# Patient Record
Sex: FEMALE | Race: WHITE | NOT HISPANIC OR LATINO | Employment: OTHER | URBAN - METROPOLITAN AREA
[De-identification: names, ages, dates, MRNs, and addresses within clinical notes are randomized per-mention and may not be internally consistent; named-entity substitution may affect disease eponyms.]

---

## 2017-01-09 ENCOUNTER — GENERIC CONVERSION - ENCOUNTER (OUTPATIENT)
Dept: OTHER | Facility: OTHER | Age: 72
End: 2017-01-09

## 2017-04-11 ENCOUNTER — GENERIC CONVERSION - ENCOUNTER (OUTPATIENT)
Dept: OTHER | Facility: OTHER | Age: 72
End: 2017-04-11

## 2017-04-12 ENCOUNTER — ALLSCRIPTS OFFICE VISIT (OUTPATIENT)
Dept: OTHER | Facility: OTHER | Age: 72
End: 2017-04-12

## 2017-04-12 LAB
BILIRUB UR QL STRIP: NORMAL
CLARITY UR: NORMAL
COLOR UR: YELLOW
GLUCOSE (HISTORICAL): NORMAL
HGB UR QL STRIP.AUTO: NORMAL
KETONES UR STRIP-MCNC: NORMAL MG/DL
LEUKOCYTE ESTERASE UR QL STRIP: NORMAL
NITRITE UR QL STRIP: NORMAL
PH UR STRIP.AUTO: 5.5 [PH]
PROT UR STRIP-MCNC: NORMAL MG/DL
SP GR UR STRIP.AUTO: 1.02
UROBILINOGEN UR QL STRIP.AUTO: 0.2

## 2017-06-20 ENCOUNTER — TRANSCRIBE ORDERS (OUTPATIENT)
Dept: LAB | Facility: HOSPITAL | Age: 72
End: 2017-06-20

## 2017-06-20 DIAGNOSIS — R03.0 ELEVATED BLOOD PRESSURE READING WITHOUT DIAGNOSIS OF HYPERTENSION: Primary | ICD-10-CM

## 2017-06-27 ENCOUNTER — APPOINTMENT (OUTPATIENT)
Dept: LAB | Facility: HOSPITAL | Age: 72
End: 2017-06-27
Attending: INTERNAL MEDICINE
Payer: COMMERCIAL

## 2017-06-27 DIAGNOSIS — R03.0 ELEVATED BLOOD PRESSURE READING WITHOUT DIAGNOSIS OF HYPERTENSION: ICD-10-CM

## 2017-06-27 LAB — VENIPUNCTURE: NORMAL

## 2017-06-27 PROCEDURE — 80061 LIPID PANEL: CPT | Performed by: INTERNAL MEDICINE

## 2017-06-27 PROCEDURE — 36415 COLL VENOUS BLD VENIPUNCTURE: CPT | Performed by: INTERNAL MEDICINE

## 2017-07-11 ENCOUNTER — ALLSCRIPTS OFFICE VISIT (OUTPATIENT)
Dept: OTHER | Facility: OTHER | Age: 72
End: 2017-07-11

## 2017-07-25 ENCOUNTER — APPOINTMENT (OUTPATIENT)
Dept: LAB | Facility: CLINIC | Age: 72
End: 2017-07-25
Payer: COMMERCIAL

## 2017-07-25 DIAGNOSIS — R53.83 OTHER FATIGUE: ICD-10-CM

## 2017-07-25 LAB — TSH SERPL DL<=0.05 MIU/L-ACNC: 1.97 UIU/ML (ref 0.36–3.74)

## 2017-07-25 PROCEDURE — 84443 ASSAY THYROID STIM HORMONE: CPT

## 2017-07-25 PROCEDURE — 36415 COLL VENOUS BLD VENIPUNCTURE: CPT

## 2017-08-08 ENCOUNTER — GENERIC CONVERSION - ENCOUNTER (OUTPATIENT)
Dept: OTHER | Facility: OTHER | Age: 72
End: 2017-08-08

## 2017-08-10 ENCOUNTER — ALLSCRIPTS OFFICE VISIT (OUTPATIENT)
Dept: OTHER | Facility: OTHER | Age: 72
End: 2017-08-10

## 2017-08-30 ENCOUNTER — TELEPHONE (OUTPATIENT)
Dept: PREADMISSION TESTING | Facility: HOSPITAL | Age: 72
End: 2017-08-30

## 2017-09-01 DIAGNOSIS — I60.8: ICD-10-CM

## 2017-09-01 DIAGNOSIS — R93.0 ABNORMAL FINDINGS ON DIAGNOSTIC IMAGING OF SKULL AND HEAD, NOT ELSEWHERE CLASSIFIED: ICD-10-CM

## 2017-09-01 DIAGNOSIS — I60.9 NONTRAUMATIC SUBARACHNOID HEMORRHAGE (HCC): ICD-10-CM

## 2017-09-05 ENCOUNTER — ANESTHESIA EVENT (OUTPATIENT)
Dept: RADIOLOGY | Facility: HOSPITAL | Age: 72
End: 2017-09-05

## 2017-09-06 ENCOUNTER — HOSPITAL ENCOUNTER (OUTPATIENT)
Dept: RADIOLOGY | Facility: HOSPITAL | Age: 72
Discharge: HOME | End: 2017-09-06
Payer: COMMERCIAL

## 2017-09-06 ENCOUNTER — ANESTHESIA (OUTPATIENT)
Dept: RADIOLOGY | Facility: HOSPITAL | Age: 72
End: 2017-09-06

## 2017-09-06 VITALS
WEIGHT: 166 LBS | BODY MASS INDEX: 24.59 KG/M2 | HEIGHT: 69 IN | RESPIRATION RATE: 18 BRPM | SYSTOLIC BLOOD PRESSURE: 110 MMHG | HEART RATE: 73 BPM | TEMPERATURE: 98.4 F | DIASTOLIC BLOOD PRESSURE: 70 MMHG | OXYGEN SATURATION: 97 %

## 2017-09-06 DIAGNOSIS — I60.9 NONTRAUMATIC SUBARACHNOID HEMORRHAGE (HCC): ICD-10-CM

## 2017-09-06 DIAGNOSIS — I60.8: ICD-10-CM

## 2017-09-06 DIAGNOSIS — R93.0 ABNORMAL FINDINGS ON DIAGNOSTIC IMAGING OF SKULL AND HEAD, NOT ELSEWHERE CLASSIFIED: ICD-10-CM

## 2017-09-06 LAB
BUN SERPL-MCNC: 14 MG/DL (ref 5–25)
CREAT SERPL-MCNC: 0.74 MG/DL (ref 0.6–1.3)
GFR SERPL CREATININE-BSD FRML MDRD: 82 ML/MIN/1.73SQ M

## 2017-09-06 PROCEDURE — 84520 ASSAY OF UREA NITROGEN: CPT | Performed by: PHYSICIAN ASSISTANT

## 2017-09-06 PROCEDURE — 82565 ASSAY OF CREATININE: CPT | Performed by: PHYSICIAN ASSISTANT

## 2017-09-06 PROCEDURE — 70544 MR ANGIOGRAPHY HEAD W/O DYE: CPT

## 2017-09-06 RX ORDER — SODIUM CHLORIDE, SODIUM LACTATE, POTASSIUM CHLORIDE, CALCIUM CHLORIDE 600; 310; 30; 20 MG/100ML; MG/100ML; MG/100ML; MG/100ML
20 INJECTION, SOLUTION INTRAVENOUS CONTINUOUS
Status: DISCONTINUED | OUTPATIENT
Start: 2017-09-06 | End: 2017-09-06

## 2017-09-06 RX ORDER — SODIUM CHLORIDE, SODIUM LACTATE, POTASSIUM CHLORIDE, CALCIUM CHLORIDE 600; 310; 30; 20 MG/100ML; MG/100ML; MG/100ML; MG/100ML
20 INJECTION, SOLUTION INTRAVENOUS CONTINUOUS
Status: DISCONTINUED | OUTPATIENT
Start: 2017-09-06 | End: 2017-09-07 | Stop reason: HOSPADM

## 2017-09-06 RX ADMIN — SODIUM CHLORIDE, SODIUM LACTATE, POTASSIUM CHLORIDE, AND CALCIUM CHLORIDE 20 ML/HR: .6; .31; .03; .02 INJECTION, SOLUTION INTRAVENOUS at 09:48

## 2017-10-03 ENCOUNTER — GENERIC CONVERSION - ENCOUNTER (OUTPATIENT)
Dept: OTHER | Facility: OTHER | Age: 72
End: 2017-10-03

## 2017-11-17 ENCOUNTER — GENERIC CONVERSION - ENCOUNTER (OUTPATIENT)
Dept: OTHER | Facility: OTHER | Age: 72
End: 2017-11-17

## 2017-12-11 DIAGNOSIS — I67.1 NONRUPTURED CEREBRAL ANEURYSM: ICD-10-CM

## 2017-12-11 DIAGNOSIS — R42 DIZZINESS AND GIDDINESS: ICD-10-CM

## 2017-12-11 DIAGNOSIS — E78.5 HYPERLIPIDEMIA: ICD-10-CM

## 2017-12-11 DIAGNOSIS — I77.4 CELIAC ARTERY COMPRESSION SYNDROME (HCC): ICD-10-CM

## 2017-12-11 DIAGNOSIS — R03.0 ELEVATED BLOOD PRESSURE READING WITHOUT DIAGNOSIS OF HYPERTENSION: ICD-10-CM

## 2017-12-13 ENCOUNTER — TRANSCRIBE ORDERS (OUTPATIENT)
Dept: LAB | Facility: CLINIC | Age: 72
End: 2017-12-13

## 2017-12-13 ENCOUNTER — APPOINTMENT (OUTPATIENT)
Dept: LAB | Facility: CLINIC | Age: 72
End: 2017-12-13
Payer: COMMERCIAL

## 2017-12-13 DIAGNOSIS — I77.4 CELIAC ARTERY COMPRESSION SYNDROME (HCC): ICD-10-CM

## 2017-12-13 DIAGNOSIS — R42 DIZZINESS AND GIDDINESS: ICD-10-CM

## 2017-12-13 DIAGNOSIS — R03.0 ELEVATED BLOOD PRESSURE READING WITHOUT DIAGNOSIS OF HYPERTENSION: ICD-10-CM

## 2017-12-13 DIAGNOSIS — E78.5 HYPERLIPIDEMIA: ICD-10-CM

## 2017-12-13 LAB
CHOLEST SERPL-MCNC: 279 MG/DL (ref 50–200)
HDLC SERPL-MCNC: 82 MG/DL (ref 40–60)
LDLC SERPL CALC-MCNC: 179 MG/DL (ref 0–100)
TRIGL SERPL-MCNC: 92 MG/DL

## 2017-12-13 PROCEDURE — 80061 LIPID PANEL: CPT

## 2017-12-13 PROCEDURE — 36415 COLL VENOUS BLD VENIPUNCTURE: CPT

## 2017-12-14 ENCOUNTER — ALLSCRIPTS OFFICE VISIT (OUTPATIENT)
Dept: OTHER | Facility: OTHER | Age: 72
End: 2017-12-14

## 2017-12-16 NOTE — PROGRESS NOTES
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)   · This is very small and is either a blister aneurysm or a small infundibulum there is also    an additional lenticulostriate abnormality  2  Constipation (564 00) (K59 00)  3  Hyperlipidemia (272 4) (E78 5)  4  Stress incontinence, female (625 6) (N39 3)    Plan  Aneurysm of anterior cerebral artery    · (1) CBC/PLT/DIFF; Status:Active - Retrospective Authorization; Requested for:14Ivv5845;   · (1) COMPREHENSIVE METABOLIC PANEL; Status:Active - Retrospective Authorization; Requested for:18Ylg2664; Anxiety disorder    · Renew: ALPRAZolam 0 25 MG Oral Tablet; TAKE ONE TABLET THREE TIMES A DAY ASNEEDED  Health Maintenance    · We have prescribed Kegel exercises to be done in a set of 15 repetitions, 3 times a day  ;Status:Complete;   Done:1 1,2  [id number] 1,2   Check blood work  As above  Recheck 4 months  1 Amended By: Kassi De La Rosa; Dec 14 2017 10:52 PM EST   2 Amended By: Kassi De La Rosa; Dec 14 2017 10:52 PM EST    Discussion/Summary    1  Hypertension-controlled2  Hyperlipidemia-unable to tolerate statins or red yeast rice  Suggest she consider Zetia  Reminded about low-fat diet  3  History of subarachnoid hemorrhage-4  Family history of cerebral aneurysms-will be getting annual MRA a as per recommend neuro 1  surgery's recommendations5  Change in bowel movements-most likely related to the flaxseed  She will stop this  Had colonoscopy 2015  6  Urinary incontinence-timed voiding helps  7  1  HM-immunizations up-to-date  Has mammogram ordered by Juan C Gary physician       1 Amended By: Kassi De La Rosa; Dec 14 2017 10:50 PM EST    Chief Complaint  The patient is here today for a follow up  History of Present Illness  Fuller Games is doing well except she has a problem with constipation  BM's are pellets but now fewer but more frequent  Has been taking flaxseed  She was having trouble with incontinence after her surgery   She is doing timed voiding  She saw Cardiology  She is unable to tolerate statins  Red yeast rice was suggested  She tried this but also got leg pain  She has now been taking flaxseed   She saw neurosurgery for f/u of her SAH  Annual MRA recommended due to Jose Jones of cerebral aneurysns  Review of Systems  Preventive Quality 65 and Older:1  The patient is currently experiencing urinary symptoms  1  Urinary Incontinence Symptoms includes: 1        1 Amended By: Viv Lepe; Dec 14 2017 10:52 PM EST    Active Problems  1  Abdominal pain (789 00) (R10 9)  2  Abnormal computed tomography angiography of head (793 0) (R93 0)  3  Abnormal findings on diagnostic imaging of breast (793 89) (R92 8)  4  Allergic rhinitis (477 9) (J30 9)  5  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)  6  Anxiety disorder (300 00) (F41 9)  7  Arthritis (716 90) (M19 90)  8  Breast cancer (174 9) (C50 919)  9  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)  10  Colonoscopy (Fiberoptic)  11  Constipation (564 00) (K59 00)  12  Depression (311) (F32 9)  13  Dizziness (780 4) (R42)  14  Elevated blood pressure, situational (796 2) (R03 0)  15  Encounter for removal of sutures (V58 32) (Z48 02)  16  Encounter for routine gynecological examination (V72 31) (Z01 419)  17  Esophageal reflux (530 81) (K21 9)  18  Fatigue (780 79) (R53 83)  19  Flu vaccine need (V04 81) (Z23)  20  Gait disorder (781 2) (R26 9)  21  Hydrocephalus (331 4) (G91 9)  22  Hyperlipidemia (272 4) (E78 5)  23  Lethargy (780 79) (R53 83)  24  Lump of skin (782 2) (R22 9)  25  MCI (mild cognitive impairment) (331 83) (G31 84)  26  Memory deficits (780 93) (R41 3)  27  Nasal congestion (478 19) (R09 81)  28  Need for pneumococcal vaccination (V03 82) (Z23)  29  Osteoarthritis of hip (715 95) (M16 9)  30  Osteopenia (733 90) (M85 80)  31  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)  32  Pain of both hip joints (719 45) (M25 551,M25 552)  33   Preprocedural examination (C92 84) (Z01 818)  34  Stress incontinence, female (625 6) (N39 3)  35  Subarachnoidal hemorrhage (430) (I60 9)  36  Urinary retention (788 20) (R33 9)  37  Urinary tract infection (599 0) (N39 0)  38  Weakness of both lower extremities (729 89) (R29 898)    Past Medical History  1  History of Bone Density Studies Dual-Energy X-ray Absorptiometry  2  Colonoscopy (Fiberoptic)  3  History of Encounter for screening mammogram for malignant neoplasm of breast (V76 12) (Z12 31)  4  History of hypertension (V12 59) (Z86 79)  5  History of Screening for colon cancer (V76 51) (Z12 11)  6  History of Stroke, hemorrhagic (431) (I61 9)  7  History of Thyroid disorder screen (V77 0) (Z13 29)    Surgical History  1  History of Breast Lumpectomy Location  2  History of Diagnostic Esophagogastroduodenoscopy  3  History of Hip Replacement  4  History of Hysterectomy  5  History of Simple Bunion Exostectomy (Silver Procedure)    Family History  Mother   1  Family history of Arthritis (V17 7)  2  Family history of Bone Cancer  3  Family history of Family Health Status Of Mother -   3  Family history of Hypertension (V17 49)  Father   5  Family history of Family Health Status Of Father -   10  Family history of Stroke Syndrome (V17 1)  Maternal Grandmother   7  Family history of cerebrovascular accident (CVA) (V17 1) (Z82 3)    Social History   · Alcohol Use (History)   · Caffeine Use   ·    · Never a smoker    Current Meds  1  ALPRAZolam 0 25 MG Oral Tablet; TAKE ONE TABLET THREE TIMES A DAY AS NEEDED; Therapy: 24XMH4899 to (Last Rx:2017) Ordered  2  Azithromycin 250 MG Oral Tablet; Take two tablets one hour prior to procedure; Therapy: 67SBY2707 to (Last Rx:41Efs7547)  Requested for: 34Kdz0194 Ordered  3  Caltrate 600+D TABS; Take 1 tablet daily; Therapy: ([phone]) to Recorded  4  Flonase Allergy Relief 50 MCG/ACT Nasal Suspension; USE AS DIRECTED Recorded  5   HM Vitamin B12 TABS; TAKE 1 TABLET DAILY; Therapy: [phone] to Recorded  6  Multiple Vitamin TABS; TAKE 1 TABLET DAILY; Therapy: (Recorded:01Sep2016) to Recorded  7  PriLOSEC OTC 20 MG Oral Tablet Delayed Release; TAKE 1 TABLET DAILY; Therapy: (Recorded:01Sep2016) to Recorded  8  Sertraline HCl - 100 MG Oral Tablet; TAKE 1 TABLET DAILY; Therapy: 67TWB4439 to (Evaluate:14Apr2018)  Requested for: 80BBW0793; Last Rx:16Oct2017 Ordered  9  Stool Softener TABS; TAKE 1 TABLET DAILY AS DIRECTED; Therapy: [phone]) to Recorded  10  Xalatan 0 005 % Ophthalmic Solution; Therapy: 23BWO3375 to (Last Rx:04Mar2011)  Requested for: 53AZS4488 Ordered    Allergies  1  Bactrim TABS  2  Penicillins  3  Lipitor TABS  4  red yeast rice    Vitals  Vital Signs    Recorded: 14Dec2017 04:16PM   Heart Rate 64   Respiration 18   Systolic 783   Diastolic 68   Weight 997 lb 4 oz   BMI Calculated 24 7   BSA Calculated 1 91     Physical Exam   Constitutional  General appearance: No acute distress, well appearing and well nourished  Pulmonary  Respiratory effort: No increased work of breathing or signs of respiratory distress  Auscultation of lungs: Clear to auscultation  Abdomen  Liver and spleen: No hepatomegaly or splenomegaly  Lymphatic  Palpation of lymph nodes in neck: No lymphadenopathy  Results/Data  (1) LIPID PANEL, FASTING 13Dec2017 09:50AM Owen Domínguez    Order Number: [id number]     Test Name Result Flag Reference   CHOLESTEROL 279 mg/dL H    HDL,DIRECT 82 mg/dL H 40-60   Specimen collection should occur prior to Metamizole administration due to the potential for falsley depressed results     LDL CHOLESTEROL CALCULATED 179 mg/dL H 0-100   Triglyceride:       Normal <150 mg/dl  Borderline High 150-199 mg/dl  High 200-499 mg/dl  Very High >499 mg/dl   Cholesterol:      Desirable <200 mg/dl   Borderline High 200-239 mg/dl   High >239 mg/dl   HDL Cholesterol:      High>59 mg/dL   Low <41 mg/dL   This screening LDL is a calculated result  It does not have the accuracy of the Direct Measured LDL in the monitoring of patients with hyperlipidemia and/or statin therapy  Direct Measure LDL (ZTD106) must be ordered separately in these patients  TRIGLYCERIDES 92 mg/dL  <=150   Specimen collection should occur prior to N-Acetylcysteine or Metamizole administration due to the potential for falsely depressed results  Health Management  Health Maintenance   Medicare Annual Wellness Visit; every 1 year; Next Due: 08WCZ9346; Overdue    Future Appointments    Date/Time Provider Specialty Site   05/23/2018 01:30 PM ABDI Valencia   [address] [facility]     Signatures   Electronically signed by : ABDI Vasquez ; Dec 14 2017 10:49PM EST                       (Author)    Electronically signed by : ABDI Vasquez ; Dec 14 2017 10:52PM EST                       (Author)

## 2017-12-22 ENCOUNTER — GENERIC CONVERSION - ENCOUNTER (OUTPATIENT)
Dept: FAMILY MEDICINE CLINIC | Facility: MEDICAL CENTER | Age: 72
End: 2017-12-22

## 2017-12-29 ENCOUNTER — GENERIC CONVERSION - ENCOUNTER (OUTPATIENT)
Dept: FAMILY MEDICINE CLINIC | Facility: MEDICAL CENTER | Age: 72
End: 2017-12-29

## 2018-01-05 ENCOUNTER — GENERIC CONVERSION - ENCOUNTER (OUTPATIENT)
Dept: FAMILY MEDICINE CLINIC | Facility: MEDICAL CENTER | Age: 73
End: 2018-01-05

## 2018-01-10 NOTE — PROGRESS NOTES
History of Present Illness  Care Coordination Encounter Information:   Type of Encounter: Telephonic   Contact: Initial Contact   Last Office Visit: 03/15/16   Spoke to Patient  Care Coordination  Nurse [address] [place] [address] [address]:   The reason for call is to discuss outreach for follow up/needed services  Previously spoke with patient on 2/29/16 following hospitalization for subarachnoid hemorrhage  Called patient today to follow up on patient's progress and to address any questions or concerns  Patient states that she is "doing good, but I still have my off days " She states that she is logging these days on the calendar  Encouraged patient to note the symptoms and feelings she is having at that time as well and to take the log with her to the physician appointments  Reminded patient of her upcoming geriatric appointment on 4/8/16  Jake Suarez denies any complaints of headache, dizziness, blurred vision or pain  She states that she has occasional weakness to bilateral legs, but denies any falls  She states that she is using her cane to ambulate, which she feels is helping  She states that she is no longer receiving speech therapy, physical therapy or visiting nurses  She states that she is still staying with her daughter at night, however is continuing to come to her home during the daytime hours  At this time, she states that she is currently getting ready to leave for a doctors appointment  She has a routine follow up visit with her breast cancer doctor  Jake Suarez denies any questions or concerns and is encouraged to call myself or the office if needed  Active Problems    1  Abdominal pain (789 00) (R10 9)   2  Abnormal findings on diagnostic imaging of breast (793 89) (R92 8)   3  Allergic rhinitis (477 9) (J30 9)   4  Anxiety disorder (300 00) (F41 9)   5  Arthritis (716 90) (M19 90)   6  Breast cancer (174 9) (C50 919)   7  Colonoscopy (Fiberoptic)   8  Constipation (564 00) (K59 00)   9  Dementia (294 20) (F03 90)   10  Dementia (294 20) (F03 90)   11  Depression (311) (F32 9)   12  Dizziness (780 4) (R42)   13  Encounter for removal of sutures (V58 32) (Z48 02)   14  Encounter for routine gynecological examination (V72 31) (Z01 419)   15  Esophageal reflux (530 81) (K21 9)   16  Flu vaccine need (V04 81) (Z23)   17  Gait disorder (781 2) (R26 9)   18  Hyperlipidemia (272 4) (E78 5)   19  Hypertension (401 9) (I10)   20  Lump of skin (782 2) (R22 9)   21  Need for pneumococcal vaccination (V03 82) (Z23)   22  Osteoarthritis of hip (715 95) (M16 9)   23  Osteopenia (733 90) (M85 80)   24  Subarachnoidal hemorrhage (430) (I60 9)   25  Urinary retention (788 20) (R33 9)   26  Urinary tract infection (599 0) (N39 0)    Past Medical History    1  History of Bone Density Studies Dual-Energy X-ray Absorptiometry   2  Colonoscopy (Fiberoptic)   3  History of Encounter for screening mammogram for malignant neoplasm of breast   (V76 12) (Z12 31)   4  History of Screening for colon cancer (V76 51) (Z12 11)   5  History of Thyroid disorder screen (V77 0) (Z13 29)    Surgical History    1  History of Diagnostic Esophagogastroduodenoscopy   2  History of Hip Replacement   3  History of Hysterectomy   4  History of Simple Bunion Exostectomy (Silver Procedure)    Family History    1  Family history of Arthritis (V17 7)   2  Family history of Bone Cancer   3  Family history of Family Health Status Of Mother -    3  Family history of Hypertension (V17 49)    5  Family history of Family Health Status Of Father -    10  Family history of Stroke Syndrome (V17 1)    Social History    · Alcohol Use (History)   · Caffeine Use   ·    · Never a smoker    Current Meds    1  ALPRAZolam 0 25 MG Oral Tablet; TAKE ONE TABLET THREE TIMES A DAY AS NEEDED; Therapy: 21VNR7549 to (Last Rx:2015) Ordered    2  Nasonex 50 MCG/ACT Nasal Suspension; INSTILL 1 TO 2 SPRAYS IN EACH NOSTRIL   EVERY DAY;    Therapy: 09SDZ5370 to (Evaluate:99Jez5576) Requested for: 06Apr2014; Last   Rx:17Mar2014 Ordered    3  Sertraline HCl - 25 MG Oral Tablet; take 1 tablet by mouth every day; Therapy: 92YVX5127 to (Evaluate:05Jun2016)  Requested for: 03PBC7469; Last   Rx:07Jan2016 Ordered    4  HydrALAZINE HCl - 10 MG Oral Tablet; As needed for systolic BP over 139 Recorded    5  Caltrate 600+D TABS; Therapy: (Recorded:37Add5277) to Recorded   6  Multiple Vitamin Oral Tablet; Therapy: (Recorded:00Xuw2586) to Recorded   7  PriLOSEC OTC 20 MG Oral Tablet Delayed Release Recorded   8  Tylenol 325 MG Oral Tablet; as needed Recorded   9  Xalatan 0 005 % Ophthalmic Solution (Latanoprost); Therapy: 14OWX1733 to (Last Rx:04Mar2011)  Requested for: 56ZXF9830 Ordered    Allergies    1  Bactrim TABS   2  Penicillins    Health Management   Medicare Annual Wellness Visit; every 1 year; Next Due: 16DLF3917; Overdue    End of Encounter Meds    1  ALPRAZolam 0 25 MG Oral Tablet; TAKE ONE TABLET THREE TIMES A DAY AS NEEDED; Therapy: 46FKK4956 to (Last Rx:21Apr2015) Ordered    2  Nasonex 50 MCG/ACT Nasal Suspension; INSTILL 1 TO 2 SPRAYS IN EACH NOSTRIL   EVERY DAY; Therapy: 79UVQ6870 to (Evaluate:18Tez8711)  Requested for: 06Apr2014; Last   Rx:17Mar2014 Ordered    3  Sertraline HCl - 25 MG Oral Tablet; take 1 tablet by mouth every day; Therapy: 29RIG9082 to (Evaluate:05Jun2016)  Requested for: 17SUF1087; Last   Rx:07Jan2016 Ordered    4  HydrALAZINE HCl - 10 MG Oral Tablet; As needed for systolic BP over 242 Recorded    5  Caltrate 600+D TABS; Therapy: (Recorded:11Qzo0820) to Recorded   6  Multiple Vitamin Oral Tablet; Therapy: (Recorded:24Vpq2039) to Recorded   7  PriLOSEC OTC 20 MG Oral Tablet Delayed Release Recorded   8  Tylenol 325 MG Oral Tablet; as needed Recorded   9  Xalatan 0 005 % Ophthalmic Solution (Latanoprost);    Therapy: 86CRU3319 to (Last Rx:04Mar2011)  Requested for: 65QBS9119 Ordered    Future Appointments    Date/Time Provider Specialty Site   06/06/2016 10:30 AM ABDI Jones  [address] [facility]   04/08/2016 11:00 AM Bharati Vazquez MD Geriatrics [address]   09/01/2016 10:30 AM Bekah Carmona [facility] Neurosurgery [facility] NEUROSURGICAL   09/01/2016 11:00 AM ABDI Govea   Neurosurgery [facility] NEUROSURGICAL     Patient Care Team    Care Team Member Role Specialty Office Number   Llana Logan AREVALO  Urology [phone]     Signatures   Electronically signed by : Tara Lea; Mar 29 2016  3:35PM EST                       (Author)

## 2018-01-10 NOTE — MISCELLANEOUS
Message   Recorded as Task   Date: 08/08/2017 04:10 PM, Created By: Audelia Hussein   Task Name: Care Coordination   Assigned To: Audelia Hussein   Regarding Patient: Constance Garcia, Status: Active   CommentYashira Yanceyman - 08 Aug 2017 4:10 PM     TASK CREATED  Caller: Self; Care Coordination; [phone] (Home)  Patient left message on nurse line requesting a call back  She said that she has gotten a couple messages from this number and was not sure why they were calling  Upon investigation, she is scheduled for an appt with KENYATTA on 9/12/17 and he will not be in the office that day so we need to reschedule her appt  Patient was rescheduled for 10/3/17 @ 4pm   This actually works better for the patient as well  She will keep the same date/time for her MRA  She was appreciative for the call back  Also confirmed that patient still has the [employer] blue ppo plan          Signatures   Electronically signed by : Moe Mac RN; Aug  8 2017  4:10PM EST                       (Author)

## 2018-01-12 NOTE — PROGRESS NOTES
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)    Plan    · *1 - [facility] OCCUPATIONAL THERAPY Physician Referral  Consult eval and treat  cognitive  eval  Status: Active  Requested for: 16Cod5524  Ordered; For: Abnormal computed tomography angiography of head, MCI (mild cognitive   impairment), Other nontraumatic subarachnoid hemorrhage;  Ordered By: Pernell Queen  Performed:   Due: 55KUE6127; Last Updated By:   Mazin Carbajal; 9/1/2016 12:33:11 PM  () Care Summary provided  : Yes    · Follow-up visit in 1 year Evaluation and Treatment  Follow-up sovl Dr Hu Menchaca with MRA  head with anesthesia  Status: Complete  Done: 60VMG3259  Ordered; For: Abnormal computed tomography angiography of head, Other nontraumatic   subarachnoid hemorrhage;  Ordered By: Pernell Queen  Performed:     Due: 32HPT9940; Last Updated By: Mazin Carbajal; 9/1/2016 12:52:21 PM    · * MRA HEAD WO CONTRAST; Status:Need Information - Financial Authorization; Requested for:[id number];   Perform:[facility] Radiology; Order Comments:MRA head without contrast with   anesthesia sedation  (Patient with claustrophobia)  Follow up 1mm    outpouching  at  the  junction  of  the  right    A1/A2  segments  Small  infundibular  origin  right  lateral  lenticulostriate    vessels  off  of  the  right  middle  cerebral  artery  History subarachnoid hemorrhage;   Due:48Roa3480; Last Updated By:Karlos Dumont; 9/1/2016 12:44:23 PM;Ordered; For:Abnormal computed tomography angiography of head, Other nontraumatic   subarachnoid hemorrhage, PMH: Subarachnoidal hemorrhage; Ordered By:Madhav Andrew Adjutant;    · 1 Johnathan Robison MD, Rakesh Juarez (Cardiology) Physician Referral  Consult eval and treat  Status:  Active  Requested for: 88Dzh5334  Ordered; For: Hypertension;  Ordered By: Pernell Queen  Performed:   Order   Comments: october 19, 2016 at 3:00pm @ 14[address] 8th Due: 64NLP8428; Last Updated   By: Mateus Lilly; 9/1/2016 12:33:11 PM  () Care Summary provided  : Yes    · (Q) BUN/CREATININE RATIO; Status:Active; Requested for:01Sep2017;   Perform:[facility]; Due:95Uhh7449; Ordered; For:Preprocedural examination; Ordered   By:Jax Andrew;    Discussion/Summary    Ms Luanne Mckeon is a 80year old lady who had a spontaneous [facility] 1/3/16  Dr Freda Casas discussed with Ms Luanne Mckeon and her daughter the possibility that her SAH was the result of a ruptured aneurysm which may of thrombosed post rupture  Dr Freda Casas reviewed CTA head in detail with Ms Luanne Mckeon and her daughter  CTA head demonstrates stable subtle 1mm sidewall outpouching at the junction of the right A1/A2 segments  No interval increase in size of change  She also has a small infundibular origin right lateral lenticulostriate vessels off the right middle cerebral artery (M1 segment) a developmental variant  Dr Freda Casas discussed with Ms Luanne Mckeon and her daughter that neurosurgical intervention is not indicated at this juncture  He recommends continued surveillance in 1 year with MRA brain without contrast (with anesthesia sedation given claustrophobia)  BUN/Creatinine was ordered as per Anesthesia departments request     It was discussed with patient it is common to experience cognitive / memory dysfunction post-SAH  This has improved some but she and her daughter still describe short term memory difficulty and difficulty w/ concentrating  She would like to pursue Occupational therapy to include cognitive therapy -- referral provided  The natural history of unruptured aneurysm is related to genetics, size, position, and complexity of dome as well as smoking history and second hand smoke, hypercholesterolemia (HDL to LDL ratio), and hypertension  With this in mind, modulating these risk factors as much as possible will effect rupture rate  Ms Luanne Mckeon reports she does not smoke  Recommend Ms Luanne Mckeon refrain from smoking and refrain from second hand smoke exposure  Certain genetic conditions also are associated with the incidence and rupture rate of aneurysms  Advise patient's family members (ie  her siblings, children) follow up with their Primary Care Providers for screening MRA head to evaluate for aneurysm especially in setting of patient's history of SAH and reports of patient's family history of stroke of unknown etiology (father, maternal grandmother)  Daughter with patient made aware  Patient was advised to relay this information to her family members  Recommend patient follow up with her Primary Care Provider for risk factor modification management  Ms Justen Gómez wishes to pursue Cardiology evaluation for evaluation of reports of periodic hypertension -- referral to Dr Josiah Gates provided  Signs / symptoms of stroke and intracranial hemorrhage were discussed with Ms Justen Gómez and her daughter and patient was advised to call 911 for immediate evaluation an emergency room if such signs / symptoms occurred (ie  bad headache, nausea/vomiting, speech/vision dysfunction, facial droop, weakness, confusion / mental status change) or other neurological changes  Ms Justen Gómez and her daughter expressed understanding and agreement  The patient, patient's family (daughter) was counseled regarding diagnostic results, instructions for management, risk factor reductions, patient and family education, impressions  The treatment plan was reviewed with the patient/guardian  The patient/guardian understands and agrees with the treatment plan      Chief Complaint  patient presents for 6 month f/u with CTA Head      History of Present Illness  Ms Justen Gómez is a 80year old lady who presents for 6 month follow up for outpouching at the junction of the right A1/A2 segments and small infundibular origin right lateral lenticulostriate vessels off of the right middle cerebral artery ( M1 segment)  Patient is accompanied with her daughter    History of spontaneous subarachnoid 1/3/16   s/p CTA head 8/30/16  In review, Ms Yovany Mak is a 80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, 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 at [facility] s/p spontaneous subarachnoid hemorrhage on 1/3/16 ( Hunt and Skinner score 2 and Thao grade 3)  She had a left EVD for about 10 days from 1/4/16  She She underwent serial CTA and cerebral angiograms  She was noted to have 1-2mm outpouching involving the right LIANET A1/A2 junction projecting inferiorly possibly representing an aneurysm  Also noted to have proximal right MCA outpouching seen on CTA with one and possibly to branches arising from the apex, then supplying the lenticulostriate arteries, consistent with an infundibulum  Repeat cerebral angiogram 2/17/16 reported persistent subtle 1-2mm outpouching arising from the inferior aspect of the right LIANET A2 segment and interval resolution of the previously noted cerebral vasospasm  Her case was reviewed at Neurovascular Working Group Meeting 3/8/16 and was recommended follow up in 6 months with CTA head  She denies headache  She reports she can experience a "funny feeling" (hard for patient to describe) posterior aspect of head what typically occurs if her blood pressure is elevated (which can occur if she gets excited / anxious)  She reports BP went up to 196/119 a few months ago; reported BP was 169/119 on 7/30/16 (upset when watching TV show); reported BP was 153/101 on 8/31/16 (when dealing with apartment tenant issue 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)  Patient reports she took Hydralazine when these blood pressure spikes occurred which brought her blood pressure back down  She reports she has used about 4-6 tabs of the Hydralazine over the last three month  She reports she has the Hydralazine from hospital discharge at the beginning of the year  She would like to see cardiology regarding BP management       Ms Yovany Mak reports short term memory difficulty since the [facility] in 1/2016  Her daughter was in agreement  It has improved some but she still has to write things down for reminders  She reports playing "Words with Friends" (game on phone) has helped  Reports she can read articles on phone without difficulty but difficulty with concentrating if try to read from book so reports she has not been reading from books since [facility] in 1/2016  Reports since [facility] in 1/2016 she would forget to urinate so she reports at recommendations of Dr Bharat Lyle she has been completing timed voiding (q 2-3hrs) where she sets her phone alarm to remind her to urinate  She reports she is not participating in outpatient Occupational therapy  Reported she had participated in some home OT initially post-SAH in 1/2016  She denies nausea or vomiting  She denies visual disturbances  She denies numbness or tingling  She reports subjective weakness of her right leg since [facility] in 1/2016  She has a right hip replacement in 2014  She reports ambulating independent  She reports occasional shuffling but not present currently  Reports resumed exercise to [facility] once a week  She is no longer driving so relies on taking bus  She is living in an apartment independent in a [facility]  She reports she is a lifelong nonsmoker  She does report she was exposed to second hand smoke for 14 years when lived with her first   She reports she is not on any cholesterol medication  No reported known family member w/ aneurysm  She denies unexplained death in family members  She reports her father had a stroke in the 18's (unsure etiology of stroke) but he lived for 4 years longer and passed away of pneumonia  She reports her maternal grandmother had a stroke (unsure of etiology) although patient reports her M  grandmother lived a while after the stroke as well        Review of Systems    Constitutional: no fever, not feeling poorly, no recent weight gain, no chills, not feeling tired and no recent weight loss  Eyes: dryness of the eyes, but no eye pain, no eyesight problems, eyes not red and no purulent discharge from the eyes    no itching of the eyes  ENT: no earache, no nosebleeds, no sore throat, no hearing loss, no nasal discharge and no hoarseness  Cardiovascular: the heart rate was not slow, no chest pain, the heart rate was not fast and no palpitations  Respiratory: shortness of breath during exertion, but no shortness of breath, no cough and no wheezing  Gastrointestinal: no abdominal pain, no nausea, no vomiting, no constipation, no diarrhea and no blood in stools  Genitourinary: no dysuria and no incontinence  Musculoskeletal: no joint swelling, no limb pain, no joint stiffness and no limb swelling  Integumentary: no rashes, no itching, no skin lesions and no skin wound  Neurological: limb weakness (on/off weakness in the legs) and difficulty walking (balance issues), but no numbness, no tingling (occasional tingling in the back of the head), no dizziness, no convulsions and no fainting    The patient presents with complaints of occasional episodes of headache  The patient presents with complaints of confusion (memory issues)   Psychiatric: anxiety, but no sleep disturbances and no depression  Hematologic/Lymphatic: no tendency for easy bleeding and no tendency for easy bruising  ROS reviewed  Active Problems   1  Abdominal pain (789 00) (R10 9)  2  Abnormal findings on diagnostic imaging of breast (793 89) (R92 8)  3  Allergic rhinitis (477 9) (J30 9)  4  Anxiety disorder (300 00) (F41 9)  5  Arthritis (716 90) (M19 90)  6  Breast cancer (174 9) (C50 919)  7  Colonoscopy (Fiberoptic)  8  Constipation (564 00) (K59 00)  9  Depression (311) (F32 9)  10  Dizziness (780 4) (R42)  11  Encounter for removal of sutures (V58 32) (Z48 02)  12  Encounter for routine gynecological examination (V72 31) (Z01 419)  13  Esophageal reflux (530 81) (K21 9)  14   Flu vaccine need (V04 81) (Z23)  15  Gait disorder (781 2) (R26 9)  16  Hyperlipidemia (272 4) (E78 5)  17  Hypertension (401 9) (I10)  18  Lump of skin (782 2) (R22 9)  19  MCI (mild cognitive impairment) (331 83) (G31 84)  20  Nasal congestion (478 19) (R09 81)  21  Need for pneumococcal vaccination (V03 82) (Z23)  22  Osteoarthritis of hip (715 95) (M16 9)  23  Osteopenia (733 90) (M85 80)  24  Pain of both hip joints (719 45) (M25 551,M25 552)  25  Urinary retention (788 20) (R33 9)  26  Urinary tract infection (599 0) (N39 0)  27  Weakness of both lower extremities (729 89) (M62 81)    Past Medical History   1  History of Bone Density Studies Dual-Energy X-ray Absorptiometry  2  Colonoscopy (Fiberoptic)  3  History of Encounter for screening mammogram for malignant neoplasm of breast   (V76 12) (Z12 31)  4  History of Screening for colon cancer (V76 51) (Z12 11)  5  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)  6  History of Thyroid disorder screen (V77 0) (Z13 29)    The active problems and past medical history were reviewed and updated today  Surgical History   1  History of Diagnostic Esophagogastroduodenoscopy  2  History of Hip Replacement  3  History of Hysterectomy  4  History of Simple Bunion Exostectomy (Silver Procedure)    The surgical history was reviewed and updated today  Family History  Mother   1  Family history of Arthritis (V17 7)  2  Family history of Bone Cancer  3  Family history of Family Health Status Of Mother -   3  Family history of Hypertension (V17 49)  Father   5  Family history of Family Health Status Of Father -   10  Family history of Stroke Syndrome (V17 1)  Maternal Grandmother   7  Family history of cerebrovascular accident (CVA) (V17 1) (Z82 3)    The family history was reviewed and updated today  Social History    · Alcohol Use (History)   · Caffeine Use   ·    · Never a smoker  The social history was reviewed and updated today        Current Meds  1  ALPRAZolam 0 25 MG Oral Tablet; TAKE ONE TABLET THREE TIMES A DAY AS NEEDED; Therapy: 97VYU7260 to (Last Rx:06Jun2016) Ordered  2  Caltrate 600+D TABS; Therapy: (Recorded:39Lxc1082) to Recorded  3  Flonase Allergy Relief 50 MCG/ACT Nasal Suspension; USE AS DIRECTED Recorded  4  HydrALAZINE HCl - 10 MG Oral Tablet; As needed for systolic BP over 966 Recorded  5  Multiple Vitamin TABS; TAKE 1 TABLET DAILY; Therapy: (Recorded:01Sep2016) to Recorded  6  PriLOSEC OTC 20 MG Oral Tablet Delayed Release; TAKE 1 TABLET DAILY; Therapy: (Recorded:01Sep2016) to Recorded  7  Sertraline HCl - 50 MG Oral Tablet; TAKE 1 TABLET DAILY; Therapy: 10TTG2376 to (Evaluate:17Gel8426)  Requested for: 61Jol9890; Last   Rx:11Aug2016 Ordered  8  Tylenol 325 MG Oral Tablet; as needed Recorded  9  Xalatan 0 005 % Ophthalmic Solution; Therapy: 84OKZ3736 to (Last Rx:04Mar2011)  Requested for: 30TVV5790 Ordered    The medication list was reviewed and updated today  Allergies   1  Bactrim TABS  2  Penicillins    Vitals  Vital Signs    Recorded: 08WZG3179 24:14US   Systolic 062, LUE, Sitting   Diastolic 76, LUE, Sitting   Heart Rate 74   Respiration 16   Temperature 97 5 F, Tympanic   Height 5 ft 9 in   Weight 162 lb    BMI Calculated 23 92   BSA Calculated 1 89     Physical Exam     Constitutional Patient appears healthy and well developed  No signs of acute distress present  Respiratory Respiratory effort: Normal    Neurologic - Mental Status: Alert and Oriented x3  Mood and affect: Affect is normal   [place] President -- Obama, Mooney, Facundo, Mooney intact  Recalls 2 of three words delayed  Speech is articulated and fluent  Id's pen, the tip, and function to write  Grossly nonfocal     Cranial Nerve Exam:  2nd cranial nerve: Visual field was full to confrontation  3rd, 4th, and 6th cranial nerves: Normal with no deficit  5th CN: Sensation to LT intact b/l V1-V3  Masseter intact b/l  7th cranial nerve:  Face symmetrical at grimace and at rest  8th cranial nerve: Grossly intact to finger rub bilaterally  9th and 10th cranial nerves: Uvula is midline  11th cranial nerve: Shoulder shrug equal bilaterally  12th cranial nerve: Tongue mideline, no atrophy present  Motor System - Upper Extremities: Normal to inspection and palpation  Strength: Deltoids 5/5 bilaterally  Biceps 5/5 bilaterally  Triceps 5/5 bilaterally  Extensor carpi radials is 5/5 bilaterally  Extensor digitorum 5/5 bilaterally  Intrinsic 5/5 bilaterally   5/5 bilaterally  Motor System - Lower Extremities: Normal to inspection and palpation  Strength: iliopsoas 5/5 bilaterally  Quadriceps 5/5 bilaterally  Hamstrings 5/5 bilaterally  Gastrocnemius 5/5 bilaterally  Coordination: Finger to nose was normal   (No pronator drift)  Coordination: no past-pointing, no dysdiadochokinesia and no finger to nose dysmetria  Involuntary movements: None   Sensory: Sensation grossly intact to light touch  Sensation grossly intact to light touch  Gait and Station: [place] with a normal gait  Able to tandem walk 6 paces  Results/Data  Diagnostic Studies Reviewed [facility]:   I personally reviewed the CTA head in detail with the patient  CTA HEAD W WO CONTRAST 35Vci4038 07:31AM [place] Order Number: [id number]    - Patient Instructions: Appt scheduled for 8/30/2016 at 745 am located at [address] [place] [address] Street  Please arrive 15 minutes early with script, photo ID and insurance cards  Nothing to eat 3 hours prior  But please drink plenty of clear liquids  To schedule this appointment, please contact Central Scheduling at [phone]   Order Number: [id number]    - Patient Instructions: Appt scheduled for 8/30/2016 at 745 am located at [address] [place] [address]  Please arrive 15 minutes early with script, photo ID and insurance cards  Nothing to eat 3 hours prior  But please drink plenty of clear liquids      To schedule this appointment, please contact Central Scheduling at [phone]  Test Name Result Flag Reference   CTA HEAD W WO CONTRAST (Report)     CTA BRAIN - WITH AND WITHOUT CONTRAST     INDICATION: Follow-up subarachnoid hemorrhage  History of prior craniotomy     COMPARISON:  2/18/2016; cerebral angiogram 2/17/2016; CTA 1/21/2016; head CT 3/29/2016     TECHNIQUE: Routine noncontrast CT brain followed by axial 0 625 mm imaging after administration of intravenous contrast  MIP and 3D reconstructions performed  3D rendering was performed on an independent workstation  This examination, like all CT    scans performed in the [facility], was performed utilizing techniques to minimize radiation dose exposure, including the use of iterative reconstruction and automated exposure control  85 mL of Omnipaque 350 was injected intravenously without immediate consequence  IMAGE QUALITY: Diagnostic  FINDINGS:     NONCONTRAST BRAIN: No acute intracranial abnormality  No mass, hemmorhage or evidence of infarction  DISTAL INTERNAL CAROTID ARTERIES: Trace atherosclerotic vascular calcifications without hemodynamically significant stenosis proximal left internal carotid artery  Vessels are bilaterally patent through the skull base  Atherosclerotic calcifications of   the cavernous segment of the internal carotid artery are mild    Normal ophthalmic artery origins  Normal ICA terminus  ANTERIOR CIRCULATION: Tiny sessile sidewall outpouching measuring 1 mm at the junction of the right A1/A2 segment of the right anterior cerebral artery projecting inferiorly on image 179, series 66, correlating to image 7, series 159  This is stable,    possibly a very tiny sidewall aneurysm  No interval increase in size  Otherwise anterior cerebral arteries are intact  MIDDLE CEREBRAL ARTERY CIRCULATION: Small infundibular origin associated with the lateral lenticular striates on the right, image 166, series 6 also stable   No cerebral arteries otherwise intact  DISTAL VERTEBRAL ARTERIES: Mild atherosclerotic vascular calcifications distal vertebral arteries in the intradural segments    Posterior inferior cerebellar artery origins are normal  Normal vertebral basilar junction  BASILAR ARTERY: Basilar artery is normal in caliber  Normal superior cerebellar arteries  POSTERIOR CEREBRAL ARTERIES: Both posterior cerebral arteries arise from the basilar tip and demonstrates normal enhancement  Normal posterior communicating arteries  DURAL VENOUS SINUSES: Normal      BONY STRUCTURES: Smallburr hole defect in the left frontal region  IMPRESSION:       1  Stable subtle 1 mm sessile sidewall outpouching at the junction of the right A1/A2 segments possibly a miniscule aneurysm  No interval increase in size or change  2  Small infundibular origin right lateral lenticulostriate vessels off of the right middle cerebral artery, M1 segment, a developmental variant  Workstation performed: [id number]     Signed by: Yumiko Sultana MD   8/30/16     Health Management  Health Maintenance   Medicare Annual Wellness Visit; every 1 year; Next Due: 61ZXA9967; Overdue    Future Appointments    Date/Time Provider Specialty Site   12/07/2016 10:15 AM ABDI Meyers  [address] [place] [address]   10/19/2016 03:00 PM Blanka Florentino MD Cardiology [facility]   09/12/2017 09:30 AM ABDI Jarrell   Neurosurgery [facility] NEUROSURGICAL [facility]     Signatures   Electronically signed by : Fareed Bowers, [facility]; Sep  2 2016  5:42AM EST                       (Author)    Electronically signed by : Gerhardt Matt, M D ; Sep  2 2016  8:39AM EST                       (Author)

## 2018-01-13 VITALS
HEIGHT: 69 IN | BODY MASS INDEX: 24.81 KG/M2 | DIASTOLIC BLOOD PRESSURE: 70 MMHG | WEIGHT: 167.5 LBS | HEART RATE: 70 BPM | RESPIRATION RATE: 18 BRPM | SYSTOLIC BLOOD PRESSURE: 110 MMHG

## 2018-01-13 VITALS
DIASTOLIC BLOOD PRESSURE: 68 MMHG | HEIGHT: 69 IN | HEART RATE: 60 BPM | SYSTOLIC BLOOD PRESSURE: 118 MMHG | BODY MASS INDEX: 24.29 KG/M2 | WEIGHT: 164 LBS

## 2018-01-13 NOTE — PROGRESS NOTES
Assessment    1  Encounter for routine gynecological examination (V72 31) (Z01 419)   2  Stress incontinence, female (625 6) (N39 3)    Plan  Encounter for routine gynecological examination    · *VB - Urinary Incontinence Screen (Dx Z13 89 Screen for UI); Status:Active; Requested  for:12Apr2017;    · Incontinence Appliances/Supplies; Status:Complete;   Done: 12Apr2017 09:22PM  Hyperlipidemia    · Atorvastatin Calcium 10 MG Oral Tablet  Urinary retention    · Urine Dip Automated- POC; Status:Resulted - Requires Verification,Retrospective By  Protocol Authorization;   Done: 67CIB4027 12:00AM   · 2 - Eunice Lamb MD, Jose Guadalupe Oliveira  (Obstetrics/Gynecology) Co-Management  *  Status: Active -  Retrospective By Protocol Authorization  Requested for: [id number]  Care Summary provided  : Yes    Referral to urogyn  REcheck 3 months  History of Present Illness  HM, Adult Female: The patient is being seen for a gynecology evaluation  General Health:   Reproductive health: the patient is postmenopausal   She went to [facility] her breast checkup yesterday  Needs a breast physician locally  Screening:      Review of Systems    Genitourinary: incontinence and Has had stress incontinence with cough , sneeze, laugh  , but no dysuria, no pelvic pain, no vaginal discharge and no unexplained vaginal bleeding  Preventive Quality 65 and Older: The patient is currently experiencing urinary symptoms  Urinary Incontinence Symptoms includes: urinary incontinence       Active Problems    1  Abdominal pain (789 00) (R10 9)   2  Abnormal computed tomography angiography of head (793 0) (R93 0)   3  Abnormal findings on diagnostic imaging of breast (793 89) (R92 8)   4  Allergic rhinitis (477 9) (J30 9)   5  Anxiety disorder (300 00) (F41 9)   6  Arthritis (716 90) (M19 90)   7  Breast cancer (174 9) (C50 919)   8  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)   9  Colonoscopy (Fiberoptic)   10  Constipation (564 00) (K59 00)   11   Depression (311) (F32 9)   12  Dizziness (780 4) (R42)   13  Elevated blood pressure, situational (796 2) (R03 0)   14  Encounter for removal of sutures (V58 32) (Z48 02)   15  Encounter for routine gynecological examination (V72 31) (Z01 419)   16  Esophageal reflux (530 81) (K21 9)   17  Flu vaccine need (V04 81) (Z23)   18  Gait disorder (781 2) (R26 9)   19  Hydrocephalus (331 4) (G91 9)   20  Hyperlipidemia (272 4) (E78 5)   21  Lump of skin (782 2) (R22 9)   22  MCI (mild cognitive impairment) (331 83) (G31 84)   23  Memory deficits (780 93) (R41 3)   24  Nasal congestion (478 19) (R09 81)   25  Need for pneumococcal vaccination (V03 82) (Z23)   26  Osteoarthritis of hip (715 95) (M16 9)   27  Osteopenia (733 90) (M85 80)   28  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)   29  Pain of both hip joints (719 45) (M25 551,M25 552)   30  Preprocedural examination (V72 84) (Z01 818)   31  Subarachnoidal hemorrhage (430) (I60 9)   32  Urinary retention (788 20) (R33 9)   33  Urinary tract infection (599 0) (N39 0)   34   Weakness of both lower extremities (729 89) (R29 898)    Past Medical History    · History of Bone Density Studies Dual-Energy X-ray Absorptiometry   · Colonoscopy (Fiberoptic)   · History of Encounter for screening mammogram for malignant neoplasm of breast  (V76 12) (Z12 31)   · History of hypertension (V12 59) (Z86 79)   · History of Screening for colon cancer (V76 51) (Z12 11)   · History of Stroke, hemorrhagic (431) (I61 9)   · History of Thyroid disorder screen (V77 0) (Z13 29)    Surgical History    · History of Breast Lumpectomy Location   · History of Diagnostic Esophagogastroduodenoscopy   · History of Hip Replacement   · History of Hysterectomy   · History of Simple Bunion Exostectomy (Silver Procedure)    Family History  Mother    · Family history of Arthritis (V17 7)   · Family history of Bone Cancer   · Family history of Family Health Status Of Mother -    · Family history of Hypertension (V17 49)  Father    · Family history of Family Health Status Of Father -    · Family history of Stroke Syndrome (V17 1)  Maternal Grandmother    · Family history of cerebrovascular accident (CVA) (V17 1) (Z82 3)    Social History    · Alcohol Use (History)   · Caffeine Use   ·    · Never a smoker    Current Meds   1  ALPRAZolam 0 25 MG Oral Tablet; TAKE ONE TABLET THREE TIMES A DAY AS   NEEDED; Therapy: 61LYO1866 to (Last Rx:2017) Ordered   2  Atorvastatin Calcium 10 MG Oral Tablet; TAKE 1 TABLET BY MOUTH DAILY AT   BEDTIME; Therapy: 10VXP1164 to [phone])  Requested for: 55STU4695; Last   Rx:06Bwe6897 Ordered   3  Caltrate 600+D TABS; Therapy: (Recorded:28Jrf7101) to Recorded   4  Flonase Allergy Relief 50 MCG/ACT Nasal Suspension; USE AS DIRECTED Recorded   5  HydrALAZINE HCl - 10 MG Oral Tablet; As needed for systolic BP over 060 Recorded   6  Multiple Vitamin TABS; TAKE 1 TABLET DAILY; Therapy: (Recorded:90Log8559) to Recorded   7  PriLOSEC OTC 20 MG Oral Tablet Delayed Release; TAKE 1 TABLET DAILY; Therapy: (Recorded:39Hej3609) to Recorded   8  Sertraline HCl - 100 MG Oral Tablet; take 1 tablet by mouth every day; Therapy: 05GUH3720 to (Last Rx:86Jev6581)  Requested for: 70FMD7434 Ordered   9  Xalatan 0 005 % Ophthalmic Solution; Therapy: 40SHK5048 to (Last Rx:2011)  Requested for: 59ESV0051 Ordered    Allergies    1  Bactrim TABS   2  Penicillins    Vitals   Recorded: 2017 03:26PM   Heart Rate 70   Respiration 18   Systolic 896   Diastolic 70   Height 5 ft 9 in   Weight 167 lb 8 0 oz   BMI Calculated 24 74   BSA Calculated 1 91     Physical Exam    Constitutional   General appearance: No acute distress, well appearing and well nourished  Neck   Neck: Supple, symmetric, trachea midline, no masses  Thyroid: Normal, no thyromegaly  Pulmonary   Respiratory effort: No increased work of breathing or signs of respiratory distress  Auscultation of lungs: Clear to auscultation  Cardiovascular   Peripheral vascular exam: Normal     Examination of extremities for edema and/or varicosities: Normal     Abdomen   Abdomen: Non-tender, no masses  Liver and spleen: No hepatomegaly or splenomegaly  Genitourinary   External genitalia and vagina: Normal, no lesions appreciated  mild cystocele  Urethra: Normal, no discharge  Cervix: Normal, no lesions  Uterus: Normal size, no tenderness, no masses  Adnexa/Parametria: Normal, no masses or tenderness  Lymphatic   Palpation of lymph nodes in neck: No lymphadenopathy  Palpation of lymph nodes in groin: No lymphadenopathy  Results/Data  Urine Dip Automated- POC 12Apr2017 12:00AM Oriana Moseleydon     Test Name Result Flag Reference   Color Yellow     Clarity Transparent     Leukocytes neg     Nitrite neg     Blood neg     Bilirubin neg     Urobilinogen 0 2     Protein neg     Ph 5 5     Specific Gravity 1 020     Ketone neg     Glucose neg     Color Yellow     Clarity Transparent     Leukocytes neg     Nitrite neg     Blood neg     Bilirubin neg     Urobilinogen 0 2     Protein neg     Ph 5 5     Specific Gravity 1 020     Ketone neg     Glucose neg               Health Management  Health Maintenance   Medicare Annual Wellness Visit; every 1 year; Next Due: 76HLV6603; Overdue    Future Appointments    Date/Time Provider Specialty Site   09/12/2017 09:30 AM ABDI Parker   Neurosurgery [facility]     Signatures   Electronically signed by : ABDI Carrasquillo ; Apr 12 2017  9:22PM EST                       (Author)

## 2018-01-15 VITALS
BODY MASS INDEX: 24.42 KG/M2 | SYSTOLIC BLOOD PRESSURE: 128 MMHG | WEIGHT: 165.38 LBS | RESPIRATION RATE: 16 BRPM | DIASTOLIC BLOOD PRESSURE: 68 MMHG | HEART RATE: 76 BPM

## 2018-01-15 NOTE — PROGRESS NOTES
History of Present Illness  Care Coordination Encounter Information:   Type of Encounter: Telephonic   Contact: Follow-Up    Spoke to Patient  Care Coordination  Nurse [address] [place] [address] [address]:   The reason for call is to discuss outreach for follow up/needed services  Previously spoke with patient on 3/29/16  Called patient as a follow up phone call to assess progress and address any questions or concerns  Milagro Alfaro states that she is doing well  She denies any complaints of headache, blurred vision, dizziness, or pain  Trula complains of weakness to her legs, which she states she has had since being discharged from the hospital in February  She denies any falls and states that she does have a medical alert button if needed  She states that she is doing exercises at home to try to strengthen her legs  She states that she has increased her walking and will do a "pedal a bike" motion a couple of times daily  She states that she intended to start to go to the local [employer], however she does not drive and her friend that she was to ride with is undergoing surgery  She states that she would like to get a stationary bike for her home, but she is waiting for her daughter to give her a ride to the store  Milagro Alfaro states that she has been very active at her [facility] and will go downstairs daily for lunch and activities  She states that she is at her apartment around the clock at this time and is adjusting well  She feels that her memory is improving daily  Reminded Milagro Alfaro of her upcoming appointment with Dr Frankie Jc on 6/6/16  She denies any questions or concerns and is encouraged to call myself or the office if needed  Active Problems    1  Abdominal pain (789 00) (R10 9)   2  Abnormal findings on diagnostic imaging of breast (793 89) (R92 8)   3  Allergic rhinitis (477 9) (J30 9)   4  Anxiety disorder (300 00) (F41 9)   5  Arthritis (716 90) (M19 90)   6  Breast cancer (174 9) (C50 919)   7  Colonoscopy (Fiberoptic)   8  Constipation (564 00) (K59 00)   9  Dementia (294 20) (F03 90)   10  Dementia (294 20) (F03 90)   11  Depression (311) (F32 9)   12  Dizziness (780 4) (R42)   13  Encounter for removal of sutures (V58 32) (Z48 02)   14  Encounter for routine gynecological examination (V72 31) (Z01 419)   15  Esophageal reflux (530 81) (K21 9)   16  Flu vaccine need (V04 81) (Z23)   17  Gait disorder (781 2) (R26 9)   18  Hyperlipidemia (272 4) (E78 5)   19  Hypertension (401 9) (I10)   20  Lump of skin (782 2) (R22 9)   21  MCI (mild cognitive impairment) (331 83) (G31 84)   22  Need for pneumococcal vaccination (V03 82) (Z23)   23  Osteoarthritis of hip (715 95) (M16 9)   24  Osteopenia (733 90) (M85 80)   25  Pain of both hip joints (719 45) (M25 551,M25 552)   26  Subarachnoidal hemorrhage (430) (I60 9)   27  Urinary retention (788 20) (R33 9)   28  Urinary tract infection (599 0) (N39 0)   29  Weakness of both lower extremities (729 89) (M62 81)    Past Medical History    1  History of Bone Density Studies Dual-Energy X-ray Absorptiometry   2  Colonoscopy (Fiberoptic)   3  History of Encounter for screening mammogram for malignant neoplasm of breast   (V76 12) (Z12 31)   4  History of Screening for colon cancer (V76 51) (Z12 11)   5  History of Thyroid disorder screen (V77 0) (Z13 29)    Surgical History    1  History of Diagnostic Esophagogastroduodenoscopy   2  History of Hip Replacement   3  History of Hysterectomy   4  History of Simple Bunion Exostectomy (Silver Procedure)    Family History  Mother    1  Family history of Arthritis (V17 7)   2  Family history of Bone Cancer   3  Family history of Family Health Status Of Mother -    3  Family history of Hypertension (V17 49)  Father    5  Family history of Family Health Status Of Father -    10  Family history of Stroke Syndrome (V17 1)    Social History    · Alcohol Use (History)   · Caffeine Use   ·    · Never a smoker    Current Meds    1   ALPRAZolam 0 25 MG Oral Tablet; TAKE ONE TABLET THREE TIMES A DAY AS NEEDED; Therapy: 77LVV0518 to (Last Rx:21Apr2015) Ordered    2  Nasonex 50 MCG/ACT Nasal Suspension (Mometasone Furoate); INSTILL 1 TO 2   SPRAYS IN EACH NOSTRIL EVERY DAY; Therapy: 74FNV0240 to (Evaluate:01Sep2014)  Requested for: 06Apr2014; Last   Rx:17Mar2014 Ordered    3  Sertraline HCl - 25 MG Oral Tablet; take 1 tablet by mouth every day; Therapy: 32VIJ1016 to (Evaluate:05Jun2016)  Requested for: 99VOJ4521; Last   Rx:07Jan2016 Ordered    4  HydrALAZINE HCl - 10 MG Oral Tablet; As needed for systolic BP over 713 Recorded    5  Caltrate 600+D TABS; Therapy: (Recorded:18Cgf2483) to Recorded   6  Multiple Vitamin Oral Tablet; Therapy: (Recorded:72Gjh3791) to Recorded   7  PriLOSEC OTC 20 MG Oral Tablet Delayed Release Recorded   8  Tylenol 325 MG Oral Tablet; as needed Recorded   9  Xalatan 0 005 % Ophthalmic Solution (Latanoprost); Therapy: 73FPD5851 to (Last Rx:04Mar2011)  Requested for: 11WKH9397 Ordered    Allergies    1  Bactrim TABS   2  Penicillins    Health Management   Medicare Annual Wellness Visit; every 1 year; Next Due: 17BWQ2853; Overdue    End of Encounter Meds    1  ALPRAZolam 0 25 MG Oral Tablet; TAKE ONE TABLET THREE TIMES A DAY AS NEEDED; Therapy: 87IHJ1764 to (Last Rx:21Apr2015) Ordered    2  Nasonex 50 MCG/ACT Nasal Suspension (Mometasone Furoate); INSTILL 1 TO 2   SPRAYS IN EACH NOSTRIL EVERY DAY; Therapy: 40CYH9658 to (Evaluate:01Sep2014)  Requested for: 06Apr2014; Last   Rx:17Mar2014 Ordered    3  Sertraline HCl - 25 MG Oral Tablet; take 1 tablet by mouth every day; Therapy: 84SAU2969 to (Evaluate:05Jun2016)  Requested for: 63LAG6771; Last   Rx:07Jan2016 Ordered    4  HydrALAZINE HCl - 10 MG Oral Tablet; As needed for systolic BP over 202 Recorded    5  Caltrate 600+D TABS; Therapy: (Recorded:29Xkl5474) to Recorded   6  Multiple Vitamin Oral Tablet; Therapy: (Recorded:80Zzk2766) to Recorded   7   PriLOSEC OTC 20 MG Oral Tablet Delayed Release Recorded   8  Tylenol 325 MG Oral Tablet; as needed Recorded   9  Xalatan 0 005 % Ophthalmic Solution (Latanoprost); Therapy: 87MTF6055 to (Last Rx:04Mar2011)  Requested for: 64NFN6687 Ordered    Future Appointments    Date/Time Provider Specialty Site   06/06/2016 10:30 AM ABDI Brown  [facility]   09/01/2016 10:30 [facility]   09/01/2016 11:00 AM ABDI Daniel   [facility]     Patient Care Team    Care Team Member Role Specialty Office Number   Margarette Dangelo MD  Urology [phone]     Signatures   Electronically signed by : Mariela Schwartz; May  3 2016 11:12AM EST                       (Author)

## 2018-01-15 NOTE — MISCELLANEOUS
Assessment    1  Subarachnoidal hemorrhage (430) (I60 9)   2  Encounter for removal of sutures (V58 32) (Z48 02)   3  Constipation (564 00) (K59 00)    Plan  Unlinked    · PriLOSEC OTC 20 MG Oral Tablet Delayed Release   Rx By: Kenzie, Provider; Dispense: 0 Days ; #:0 TBEC; Refill: 0; SE = N; Record; Last Updated By: Lorie An; 2/4/2016 2:58:26 PM     F/u with neurosurgery  Recheck 1 month  Discussion/Summary  Discussion Summary:   Considering the [address] is actually doing quite well  I think she should continue to be monitored by her family for the time being  Encouraged OT/PT  History of Present Illness  TCM Communication [facility]: The patient is being contacted for follow-up after hospitalization and [facility] records were reviewed  The dates of hospitalization: 1/3/2016 - 1/28/2016  Diagnosis: SUBARACHNOID HEMORRHAGE  She was discharged to home  She scheduled a follow up appointment  Follow-up appointments with other specialists: APPT W/NEURO Leela Wu 2/11/16  Counseling was provided to patient's family  Communication performed and completed by CLEOPATRA DUMONT   HPI: Frankie Cruz was hospitalized for a subarachnoid hemorrhage  No aneurysm was identified  She had extensive hydrocephalus and placement of a right frontal ventricular drain   Her only neurolgic deficit ws memory loss  She other wise did well  Neurosurgery was contracted today during the visit regarding patient f/u  She ahs sutures and it was requesed we remove them  She also is to be scheduled for a repeat Cerebral angiogram    Frankie Cruz is feeling well  She is staying with one of her daughters   There is some confusion but overall she is doing well with ADL's  no safety issues  No sleep disturbances or mood changes  In fact her anxiety is well controlled  Active Problems    1  Abdominal pain (789 00) (R10 9)   2  Abnormal findings on diagnostic imaging of breast (793 89) (R92 8)   3   Allergic rhinitis (477 9) (J30 9)   4  Anxiety disorder (300 00) (F41 9)   5  Arthritis (716 90) (M19 90)   6  Breast cancer (174 9) (C50 919)   7  Colonoscopy (Fiberoptic)   8  Constipation (564 00) (K59 00)   9  Dizziness (780 4) (R42)   10  Encounter for routine gynecological examination (V72 31) (Z01 419)   11  Esophageal reflux (530 81) (K21 9)   12  Hyperlipidemia (272 4) (E78 5)   13  Hypertension (401 9) (I10)   14  Lump of skin (782 2) (R22 9)   15  Need for pneumococcal vaccination (V03 82) (Z23)   16  Osteoarthritis of hip (715 95) (M16 9)   17  Osteopenia (733 90) (M85 80)   18  Urinary tract infection (599 0) (N39 0)    Past Medical History    1  History of Bone Density Studies Dual-Energy X-ray Absorptiometry   2  Colonoscopy (Fiberoptic)   3  History of Encounter for screening mammogram for malignant neoplasm of breast   (V76 12) (Z12 31)   4  History of Screening for colon cancer (V76 51) (Z12 11)   5  History of Thyroid disorder screen (V77 0) (Z13 29)    Surgical History    1  History of Diagnostic Esophagogastroduodenoscopy   2  History of Hysterectomy   3  History of Simple Bunion Exostectomy (Silver Procedure)    Family History    1  Family history of Arthritis (V17 7)   2  Family history of Bone Cancer   3  Family history of Family Health Status Of Mother -    3  Family history of Hypertension (V17 49)    5  Family history of Family Health Status Of Father -    10  Family history of Stroke Syndrome (V17 1)    Social History    · Alcohol Use (History)   · Caffeine Use   · Unknown If Ever Smoked    Current Meds   1  ALPRAZolam 0 25 MG Oral Tablet; TAKE ONE TABLET THREE TIMES A DAY AS NEEDED; Therapy: 39HMZ7636 to (Last Rx:2015) Ordered   2  Caltrate 600+D TABS; Therapy: (Recorded:85Jom6395) to Recorded   3  Colace CAPS; Take 1 capsule twice daily as needed Recorded   4  HydrALAZINE HCl - 10 MG Oral Tablet; As needed for systolic BP over 106 Recorded   5   Multiple Vitamin Oral Tablet; Therapy: (Recorded:37Egi7844) to Recorded   6  Nasonex 50 MCG/ACT Nasal Suspension; INSTILL 1 TO 2 SPRAYS IN EACH NOSTRIL   EVERY DAY; Therapy: 87ZOH3013 to (Evaluate:01Sep2014)  Requested for: 06Apr2014; Last   Rx:17Mar2014 Ordered   7  Pantoprazole Sodium 20 MG Oral Tablet Delayed Release; Take 1 tablet daily Recorded   8  PriLOSEC OTC 20 MG Oral Tablet Delayed Release; Therapy: (Recorded:28Blo9017) to Recorded   9  Senokot 8 6 MG Oral Tablet; Take as directed Recorded   10  Sertraline HCl - 25 MG Oral Tablet; take 1 tablet by mouth every day; Therapy: 88NRH9186 to (Evaluate:05Jun2016)  Requested for: 81CHK4196; Last    Rx:07Jan2016 Ordered   11  Tylenol 325 MG Oral Tablet; as needed Recorded   12  Xalatan 0 005 % Ophthalmic Solution; Therapy: 50GHE7281 to (Last Rx:04Mar2011)  Requested for: 71IAI5880 Ordered    Allergies    1  Penicillins    Vitals  Signs [Data Includes: Current Encounter]   Recorded: [id number] 02:57PM   Heart Rate: 84  Respiration: 16  Systolic: 643  Diastolic: 70  Height: 5 ft 8 2 in  Weight: 158 lb   BMI Calculated: 23 88  BSA Calculated: 1 85    Physical Exam    Constitutional   General appearance: No acute distress, well appearing and well nourished  Neck   Neck: Supple, symmetric, trachea midline, no masses  Thyroid: Normal, no thyromegaly  Pulmonary   Respiratory effort: No increased work of breathing or signs of respiratory distress  Auscultation of lungs: Clear to auscultation  Cardiovascular   Auscultation of heart: Normal rate and rhythm, normal S1 and S2, no murmurs  Lymphatic   Palpation of lymph nodes in neck: No lymphadenopathy  Skin 5 sutures were on the frontoparietal left scalp and removed without difficulty  Psychiatric   Mood and affect: Normal        Health Management  Health Maintenance   Medicare Annual Wellness Visit; every 1 year; Next Due: 38MWH5507;  Overdue    Future Appointments    Date/Time Provider Specialty Site 03/15/2016 10:15 AM ABDI Mitchell   [address] [facility]     Signatures   Electronically signed by : ABDI Dee ; Feb 8 2016 12:25AM EST                       (Author)

## 2018-01-18 NOTE — PROGRESS NOTES
History of Present Illness  Care Coordination Encounter Information:   Type of Encounter: Telephonic   Contact: Initial Contact   Last Office Visit: 2/23/16   Spoke to Patient  Care Coordination SL Nurse [facility]:   The reason for call is to discuss outreach for follow up/needed services  Patient was discharged from [facility] on 2/20/16 and previously discharged from skilled nursing facility on 1/27/16 following diagnosis of subarachnoid hemorrhage  Patient had FAITH appointment with Dr Chad Mohamud on 2/23/16  Called patient today to assess patient's progress and address any further questions or concerns  Patient states that overall she is doing "quite well " Patient states that she is having a difficult time with her memory, as she is very forgetful  Encouraged patient to keep a notepad with her throughout the day, and as things occur or if she needs a reminder, to write these down on the paper and refer to the paper as needed  Patient states that the visiting nurses are coming to her home twice weekly, as well as physical therapy  Clearence Confer stated that speech therapy was going to be coming to her home as well, starting today  Patient denies any headaches, blurred vision, dizziness, weakness  She complains of feeling "foggy" and unable to think clearly at times since being hospitalized  Patient does not know when her follow up appointment is with neurology, however she will ask her daughter to make sure that one is scheduled  Patient is currently living with her daughter, Alberto García, but comes back to her apartment during the day to receive the home services  Reminded patient that she has an upcoming appointment with Dr Chad Mohamud on 3/15/16 and with Dr Phyllis Sellers (urology) on 3/3/16 for urinary retention  Patient verbalized understanding  Patient denies any falls, and states that she feels steady on her feet  She states that she has a cane at home and she uses it as needed or when leaving the home   Patient states that the visiting nurses are monitoring her blood pressure and review her medications with her at each visit  Patient denies any further questions or concerns at this time  Patient encouraged to call myself or the office if needed  My contact information was provided to the patient at this time  Will follow up with patient in one month to assess patient's progress  Active Problems    1  Abdominal pain (789 00) (R10 9)   2  Abnormal findings on diagnostic imaging of breast (793 89) (R92 8)   3  Allergic rhinitis (477 9) (J30 9)   4  Anxiety disorder (300 00) (F41 9)   5  Arthritis (716 90) (M19 90)   6  Breast cancer (174 9) (C50 919)   7  Colonoscopy (Fiberoptic)   8  Constipation (564 00) (K59 00)   9  Dizziness (780 4) (R42)   10  Encounter for removal of sutures (V58 32) (Z48 02)   11  Encounter for routine gynecological examination (V72 31) (Z01 419)   12  Esophageal reflux (530 81) (K21 9)   13  Flu vaccine need (V04 81) (Z23)   14  Hyperlipidemia (272 4) (E78 5)   15  Hypertension (401 9) (I10)   16  Lump of skin (782 2) (R22 9)   17  Need for pneumococcal vaccination (V03 82) (Z23)   18  Osteoarthritis of hip (715 95) (M16 9)   19  Osteopenia (733 90) (M85 80)   20  Subarachnoidal hemorrhage (430) (I60 9)   21  Urinary retention (788 20) (R33 9)   22  Urinary tract infection (599 0) (N39 0)    Past Medical History    1  History of Bone Density Studies Dual-Energy X-ray Absorptiometry   2  Colonoscopy (Fiberoptic)   3  History of Encounter for screening mammogram for malignant neoplasm of breast   (V76 12) (Z12 31)   4  History of Screening for colon cancer (V76 51) (Z12 11)   5  History of Thyroid disorder screen (V77 0) (Z13 29)    Surgical History    1  History of Diagnostic Esophagogastroduodenoscopy   2  History of Hysterectomy   3  History of Simple Bunion Exostectomy (Silver Procedure)    Family History    1  Family history of Arthritis (V17 7)   2  Family history of Bone Cancer   3   Family history of Family Health Status Of Mother -    3  Family history of Hypertension (V17 49)    5  Family history of Family Health Status Of Father -    10  Family history of Stroke Syndrome (V17 1)    Social History    · Alcohol Use (History)   · Caffeine Use   · Never a smoker    Current Meds    1  ALPRAZolam 0 25 MG Oral Tablet; TAKE ONE TABLET THREE TIMES A DAY AS NEEDED; Therapy: 23OMK3309 to (Last Rx:2015) Ordered    2  Nasonex 50 MCG/ACT Nasal Suspension; INSTILL 1 TO 2 SPRAYS IN EACH NOSTRIL   EVERY DAY; Therapy: 67QJJ5621 to (Evaluate:2014)  Requested for: 2014; Last   Rx:2014 Ordered    3  Sertraline HCl - 25 MG Oral Tablet; take 1 tablet by mouth every day; Therapy: 22HCV0326 to (Evaluate:2016)  Requested for: 53YTG6044; Last   Rx:2016 Ordered    4  HydrALAZINE HCl - 10 MG Oral Tablet; As needed for systolic BP over 726 Recorded    5  Caltrate 600+D TABS; Therapy: (Recorded:17Skn7407) to Recorded   6  Multiple Vitamin Oral Tablet; Therapy: (Recorded:09Rpw1204) to Recorded   7  PriLOSEC OTC 20 MG Oral Tablet Delayed Release Recorded   8  Tylenol 325 MG Oral Tablet; as needed Recorded   9  Xalatan 0 005 % Ophthalmic Solution (Latanoprost); Therapy: 32LUJ2971 to (Last Rx:2011)  Requested for: 42WWC5347 Ordered    Allergies    1  Bactrim TABS   2  Penicillins    Health Management   Medicare Annual Wellness Visit; every 1 year; Next Due: 62NGC3934; Overdue    End of Encounter Meds    1  ALPRAZolam 0 25 MG Oral Tablet; TAKE ONE TABLET THREE TIMES A DAY AS NEEDED; Therapy: 46GGU1781 to (Last Rx:2015) Ordered    2  Nasonex 50 MCG/ACT Nasal Suspension; INSTILL 1 TO 2 SPRAYS IN EACH NOSTRIL   EVERY DAY; Therapy: 06SSW3939 to (Evaluate:2014)  Requested for: 2014; Last   Rx:2014 Ordered    3  Sertraline HCl - 25 MG Oral Tablet; take 1 tablet by mouth every day;    Therapy: 11DDB7253 to (Evaluate:2016)  Requested for: 40DHU3082; Last FF:91JWK7355 Ordered    4  HydrALAZINE HCl - 10 MG Oral Tablet; As needed for systolic BP over 251 Recorded    5  Caltrate 600+D TABS; Therapy: (Recorded:63Avq8094) to Recorded   6  Multiple Vitamin Oral Tablet; Therapy: (Recorded:54Jmr0522) to Recorded   7  PriLOSEC OTC 20 MG Oral Tablet Delayed Release Recorded   8  Tylenol 325 MG Oral Tablet; as needed Recorded   9  Xalatan 0 005 % Ophthalmic Solution (Latanoprost); Therapy: 68LOS2270 to (Last Rx:04Mar2011)  Requested for: 13TMY7939 Ordered    Future Appointments    Date/Time Provider Specialty Site   03/15/2016 10:15 AM ABDI Estrella   [address] [place] [address]   03/18/2016 09:00 AM Prateek Ngo MD Geriatrics [address] [place] [address]   03/03/2016 10:30 AM Racquel Saunders MD Urology [address]     Signatures   Electronically signed by : Nely García; Feb 29 2016  1:59PM EST                       (Author)

## 2018-01-22 VITALS
HEIGHT: 69 IN | BODY MASS INDEX: 24.44 KG/M2 | WEIGHT: 165 LBS | TEMPERATURE: 97.6 F | RESPIRATION RATE: 18 BRPM | DIASTOLIC BLOOD PRESSURE: 62 MMHG | SYSTOLIC BLOOD PRESSURE: 111 MMHG | HEART RATE: 69 BPM

## 2018-01-23 VITALS
BODY MASS INDEX: 24.7 KG/M2 | DIASTOLIC BLOOD PRESSURE: 78 MMHG | HEART RATE: 64 BPM | WEIGHT: 167.25 LBS | RESPIRATION RATE: 18 BRPM | SYSTOLIC BLOOD PRESSURE: 136 MMHG

## 2018-03-13 ENCOUNTER — OFFICE VISIT (OUTPATIENT)
Dept: CARDIOLOGY CLINIC | Facility: MEDICAL CENTER | Age: 73
End: 2018-03-13
Payer: COMMERCIAL

## 2018-03-13 VITALS
DIASTOLIC BLOOD PRESSURE: 62 MMHG | WEIGHT: 166.8 LBS | SYSTOLIC BLOOD PRESSURE: 122 MMHG | HEIGHT: 69 IN | OXYGEN SATURATION: 98 % | BODY MASS INDEX: 24.71 KG/M2 | HEART RATE: 80 BPM

## 2018-03-13 DIAGNOSIS — E78.00 HYPERCHOLESTEROLEMIA: Primary | ICD-10-CM

## 2018-03-13 DIAGNOSIS — R03.0 SITUATIONAL HYPERTENSION: ICD-10-CM

## 2018-03-13 PROBLEM — I77.4 CELIAC ARTERY STENOSIS (HCC): Status: ACTIVE | Noted: 2018-03-13

## 2018-03-13 PROBLEM — I77.1 CELIAC ARTERY STENOSIS (HCC): Status: ACTIVE | Noted: 2018-03-13

## 2018-03-13 PROCEDURE — 99213 OFFICE O/P EST LOW 20 MIN: CPT | Performed by: INTERNAL MEDICINE

## 2018-03-13 RX ORDER — ASPIRIN 81 MG/1
81 TABLET ORAL DAILY
COMMUNITY
End: 2018-06-25 | Stop reason: ALTCHOICE

## 2018-03-13 NOTE — PROGRESS NOTES
Follow-up - Cardiology   Abigail Diaz 68 y o  female MRN: [id number]        Problems    1  Hypercholesterolemia  Pitavastatin Calcium (LIVALO) 1 MG TABS   2  Situational hypertension         Plan    I had the pleasure of having Samson  Return to see me at the [facility] office  She had a recent right knee replacement, and while she still has discomfort she is early in her rehab course  She has situational hypertension, blood pressure is normal in my office today, she does not take any antihypertensive therapy  Hyperlipidemia- severely uncontrolled by previous numbers in 2017 with an LDL of 181  Severe intolerance to Lipitor, and most recently could not tolerate red yeast rice  I did talk to her about alternative statin therapy such as Livalo  And she is willing to try a very low dose of just 1 mg daily  I will not have her recheck her lipids until we can be sure she is tolerating it  I have requested a four-month follow-up  HPI: Abigail Diaz is a 68y o  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 with a history of severe hyperlipidemia with an LDL 2017 of 181 but no established peripheral or cardiovascular disease he has a history of Lipitor and reduced right intolerance she has a history of situational hypertension, blood pressure is completely normal she underwent right knee replacement in January, is about 8 weeks postop and still has some discomfort of the knee  She denies shortness of breath, lightheadedness, palpitations, chest pain        Review of Systems   All other systems reviewed and are negative          Past Medical History:   Diagnosis Date    Anxiety disorder     [facility] [address] [facility]     Cancer [facility] [address] [facility]     Constipation     Esophageal reflux     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    Hyperlipidemia     Hypertension     Osteoarthritis of hip     Osteopenia     Subarachnoid hemorrhage (HCC)      History   Alcohol Use    Yes     Comment: occasionally     History   Drug Use No History   Smoking Status    Never Smoker   Smokeless Tobacco    Never Used       Allergies: Allergies   Allergen Reactions    Atorvastatin     Monascus Purpureus Went Yeast     Oxycodone Vomiting    Bactrim [Sulfamethoxazole-Trimethoprim] Itching and Rash    Keppra [Levetiracetam] Rash    Penicillins Rash     Agitation        Medications:     Current Outpatient Prescriptions:     ALPRAZolam (XANAX) 0 25 mg tablet, Take 0 25 mg by mouth daily as needed for anxiety  , Disp: , Rfl:     aspirin (ECOTRIN LOW STRENGTH) 81 mg EC tablet, Take 81 mg by mouth daily, Disp: , Rfl:     Calcium-Vitamin D (CALTRATE 600 PLUS-VIT D PO), Take by mouth daily  , Disp: , Rfl:     cyanocobalamin (VITAMIN B-12) 100 mcg tablet, Take by mouth daily, Disp: , Rfl:     docusate sodium (STOOL SOFTENER) 100 mg capsule, Take 100 mg by mouth 2 (two) times a day, Disp: , Rfl:     fluticasone (FLONASE) 50 mcg/act nasal spray, 1 spray into each nostril daily, Disp: , Rfl:     latanoprost (XALATAN) 0 005 % ophthalmic solution, Administer 1 drop to both eyes daily at bedtime  , Disp: , Rfl:     Multiple Vitamins-Minerals (CENTRUM SILVER PO), Take by mouth daily  , Disp: , Rfl:     omeprazole (PriLOSEC) 20 mg delayed release capsule, Take 20 mg by mouth daily  , Disp: , Rfl:     sertraline (ZOLOFT) 25 mg tablet, Take 100 mg by mouth daily Check dosage with Physician  , Disp: , Rfl:     Pitavastatin Calcium (LIVALO) 1 MG TABS, Take 1 tablet (1 mg total) by mouth daily, Disp: 30 tablet, Rfl: 11      Vitals:    03/13/18 1414   BP: 122/62   Pulse: 80   SpO2: 98%     Weight (last 2 days)     Date/Time   Weight    03/13/18 1414  75 7 (166 8)            Physical Exam   Constitutional: No distress  HENT:   Head: Normocephalic and atraumatic  Eyes: Conjunctivae are normal  No scleral icterus  Neck: Normal range of motion  No JVD present  Cardiovascular: Normal rate, regular rhythm, normal heart sounds and intact distal pulses      No murmur heard  Pulmonary/Chest: Effort normal and breath sounds normal  No respiratory distress  She has no wheezes  She has no rales  Musculoskeletal: She exhibits no edema or tenderness  Slightly reduced range of motion of knee,   Skin: Skin is warm and dry  She is not diaphoretic  Laboratory Studies:  CMP:  Lab Results   Component Value Date    CREATININE 0 74 09/06/2017    CREATININE 0 68 01/08/2016    BUN 14 09/06/2017    BUN 12 01/08/2016     12/29/2016     (L) 01/08/2016    K 3 7 12/29/2016    K 3 7 01/08/2016     12/29/2016     01/08/2016    CO2 31 12/29/2016    CO2 27 01/08/2016    GLUCOSE 105 12/29/2016    GLUCOSE 99 01/08/2016    PROT 7 3 12/29/2016    PROT 7 0 01/03/2016    ALKPHOS 95 12/29/2016    ALKPHOS 104 01/03/2016    ALT 22 12/29/2016    ALT 43 01/03/2016    AST 15 12/29/2016    AST 35 01/03/2016    BILIDIR 0 12 11/03/2016     Lipid Profile:   Lab Results   Component Value Date    CHOL 279 (H) 12/13/2017    CHOL 278 (H) 06/27/2017    CHOL 269 (H) 11/03/2016     Lab Results   Component Value Date    HDL 82 (H) 12/13/2017    HDL 73 (H) 06/27/2017    HDL 84 (H) 11/03/2016     Lab Results   Component Value Date    LDLCALC 179 (H) 12/13/2017    LDLCALC 181 (H) 06/27/2017    LDLCALC 168 (H) 11/03/2016     Lab Results   Component Value Date    TRIG 92 12/13/2017    TRIG 121 06/27/2017    TRIG 84 11/03/2016               Mono Edwards MD    Portions of the record may have been created with voice recognition software   Occasional wrong word or "sound a like" substitutions may have occurred due to the inherent limitations of voice recognition software   Read the chart carefully and recognize, using context, where substitutions have occurred

## 2018-03-22 ENCOUNTER — TRANSCRIBE ORDERS (OUTPATIENT)
Dept: ADMINISTRATIVE | Facility: HOSPITAL | Age: 73
End: 2018-03-22

## 2018-03-22 DIAGNOSIS — Z12.31 ENCOUNTER FOR SCREENING MAMMOGRAM FOR MALIGNANT NEOPLASM OF BREAST: ICD-10-CM

## 2018-03-22 DIAGNOSIS — Z85.3 PERSONAL HISTORY OF MALIGNANT NEOPLASM OF BREAST: Primary | ICD-10-CM

## 2018-04-09 ENCOUNTER — TELEPHONE (OUTPATIENT)
Dept: CARDIOLOGY CLINIC | Facility: CLINIC | Age: 73
End: 2018-04-09

## 2018-04-09 NOTE — TELEPHONE ENCOUNTER
Samson called c/o generalized itching on/off x3 days  Started Livalo 3/26 and feels symptoms are due to medication  Also c/o fatigue, falls asleep easily which she noticed today  Denies a rash  Still taking Livalo but wants to hear your recommendations, states she was unable to tolerate statins in the past    Please advise     C/b # [phone]

## 2018-04-12 NOTE — TELEPHONE ENCOUNTER
Have her stop it for now  Continue healthy diet    Ask her to start plant based omega 3, Flaxseed, Erick seed, walnuts, avocados etc

## 2018-04-19 ENCOUNTER — HOSPITAL ENCOUNTER (OUTPATIENT)
Dept: MAMMOGRAPHY | Facility: CLINIC | Age: 73
Discharge: HOME | End: 2018-04-19
Payer: COMMERCIAL

## 2018-04-19 DIAGNOSIS — Z85.3 PERSONAL HISTORY OF BREAST CANCER: ICD-10-CM

## 2018-04-19 PROCEDURE — G0279 TOMOSYNTHESIS, MAMMO: HCPCS

## 2018-04-19 PROCEDURE — 77066 DX MAMMO INCL CAD BI: CPT

## 2018-05-17 DIAGNOSIS — F41.8 DEPRESSION WITH ANXIETY: Primary | ICD-10-CM

## 2018-05-20 RX ORDER — SERTRALINE HYDROCHLORIDE 100 MG/1
TABLET, FILM COATED ORAL
Qty: 90 TABLET | Refills: 0 | Status: SHIPPED | OUTPATIENT
Start: 2018-05-20 | End: 2018-08-12 | Stop reason: SDUPTHER

## 2018-06-12 ENCOUNTER — OFFICE VISIT (OUTPATIENT)
Dept: FAMILY MEDICINE CLINIC | Facility: MEDICAL CENTER | Age: 73
End: 2018-06-12
Payer: COMMERCIAL

## 2018-06-12 ENCOUNTER — TELEPHONE (OUTPATIENT)
Dept: FAMILY MEDICINE CLINIC | Facility: MEDICAL CENTER | Age: 73
End: 2018-06-12

## 2018-06-12 VITALS
SYSTOLIC BLOOD PRESSURE: 118 MMHG | RESPIRATION RATE: 18 BRPM | HEART RATE: 70 BPM | WEIGHT: 168.8 LBS | BODY MASS INDEX: 24.93 KG/M2 | DIASTOLIC BLOOD PRESSURE: 70 MMHG | TEMPERATURE: 98.7 F

## 2018-06-12 DIAGNOSIS — I49.9 IRREGULAR HEART RHYTHM: ICD-10-CM

## 2018-06-12 DIAGNOSIS — R30.0 DYSURIA: Primary | ICD-10-CM

## 2018-06-12 LAB
SL AMB  POCT GLUCOSE, UA: ABNORMAL
SL AMB LEUKOCYTE ESTERASE,UA: ABNORMAL
SL AMB POCT BILIRUBIN,UA: ABNORMAL
SL AMB POCT BLOOD,UA: ABNORMAL
SL AMB POCT CLARITY,UA: CLEAR
SL AMB POCT COLOR,UA: CLEAR
SL AMB POCT KETONES,UA: ABNORMAL
SL AMB POCT NITRITE,UA: ABNORMAL
SL AMB POCT PH,UA: 6
SL AMB POCT SPECIFIC GRAVITY,UA: 1.01
SL AMB POCT URINE PROTEIN: ABNORMAL
SL AMB POCT UROBILINOGEN: 0.2

## 2018-06-12 PROCEDURE — 93000 ELECTROCARDIOGRAM COMPLETE: CPT | Performed by: FAMILY MEDICINE

## 2018-06-12 PROCEDURE — 81002 URINALYSIS NONAUTO W/O SCOPE: CPT | Performed by: FAMILY MEDICINE

## 2018-06-12 PROCEDURE — 87086 URINE CULTURE/COLONY COUNT: CPT | Performed by: FAMILY MEDICINE

## 2018-06-12 PROCEDURE — 99213 OFFICE O/P EST LOW 20 MIN: CPT | Performed by: FAMILY MEDICINE

## 2018-06-12 RX ORDER — NITROFURANTOIN 25; 75 MG/1; MG/1
100 CAPSULE ORAL 2 TIMES DAILY
Qty: 14 CAPSULE | Refills: 0 | Status: SHIPPED | OUTPATIENT
Start: 2018-06-12 | End: 2018-06-19

## 2018-06-12 RX ORDER — NITROFURANTOIN 25; 75 MG/1; MG/1
100 CAPSULE ORAL 2 TIMES DAILY
Qty: 14 CAPSULE | Refills: 0 | Status: SHIPPED | OUTPATIENT
Start: 2018-06-12 | End: 2018-06-12 | Stop reason: SDUPTHER

## 2018-06-12 NOTE — TELEPHONE ENCOUNTER
Spoke to patient about antibiotic (Macrobid) concerns  She takes Prilosec every day and was concerned about the information pamphlet on the RX bottle  She said that the pamphlet says not to use with antacids-she uses Prilosec daily  I advised her to separate taking the antibiotic and the Prilosec by 2 hours like you suggested   She was also concerned about her liver because she had liver issues in the past

## 2018-06-12 NOTE — TELEPHONE ENCOUNTER
Space the Prilosec and the antibiotic by at least 2 hours  Liver function looks good on previous labs  Okay to take medication

## 2018-06-12 NOTE — PROGRESS NOTES
Assessment/Plan:    No problem-specific Assessment & Plan notes found for this encounter  Diagnoses and all orders for this visit:    Dysuria  -     POCT urine dip  -     Urine culture  -     Discontinue: nitrofurantoin (MACROBID) 100 mg capsule; Take 1 capsule (100 mg total) by mouth 2 (two) times a day for 7 days  -     nitrofurantoin (MACROBID) 100 mg capsule; Take 1 capsule (100 mg total) by mouth 2 (two) times a day for 7 days  Patient has a UTI based on symptoms, exam findings and urinalysis findings  I will have her start Macrobid twice daily for seven days  Irregular heart rhythm  -     POCT ECG  Patient has an irregular rhythm on vital signs, auscultation and EKG  She is currently asymptomatic  No additional intervention at this time  I did recommend she make a follow-up appointment with her cardiologist   I also sent a message to patient's cardiologist to inform him of the EKG findings  I brought a copy of the EKG across the hallway to the cardiology office for him to review  Follow-up in one week if symptoms persist or sooner if needed  Subjective:      Patient ID: Samantha Headley is a 68 y o  female  Patient presents with two day history of burning with urination  States her urine was cloudy this morning  Tried taking cranberry juice with little relief  Believe she has a UTI  Last UTI was many years ago  The following portions of the patient's history were reviewed and updated as appropriate: allergies and current medications  Review of Systems   Constitutional: Negative for fever  Respiratory: Negative for shortness of breath  Cardiovascular: Negative for chest pain           Objective:      /70 (BP Location: Left arm, Patient Position: Sitting, Cuff Size: Adult)   Pulse 70 Comment: IRREGULAR  Temp 98 7 °F (37 1 °C) (Oral)   Resp 18   Wt 76 6 kg (168 lb 12 8 oz)   BMI 24 93 kg/m²          Physical Exam   Constitutional: She appears well-developed and well-nourished  Cardiovascular: Normal rate and normal heart sounds  An irregular rhythm present  Pulmonary/Chest: Effort normal and breath sounds normal    Abdominal: Soft  Bowel sounds are normal  There is tenderness in the suprapubic area  There is no CVA tenderness  Vitals reviewed

## 2018-06-13 ENCOUNTER — TELEPHONE (OUTPATIENT)
Dept: FAMILY MEDICINE CLINIC | Facility: MEDICAL CENTER | Age: 73
End: 2018-06-13

## 2018-06-13 LAB — BACTERIA UR CULT: NORMAL

## 2018-06-13 NOTE — TELEPHONE ENCOUNTER
Please call patient  Please inform her that her cardiologist did get back to me  The abnormal EKG findings that were seen in the office yesterday can be found in patients with infections  Patient has a urinary tract infection  No need to see him sooner

## 2018-06-25 ENCOUNTER — OFFICE VISIT (OUTPATIENT)
Dept: FAMILY MEDICINE CLINIC | Facility: MEDICAL CENTER | Age: 73
End: 2018-06-25
Payer: COMMERCIAL

## 2018-06-25 VITALS
OXYGEN SATURATION: 98 % | BODY MASS INDEX: 24.99 KG/M2 | SYSTOLIC BLOOD PRESSURE: 134 MMHG | HEART RATE: 61 BPM | DIASTOLIC BLOOD PRESSURE: 90 MMHG | WEIGHT: 169.2 LBS

## 2018-06-25 DIAGNOSIS — R03.0 SITUATIONAL HYPERTENSION: Primary | ICD-10-CM

## 2018-06-25 DIAGNOSIS — E78.00 HYPERCHOLESTEROLEMIA: ICD-10-CM

## 2018-06-25 DIAGNOSIS — F41.9 ANXIETY DISORDER, UNSPECIFIED TYPE: Chronic | ICD-10-CM

## 2018-06-25 PROCEDURE — 99214 OFFICE O/P EST MOD 30 MIN: CPT | Performed by: FAMILY MEDICINE

## 2018-06-25 RX ORDER — MOMETASONE FUROATE 50 UG/1
2 SPRAY, METERED NASAL DAILY
COMMUNITY
End: 2022-01-13 | Stop reason: ALTCHOICE

## 2018-06-25 RX ORDER — PERMETHRIN 50 MG/G
CREAM TOPICAL
Refills: 1 | COMMUNITY
Start: 2018-06-21 | End: 2018-08-16 | Stop reason: ALTCHOICE

## 2018-06-25 NOTE — PROGRESS NOTES
Kevin Benjamin has a rash which started on June 21st   Very itchy for several weeks prior  Rash was on her left thigh, ankles, abdomen  Daughter who is NP diagnosed her with scabies and she was treated with Elimite cream  The next day it was better  Sweating makes the itching worse  She shows me a photo of the rash which is a group of red papules with central vesicular appearing lesion  Patient states these lesions were slightly raised  She had a right total knee replacement  Dr Andrea Plummer  She recovered well  She is very happy with the result  She continues to be very ambulatory  She said she is working on her diet  She has made a lot of changes  She no longer eats out all the time  She will be late Eating fresh fruit several times a day  Cereal or oatmeal in the morning  Eating salmon  Cutting back on baked goods  Eating yogurt  O: /90 (BP Location: Right arm, Patient Position: Sitting, Cuff Size: Adult)   Pulse 61   Wt 76 7 kg (169 lb 3 2 oz)   SpO2 98%   BMI 24 99 kg/m²   BP by me 120/68    Physical Exam   Constitutional: She appears well-developed and well-nourished  No distress  Neck: Carotid bruit is not present  No thyromegaly present  Cardiovascular: Normal rate, regular rhythm, normal heart sounds and intact distal pulses  Pulmonary/Chest: Effort normal and breath sounds normal    Abdominal: Soft  Bowel sounds are normal  She exhibits no mass  There is no hepatomegaly  There is no tenderness  Musculoskeletal: She exhibits no edema  Lymphadenopathy:     She has no cervical adenopathy  Skin-no rash    Assessment  1  Anxiety-well controlled with sertraline  Will continue  2  Hyperlipidemia-has been on unable to tolerate several statins  However she is making some significant dietary changes  Will reassess her lipid profile  Consider addition of Zetia  3   Reactive hypertension-blood pressure is good   4  Rash-has resolved  Suspect this was urticaria   The the all notify me if recurs  Plan  Lipid profile in August   She will get this done before her Cardiology appointment   Recheck 6 months with ARTHUR

## 2018-08-04 ENCOUNTER — APPOINTMENT (OUTPATIENT)
Dept: LAB | Facility: MEDICAL CENTER | Age: 73
End: 2018-08-04
Payer: COMMERCIAL

## 2018-08-04 DIAGNOSIS — F41.9 ANXIETY DISORDER, UNSPECIFIED TYPE: Chronic | ICD-10-CM

## 2018-08-04 DIAGNOSIS — E78.00 HYPERCHOLESTEROLEMIA: ICD-10-CM

## 2018-08-04 DIAGNOSIS — R03.0 SITUATIONAL HYPERTENSION: ICD-10-CM

## 2018-08-04 LAB
ALBUMIN SERPL BCP-MCNC: 3.6 G/DL (ref 3.5–5)
ALP SERPL-CCNC: 87 U/L (ref 46–116)
ALT SERPL W P-5'-P-CCNC: 23 U/L (ref 12–78)
ANION GAP SERPL CALCULATED.3IONS-SCNC: 8 MMOL/L (ref 4–13)
AST SERPL W P-5'-P-CCNC: 24 U/L (ref 5–45)
BASOPHILS # BLD AUTO: 0.04 THOUSANDS/ΜL (ref 0–0.1)
BASOPHILS NFR BLD AUTO: 1 % (ref 0–1)
BILIRUB SERPL-MCNC: 0.53 MG/DL (ref 0.2–1)
BUN SERPL-MCNC: 11 MG/DL (ref 5–25)
CALCIUM SERPL-MCNC: 9.3 MG/DL (ref 8.3–10.1)
CHLORIDE SERPL-SCNC: 104 MMOL/L (ref 100–108)
CO2 SERPL-SCNC: 28 MMOL/L (ref 21–32)
CREAT SERPL-MCNC: 0.72 MG/DL (ref 0.6–1.3)
EOSINOPHIL # BLD AUTO: 0.28 THOUSAND/ΜL (ref 0–0.61)
EOSINOPHIL NFR BLD AUTO: 5 % (ref 0–6)
ERYTHROCYTE [DISTWIDTH] IN BLOOD BY AUTOMATED COUNT: 14 % (ref 11.6–15.1)
GFR SERPL CREATININE-BSD FRML MDRD: 84 ML/MIN/1.73SQ M
GLUCOSE P FAST SERPL-MCNC: 81 MG/DL (ref 65–99)
HCT VFR BLD AUTO: 41.1 % (ref 34.8–46.1)
HGB BLD-MCNC: 12.8 G/DL (ref 11.5–15.4)
IMM GRANULOCYTES # BLD AUTO: 0.01 THOUSAND/UL (ref 0–0.2)
IMM GRANULOCYTES NFR BLD AUTO: 0 % (ref 0–2)
LYMPHOCYTES # BLD AUTO: 1.71 THOUSANDS/ΜL (ref 0.6–4.47)
LYMPHOCYTES NFR BLD AUTO: 30 % (ref 14–44)
MCH RBC QN AUTO: 27.8 PG (ref 26.8–34.3)
MCHC RBC AUTO-ENTMCNC: 31.1 G/DL (ref 31.4–37.4)
MCV RBC AUTO: 89 FL (ref 82–98)
MONOCYTES # BLD AUTO: 0.62 THOUSAND/ΜL (ref 0.17–1.22)
MONOCYTES NFR BLD AUTO: 11 % (ref 4–12)
NEUTROPHILS # BLD AUTO: 3.09 THOUSANDS/ΜL (ref 1.85–7.62)
NEUTS SEG NFR BLD AUTO: 53 % (ref 43–75)
NRBC BLD AUTO-RTO: 0 /100 WBCS
PLATELET # BLD AUTO: 368 THOUSANDS/UL (ref 149–390)
PMV BLD AUTO: 10 FL (ref 8.9–12.7)
POTASSIUM SERPL-SCNC: 3.9 MMOL/L (ref 3.5–5.3)
PROT SERPL-MCNC: 7.3 G/DL (ref 6.4–8.2)
RBC # BLD AUTO: 4.61 MILLION/UL (ref 3.81–5.12)
SODIUM SERPL-SCNC: 140 MMOL/L (ref 136–145)
TSH SERPL DL<=0.05 MIU/L-ACNC: 1.92 UIU/ML (ref 0.36–3.74)
WBC # BLD AUTO: 5.75 THOUSAND/UL (ref 4.31–10.16)

## 2018-08-04 PROCEDURE — 36415 COLL VENOUS BLD VENIPUNCTURE: CPT

## 2018-08-04 PROCEDURE — 85025 COMPLETE CBC W/AUTO DIFF WBC: CPT

## 2018-08-04 PROCEDURE — 80053 COMPREHEN METABOLIC PANEL: CPT

## 2018-08-04 PROCEDURE — 84443 ASSAY THYROID STIM HORMONE: CPT

## 2018-08-12 DIAGNOSIS — F41.8 DEPRESSION WITH ANXIETY: ICD-10-CM

## 2018-08-12 RX ORDER — SERTRALINE HYDROCHLORIDE 100 MG/1
TABLET, FILM COATED ORAL
Qty: 90 TABLET | Refills: 0 | Status: SHIPPED | OUTPATIENT
Start: 2018-08-12 | End: 2018-11-11 | Stop reason: SDUPTHER

## 2018-08-16 ENCOUNTER — OFFICE VISIT (OUTPATIENT)
Dept: CARDIOLOGY CLINIC | Facility: CLINIC | Age: 73
End: 2018-08-16
Payer: COMMERCIAL

## 2018-08-16 VITALS
OXYGEN SATURATION: 95 % | HEART RATE: 80 BPM | BODY MASS INDEX: 25.28 KG/M2 | DIASTOLIC BLOOD PRESSURE: 68 MMHG | SYSTOLIC BLOOD PRESSURE: 124 MMHG | WEIGHT: 170.7 LBS | HEIGHT: 69 IN

## 2018-08-16 DIAGNOSIS — E78.00 HYPERCHOLESTEROLEMIA: ICD-10-CM

## 2018-08-16 DIAGNOSIS — I77.1 CELIAC ARTERY STENOSIS (HCC): Primary | ICD-10-CM

## 2018-08-16 DIAGNOSIS — R03.0 SITUATIONAL HYPERTENSION: ICD-10-CM

## 2018-08-16 PROCEDURE — 99214 OFFICE O/P EST MOD 30 MIN: CPT | Performed by: INTERNAL MEDICINE

## 2018-08-16 RX ORDER — EZETIMIBE 10 MG/1
10 TABLET ORAL DAILY
Qty: 30 TABLET | Refills: 11 | Status: SHIPPED | OUTPATIENT
Start: 2018-08-16 | End: 2019-02-26 | Stop reason: SDUPTHER

## 2018-08-16 NOTE — PROGRESS NOTES
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[address] )     2  Hypercholesterolemia     3  Situational hypertension           Impression    I had the pleasure of having Samson return to see me at the [facility] office    She has situational hypertension, blood pressure is normal in my office today, she does not take any antihypertensive therapy  Hyperlipidemia  -Significant intolerance to statins, recently tried Livalo  Also intolerant to red yeast rice  We have never attempted Zetia  She has made dietary modifications, eating more fruits, but also taking a supplement of great you/ apple juice/cider vinegar  PLAN:     repeat lipids, assess recent lifestyle modification results, but I still suspect we may need some additional medical therapy and recommend following her lipid blood check we start Zetia 10 mg daily      HPI: Christina Beckett is a 68y o  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 with  Longstanding significant hyperlipidemia, last assessed 12/17 with , total cholesterol 279  Thankfully her HDL is high  She has increased fruit in her diet, she has made some modifications with limiting baked goods, usually limiting portions size but still eat something every day  She eats [employer] ice cream bars twice a week, we discussed how bad they are for her  She has a long history of significant intolerance to statins, she could not tolerated atorvastatin, red yeast rice and most recently Livalo  She has a history of situational hypertension, blood pressure is currently very well controlled and she does not take any medical therapy  She has a family history of CAD  She herself has no established vascular disease  She has recovered well from her knee surgery  She is trying to be more active  Tries to get to the [facility] at least twice a week  Review of Systems   All other systems reviewed and are negative          Past Medical History:   Diagnosis Date  Anxiety disorder     Breast cancer ([facility] )     Cancer ([place] [address] )     Constipation     Esophageal reflux     History of bone density study 2011    [employer]    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    Hyperlipidemia     Hypertension     Osteoarthritis of hip     Osteopenia     Stroke, hemorrhagic ([facility] ) 12/2015    Subarachnoid hemorrhage (HCC)      History   Alcohol Use    Yes     Comment: occasionally     History   Drug Use No     History   Smoking Status    Never Smoker   Smokeless Tobacco    Never Used       Allergies: Allergies   Allergen Reactions    Oxycodone Vomiting    Monascus Purpureus Went Yeast      Pt unsure   Atorvastatin Other (See Comments)     leg pain    Bactrim [Sulfamethoxazole-Trimethoprim] Itching and Rash    Keppra [Levetiracetam] Rash    Livalo [Pitavastatin] Itching    Penicillins Rash     Agitation        Medications:     Current Outpatient Prescriptions:     ALPRAZolam (XANAX) 0 25 mg tablet, Take 0 25 mg by mouth daily as needed for anxiety  , Disp: , Rfl:     Calcium-Vitamin D (CALTRATE 600 PLUS-VIT D PO), Take by mouth daily  , Disp: , Rfl:     cyanocobalamin (VITAMIN B-12) 100 mcg tablet, Take by mouth daily, Disp: , Rfl:     docusate sodium (STOOL SOFTENER) 100 mg capsule, Take 100 mg by mouth 2 (two) times a day, Disp: , Rfl:     latanoprost (XALATAN) 0 005 % ophthalmic solution, Administer 1 drop to both eyes daily at bedtime  , Disp: , Rfl:     mometasone (NASONEX) 50 mcg/act nasal spray, 2 sprays into each nostril daily, Disp: , Rfl:     Multiple Vitamins-Minerals (CENTRUM SILVER PO), Take by mouth daily  , Disp: , Rfl:     omeprazole (PriLOSEC) 20 mg delayed release capsule, Take 20 mg by mouth daily  , Disp: , Rfl:     sertraline (ZOLOFT) 100 mg tablet, TAKE ONE TABLET BY MOUTH EVERY DAY, Disp: 90 tablet, Rfl: 0      Vitals:    08/16/18 1302   BP: 124/68   Pulse: 80   SpO2: 95%     Weight (last 2 days) Date/Time   Weight    08/16/18 1302  77 4 (170 7)            Physical Exam   Constitutional: No distress  HENT:   Head: Normocephalic and atraumatic  Eyes: Conjunctivae are normal  No scleral icterus  Neck: Normal range of motion  No JVD present  Cardiovascular: Normal rate, regular rhythm, normal heart sounds and intact distal pulses  No murmur heard  Pulmonary/Chest: Effort normal and breath sounds normal  No respiratory distress  She has no wheezes  She has no rales  Musculoskeletal: She exhibits no edema or tenderness  Skin: Skin is warm and dry  She is not diaphoretic           Laboratory Studies:  CMP:  Lab Results   Component Value Date    CREATININE 0 72 08/04/2018    CREATININE 0 68 01/08/2016    BUN 11 08/04/2018    BUN 12 01/08/2016     08/04/2018     (L) 01/08/2016    K 3 9 08/04/2018    K 3 7 01/08/2016     08/04/2018     01/08/2016    CO2 28 08/04/2018    CO2 27 01/08/2016    GLUCOSE 105 12/29/2016    GLUCOSE 99 01/08/2016    PROT 7 3 08/04/2018    PROT 7 0 01/03/2016    ALKPHOS 87 08/04/2018    ALKPHOS 104 01/03/2016    ALT 23 08/04/2018    ALT 43 01/03/2016    AST 24 08/04/2018    AST 35 01/03/2016    BILIDIR 0 12 11/03/2016     Lipid Profile:   Lab Results   Component Value Date    CHOL 250 07/29/2015    CHOL 238 07/24/2014     Lab Results   Component Value Date    HDL 82 (H) 12/13/2017    HDL 73 (H) 06/27/2017    HDL 84 (H) 11/03/2016     Lab Results   Component Value Date    LDLCALC 179 (H) 12/13/2017    LDLCALC 181 (H) 06/27/2017    LDLCALC 168 (H) 11/03/2016     Lab Results   Component Value Date    TRIG 92 12/13/2017    TRIG 121 06/27/2017    TRIG 84 11/03/2016               Velvet Shine MD    Portions of the record may have been created with voice recognition software   Occasional wrong word or "sound a like" substitutions may have occurred due to the inherent limitations of voice recognition software   Read the chart carefully and recognize, using context, where substitutions have occurred

## 2018-08-18 ENCOUNTER — APPOINTMENT (OUTPATIENT)
Dept: LAB | Facility: MEDICAL CENTER | Age: 73
End: 2018-08-18
Payer: COMMERCIAL

## 2018-08-18 DIAGNOSIS — E78.00 HYPERCHOLESTEROLEMIA: ICD-10-CM

## 2018-08-18 LAB
CHOLEST SERPL-MCNC: 263 MG/DL (ref 50–200)
HDLC SERPL-MCNC: 75 MG/DL (ref 40–60)
LDLC SERPL CALC-MCNC: 171 MG/DL (ref 0–100)
NONHDLC SERPL-MCNC: 188 MG/DL
TRIGL SERPL-MCNC: 83 MG/DL

## 2018-08-18 PROCEDURE — 80061 LIPID PANEL: CPT

## 2018-08-18 PROCEDURE — 36415 COLL VENOUS BLD VENIPUNCTURE: CPT

## 2018-08-29 ENCOUNTER — TELEPHONE (OUTPATIENT)
Dept: CARDIOLOGY CLINIC | Facility: MEDICAL CENTER | Age: 73
End: 2018-08-29

## 2018-08-29 NOTE — TELEPHONE ENCOUNTER
Patient called back [place] [facility] patient will  the zetia this weekend  She will call us back if she has any problems with the medication

## 2018-08-29 NOTE — TELEPHONE ENCOUNTER
----- Message from Madeline Lockhart MD sent at 8/29/2018 12:21 PM EDT -----  Please let the patient know that there has been little change in her cholesterol with diet  Go ahead and start Zetia if not done already and will plan f/i lipids as discussed

## 2018-09-05 ENCOUNTER — TELEPHONE (OUTPATIENT)
Dept: CARDIOLOGY CLINIC | Facility: MEDICAL CENTER | Age: 73
End: 2018-09-05

## 2018-10-25 ENCOUNTER — TELEPHONE (OUTPATIENT)
Dept: FAMILY MEDICINE CLINIC | Facility: MEDICAL CENTER | Age: 73
End: 2018-10-25

## 2018-10-25 NOTE — TELEPHONE ENCOUNTER
Pt called  She states on Saturday she had very bad constant diarrhea that lasted 45 minutes  She states that since then she has not had a bowel movement  She has abdominal cramping and feels all blocked up  She takes a daily stool softener and has also tried Vaseline to try to lubricate so the stool will pass  She is asking for advice on what she should do next

## 2018-11-11 DIAGNOSIS — F41.8 DEPRESSION WITH ANXIETY: ICD-10-CM

## 2018-11-13 RX ORDER — SERTRALINE HYDROCHLORIDE 100 MG/1
TABLET, FILM COATED ORAL
Qty: 90 TABLET | Refills: 0 | Status: SHIPPED | OUTPATIENT
Start: 2018-11-13 | End: 2019-02-10 | Stop reason: SDUPTHER

## 2018-12-03 ENCOUNTER — TELEPHONE (OUTPATIENT)
Dept: FAMILY MEDICINE CLINIC | Facility: MEDICAL CENTER | Age: 73
End: 2018-12-03

## 2018-12-03 ENCOUNTER — OFFICE VISIT (OUTPATIENT)
Dept: FAMILY MEDICINE CLINIC | Facility: MEDICAL CENTER | Age: 73
End: 2018-12-03
Payer: COMMERCIAL

## 2018-12-03 VITALS
DIASTOLIC BLOOD PRESSURE: 78 MMHG | RESPIRATION RATE: 16 BRPM | SYSTOLIC BLOOD PRESSURE: 124 MMHG | HEART RATE: 78 BPM | BODY MASS INDEX: 24.99 KG/M2 | WEIGHT: 169.2 LBS

## 2018-12-03 DIAGNOSIS — R20.2 PARESTHESIAS: Primary | ICD-10-CM

## 2018-12-03 PROCEDURE — 1160F RVW MEDS BY RX/DR IN RCRD: CPT | Performed by: FAMILY MEDICINE

## 2018-12-03 PROCEDURE — 99213 OFFICE O/P EST LOW 20 MIN: CPT | Performed by: FAMILY MEDICINE

## 2018-12-03 RX ORDER — DIPHENHYDRAMINE HCL 25 MG
25 CAPSULE ORAL EVERY 6 HOURS PRN
COMMUNITY
End: 2019-05-16 | Stop reason: HOSPADM

## 2018-12-03 RX ORDER — EZETIMIBE 10 MG/1
10 TABLET ORAL
COMMUNITY
Start: 2018-08-16 | End: 2019-02-19 | Stop reason: SDUPTHER

## 2018-12-03 NOTE — TELEPHONE ENCOUNTER
JIM: Lori Night says her right leg from the knee down to her foot feels numb, may be a little larger then her left leg, not necessarily painful  Started yesterday and has not resolved  Not hot to touch, no edema when she pushed on the calf area  Given an appt  at 1 this afternoon  She will call back if not able to get a ride

## 2018-12-04 NOTE — PROGRESS NOTES
Patient has had some tingling down her right leg  It follows the distribution of the tibial nerve  She noticed this after she was sleeping on her side and felt that the opposite knee bone had pressed into of the medial aspect of her left knee  She has no other neurological complaints    /78 (Cuff Size: Standard)   Pulse 78   Resp 16   Wt 76 7 kg (169 lb 3 2 oz)   BMI 24 99 kg/m²     Neurologic assessment of both lower extremities is negative  Her gait is normal   Mental status is excellent  Cranial nerves are grossly intact  I just believe that this is a nerve compression situation  She should sleep with a pillow between her knees  She should also call if symptoms worsen or if she develops other neurological complaints

## 2018-12-12 ENCOUNTER — OFFICE VISIT (OUTPATIENT)
Dept: FAMILY MEDICINE CLINIC | Facility: MEDICAL CENTER | Age: 73
End: 2018-12-12
Payer: COMMERCIAL

## 2018-12-12 VITALS
SYSTOLIC BLOOD PRESSURE: 134 MMHG | WEIGHT: 168.25 LBS | BODY MASS INDEX: 24.92 KG/M2 | DIASTOLIC BLOOD PRESSURE: 82 MMHG | RESPIRATION RATE: 16 BRPM | HEIGHT: 69 IN | HEART RATE: 90 BPM

## 2018-12-12 DIAGNOSIS — Z23 NEED FOR SHINGLES VACCINE: Primary | ICD-10-CM

## 2018-12-12 DIAGNOSIS — M85.89 OSTEOPENIA OF MULTIPLE SITES: ICD-10-CM

## 2018-12-12 DIAGNOSIS — I10 ESSENTIAL HYPERTENSION: ICD-10-CM

## 2018-12-12 PROBLEM — M85.80 OSTEOPENIA: Status: ACTIVE | Noted: 2018-12-12

## 2018-12-12 PROBLEM — K59.00 CONSTIPATION: Status: ACTIVE | Noted: 2018-12-12

## 2018-12-12 LAB
SL AMB  POCT GLUCOSE, UA: NORMAL
SL AMB LEUKOCYTE ESTERASE,UA: NORMAL
SL AMB POCT BILIRUBIN,UA: NORMAL
SL AMB POCT BLOOD,UA: NORMAL
SL AMB POCT CLARITY,UA: CLEAR
SL AMB POCT COLOR,UA: NORMAL
SL AMB POCT KETONES,UA: NORMAL
SL AMB POCT NITRITE,UA: NORMAL
SL AMB POCT PH,UA: 5.5
SL AMB POCT SPECIFIC GRAVITY,UA: 1.01
SL AMB POCT URINE PROTEIN: NORMAL
SL AMB POCT UROBILINOGEN: 3.5

## 2018-12-12 PROCEDURE — 81002 URINALYSIS NONAUTO W/O SCOPE: CPT | Performed by: FAMILY MEDICINE

## 2018-12-12 PROCEDURE — 3075F SYST BP GE 130 - 139MM HG: CPT | Performed by: FAMILY MEDICINE

## 2018-12-12 PROCEDURE — 4040F PNEUMOC VAC/ADMIN/RCVD: CPT | Performed by: FAMILY MEDICINE

## 2018-12-12 PROCEDURE — 1160F RVW MEDS BY RX/DR IN RCRD: CPT | Performed by: FAMILY MEDICINE

## 2018-12-12 PROCEDURE — 3008F BODY MASS INDEX DOCD: CPT | Performed by: FAMILY MEDICINE

## 2018-12-12 PROCEDURE — 3079F DIAST BP 80-89 MM HG: CPT | Performed by: FAMILY MEDICINE

## 2018-12-12 PROCEDURE — 99214 OFFICE O/P EST MOD 30 MIN: CPT | Performed by: FAMILY MEDICINE

## 2018-12-12 NOTE — PROGRESS NOTES
Leroy Callahan is here for CPX  She said she was treated for scabies  However   She says she still gets itching  Uses moisturizer which is helping  She has chronic constipation  Uses 3 Dulcolax stool softener daily  Prilosec works well  No heartburn  She  gets occ gas from her Zetia  She  says she had an episode of confusion last week  Couldn't remember her credit card bill  She does her own checkbook  She does all her own cooking, cleaning, laundry  She uses a computer  No family concerns  Doesn't drive but follows directioins  well  No urinary symptoms  Can't take  Statins  Take Zetia  Occasionally gets or gas  Colonoscopy 2015  10 year f/u   Mammogram 2018 per Dr Kaylyn Sarmiento 2015    Physical Exam   Constitutional: She appears well-developed and well-nourished  No distress  Neck: Carotid bruit is not present  No thyromegaly present  Cardiovascular: Normal rate, regular rhythm, normal heart sounds and intact distal pulses  Pulmonary/Chest: Effort normal and breath sounds normal    Abdominal: Soft  Bowel sounds are normal  She exhibits no mass  There is no hepatomegaly  There is no tenderness  Musculoskeletal: She exhibits no edema  Lymphadenopathy:     She has no cervical adenopathy  Assessment  1  Depression and/anxiety-does well with sertraline 100 milligrams    Will continue  2   Cognitive changes-will check a mini-mental status exam today however I think that she is really at her baseline following her stroke  3   Hyperlipidemia-can't tolerate statins  Does well with Zetia  Cholesterol is stable  4   Dry skin dermatitis-discussed moisturization reduced bathing  5  Health maintenance-immunizations up-to-date  Discussed Shingrix  Due for DEXA scan  At flu shot  Mammogram per her oncologist    Plan  Check mini-mental status exam   As above  Recheck 6 months

## 2019-01-25 ENCOUNTER — HOSPITAL ENCOUNTER (OUTPATIENT)
Dept: RADIOLOGY | Facility: MEDICAL CENTER | Age: 74
Discharge: HOME | End: 2019-01-25
Payer: COMMERCIAL

## 2019-01-25 DIAGNOSIS — M85.89 OSTEOPENIA OF MULTIPLE SITES: ICD-10-CM

## 2019-01-25 PROCEDURE — 77080 DXA BONE DENSITY AXIAL: CPT

## 2019-02-10 DIAGNOSIS — F41.8 DEPRESSION WITH ANXIETY: ICD-10-CM

## 2019-02-10 RX ORDER — SERTRALINE HYDROCHLORIDE 100 MG/1
TABLET, FILM COATED ORAL
Qty: 90 TABLET | Refills: 0 | Status: ON HOLD | OUTPATIENT
Start: 2019-02-10 | End: 2019-05-12 | Stop reason: SDUPTHER

## 2019-02-19 ENCOUNTER — OFFICE VISIT (OUTPATIENT)
Dept: CARDIOLOGY CLINIC | Facility: CLINIC | Age: 74
End: 2019-02-19
Payer: COMMERCIAL

## 2019-02-19 VITALS
DIASTOLIC BLOOD PRESSURE: 76 MMHG | OXYGEN SATURATION: 98 % | SYSTOLIC BLOOD PRESSURE: 146 MMHG | BODY MASS INDEX: 25.27 KG/M2 | WEIGHT: 170.6 LBS | HEART RATE: 76 BPM | HEIGHT: 69 IN

## 2019-02-19 DIAGNOSIS — E78.00 HYPERCHOLESTEROLEMIA: Primary | ICD-10-CM

## 2019-02-19 DIAGNOSIS — I77.1 CELIAC ARTERY STENOSIS (HCC): ICD-10-CM

## 2019-02-19 DIAGNOSIS — R03.0 SITUATIONAL HYPERTENSION: ICD-10-CM

## 2019-02-19 PROCEDURE — 99214 OFFICE O/P EST MOD 30 MIN: CPT | Performed by: INTERNAL MEDICINE

## 2019-02-19 NOTE — PROGRESS NOTES
Follow-up - Cardiology   Pepe Darling 69 y o  female MRN: [id number]        Problems    1  Hypercholesterolemia  Lipid panel   2  Situational hypertension     3  Celiac artery stenosis (HCC)         Impression    I had the pleasure of having Samson return to see me at the [facility] office    Hypertension  Only situational hypertension  Remains normal today in the office  No history of LVH by EKG or echocardiography    Hyperlipidemia  -Significant intolerance to statins, recently tried Livalo  Also intolerant to red yeast rice  LDL was 171 in 8/18, Zetia was recommended    Celiac artery stenosis  70% by ultrasound 11/16  No current abdominal symptoms    PLAN:    Reassess lipids now that on Zetia 10 mg daily  Six-month follow-up recommended  We spent most of the 20 min visit today discussing more dietary modification, especially avoiding creamy sauces, creamy dressings, and trying to add more lean meat and vegetables to her diet  HPI: Pepe Darling is a 69y o  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 with longstanding severe hypercholesterolemia, situational hypertension, celiac artery stenosis, and a history of subarachnoid hemorrhage presents to the office for a follow-up visit  Her lipids are severely uncontrolled, she has a long history of severe intolerance to every statin including Livalo  She could not tolerate red yeast rice  She is currently tolerating Zetia  She has not had her lipids rechecked on Zetia  Her blood pressure remains normal, home blood pressure checks since December are normal, and her office pressure today is normal     Regarding her celiac artery stenosis, she denies any significant abdominal symptoms  Review of Systems   Constitutional: Negative  Respiratory: Negative  Cardiovascular: Negative  All other systems reviewed and are negative          Past Medical History:   Diagnosis Date    Anxiety disorder     [facility]     [facility]     Constipation     Esophageal reflux  History of bone density study 2011    [employer]    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    Hyperlipidemia     Hypertension     Osteoarthritis of hip     Osteopenia     Stroke, hemorrhagic [facility] [address] ) 12/2015    Subarachnoid hemorrhage (HCC)      Social History     Substance and Sexual Activity   Alcohol Use Yes    Comment: occasionally     Social History     Substance and Sexual Activity   Drug Use No     Social History     Tobacco Use   Smoking Status Never Smoker   Smokeless Tobacco Never Used       Allergies: Allergies   Allergen Reactions    Oxycodone Vomiting    Monascus Purpureus Went Yeast      Pt unsure   Atorvastatin Other (See Comments)     leg pain    Bactrim [Sulfamethoxazole-Trimethoprim] Itching and Rash    Keppra [Levetiracetam] Rash    Livalo [Pitavastatin] Itching    Penicillins Rash     Agitation        Medications:     Current Outpatient Medications:     ALPRAZolam (XANAX) 0 25 mg tablet, Take 0 25 mg by mouth daily as needed for anxiety  , Disp: , Rfl:     Calcium-Vitamin D (CALTRATE 600 PLUS-VIT D PO), Take by mouth daily  , Disp: , Rfl:     cyanocobalamin (VITAMIN B-12) 100 mcg tablet, Take by mouth daily, Disp: , Rfl:     diphenhydrAMINE (BENADRYL) 25 mg capsule, Take 25 mg by mouth every 6 (six) hours as needed, Disp: , Rfl:     docusate sodium (STOOL SOFTENER) 100 mg capsule, Take 300 mg by mouth daily , Disp: , Rfl:     ezetimibe (ZETIA) 10 mg tablet, Take 1 tablet (10 mg total) by mouth daily, Disp: 30 tablet, Rfl: 11    latanoprost (XALATAN) 0 005 % ophthalmic solution, Administer 1 drop to both eyes daily at bedtime  , Disp: , Rfl:     mometasone (NASONEX) 50 mcg/act nasal spray, 2 sprays into each nostril daily, Disp: , Rfl:     Multiple Vitamins-Minerals (CENTRUM SILVER PO), Take by mouth daily  , Disp: , Rfl:     omeprazole (PriLOSEC) 20 mg delayed release capsule, Take 20 mg by mouth daily  , Disp: , Rfl:     sertraline (ZOLOFT) 100 mg tablet, TAKE ONE TABLET BY MOUTH EVERY DAY, Disp: 90 tablet, Rfl: 0      Vitals:    02/19/19 1531   BP: 146/76   Pulse: 76   SpO2: 98%     Weight (last 2 days)     Date/Time   Weight    02/19/19 1531   77 4 (170 6)            Physical Exam   Constitutional: No distress  HENT:   Head: Normocephalic and atraumatic  Eyes: Conjunctivae are normal  No scleral icterus  Neck: Normal range of motion  No JVD present  Cardiovascular: Normal rate, regular rhythm, normal heart sounds and intact distal pulses  No murmur heard  Pulmonary/Chest: Effort normal and breath sounds normal  No respiratory distress  She has no wheezes  She has no rales  Musculoskeletal: She exhibits no edema or tenderness  Skin: Skin is warm and dry  She is not diaphoretic           Laboratory Studies:  CMP:  Lab Results   Component Value Date    CREATININE 0 72 08/04/2018    CREATININE 0 68 01/08/2016    BUN 11 08/04/2018    BUN 12 01/08/2016     (L) 01/08/2016    K 3 9 08/04/2018    K 3 7 01/08/2016     08/04/2018     01/08/2016    CO2 28 08/04/2018    CO2 27 01/08/2016    GLUCOSE 99 01/08/2016    PROT 7 0 01/03/2016    ALKPHOS 87 08/04/2018    ALKPHOS 104 01/03/2016    ALT 23 08/04/2018    ALT 43 01/03/2016    AST 24 08/04/2018    AST 35 01/03/2016    BILIDIR 0 12 11/03/2016     Lipid Profile:   Lab Results   Component Value Date    CHOL 250 07/29/2015    CHOL 238 07/24/2014     Lab Results   Component Value Date    HDL 75 (H) 08/18/2018    HDL 82 (H) 12/13/2017    HDL 73 (H) 06/27/2017     Lab Results   Component Value Date    LDLCALC 171 (H) 08/18/2018    LDLCALC 179 (H) 12/13/2017    LDLCALC 181 (H) 06/27/2017     Lab Results   Component Value Date    TRIG 83 08/18/2018    TRIG 92 12/13/2017    TRIG 121 06/27/2017               Bahman Marmolejo MD    Portions of the record may have been created with voice recognition software   Occasional wrong word or "sound a like" substitutions may have occurred due to the inherent limitations of voice recognition software   Read the chart carefully and recognize, using context, where substitutions have occurred

## 2019-02-19 NOTE — PATIENT INSTRUCTIONS
[employer] stores cell pre made, pre cooked, pre sliced chicken in a bag  Try making her own pasta in boiling water    Try avoiding the premade meals that include a lot of cream sauces like [place], meat sauce   Add olive oil, steam to vegetables, and a small sprinkle of parmesan on cheese for flavor      All salad dressings should be olive oil/vinegar based, no creamy dressings

## 2019-02-26 DIAGNOSIS — E78.00 HYPERCHOLESTEROLEMIA: ICD-10-CM

## 2019-02-26 RX ORDER — EZETIMIBE 10 MG/1
10 TABLET ORAL DAILY
Qty: 30 TABLET | Refills: 5 | Status: SHIPPED | OUTPATIENT
Start: 2019-02-26 | End: 2019-08-26

## 2019-03-28 DIAGNOSIS — F41.9 ANXIETY: Primary | ICD-10-CM

## 2019-03-28 NOTE — TELEPHONE ENCOUNTER
Also needs Antibiotic to use prior to Dental work  She says it was prescribed previously but she did not remember the name  She states that she only gets 2 pills  I do not see this on her chart

## 2019-03-29 RX ORDER — ALPRAZOLAM 0.25 MG/1
0.25 TABLET ORAL DAILY PRN
Qty: 30 TABLET | Refills: 0 | Status: ON HOLD | OUTPATIENT
Start: 2019-03-29 | End: 2019-05-15 | Stop reason: SDUPTHER

## 2019-04-02 DIAGNOSIS — Z00.00 PREVENTATIVE HEALTH CARE: Primary | ICD-10-CM

## 2019-04-02 RX ORDER — AZITHROMYCIN 250 MG/1
TABLET, FILM COATED ORAL
Qty: 2 TABLET | Refills: 0 | Status: SHIPPED | OUTPATIENT
Start: 2019-04-02 | End: 2019-04-02

## 2019-04-10 ENCOUNTER — APPOINTMENT (OUTPATIENT)
Dept: LAB | Facility: CLINIC | Age: 74
End: 2019-04-10
Payer: COMMERCIAL

## 2019-04-10 DIAGNOSIS — E78.00 HYPERCHOLESTEROLEMIA: ICD-10-CM

## 2019-04-10 LAB
CHOLEST SERPL-MCNC: 214 MG/DL (ref 50–200)
HDLC SERPL-MCNC: 72 MG/DL (ref 40–60)
LDLC SERPL CALC-MCNC: 127 MG/DL (ref 0–100)
NONHDLC SERPL-MCNC: 142 MG/DL
TRIGL SERPL-MCNC: 73 MG/DL

## 2019-04-10 PROCEDURE — 36415 COLL VENOUS BLD VENIPUNCTURE: CPT

## 2019-04-10 PROCEDURE — 80061 LIPID PANEL: CPT

## 2019-04-22 ENCOUNTER — TRANSCRIBE ORDERS (OUTPATIENT)
Dept: ADMINISTRATIVE | Facility: HOSPITAL | Age: 74
End: 2019-04-22

## 2019-04-22 DIAGNOSIS — Z12.39 BREAST SCREENING, UNSPECIFIED: Primary | ICD-10-CM

## 2019-05-10 ENCOUNTER — APPOINTMENT (EMERGENCY)
Dept: RADIOLOGY | Facility: HOSPITAL | Age: 74
End: 2019-05-10
Payer: COMMERCIAL

## 2019-05-10 ENCOUNTER — HOSPITAL ENCOUNTER (OUTPATIENT)
Facility: HOSPITAL | Age: 74
Setting detail: OBSERVATION
Discharge: NURSING, REHAB OR LONG-TERM CARE | End: 2019-05-16
Attending: EMERGENCY MEDICINE | Admitting: INTERNAL MEDICINE
Payer: COMMERCIAL

## 2019-05-10 ENCOUNTER — APPOINTMENT (EMERGENCY)
Dept: CT IMAGING | Facility: HOSPITAL | Age: 74
End: 2019-05-10
Payer: COMMERCIAL

## 2019-05-10 DIAGNOSIS — R42 DIZZINESS: Primary | ICD-10-CM

## 2019-05-10 DIAGNOSIS — E04.1 THYROID NODULE: ICD-10-CM

## 2019-05-10 DIAGNOSIS — F41.9 ANXIETY: ICD-10-CM

## 2019-05-10 DIAGNOSIS — R42 VERTIGO: ICD-10-CM

## 2019-05-10 DIAGNOSIS — R26.2 AMBULATORY DYSFUNCTION: ICD-10-CM

## 2019-05-10 DIAGNOSIS — I60.9 SUBARACHNOID HEMORRHAGE (HCC): ICD-10-CM

## 2019-05-10 LAB
ALBUMIN SERPL BCP-MCNC: 3.5 G/DL (ref 3.5–5)
ALP SERPL-CCNC: 87 U/L (ref 46–116)
ALT SERPL W P-5'-P-CCNC: 29 U/L (ref 12–78)
ANION GAP SERPL CALCULATED.3IONS-SCNC: 10 MMOL/L (ref 4–13)
APTT PPP: 29 SECONDS (ref 26–38)
AST SERPL W P-5'-P-CCNC: 24 U/L (ref 5–45)
ATRIAL RATE: 70 BPM
BASOPHILS # BLD AUTO: 0.04 THOUSANDS/ΜL (ref 0–0.1)
BASOPHILS NFR BLD AUTO: 1 % (ref 0–1)
BILIRUB SERPL-MCNC: 0.4 MG/DL (ref 0.2–1)
BILIRUB UR QL STRIP: NEGATIVE
BUN SERPL-MCNC: 11 MG/DL (ref 5–25)
CALCIUM SERPL-MCNC: 9.3 MG/DL (ref 8.3–10.1)
CHLORIDE SERPL-SCNC: 106 MMOL/L (ref 100–108)
CLARITY UR: CLEAR
CO2 SERPL-SCNC: 29 MMOL/L (ref 21–32)
COLOR UR: NORMAL
CREAT SERPL-MCNC: 0.78 MG/DL (ref 0.6–1.3)
EOSINOPHIL # BLD AUTO: 0.23 THOUSAND/ΜL (ref 0–0.61)
EOSINOPHIL NFR BLD AUTO: 5 % (ref 0–6)
ERYTHROCYTE [DISTWIDTH] IN BLOOD BY AUTOMATED COUNT: 13.2 % (ref 11.6–15.1)
GFR SERPL CREATININE-BSD FRML MDRD: 76 ML/MIN/1.73SQ M
GLUCOSE SERPL-MCNC: 92 MG/DL (ref 65–140)
GLUCOSE UR STRIP-MCNC: NEGATIVE MG/DL
HCT VFR BLD AUTO: 40.8 % (ref 34.8–46.1)
HGB BLD-MCNC: 13.6 G/DL (ref 11.5–15.4)
HGB UR QL STRIP.AUTO: NEGATIVE
IMM GRANULOCYTES # BLD AUTO: 0.01 THOUSAND/UL (ref 0–0.2)
IMM GRANULOCYTES NFR BLD AUTO: 0 % (ref 0–2)
INR PPP: 0.95 (ref 0.86–1.17)
KETONES UR STRIP-MCNC: NEGATIVE MG/DL
LEUKOCYTE ESTERASE UR QL STRIP: NEGATIVE
LYMPHOCYTES # BLD AUTO: 1.5 THOUSANDS/ΜL (ref 0.6–4.47)
LYMPHOCYTES NFR BLD AUTO: 30 % (ref 14–44)
MCH RBC QN AUTO: 29.1 PG (ref 26.8–34.3)
MCHC RBC AUTO-ENTMCNC: 33.3 G/DL (ref 31.4–37.4)
MCV RBC AUTO: 87 FL (ref 82–98)
MONOCYTES # BLD AUTO: 0.54 THOUSAND/ΜL (ref 0.17–1.22)
MONOCYTES NFR BLD AUTO: 11 % (ref 4–12)
NEUTROPHILS # BLD AUTO: 2.68 THOUSANDS/ΜL (ref 1.85–7.62)
NEUTS SEG NFR BLD AUTO: 53 % (ref 43–75)
NITRITE UR QL STRIP: NEGATIVE
NRBC BLD AUTO-RTO: 0 /100 WBCS
P AXIS: 56 DEGREES
PH UR STRIP.AUTO: 7.5 [PH]
PLATELET # BLD AUTO: 344 THOUSANDS/UL (ref 149–390)
PMV BLD AUTO: 9.3 FL (ref 8.9–12.7)
POTASSIUM SERPL-SCNC: 3.8 MMOL/L (ref 3.5–5.3)
PR INTERVAL: 148 MS
PROT SERPL-MCNC: 7.1 G/DL (ref 6.4–8.2)
PROT UR STRIP-MCNC: NEGATIVE MG/DL
PROTHROMBIN TIME: 12.4 SECONDS (ref 11.8–14.2)
QRS AXIS: 23 DEGREES
QRSD INTERVAL: 82 MS
QT INTERVAL: 402 MS
QTC INTERVAL: 434 MS
RBC # BLD AUTO: 4.67 MILLION/UL (ref 3.81–5.12)
SODIUM SERPL-SCNC: 145 MMOL/L (ref 136–145)
SP GR UR STRIP.AUTO: 1.01 (ref 1–1.03)
T WAVE AXIS: 28 DEGREES
TROPONIN I SERPL-MCNC: <0.02 NG/ML
UROBILINOGEN UR QL STRIP.AUTO: 0.2 E.U./DL
VENTRICULAR RATE: 70 BPM
WBC # BLD AUTO: 5 THOUSAND/UL (ref 4.31–10.16)

## 2019-05-10 PROCEDURE — 96360 HYDRATION IV INFUSION INIT: CPT

## 2019-05-10 PROCEDURE — 71046 X-RAY EXAM CHEST 2 VIEWS: CPT

## 2019-05-10 PROCEDURE — 81003 URINALYSIS AUTO W/O SCOPE: CPT | Performed by: PHYSICIAN ASSISTANT

## 2019-05-10 PROCEDURE — 99219 PR INITIAL OBSERVATION CARE/DAY 50 MINUTES: CPT | Performed by: INTERNAL MEDICINE

## 2019-05-10 PROCEDURE — 85025 COMPLETE CBC W/AUTO DIFF WBC: CPT | Performed by: PHYSICIAN ASSISTANT

## 2019-05-10 PROCEDURE — 99205 OFFICE O/P NEW HI 60 MIN: CPT | Performed by: PHYSICIAN ASSISTANT

## 2019-05-10 PROCEDURE — 70496 CT ANGIOGRAPHY HEAD: CPT

## 2019-05-10 PROCEDURE — 36415 COLL VENOUS BLD VENIPUNCTURE: CPT | Performed by: PHYSICIAN ASSISTANT

## 2019-05-10 PROCEDURE — 80053 COMPREHEN METABOLIC PANEL: CPT | Performed by: PHYSICIAN ASSISTANT

## 2019-05-10 PROCEDURE — 85610 PROTHROMBIN TIME: CPT | Performed by: PHYSICIAN ASSISTANT

## 2019-05-10 PROCEDURE — 99285 EMERGENCY DEPT VISIT HI MDM: CPT

## 2019-05-10 PROCEDURE — 70498 CT ANGIOGRAPHY NECK: CPT

## 2019-05-10 PROCEDURE — 93010 ELECTROCARDIOGRAM REPORT: CPT | Performed by: INTERNAL MEDICINE

## 2019-05-10 PROCEDURE — 99284 EMERGENCY DEPT VISIT MOD MDM: CPT | Performed by: PHYSICIAN ASSISTANT

## 2019-05-10 PROCEDURE — 84484 ASSAY OF TROPONIN QUANT: CPT | Performed by: PHYSICIAN ASSISTANT

## 2019-05-10 PROCEDURE — 85730 THROMBOPLASTIN TIME PARTIAL: CPT | Performed by: PHYSICIAN ASSISTANT

## 2019-05-10 PROCEDURE — 93005 ELECTROCARDIOGRAM TRACING: CPT

## 2019-05-10 RX ORDER — PANTOPRAZOLE SODIUM 40 MG/1
40 TABLET, DELAYED RELEASE ORAL
Status: DISCONTINUED | OUTPATIENT
Start: 2019-05-11 | End: 2019-05-16 | Stop reason: HOSPADM

## 2019-05-10 RX ORDER — DOCUSATE SODIUM 100 MG/1
300 CAPSULE, LIQUID FILLED ORAL 2 TIMES DAILY PRN
Status: DISCONTINUED | OUTPATIENT
Start: 2019-05-10 | End: 2019-05-16 | Stop reason: HOSPADM

## 2019-05-10 RX ORDER — ONDANSETRON 2 MG/ML
4 INJECTION INTRAMUSCULAR; INTRAVENOUS EVERY 6 HOURS PRN
Status: DISCONTINUED | OUTPATIENT
Start: 2019-05-10 | End: 2019-05-16 | Stop reason: HOSPADM

## 2019-05-10 RX ORDER — ASPIRIN 81 MG/1
81 TABLET ORAL DAILY
Status: DISCONTINUED | OUTPATIENT
Start: 2019-05-11 | End: 2019-05-10

## 2019-05-10 RX ORDER — LORATADINE 10 MG/1
10 TABLET ORAL DAILY
Status: DISCONTINUED | OUTPATIENT
Start: 2019-05-10 | End: 2019-05-16 | Stop reason: HOSPADM

## 2019-05-10 RX ORDER — LATANOPROST 50 UG/ML
1 SOLUTION/ DROPS OPHTHALMIC
Status: DISCONTINUED | OUTPATIENT
Start: 2019-05-10 | End: 2019-05-16 | Stop reason: HOSPADM

## 2019-05-10 RX ORDER — DIAZEPAM 2 MG/1
2 TABLET ORAL ONCE
Status: COMPLETED | OUTPATIENT
Start: 2019-05-10 | End: 2019-05-10

## 2019-05-10 RX ORDER — MECLIZINE HCL 12.5 MG/1
25 TABLET ORAL ONCE
Status: COMPLETED | OUTPATIENT
Start: 2019-05-10 | End: 2019-05-10

## 2019-05-10 RX ORDER — SERTRALINE HYDROCHLORIDE 100 MG/1
100 TABLET, FILM COATED ORAL DAILY
Status: DISCONTINUED | OUTPATIENT
Start: 2019-05-10 | End: 2019-05-16 | Stop reason: HOSPADM

## 2019-05-10 RX ORDER — MECLIZINE HYDROCHLORIDE 25 MG/1
25 TABLET ORAL EVERY 8 HOURS SCHEDULED
Status: DISCONTINUED | OUTPATIENT
Start: 2019-05-10 | End: 2019-05-16 | Stop reason: HOSPADM

## 2019-05-10 RX ORDER — B-COMPLEX WITH VITAMIN C
1 TABLET ORAL
Status: DISCONTINUED | OUTPATIENT
Start: 2019-05-11 | End: 2019-05-16 | Stop reason: HOSPADM

## 2019-05-10 RX ORDER — ASPIRIN 81 MG/1
81 TABLET, CHEWABLE ORAL DAILY
Status: DISCONTINUED | OUTPATIENT
Start: 2019-05-10 | End: 2019-05-11 | Stop reason: ALTCHOICE

## 2019-05-10 RX ORDER — EZETIMIBE 10 MG/1
10 TABLET ORAL DAILY
Status: DISCONTINUED | OUTPATIENT
Start: 2019-05-10 | End: 2019-05-16 | Stop reason: HOSPADM

## 2019-05-10 RX ORDER — ALPRAZOLAM 0.25 MG/1
0.25 TABLET ORAL DAILY PRN
Status: DISCONTINUED | OUTPATIENT
Start: 2019-05-10 | End: 2019-05-16 | Stop reason: HOSPADM

## 2019-05-10 RX ORDER — LORATADINE 10 MG/1
10 TABLET ORAL DAILY
COMMUNITY
End: 2019-12-02 | Stop reason: ALTCHOICE

## 2019-05-10 RX ORDER — ASPIRIN 325 MG
325 TABLET ORAL DAILY
COMMUNITY
End: 2019-05-16 | Stop reason: HOSPADM

## 2019-05-10 RX ORDER — ACETAMINOPHEN 325 MG/1
650 TABLET ORAL EVERY 6 HOURS PRN
Status: DISCONTINUED | OUTPATIENT
Start: 2019-05-10 | End: 2019-05-16 | Stop reason: HOSPADM

## 2019-05-10 RX ORDER — FLUTICASONE PROPIONATE 50 MCG
1 SPRAY, SUSPENSION (ML) NASAL 2 TIMES DAILY
Status: DISCONTINUED | OUTPATIENT
Start: 2019-05-10 | End: 2019-05-16 | Stop reason: HOSPADM

## 2019-05-10 RX ORDER — UBIDECARENONE 75 MG
100 CAPSULE ORAL DAILY
Status: DISCONTINUED | OUTPATIENT
Start: 2019-05-10 | End: 2019-05-16 | Stop reason: HOSPADM

## 2019-05-10 RX ORDER — ACETAMINOPHEN 325 MG/1
650 TABLET ORAL EVERY 6 HOURS PRN
COMMUNITY

## 2019-05-10 RX ORDER — ASPIRIN 325 MG
325 TABLET ORAL DAILY
Status: DISCONTINUED | OUTPATIENT
Start: 2019-05-10 | End: 2019-05-10

## 2019-05-10 RX ADMIN — SERTRALINE HYDROCHLORIDE 100 MG: 100 TABLET ORAL at 16:16

## 2019-05-10 RX ADMIN — MECLIZINE HYDROCHLORIDE 25 MG: 25 TABLET ORAL at 16:16

## 2019-05-10 RX ADMIN — LATANOPROST 1 DROP: 50 SOLUTION OPHTHALMIC at 22:15

## 2019-05-10 RX ADMIN — DIAZEPAM 2 MG: 2 TABLET ORAL at 11:45

## 2019-05-10 RX ADMIN — MECLIZINE HYDROCHLORIDE 25 MG: 25 TABLET ORAL at 22:15

## 2019-05-10 RX ADMIN — ASPIRIN 81 MG 81 MG: 81 TABLET ORAL at 18:32

## 2019-05-10 RX ADMIN — VITAM B12 100 MCG: 100 TAB at 16:16

## 2019-05-10 RX ADMIN — ENOXAPARIN SODIUM 40 MG: 40 INJECTION SUBCUTANEOUS at 18:32

## 2019-05-10 RX ADMIN — SODIUM CHLORIDE 1000 ML: 0.9 INJECTION, SOLUTION INTRAVENOUS at 08:52

## 2019-05-10 RX ADMIN — EZETIMIBE 10 MG: 10 TABLET ORAL at 16:15

## 2019-05-10 RX ADMIN — IOHEXOL 85 ML: 350 INJECTION, SOLUTION INTRAVENOUS at 10:09

## 2019-05-10 RX ADMIN — MECLIZINE 25 MG: 12.5 TABLET ORAL at 08:51

## 2019-05-11 PROBLEM — E04.1 THYROID NODULE: Status: ACTIVE | Noted: 2019-05-11

## 2019-05-11 LAB
ANION GAP SERPL CALCULATED.3IONS-SCNC: 7 MMOL/L (ref 4–13)
BUN SERPL-MCNC: 11 MG/DL (ref 5–25)
CALCIUM SERPL-MCNC: 8.9 MG/DL (ref 8.3–10.1)
CHLORIDE SERPL-SCNC: 109 MMOL/L (ref 100–108)
CHOLEST SERPL-MCNC: 214 MG/DL (ref 50–200)
CO2 SERPL-SCNC: 28 MMOL/L (ref 21–32)
CREAT SERPL-MCNC: 0.87 MG/DL (ref 0.6–1.3)
ERYTHROCYTE [DISTWIDTH] IN BLOOD BY AUTOMATED COUNT: 13.3 % (ref 11.6–15.1)
EST. AVERAGE GLUCOSE BLD GHB EST-MCNC: 114 MG/DL
GFR SERPL CREATININE-BSD FRML MDRD: 66 ML/MIN/1.73SQ M
GLUCOSE P FAST SERPL-MCNC: 88 MG/DL (ref 65–99)
GLUCOSE SERPL-MCNC: 88 MG/DL (ref 65–140)
HBA1C MFR BLD: 5.6 % (ref 4.2–6.3)
HCT VFR BLD AUTO: 40 % (ref 34.8–46.1)
HDLC SERPL-MCNC: 76 MG/DL (ref 40–60)
HGB BLD-MCNC: 13.2 G/DL (ref 11.5–15.4)
LDLC SERPL CALC-MCNC: 122 MG/DL (ref 0–100)
MCH RBC QN AUTO: 29.2 PG (ref 26.8–34.3)
MCHC RBC AUTO-ENTMCNC: 33 G/DL (ref 31.4–37.4)
MCV RBC AUTO: 89 FL (ref 82–98)
PLATELET # BLD AUTO: 310 THOUSANDS/UL (ref 149–390)
PMV BLD AUTO: 9 FL (ref 8.9–12.7)
POTASSIUM SERPL-SCNC: 3.9 MMOL/L (ref 3.5–5.3)
RBC # BLD AUTO: 4.52 MILLION/UL (ref 3.81–5.12)
SODIUM SERPL-SCNC: 144 MMOL/L (ref 136–145)
TRIGL SERPL-MCNC: 82 MG/DL
TSH SERPL DL<=0.05 MIU/L-ACNC: 2.47 UIU/ML (ref 0.36–3.74)
WBC # BLD AUTO: 5.38 THOUSAND/UL (ref 4.31–10.16)

## 2019-05-11 PROCEDURE — G8979 MOBILITY GOAL STATUS: HCPCS

## 2019-05-11 PROCEDURE — 97167 OT EVAL HIGH COMPLEX 60 MIN: CPT

## 2019-05-11 PROCEDURE — 99214 OFFICE O/P EST MOD 30 MIN: CPT | Performed by: PHYSICIAN ASSISTANT

## 2019-05-11 PROCEDURE — 80061 LIPID PANEL: CPT | Performed by: INTERNAL MEDICINE

## 2019-05-11 PROCEDURE — 83036 HEMOGLOBIN GLYCOSYLATED A1C: CPT | Performed by: INTERNAL MEDICINE

## 2019-05-11 PROCEDURE — 99225 PR SBSQ OBSERVATION CARE/DAY 25 MINUTES: CPT | Performed by: INTERNAL MEDICINE

## 2019-05-11 PROCEDURE — 97112 NEUROMUSCULAR REEDUCATION: CPT

## 2019-05-11 PROCEDURE — 80048 BASIC METABOLIC PNL TOTAL CA: CPT | Performed by: INTERNAL MEDICINE

## 2019-05-11 PROCEDURE — 97163 PT EVAL HIGH COMPLEX 45 MIN: CPT

## 2019-05-11 PROCEDURE — 85027 COMPLETE CBC AUTOMATED: CPT | Performed by: INTERNAL MEDICINE

## 2019-05-11 PROCEDURE — G8988 SELF CARE GOAL STATUS: HCPCS

## 2019-05-11 PROCEDURE — G8978 MOBILITY CURRENT STATUS: HCPCS

## 2019-05-11 PROCEDURE — 84443 ASSAY THYROID STIM HORMONE: CPT | Performed by: INTERNAL MEDICINE

## 2019-05-11 PROCEDURE — 97116 GAIT TRAINING THERAPY: CPT

## 2019-05-11 PROCEDURE — G8987 SELF CARE CURRENT STATUS: HCPCS

## 2019-05-11 RX ORDER — MECLIZINE HYDROCHLORIDE 25 MG/1
25 TABLET ORAL ONCE
Status: COMPLETED | OUTPATIENT
Start: 2019-05-11 | End: 2019-05-11

## 2019-05-11 RX ORDER — SODIUM CHLORIDE 9 MG/ML
75 INJECTION, SOLUTION INTRAVENOUS ONCE
Status: COMPLETED | OUTPATIENT
Start: 2019-05-11 | End: 2019-05-12

## 2019-05-11 RX ADMIN — MECLIZINE HYDROCHLORIDE 25 MG: 25 TABLET ORAL at 22:02

## 2019-05-11 RX ADMIN — ENOXAPARIN SODIUM 40 MG: 40 INJECTION SUBCUTANEOUS at 08:17

## 2019-05-11 RX ADMIN — MECLIZINE HYDROCHLORIDE 25 MG: 25 TABLET ORAL at 18:28

## 2019-05-11 RX ADMIN — SERTRALINE HYDROCHLORIDE 100 MG: 100 TABLET ORAL at 08:17

## 2019-05-11 RX ADMIN — VITAM B12 100 MCG: 100 TAB at 08:17

## 2019-05-11 RX ADMIN — DOCUSATE SODIUM 300 MG: 100 CAPSULE, LIQUID FILLED ORAL at 13:24

## 2019-05-11 RX ADMIN — MECLIZINE HYDROCHLORIDE 25 MG: 25 TABLET ORAL at 13:24

## 2019-05-11 RX ADMIN — OYSTER SHELL CALCIUM WITH VITAMIN D 1 TABLET: 500; 200 TABLET, FILM COATED ORAL at 08:17

## 2019-05-11 RX ADMIN — SODIUM CHLORIDE 75 ML/HR: 0.9 INJECTION, SOLUTION INTRAVENOUS at 18:28

## 2019-05-11 RX ADMIN — LATANOPROST 1 DROP: 50 SOLUTION OPHTHALMIC at 22:03

## 2019-05-11 RX ADMIN — PANTOPRAZOLE SODIUM 40 MG: 40 TABLET, DELAYED RELEASE ORAL at 05:43

## 2019-05-11 RX ADMIN — EZETIMIBE 10 MG: 10 TABLET ORAL at 08:17

## 2019-05-11 RX ADMIN — MECLIZINE HYDROCHLORIDE 25 MG: 25 TABLET ORAL at 05:43

## 2019-05-11 RX ADMIN — ASPIRIN 81 MG 81 MG: 81 TABLET ORAL at 08:17

## 2019-05-12 DIAGNOSIS — F41.8 DEPRESSION WITH ANXIETY: ICD-10-CM

## 2019-05-12 PROBLEM — E78.5 HYPERLIPIDEMIA: Status: ACTIVE | Noted: 2018-03-13

## 2019-05-12 PROCEDURE — 97535 SELF CARE MNGMENT TRAINING: CPT

## 2019-05-12 PROCEDURE — 97116 GAIT TRAINING THERAPY: CPT

## 2019-05-12 PROCEDURE — 97530 THERAPEUTIC ACTIVITIES: CPT

## 2019-05-12 PROCEDURE — 99225 PR SBSQ OBSERVATION CARE/DAY 25 MINUTES: CPT | Performed by: PHYSICIAN ASSISTANT

## 2019-05-12 RX ORDER — SERTRALINE HYDROCHLORIDE 100 MG/1
TABLET, FILM COATED ORAL
Qty: 90 TABLET | Refills: 0 | Status: SHIPPED | OUTPATIENT
Start: 2019-05-12 | End: 2019-09-09 | Stop reason: SDUPTHER

## 2019-05-12 RX ADMIN — MECLIZINE HYDROCHLORIDE 25 MG: 25 TABLET ORAL at 05:04

## 2019-05-12 RX ADMIN — OYSTER SHELL CALCIUM WITH VITAMIN D 1 TABLET: 500; 200 TABLET, FILM COATED ORAL at 08:47

## 2019-05-12 RX ADMIN — EZETIMIBE 10 MG: 10 TABLET ORAL at 08:47

## 2019-05-12 RX ADMIN — VITAM B12 100 MCG: 100 TAB at 08:47

## 2019-05-12 RX ADMIN — MECLIZINE HYDROCHLORIDE 25 MG: 25 TABLET ORAL at 21:47

## 2019-05-12 RX ADMIN — ENOXAPARIN SODIUM 40 MG: 40 INJECTION SUBCUTANEOUS at 08:48

## 2019-05-12 RX ADMIN — MECLIZINE HYDROCHLORIDE 25 MG: 25 TABLET ORAL at 14:37

## 2019-05-12 RX ADMIN — DOCUSATE SODIUM 300 MG: 100 CAPSULE, LIQUID FILLED ORAL at 20:17

## 2019-05-12 RX ADMIN — PANTOPRAZOLE SODIUM 40 MG: 40 TABLET, DELAYED RELEASE ORAL at 05:04

## 2019-05-12 RX ADMIN — LATANOPROST 1 DROP: 50 SOLUTION OPHTHALMIC at 21:47

## 2019-05-12 RX ADMIN — SERTRALINE HYDROCHLORIDE 100 MG: 100 TABLET ORAL at 08:47

## 2019-05-13 PROBLEM — R42 VERTIGO: Status: RESOLVED | Noted: 2019-05-10 | Resolved: 2019-05-13

## 2019-05-13 PROCEDURE — 97116 GAIT TRAINING THERAPY: CPT

## 2019-05-13 PROCEDURE — 99225 PR SBSQ OBSERVATION CARE/DAY 25 MINUTES: CPT | Performed by: PHYSICIAN ASSISTANT

## 2019-05-13 RX ADMIN — LATANOPROST 1 DROP: 50 SOLUTION OPHTHALMIC at 21:59

## 2019-05-13 RX ADMIN — MECLIZINE HYDROCHLORIDE 25 MG: 25 TABLET ORAL at 14:01

## 2019-05-13 RX ADMIN — ENOXAPARIN SODIUM 40 MG: 40 INJECTION SUBCUTANEOUS at 08:41

## 2019-05-13 RX ADMIN — SERTRALINE HYDROCHLORIDE 100 MG: 100 TABLET ORAL at 08:41

## 2019-05-13 RX ADMIN — MECLIZINE HYDROCHLORIDE 25 MG: 25 TABLET ORAL at 05:29

## 2019-05-13 RX ADMIN — FLUTICASONE PROPIONATE 1 SPRAY: 50 SPRAY, METERED NASAL at 17:19

## 2019-05-13 RX ADMIN — MECLIZINE HYDROCHLORIDE 25 MG: 25 TABLET ORAL at 21:59

## 2019-05-13 RX ADMIN — EZETIMIBE 10 MG: 10 TABLET ORAL at 08:41

## 2019-05-13 RX ADMIN — VITAM B12 100 MCG: 100 TAB at 08:41

## 2019-05-13 RX ADMIN — PANTOPRAZOLE SODIUM 40 MG: 40 TABLET, DELAYED RELEASE ORAL at 05:29

## 2019-05-13 RX ADMIN — OYSTER SHELL CALCIUM WITH VITAMIN D 1 TABLET: 500; 200 TABLET, FILM COATED ORAL at 08:41

## 2019-05-13 RX ADMIN — DOCUSATE SODIUM 300 MG: 100 CAPSULE, LIQUID FILLED ORAL at 17:24

## 2019-05-14 PROCEDURE — 99225 PR SBSQ OBSERVATION CARE/DAY 25 MINUTES: CPT | Performed by: PHYSICIAN ASSISTANT

## 2019-05-14 PROCEDURE — 97116 GAIT TRAINING THERAPY: CPT

## 2019-05-14 PROCEDURE — 97112 NEUROMUSCULAR REEDUCATION: CPT

## 2019-05-14 RX ADMIN — PANTOPRAZOLE SODIUM 40 MG: 40 TABLET, DELAYED RELEASE ORAL at 05:40

## 2019-05-14 RX ADMIN — OYSTER SHELL CALCIUM WITH VITAMIN D 1 TABLET: 500; 200 TABLET, FILM COATED ORAL at 08:03

## 2019-05-14 RX ADMIN — EZETIMIBE 10 MG: 10 TABLET ORAL at 08:03

## 2019-05-14 RX ADMIN — LATANOPROST 1 DROP: 50 SOLUTION OPHTHALMIC at 21:07

## 2019-05-14 RX ADMIN — DOCUSATE SODIUM 300 MG: 100 CAPSULE, LIQUID FILLED ORAL at 18:43

## 2019-05-14 RX ADMIN — ENOXAPARIN SODIUM 40 MG: 40 INJECTION SUBCUTANEOUS at 08:03

## 2019-05-14 RX ADMIN — MECLIZINE HYDROCHLORIDE 25 MG: 25 TABLET ORAL at 21:07

## 2019-05-14 RX ADMIN — MECLIZINE HYDROCHLORIDE 25 MG: 25 TABLET ORAL at 05:40

## 2019-05-14 RX ADMIN — VITAM B12 100 MCG: 100 TAB at 08:03

## 2019-05-14 RX ADMIN — SERTRALINE HYDROCHLORIDE 100 MG: 100 TABLET ORAL at 08:03

## 2019-05-14 RX ADMIN — MECLIZINE HYDROCHLORIDE 25 MG: 25 TABLET ORAL at 15:15

## 2019-05-15 PROCEDURE — 97116 GAIT TRAINING THERAPY: CPT

## 2019-05-15 PROCEDURE — 97112 NEUROMUSCULAR REEDUCATION: CPT

## 2019-05-15 PROCEDURE — 99217 PR OBSERVATION CARE DISCHARGE MANAGEMENT: CPT | Performed by: PHYSICIAN ASSISTANT

## 2019-05-15 RX ORDER — ALPRAZOLAM 0.25 MG/1
0.25 TABLET ORAL DAILY PRN
Qty: 3 TABLET | Refills: 0 | Status: SHIPPED | OUTPATIENT
Start: 2019-05-15 | End: 2020-03-23

## 2019-05-15 RX ORDER — MECLIZINE HYDROCHLORIDE 25 MG/1
25 TABLET ORAL EVERY 8 HOURS PRN
Qty: 30 TABLET | Refills: 0
Start: 2019-05-15 | End: 2019-07-30 | Stop reason: SDUPTHER

## 2019-05-15 RX ADMIN — MECLIZINE HYDROCHLORIDE 25 MG: 25 TABLET ORAL at 13:48

## 2019-05-15 RX ADMIN — LATANOPROST 1 DROP: 50 SOLUTION OPHTHALMIC at 21:15

## 2019-05-15 RX ADMIN — PANTOPRAZOLE SODIUM 40 MG: 40 TABLET, DELAYED RELEASE ORAL at 05:37

## 2019-05-15 RX ADMIN — MECLIZINE HYDROCHLORIDE 25 MG: 25 TABLET ORAL at 21:15

## 2019-05-15 RX ADMIN — ALPRAZOLAM 0.25 MG: 0.25 TABLET ORAL at 17:16

## 2019-05-15 RX ADMIN — VITAM B12 100 MCG: 100 TAB at 08:38

## 2019-05-15 RX ADMIN — MECLIZINE HYDROCHLORIDE 25 MG: 25 TABLET ORAL at 05:37

## 2019-05-15 RX ADMIN — SERTRALINE HYDROCHLORIDE 100 MG: 100 TABLET ORAL at 08:38

## 2019-05-15 RX ADMIN — ENOXAPARIN SODIUM 40 MG: 40 INJECTION SUBCUTANEOUS at 08:38

## 2019-05-15 RX ADMIN — OYSTER SHELL CALCIUM WITH VITAMIN D 1 TABLET: 500; 200 TABLET, FILM COATED ORAL at 08:38

## 2019-05-15 RX ADMIN — EZETIMIBE 10 MG: 10 TABLET ORAL at 08:38

## 2019-05-16 ENCOUNTER — TRANSITIONAL CARE MANAGEMENT (OUTPATIENT)
Dept: FAMILY MEDICINE CLINIC | Facility: MEDICAL CENTER | Age: 74
End: 2019-05-16

## 2019-05-16 VITALS
WEIGHT: 169.97 LBS | OXYGEN SATURATION: 99 % | RESPIRATION RATE: 18 BRPM | TEMPERATURE: 97.5 F | HEIGHT: 68 IN | SYSTOLIC BLOOD PRESSURE: 125 MMHG | HEART RATE: 72 BPM | DIASTOLIC BLOOD PRESSURE: 73 MMHG | BODY MASS INDEX: 25.76 KG/M2

## 2019-05-16 RX ADMIN — PANTOPRAZOLE SODIUM 40 MG: 40 TABLET, DELAYED RELEASE ORAL at 05:46

## 2019-05-16 RX ADMIN — MECLIZINE HYDROCHLORIDE 25 MG: 25 TABLET ORAL at 05:46

## 2019-05-22 ENCOUNTER — TELEPHONE (OUTPATIENT)
Dept: FAMILY MEDICINE CLINIC | Facility: MEDICAL CENTER | Age: 74
End: 2019-05-22

## 2019-05-30 ENCOUNTER — ANNUAL EXAM (OUTPATIENT)
Dept: FAMILY MEDICINE CLINIC | Facility: MEDICAL CENTER | Age: 74
End: 2019-05-30
Payer: COMMERCIAL

## 2019-05-30 VITALS
HEART RATE: 72 BPM | DIASTOLIC BLOOD PRESSURE: 56 MMHG | SYSTOLIC BLOOD PRESSURE: 100 MMHG | BODY MASS INDEX: 26.37 KG/M2 | RESPIRATION RATE: 16 BRPM | WEIGHT: 173.4 LBS

## 2019-05-30 DIAGNOSIS — F41.9 ANXIETY DISORDER, UNSPECIFIED TYPE: Chronic | ICD-10-CM

## 2019-05-30 DIAGNOSIS — E66.3 OVERWEIGHT: ICD-10-CM

## 2019-05-30 DIAGNOSIS — R42 VERTIGO: Primary | ICD-10-CM

## 2019-05-30 PROCEDURE — 99214 OFFICE O/P EST MOD 30 MIN: CPT | Performed by: FAMILY MEDICINE

## 2019-05-30 PROCEDURE — 1160F RVW MEDS BY RX/DR IN RCRD: CPT | Performed by: FAMILY MEDICINE

## 2019-05-30 RX ORDER — PHENOL 1.4 %
AEROSOL, SPRAY (ML) MUCOUS MEMBRANE DAILY
COMMUNITY

## 2019-06-11 ENCOUNTER — HOSPITAL ENCOUNTER (OUTPATIENT)
Dept: MAMMOGRAPHY | Facility: CLINIC | Age: 74
Discharge: HOME | End: 2019-06-11
Payer: COMMERCIAL

## 2019-06-11 VITALS — BODY MASS INDEX: 26.22 KG/M2 | WEIGHT: 173 LBS | HEIGHT: 68 IN

## 2019-06-11 DIAGNOSIS — Z12.39 BREAST SCREENING, UNSPECIFIED: ICD-10-CM

## 2019-06-11 DIAGNOSIS — Z85.3 HISTORY OF BREAST CANCER: ICD-10-CM

## 2019-06-11 PROCEDURE — G0279 TOMOSYNTHESIS, MAMMO: HCPCS

## 2019-06-11 PROCEDURE — 77066 DX MAMMO INCL CAD BI: CPT

## 2019-07-30 DIAGNOSIS — R42 VERTIGO: ICD-10-CM

## 2019-07-30 RX ORDER — MECLIZINE HYDROCHLORIDE 25 MG/1
25 TABLET ORAL EVERY 8 HOURS PRN
Qty: 30 TABLET | Refills: 0 | Status: SHIPPED | OUTPATIENT
Start: 2019-07-30

## 2019-07-30 NOTE — TELEPHONE ENCOUNTER
Pt was wondering if you could refill her meclizine she got back in May, 2019 when she was hospitalized  Pt woke up this morning with vertigo sx  She thinks she got out of bed too fast, and also said she was tossing and turning a lot last night  Pt had two meclizine left from when she was dc'd from [place], and she did take one this morning  Could you refill to [facility]?

## 2019-08-12 ENCOUNTER — TELEPHONE (OUTPATIENT)
Dept: FAMILY MEDICINE CLINIC | Facility: MEDICAL CENTER | Age: 74
End: 2019-08-12

## 2019-08-12 NOTE — TELEPHONE ENCOUNTER
Manjinder from [facility] called to let Dr Elisa Claros know that a comprehensive health assessment was done on Saturday and pt's PAD was positive at 0 90, mild, in the left leg

## 2019-08-16 ENCOUNTER — OFFICE VISIT (OUTPATIENT)
Dept: CARDIOLOGY CLINIC | Facility: CLINIC | Age: 74
End: 2019-08-16
Payer: COMMERCIAL

## 2019-08-16 VITALS
SYSTOLIC BLOOD PRESSURE: 132 MMHG | WEIGHT: 176.8 LBS | BODY MASS INDEX: 26.8 KG/M2 | HEART RATE: 84 BPM | DIASTOLIC BLOOD PRESSURE: 72 MMHG | OXYGEN SATURATION: 97 % | HEIGHT: 68 IN

## 2019-08-16 DIAGNOSIS — R42 VERTIGO: ICD-10-CM

## 2019-08-16 DIAGNOSIS — I77.1 CELIAC ARTERY STENOSIS (HCC): ICD-10-CM

## 2019-08-16 DIAGNOSIS — E78.00 PURE HYPERCHOLESTEROLEMIA: Primary | ICD-10-CM

## 2019-08-16 PROCEDURE — 99214 OFFICE O/P EST MOD 30 MIN: CPT | Performed by: INTERNAL MEDICINE

## 2019-08-16 NOTE — PROGRESS NOTES
Follow-up - Cardiology   Carolin Simms 69 y o  female MRN: [id number]        Problems    1  Pure hypercholesterolemia     2  Celiac artery stenosis (HCC)     3  Vertigo         Impression    I had the pleasure of having Samson return to see me at the [address] [place] [address] office    Hypertension  Only situational hypertension  Blood pressure normal in the office today    Hyperlipidemia  Severe intolerance to all statins including Livalo, also red yeast rice  Tolerating Zetia, lipids acceptable, , HDL 76, triglycerides normal    Celiac artery stenosis  70% by ultrasound 11/16  She offers no current abdominal symptoms    PLAN:    Annual follow-up recommended this point  No medication changes  Anticipate labs to be drawn by PCP for annual visit      HPI: Carolin Simms is a 69y o  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 with longstanding severe hypercholesterolemia, situational hypertension, celiac artery stenosis, and a history of subarachnoid hemorrhage presents to the office for a follow-up visit  She has no abdominal symptoms related to her celiac artery stenosis  Her blood pressures are normal    She was recently diagnosed with BPPV and has been taking meclizine with some improvement  Her lipids have significantly improved on Zetia with an LDL down to 122  Review of Systems   All other systems reviewed and are negative          Past Medical History:   Diagnosis Date    Anxiety disorder     BRCA1 negative     BRCA2 negative     [facility] 2014    left breast    [facility]     Constipation     Esophageal reflux     History of bone density study 2011    [employer]    History of radiation therapy 2014    left breast ca    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    Hyperlipidemia     Hypertension     Osteoarthritis of hip     Osteopenia     Stroke[facility] 12/2015    Subarachnoid hemorrhage (HCC)      Social History     Substance and Sexual Activity   Alcohol Use Yes Comment: occasionally     Social History     Substance and Sexual Activity   Drug Use No     Social History     Tobacco Use   Smoking Status Never Smoker   Smokeless Tobacco Never Used       Allergies: Allergies   Allergen Reactions    Oxycodone Vomiting    Monascus Purpureus Went Yeast      Pt unsure   Atorvastatin Other (See Comments)     leg pain    Bactrim [Sulfamethoxazole-Trimethoprim] Itching and Rash    Keppra [Levetiracetam] Rash    Livalo [Pitavastatin] Itching    Penicillins Rash     Agitation        Medications:     Current Outpatient Medications:     ALPRAZolam (XANAX) 0 25 mg tablet, Take 1 tablet (0 25 mg total) by mouth daily as needed for anxiety for up to 10 days, Disp: 3 tablet, Rfl: 0    Calcium-Vitamin D (CALTRATE 600 PLUS-VIT D PO), Take by mouth daily  , Disp: , Rfl:     cyanocobalamin (VITAMIN B-12) 100 mcg tablet, Take by mouth daily, Disp: , Rfl:     docusate sodium (STOOL SOFTENER) 100 mg capsule, Take 300 mg by mouth 2 (two) times a day as needed , Disp: , Rfl:     ezetimibe (ZETIA) 10 mg tablet, Take 1 tablet (10 mg total) by mouth daily, Disp: 30 tablet, Rfl: 5    latanoprost (XALATAN) 0 005 % ophthalmic solution, Administer 1 drop to both eyes daily at bedtime  , Disp: , Rfl:     loratadine (CLARITIN) 10 mg tablet, Take 10 mg by mouth daily, Disp: , Rfl:     meclizine (ANTIVERT) 25 mg tablet, Take 1 tablet (25 mg total) by mouth every 8 (eight) hours as needed for dizziness, Disp: 30 tablet, Rfl: 0    mometasone (NASONEX) 50 mcg/act nasal spray, 2 sprays into each nostril daily, Disp: , Rfl:     Multiple Vitamins-Minerals (MULTIVITAMIN ADULTS 50+) TABS, Take by mouth daily, Disp: , Rfl:     sertraline (ZOLOFT) 100 mg tablet, TAKE ONE TABLET BY MOUTH EVERY DAY, Disp: 90 tablet, Rfl: 0    acetaminophen (TYLENOL) 325 mg tablet, Take 650 mg by mouth every 6 (six) hours as needed for mild pain, Disp: , Rfl:       Vitals:    08/16/19 1300   BP: 132/72   Pulse: 84 SpO2: 97%     Weight (last 2 days)     Date/Time   Weight    08/16/19 1300   80 2 (176 8)            Physical Exam   Constitutional: No distress  HENT:   Head: Normocephalic and atraumatic  Eyes: Conjunctivae are normal  No scleral icterus  Neck: Normal range of motion  No JVD present  Cardiovascular: Normal rate, regular rhythm, normal heart sounds and intact distal pulses  No murmur heard  Pulmonary/Chest: Effort normal and breath sounds normal  No respiratory distress  She has no wheezes  She has no rhonchi  She has no rales  Musculoskeletal: She exhibits no edema or tenderness  Right lower leg: Normal  She exhibits no edema  Left lower leg: Normal  She exhibits no edema  Skin: Skin is warm and dry  She is not diaphoretic           Laboratory Studies:  CMP:  Lab Results   Component Value Date    CREATININE 0 87 05/11/2019    CREATININE 0 68 01/08/2016    BUN 11 05/11/2019    BUN 12 01/08/2016     (L) 01/08/2016    K 3 9 05/11/2019    K 3 7 01/08/2016     (H) 05/11/2019     01/08/2016    CO2 28 05/11/2019    CO2 27 01/08/2016    GLUCOSE 99 01/08/2016    PROT 7 0 01/03/2016    ALKPHOS 87 05/10/2019    ALKPHOS 104 01/03/2016    ALT 29 05/10/2019    ALT 43 01/03/2016    AST 24 05/10/2019    AST 35 01/03/2016    BILIDIR 0 12 11/03/2016     Lipid Profile:   Lab Results   Component Value Date    CHOL 250 07/29/2015    CHOL 238 07/24/2014     Lab Results   Component Value Date    HDL 76 (H) 05/11/2019    HDL 72 (H) 04/10/2019    HDL 75 (H) 08/18/2018     Lab Results   Component Value Date    LDLCALC 122 (H) 05/11/2019    LDLCALC 127 (H) 04/10/2019    LDLCALC 171 (H) 08/18/2018     Lab Results   Component Value Date    TRIG 82 05/11/2019    TRIG 73 04/10/2019    TRIG 83 08/18/2018               Abner Cavanaugh MD    Portions of the record may have been created with voice recognition software   Occasional wrong word or "sound a like" substitutions may have occurred due to the inherent limitations of voice recognition software   Read the chart carefully and recognize, using context, where substitutions have occurred

## 2019-08-26 DIAGNOSIS — E78.00 HYPERCHOLESTEROLEMIA: ICD-10-CM

## 2019-08-26 RX ORDER — EZETIMIBE 10 MG/1
10 TABLET ORAL DAILY
Qty: 30 TABLET | Refills: 5 | Status: SHIPPED | OUTPATIENT
Start: 2019-08-26 | End: 2020-02-24

## 2019-09-09 DIAGNOSIS — F41.8 DEPRESSION WITH ANXIETY: ICD-10-CM

## 2019-09-10 RX ORDER — SERTRALINE HYDROCHLORIDE 100 MG/1
TABLET, FILM COATED ORAL
Qty: 90 TABLET | Refills: 1 | Status: SHIPPED | OUTPATIENT
Start: 2019-09-10 | End: 2020-02-24

## 2019-10-04 NOTE — TELEPHONE ENCOUNTER
Patient states no   Once in awhile she says her legs get tired and she sits down to rest  Was walking to the [facility] as we spoke

## 2019-10-04 NOTE — TELEPHONE ENCOUNTER
Contact Gisella and see if she has any symptoms of claudication in the leg; in other words pain with walking or climbing stairs in the calf

## 2019-12-02 ENCOUNTER — TELEPHONE (OUTPATIENT)
Dept: FAMILY MEDICINE CLINIC | Facility: MEDICAL CENTER | Age: 74
End: 2019-12-02

## 2019-12-02 ENCOUNTER — OFFICE VISIT (OUTPATIENT)
Dept: FAMILY MEDICINE CLINIC | Facility: MEDICAL CENTER | Age: 74
End: 2019-12-02
Payer: COMMERCIAL

## 2019-12-02 VITALS
BODY MASS INDEX: 26.27 KG/M2 | HEART RATE: 75 BPM | OXYGEN SATURATION: 97 % | DIASTOLIC BLOOD PRESSURE: 78 MMHG | SYSTOLIC BLOOD PRESSURE: 136 MMHG | WEIGHT: 172.8 LBS

## 2019-12-02 DIAGNOSIS — R42 VERTIGO: ICD-10-CM

## 2019-12-02 DIAGNOSIS — N89.8 VAGINAL ITCHING: Primary | ICD-10-CM

## 2019-12-02 DIAGNOSIS — I10 ESSENTIAL HYPERTENSION: ICD-10-CM

## 2019-12-02 DIAGNOSIS — E78.00 HYPERCHOLESTEROLEMIA: ICD-10-CM

## 2019-12-02 DIAGNOSIS — R31.9 HEMATURIA, UNSPECIFIED TYPE: ICD-10-CM

## 2019-12-02 LAB
SL AMB  POCT GLUCOSE, UA: NORMAL
SL AMB LEUKOCYTE ESTERASE,UA: NORMAL
SL AMB POCT BILIRUBIN,UA: NORMAL
SL AMB POCT BLOOD,UA: NORMAL
SL AMB POCT KETONES,UA: NORMAL
SL AMB POCT NITRITE,UA: NORMAL
SL AMB POCT PH,UA: 6
SL AMB POCT SPECIFIC GRAVITY,UA: 1.02
SL AMB POCT URINE PROTEIN: NORMAL
SL AMB POCT UROBILINOGEN: 3.5

## 2019-12-02 PROCEDURE — 3078F DIAST BP <80 MM HG: CPT | Performed by: FAMILY MEDICINE

## 2019-12-02 PROCEDURE — 1160F RVW MEDS BY RX/DR IN RCRD: CPT | Performed by: FAMILY MEDICINE

## 2019-12-02 PROCEDURE — 87086 URINE CULTURE/COLONY COUNT: CPT | Performed by: FAMILY MEDICINE

## 2019-12-02 PROCEDURE — 3075F SYST BP GE 130 - 139MM HG: CPT | Performed by: FAMILY MEDICINE

## 2019-12-02 PROCEDURE — 81002 URINALYSIS NONAUTO W/O SCOPE: CPT | Performed by: FAMILY MEDICINE

## 2019-12-02 PROCEDURE — 99214 OFFICE O/P EST MOD 30 MIN: CPT | Performed by: FAMILY MEDICINE

## 2019-12-02 RX ORDER — BIMATOPROST 0.01 %
DROPS OPHTHALMIC (EYE)
Refills: 11 | COMMUNITY
Start: 2019-10-03

## 2019-12-02 NOTE — PROGRESS NOTES
tommy is here for 6 month followup  She says she has been doing very well overall  She complains of blood on her pad today  Some vaginal itching today  Sees redness in vagina  She has  noticed a dark urine for a while  No frequency, burning, fever or chills  Seh still gets occ episodes of vertigo  See notes last May  Had vestibular therapy  Responds to meclizine      She has been gong to the [facility] 1 day a week  Walks there and takes the bus back  She is  Taking Zetia  Gets belching but no other side effects   She got her first Shingrix  She  says her BP can go up  Highest systolic 163  Associated with stress  BMI Counseling: Body mass index is 26 27 kg/m²  The BMI is above normal  Nutrition recommendations include decreasing portion sizes and decreasing fast food intake  Exercise recommendations include exercising 3-5 times per week  No pharmacotherapy was ordered  However it is usually good  She had a colonoscopy in 2015 Dr Allan Lawrence  It did show polyps  I am unable to find the recommended follow-up at this time  O: /78 (BP Location: Left arm, Patient Position: Sitting, Cuff Size: Adult)   Pulse 75   Wt 78 4 kg (172 lb 12 8 oz)   SpO2 97%   BMI 26 27 kg/m²   Physical Exam   Constitutional: She appears well-developed and well-nourished  No distress  Neck: Carotid bruit is not present  No thyromegaly present  Cardiovascular: Normal rate, regular rhythm, normal heart sounds and intact distal pulses  Pulmonary/Chest: Effort normal and breath sounds normal    Abdominal: Soft  Bowel sounds are normal  She exhibits no mass  There is no hepatomegaly  There is no tenderness  Musculoskeletal: She exhibits no edema  Lymphadenopathy:     She has no cervical adenopathy  Gyn exam  External genitalia shows no erythema exudate   There is a very tiny blue black papule right labia  It does bleed very slightly with pressure     Vagina no discharge  Bimanualnontender  Rectal exam no masses  No stool for heme  Assessment  1  Hypertension-controlled  2  Status post CVA-except for some self described memory issues she is actually doing quite well and is living independently in a senior apartment  3  Hyperlipidemia-has been unable to tolerate statins  She seems to be tolerating Zetia quite well  Will continue  4  Vertigo-episodic-responds well to meclizine  5  Vaginal bleeding-suspect this was from a lesion on her right labia  This does look very benign and  may just be traumatic  Will have her revisit a couple of weeks for follow-up  Persists consider biopsy  Doubt the bleeding was urinary or rectal   Colonoscopy was 2015 will need to track down follow-up  6  Health maintenance-need to determine follow-up colonoscopy as above  Immunizations up-to-date including Shingrix  Plan  Check urine culture  As above  Recheck 2-3 weeks  Health maintenance and blood work in 6 months  Plan  6

## 2019-12-02 NOTE — TELEPHONE ENCOUNTER
Sent request to CGT-From my understanding Dr Dania Velarde office is taking care of Dr Alvin Pitts patients but older records are handled by [facility] ([place]

## 2019-12-02 NOTE — TELEPHONE ENCOUNTER
----- Message from Hasmukh Ling MD sent at 12/2/2019  1:36 PM EST -----  Patient had a colonoscopy with Dr Kirk Every to bear 2015  I am unable to find the recommended follow-up  Will need to track this down; not sure where the records when since he retired but I believe Dr Laura nath

## 2019-12-04 LAB — BACTERIA UR CULT: NORMAL

## 2020-02-13 ENCOUNTER — TELEPHONE (OUTPATIENT)
Dept: FAMILY MEDICINE CLINIC | Facility: MEDICAL CENTER | Age: 75
End: 2020-02-13

## 2020-02-13 NOTE — TELEPHONE ENCOUNTER
Pt had vertigo late yesterday afternoon into last night, she took a meclozine  Now pt woke up this morning feeling very weak and lightheaded  Pt took her bp at 9:35 am and it was 148/84  Pt then took a xanax sat for a while, started to doze off and decided to take her bp again and it was 162/89

## 2020-02-13 NOTE — TELEPHONE ENCOUNTER
JIM: I spoke with Daniela Singh for quite a while  Her vertigo went away  She has missed taking her Zoloft maybe 2 times in the last 2 weeks and one of the times was last night  She spent yesterday at a , standing at the [email or web address], [place] [id number] [place], very stressful day  A sister in law passed away, in her 80's  Aware combination of the Meclizine and then a Xanax  most likely contributed to feeling weak along with the stress of yesterday  Reassured and advised to check BP tomorrow once she feels less stressed  She has no other symptoms  Stressed more compliance with her Zoloft, If BP goes up and stays up she needs to reach out to us, advised a call back on Monday with BP statis  During all this then she says she has a slight annoying discomfort on her rib cage at her bra line today  Started when she put her bra on this morning  Advised remove bra could be a muscle pull from standing yesterday  Says she was worried about walking with her cane on uneven ground and was stiff while there in the dampness, etc   Again she will call if does not resolve

## 2020-02-23 DIAGNOSIS — E78.00 HYPERCHOLESTEROLEMIA: ICD-10-CM

## 2020-02-24 DIAGNOSIS — F41.8 DEPRESSION WITH ANXIETY: ICD-10-CM

## 2020-02-24 RX ORDER — SERTRALINE HYDROCHLORIDE 100 MG/1
TABLET, FILM COATED ORAL
Qty: 90 TABLET | Refills: 1 | Status: SHIPPED | OUTPATIENT
Start: 2020-02-24 | End: 2020-09-12

## 2020-02-24 RX ORDER — EZETIMIBE 10 MG/1
TABLET ORAL
Qty: 30 TABLET | Refills: 5 | Status: SHIPPED | OUTPATIENT
Start: 2020-02-24 | End: 2020-08-16

## 2020-03-20 DIAGNOSIS — F41.9 ANXIETY: ICD-10-CM

## 2020-03-23 RX ORDER — ALPRAZOLAM 0.25 MG/1
TABLET ORAL
Qty: 30 TABLET | Refills: 0 | Status: SHIPPED | OUTPATIENT
Start: 2020-03-23 | End: 2020-12-04

## 2020-04-02 ENCOUNTER — TELEPHONE (OUTPATIENT)
Dept: FAMILY MEDICINE CLINIC | Facility: MEDICAL CENTER | Age: 75
End: 2020-04-02

## 2020-05-07 ENCOUNTER — APPOINTMENT (OUTPATIENT)
Dept: LAB | Facility: MEDICAL CENTER | Age: 75
End: 2020-05-07
Payer: COMMERCIAL

## 2020-05-07 DIAGNOSIS — I10 ESSENTIAL HYPERTENSION: ICD-10-CM

## 2020-05-07 DIAGNOSIS — E78.00 HYPERCHOLESTEROLEMIA: ICD-10-CM

## 2020-05-07 DIAGNOSIS — R42 VERTIGO: ICD-10-CM

## 2020-05-07 LAB
ALBUMIN SERPL BCP-MCNC: 3.7 G/DL (ref 3.5–5)
ALP SERPL-CCNC: 86 U/L (ref 46–116)
ALT SERPL W P-5'-P-CCNC: 20 U/L (ref 12–78)
ANION GAP SERPL CALCULATED.3IONS-SCNC: 4 MMOL/L (ref 4–13)
AST SERPL W P-5'-P-CCNC: 21 U/L (ref 5–45)
BASOPHILS # BLD AUTO: 0.04 THOUSANDS/ΜL (ref 0–0.1)
BASOPHILS NFR BLD AUTO: 1 % (ref 0–1)
BILIRUB SERPL-MCNC: 0.37 MG/DL (ref 0.2–1)
BUN SERPL-MCNC: 10 MG/DL (ref 5–25)
CALCIUM SERPL-MCNC: 9.3 MG/DL (ref 8.3–10.1)
CHLORIDE SERPL-SCNC: 107 MMOL/L (ref 100–108)
CHOLEST SERPL-MCNC: 224 MG/DL (ref 50–200)
CO2 SERPL-SCNC: 28 MMOL/L (ref 21–32)
CREAT SERPL-MCNC: 0.79 MG/DL (ref 0.6–1.3)
EOSINOPHIL # BLD AUTO: 0.19 THOUSAND/ΜL (ref 0–0.61)
EOSINOPHIL NFR BLD AUTO: 3 % (ref 0–6)
ERYTHROCYTE [DISTWIDTH] IN BLOOD BY AUTOMATED COUNT: 13 % (ref 11.6–15.1)
GFR SERPL CREATININE-BSD FRML MDRD: 74 ML/MIN/1.73SQ M
GLUCOSE P FAST SERPL-MCNC: 93 MG/DL (ref 65–99)
HCT VFR BLD AUTO: 41 % (ref 34.8–46.1)
HDLC SERPL-MCNC: 62 MG/DL
HGB BLD-MCNC: 13 G/DL (ref 11.5–15.4)
IMM GRANULOCYTES # BLD AUTO: 0.02 THOUSAND/UL (ref 0–0.2)
IMM GRANULOCYTES NFR BLD AUTO: 0 % (ref 0–2)
LDLC SERPL CALC-MCNC: 137 MG/DL (ref 0–100)
LYMPHOCYTES # BLD AUTO: 1.62 THOUSANDS/ΜL (ref 0.6–4.47)
LYMPHOCYTES NFR BLD AUTO: 29 % (ref 14–44)
MCH RBC QN AUTO: 28.3 PG (ref 26.8–34.3)
MCHC RBC AUTO-ENTMCNC: 31.7 G/DL (ref 31.4–37.4)
MCV RBC AUTO: 89 FL (ref 82–98)
MONOCYTES # BLD AUTO: 0.54 THOUSAND/ΜL (ref 0.17–1.22)
MONOCYTES NFR BLD AUTO: 10 % (ref 4–12)
NEUTROPHILS # BLD AUTO: 3.12 THOUSANDS/ΜL (ref 1.85–7.62)
NEUTS SEG NFR BLD AUTO: 57 % (ref 43–75)
NONHDLC SERPL-MCNC: 162 MG/DL
NRBC BLD AUTO-RTO: 0 /100 WBCS
PLATELET # BLD AUTO: 335 THOUSANDS/UL (ref 149–390)
PMV BLD AUTO: 9.9 FL (ref 8.9–12.7)
POTASSIUM SERPL-SCNC: 4.2 MMOL/L (ref 3.5–5.3)
PROT SERPL-MCNC: 7.3 G/DL (ref 6.4–8.2)
RBC # BLD AUTO: 4.6 MILLION/UL (ref 3.81–5.12)
SODIUM SERPL-SCNC: 139 MMOL/L (ref 136–145)
TRIGL SERPL-MCNC: 124 MG/DL
TSH SERPL DL<=0.05 MIU/L-ACNC: 2.85 UIU/ML (ref 0.36–3.74)
WBC # BLD AUTO: 5.53 THOUSAND/UL (ref 4.31–10.16)

## 2020-05-07 PROCEDURE — 85025 COMPLETE CBC W/AUTO DIFF WBC: CPT

## 2020-05-07 PROCEDURE — 80061 LIPID PANEL: CPT

## 2020-05-07 PROCEDURE — 84443 ASSAY THYROID STIM HORMONE: CPT

## 2020-05-07 PROCEDURE — 36415 COLL VENOUS BLD VENIPUNCTURE: CPT

## 2020-05-07 PROCEDURE — 80053 COMPREHEN METABOLIC PANEL: CPT

## 2020-05-13 ENCOUNTER — TELEMEDICINE (OUTPATIENT)
Dept: FAMILY MEDICINE CLINIC | Facility: MEDICAL CENTER | Age: 75
End: 2020-05-13
Payer: COMMERCIAL

## 2020-05-13 VITALS
WEIGHT: 170 LBS | DIASTOLIC BLOOD PRESSURE: 85 MMHG | HEIGHT: 68 IN | BODY MASS INDEX: 25.76 KG/M2 | SYSTOLIC BLOOD PRESSURE: 130 MMHG | TEMPERATURE: 97.9 F | HEART RATE: 84 BPM

## 2020-05-13 DIAGNOSIS — R42 VERTIGO: Primary | ICD-10-CM

## 2020-05-13 DIAGNOSIS — E78.00 PURE HYPERCHOLESTEROLEMIA: ICD-10-CM

## 2020-05-13 DIAGNOSIS — K59.00 CONSTIPATION, UNSPECIFIED CONSTIPATION TYPE: ICD-10-CM

## 2020-05-13 DIAGNOSIS — Z12.11 COLON CANCER SCREENING: ICD-10-CM

## 2020-05-13 DIAGNOSIS — N89.8 VAGINAL ITCHING: ICD-10-CM

## 2020-05-13 DIAGNOSIS — R26.81 UNSTABLE GAIT: Primary | ICD-10-CM

## 2020-05-13 PROCEDURE — 99214 OFFICE O/P EST MOD 30 MIN: CPT | Performed by: FAMILY MEDICINE

## 2020-05-13 PROCEDURE — 3075F SYST BP GE 130 - 139MM HG: CPT | Performed by: FAMILY MEDICINE

## 2020-05-13 PROCEDURE — 1160F RVW MEDS BY RX/DR IN RCRD: CPT | Performed by: FAMILY MEDICINE

## 2020-05-13 PROCEDURE — 3079F DIAST BP 80-89 MM HG: CPT | Performed by: FAMILY MEDICINE

## 2020-05-13 PROCEDURE — 3008F BODY MASS INDEX DOCD: CPT | Performed by: FAMILY MEDICINE

## 2020-05-18 ENCOUNTER — TELEPHONE (OUTPATIENT)
Dept: FAMILY MEDICINE CLINIC | Facility: MEDICAL CENTER | Age: 75
End: 2020-05-18

## 2020-05-18 DIAGNOSIS — Z12.39 BREAST CANCER SCREENING: Primary | ICD-10-CM

## 2020-07-07 ENCOUNTER — TRANSCRIBE ORDERS (OUTPATIENT)
Dept: ADMINISTRATIVE | Facility: HOSPITAL | Age: 75
End: 2020-07-07

## 2020-07-07 DIAGNOSIS — Z12.31 ENCOUNTER FOR SCREENING MAMMOGRAM FOR MALIGNANT NEOPLASM OF BREAST: Primary | ICD-10-CM

## 2020-08-15 DIAGNOSIS — E78.00 HYPERCHOLESTEROLEMIA: ICD-10-CM

## 2020-08-16 RX ORDER — EZETIMIBE 10 MG/1
TABLET ORAL
Qty: 30 TABLET | Refills: 5 | Status: SHIPPED | OUTPATIENT
Start: 2020-08-16 | End: 2021-02-16

## 2020-09-02 ENCOUNTER — OFFICE VISIT (OUTPATIENT)
Dept: FAMILY MEDICINE CLINIC | Facility: MEDICAL CENTER | Age: 75
End: 2020-09-02
Payer: COMMERCIAL

## 2020-09-02 VITALS
BODY MASS INDEX: 26.06 KG/M2 | TEMPERATURE: 97.3 F | DIASTOLIC BLOOD PRESSURE: 68 MMHG | HEART RATE: 72 BPM | WEIGHT: 171.4 LBS | SYSTOLIC BLOOD PRESSURE: 110 MMHG | RESPIRATION RATE: 16 BRPM

## 2020-09-02 DIAGNOSIS — F41.1 GENERALIZED ANXIETY DISORDER: ICD-10-CM

## 2020-09-02 DIAGNOSIS — R41.9 COGNITIVE COMPLAINTS: ICD-10-CM

## 2020-09-02 DIAGNOSIS — E78.00 PURE HYPERCHOLESTEROLEMIA: ICD-10-CM

## 2020-09-02 DIAGNOSIS — Z86.79 HISTORY OF SUBARACHNOID HEMORRHAGE: Primary | ICD-10-CM

## 2020-09-02 PROCEDURE — 1036F TOBACCO NON-USER: CPT | Performed by: FAMILY MEDICINE

## 2020-09-02 PROCEDURE — 99214 OFFICE O/P EST MOD 30 MIN: CPT | Performed by: FAMILY MEDICINE

## 2020-09-02 PROCEDURE — 1160F RVW MEDS BY RX/DR IN RCRD: CPT | Performed by: FAMILY MEDICINE

## 2020-09-02 PROCEDURE — 3078F DIAST BP <80 MM HG: CPT | Performed by: FAMILY MEDICINE

## 2020-09-02 PROCEDURE — 3074F SYST BP LT 130 MM HG: CPT | Performed by: FAMILY MEDICINE

## 2020-09-02 NOTE — PROGRESS NOTES
Khanh Oliva is here for 3 month followup ]  Seen by televisit in April  She has  been checking her BP  Got 143/90  Took a Xanax and got 134/79  Had an episode of dizziness after eating ham  and got 160/97  Took Xanax and  drank water and got 115/77    134/83   107/69  Episode of vertigo 124/80  She thinks her memory is getting worse  Forgets to take meds  However this is moslty remembering recent events and names  Balancing checkbook, pays bills on time, does ADL's  She  does [employer], word games  She complains of belching after drinking soda at night  Ginger ale, Pepsi  No heartburn  No abdominal pain  She also eats cookies, cinnamon strusel, dessert at night  She has  been walking regularly unless its hot outside  Uses a walker  Walks 10 blocks  Has had no further episodes of rectal bleeding  Did Cologuard which was negative  Says there is still no vaginal lesions  O: /90 (Cuff Size: Standard)   Pulse 72   Temp (!) 97 3 °F (36 3 °C)   Resp 16   Wt 77 7 kg (171 lb 6 4 oz)   BMI 26 06 kg/m²   BP by me 110/68  Neck no adenopathy thyromegaly bruits  Chest clear with good breath sounds  Cardiac regular rate without murmur  Abdomen benign  Extremities no edema distal pulses full    Assessment  1  S/P subarachnoid hemorrhage; aneurysm anterior cerebral artery doing very well  Her blood pressures remains very reasonable  She should continue to monitor them  Continue to control lipids, exercise and healthy diet  Per neurosurgery recommendations in 2017 a 5 year follow-up and imaging was recommended; this will be due in 2022   2  Cognitive complaints-cognitive changes do seem age-appropriate  She is due for her annual mini-mental status exam   3  Hyperlipidemia -unable to tolerate for statins; does well with Zetia  We did discuss reduction of the carbs and sweets in her diet  4  Anxiety; -she uses occasional alprazolam ; use is appropriate  5   Maintenance-immunizations are up-to-date including Shingrix    Plan  Mini-mental status exam   Recheck 6 months

## 2020-09-10 ENCOUNTER — HOSPITAL ENCOUNTER (OUTPATIENT)
Dept: RADIOLOGY | Facility: MEDICAL CENTER | Age: 75
Discharge: HOME | End: 2020-09-10
Payer: COMMERCIAL

## 2020-09-10 VITALS — BODY MASS INDEX: 26.06 KG/M2 | HEIGHT: 68 IN

## 2020-09-10 DIAGNOSIS — Z12.31 ENCOUNTER FOR SCREENING MAMMOGRAM FOR MALIGNANT NEOPLASM OF BREAST: ICD-10-CM

## 2020-09-10 PROCEDURE — 77067 SCR MAMMO BI INCL CAD: CPT

## 2020-09-10 PROCEDURE — 77063 BREAST TOMOSYNTHESIS BI: CPT

## 2020-09-12 DIAGNOSIS — F41.8 DEPRESSION WITH ANXIETY: ICD-10-CM

## 2020-09-12 RX ORDER — SERTRALINE HYDROCHLORIDE 100 MG/1
TABLET, FILM COATED ORAL
Qty: 90 TABLET | Refills: 1 | Status: SHIPPED | OUTPATIENT
Start: 2020-09-12 | End: 2021-03-15

## 2020-09-14 ENCOUNTER — TELEPHONE (OUTPATIENT)
Dept: FAMILY MEDICINE CLINIC | Facility: MEDICAL CENTER | Age: 75
End: 2020-09-14

## 2020-09-14 NOTE — TELEPHONE ENCOUNTER
----- Message from Haley Ievy MD sent at 9/12/2020  8:48 PM EDT -----  Notify mammogram normal   Repeat 1 year

## 2020-10-02 ENCOUNTER — TELEPHONE (OUTPATIENT)
Dept: FAMILY MEDICINE CLINIC | Facility: MEDICAL CENTER | Age: 75
End: 2020-10-02

## 2020-10-13 ENCOUNTER — TELEPHONE (OUTPATIENT)
Dept: FAMILY MEDICINE CLINIC | Facility: MEDICAL CENTER | Age: 75
End: 2020-10-13

## 2020-10-29 ENCOUNTER — OFFICE VISIT (OUTPATIENT)
Dept: CARDIOLOGY CLINIC | Facility: MEDICAL CENTER | Age: 75
End: 2020-10-29
Payer: COMMERCIAL

## 2020-10-29 VITALS
HEIGHT: 68 IN | OXYGEN SATURATION: 97 % | WEIGHT: 170.4 LBS | BODY MASS INDEX: 25.82 KG/M2 | DIASTOLIC BLOOD PRESSURE: 72 MMHG | SYSTOLIC BLOOD PRESSURE: 136 MMHG | HEART RATE: 72 BPM

## 2020-10-29 DIAGNOSIS — I77.1 CELIAC ARTERY STENOSIS (HCC): ICD-10-CM

## 2020-10-29 DIAGNOSIS — R42 VERTIGO: ICD-10-CM

## 2020-10-29 DIAGNOSIS — E78.00 PURE HYPERCHOLESTEROLEMIA: Primary | ICD-10-CM

## 2020-10-29 PROCEDURE — 99213 OFFICE O/P EST LOW 20 MIN: CPT | Performed by: INTERNAL MEDICINE

## 2020-10-29 PROCEDURE — 3008F BODY MASS INDEX DOCD: CPT | Performed by: INTERNAL MEDICINE

## 2020-10-29 PROCEDURE — 93000 ELECTROCARDIOGRAM COMPLETE: CPT | Performed by: INTERNAL MEDICINE

## 2020-10-29 PROCEDURE — 1160F RVW MEDS BY RX/DR IN RCRD: CPT | Performed by: INTERNAL MEDICINE

## 2020-10-29 PROCEDURE — 3075F SYST BP GE 130 - 139MM HG: CPT | Performed by: INTERNAL MEDICINE

## 2020-10-29 PROCEDURE — 3078F DIAST BP <80 MM HG: CPT | Performed by: INTERNAL MEDICINE

## 2020-10-29 RX ORDER — FENOFIBRATE 48 MG/1
48 TABLET, COATED ORAL DAILY
Qty: 90 TABLET | Refills: 3 | Status: SHIPPED | OUTPATIENT
Start: 2020-10-29 | End: 2021-05-14 | Stop reason: SDUPTHER

## 2020-12-04 DIAGNOSIS — F41.9 ANXIETY: ICD-10-CM

## 2020-12-04 RX ORDER — ALPRAZOLAM 0.25 MG/1
TABLET ORAL
Qty: 30 TABLET | Refills: 0 | Status: SHIPPED | OUTPATIENT
Start: 2020-12-04 | End: 2021-12-22

## 2020-12-15 ENCOUNTER — HOSPITAL ENCOUNTER (OUTPATIENT)
Dept: NON INVASIVE DIAGNOSTICS | Facility: CLINIC | Age: 75
Discharge: HOME | End: 2020-12-15
Payer: COMMERCIAL

## 2020-12-15 DIAGNOSIS — I77.1 CELIAC ARTERY STENOSIS (HCC): ICD-10-CM

## 2020-12-15 PROCEDURE — 93975 VASCULAR STUDY: CPT

## 2020-12-15 PROCEDURE — 93975 VASCULAR STUDY: CPT | Performed by: SURGERY

## 2021-02-12 DIAGNOSIS — Z23 ENCOUNTER FOR IMMUNIZATION: ICD-10-CM

## 2021-02-16 DIAGNOSIS — E78.00 HYPERCHOLESTEROLEMIA: ICD-10-CM

## 2021-02-16 RX ORDER — EZETIMIBE 10 MG/1
TABLET ORAL
Qty: 30 TABLET | Refills: 5 | Status: SHIPPED | OUTPATIENT
Start: 2021-02-16 | End: 2021-08-13

## 2021-03-08 ENCOUNTER — OFFICE VISIT (OUTPATIENT)
Dept: FAMILY MEDICINE CLINIC | Facility: MEDICAL CENTER | Age: 76
End: 2021-03-08
Payer: COMMERCIAL

## 2021-03-08 VITALS
SYSTOLIC BLOOD PRESSURE: 118 MMHG | HEART RATE: 68 BPM | RESPIRATION RATE: 16 BRPM | TEMPERATURE: 98.4 F | DIASTOLIC BLOOD PRESSURE: 70 MMHG | BODY MASS INDEX: 25.31 KG/M2 | WEIGHT: 167 LBS | HEIGHT: 68 IN

## 2021-03-08 DIAGNOSIS — E78.00 HYPERCHOLESTEROLEMIA: ICD-10-CM

## 2021-03-08 DIAGNOSIS — F41.9 ANXIETY DISORDER, UNSPECIFIED TYPE: Primary | ICD-10-CM

## 2021-03-08 DIAGNOSIS — Z71.89 COUNSELED ABOUT COVID-19 VIRUS INFECTION: ICD-10-CM

## 2021-03-08 PROCEDURE — 99214 OFFICE O/P EST MOD 30 MIN: CPT | Performed by: FAMILY MEDICINE

## 2021-03-08 PROCEDURE — 3008F BODY MASS INDEX DOCD: CPT | Performed by: FAMILY MEDICINE

## 2021-03-08 NOTE — PROGRESS NOTES
Gregory Juares is here for 6 month checkup  Doing well overall  She has concerns about incontinence  She treis to keep her self on a 2 hour schedule  However she needs to wear a pad constantly  No other urinary symptoms  No blood, burningm, frequency  Some caffeine  She says she lost a few lbs but thinks only eating 2 meals a day  She  was walking regularly durign good weather  She says she drinks 1 glass of soda  Gets more gas  Seh ahs been taking her BP  Getting normal blood pressures except when   She had an episode of shortness of breath  Thinks it was anxiety related  She has a friend who is dying from cancer  See note from cardiology  Advised Bebeto; however she stops she got side effects  Due for follow-up with neurosurgery in 2022    O: /70 (BP Location: Left arm, Patient Position: Sitting, Cuff Size: Adult)   Pulse 68   Temp 98 4 °F (36 9 °C)   Resp 16   Ht 5' 8" (1 727 m)   Wt 75 8 kg (167 lb)   BMI 25 39 kg/m²    Physical Exam  Constitutional:       General: She is not in acute distress  Appearance: She is well-developed  Neck:      Thyroid: No thyromegaly  Vascular: No carotid bruit  Cardiovascular:      Rate and Rhythm: Normal rate and regular rhythm  Heart sounds: Normal heart sounds  Pulmonary:      Effort: Pulmonary effort is normal       Breath sounds: Normal breath sounds  Abdominal:      General: Bowel sounds are normal       Palpations: Abdomen is soft  There is no hepatomegaly or mass  Tenderness: There is no abdominal tenderness  Lymphadenopathy:      Cervical: No cervical adenopathy  Assessment   1  Health maintenance- discussed and advised COVID vaccination  2  Anxiety- Reasonably well controlled with sertraline  3  Hyperlipidemia-   Unfortunately unable to tolerate statins or fenofibrate; will continue Zetia   4  S/P  subarachnoid hemorrhage; Ant cerebral artery aneurysm- sees Neurosurgery  Next year   Blood pressure is excellent and she  remains very active  Unable to tolerate statins  Plan  As above    Recheck 6 months for Gyn exam

## 2021-03-10 ENCOUNTER — TELEPHONE (OUTPATIENT)
Dept: FAMILY MEDICINE CLINIC | Facility: MEDICAL CENTER | Age: 76
End: 2021-03-10

## 2021-03-10 NOTE — TELEPHONE ENCOUNTER
The patient was called but would like to speak with daughters first before scheduling on mychart or scheduling over the phone at next outreach

## 2021-03-14 DIAGNOSIS — F41.8 DEPRESSION WITH ANXIETY: ICD-10-CM

## 2021-03-15 RX ORDER — SERTRALINE HYDROCHLORIDE 100 MG/1
TABLET, FILM COATED ORAL
Qty: 90 TABLET | Refills: 1 | Status: SHIPPED | OUTPATIENT
Start: 2021-03-15 | End: 2021-09-20

## 2021-03-22 ENCOUNTER — TELEPHONE (OUTPATIENT)
Dept: CARDIOLOGY CLINIC | Facility: CLINIC | Age: 76
End: 2021-03-22

## 2021-03-22 ENCOUNTER — HOSPITAL ENCOUNTER (EMERGENCY)
Facility: HOSPITAL | Age: 76
Discharge: HOME | End: 2021-03-22
Payer: COMMERCIAL

## 2021-03-22 VITALS
RESPIRATION RATE: 18 BRPM | HEART RATE: 71 BPM | TEMPERATURE: 98 F | SYSTOLIC BLOOD PRESSURE: 132 MMHG | WEIGHT: 168 LBS | DIASTOLIC BLOOD PRESSURE: 66 MMHG | HEIGHT: 68 IN | BODY MASS INDEX: 25.46 KG/M2 | OXYGEN SATURATION: 98 %

## 2021-03-22 DIAGNOSIS — I10 HYPERTENSION, UNSPECIFIED TYPE: Primary | ICD-10-CM

## 2021-03-22 LAB
ALBUMIN SERPL BCP-MCNC: 4.1 G/DL (ref 3.4–4.8)
ALP SERPL-CCNC: 75.5 U/L (ref 35–140)
ALT SERPL W P-5'-P-CCNC: 13 U/L (ref 5–54)
ANION GAP SERPL CALCULATED.3IONS-SCNC: 7 MMOL/L (ref 4–13)
AST SERPL W P-5'-P-CCNC: 18 U/L (ref 15–41)
ATRIAL RATE: 140 BPM
BASOPHILS # BLD AUTO: 0.02 THOUSANDS/ΜL (ref 0–0.1)
BASOPHILS NFR BLD AUTO: 0 % (ref 0–1)
BILIRUB SERPL-MCNC: 0.38 MG/DL (ref 0.3–1.2)
BILIRUB UR QL STRIP: NEGATIVE
BUN SERPL-MCNC: 11 MG/DL (ref 6–20)
CALCIUM SERPL-MCNC: 9.3 MG/DL (ref 8.4–10.2)
CHLORIDE SERPL-SCNC: 104 MMOL/L (ref 96–108)
CLARITY UR: CLEAR
CO2 SERPL-SCNC: 30 MMOL/L (ref 22–33)
COLOR UR: YELLOW
CREAT SERPL-MCNC: 0.7 MG/DL (ref 0.4–1.1)
EOSINOPHIL # BLD AUTO: 0.22 THOUSAND/ΜL (ref 0–0.61)
EOSINOPHIL NFR BLD AUTO: 4 % (ref 0–6)
ERYTHROCYTE [DISTWIDTH] IN BLOOD BY AUTOMATED COUNT: 13.5 % (ref 11.6–15.1)
GFR SERPL CREATININE-BSD FRML MDRD: 85 ML/MIN/1.73SQ M
GLUCOSE SERPL-MCNC: 97 MG/DL (ref 65–140)
GLUCOSE UR STRIP-MCNC: NEGATIVE MG/DL
HCT VFR BLD AUTO: 39.3 % (ref 34.8–46.1)
HGB BLD-MCNC: 12.5 G/DL (ref 11.5–15.4)
HGB UR QL STRIP.AUTO: NEGATIVE
IMM GRANULOCYTES # BLD AUTO: 0.01 THOUSAND/UL (ref 0–0.2)
IMM GRANULOCYTES NFR BLD AUTO: 0 % (ref 0–2)
KETONES UR STRIP-MCNC: NEGATIVE MG/DL
LEUKOCYTE ESTERASE UR QL STRIP: NEGATIVE
LYMPHOCYTES # BLD AUTO: 1.75 THOUSANDS/ΜL (ref 0.6–4.47)
LYMPHOCYTES NFR BLD AUTO: 32 % (ref 14–44)
MCH RBC QN AUTO: 27.9 PG (ref 26.8–34.3)
MCHC RBC AUTO-ENTMCNC: 31.8 G/DL (ref 31.4–37.4)
MCV RBC AUTO: 88 FL (ref 82–98)
MONOCYTES # BLD AUTO: 0.56 THOUSAND/ΜL (ref 0.17–1.22)
MONOCYTES NFR BLD AUTO: 10 % (ref 4–12)
NEUTROPHILS # BLD AUTO: 3 THOUSANDS/ΜL (ref 1.85–7.62)
NEUTS SEG NFR BLD AUTO: 54 % (ref 43–75)
NITRITE UR QL STRIP: NEGATIVE
P AXIS: 43 DEGREES
PH UR STRIP.AUTO: 6.5 [PH]
PLATELET # BLD AUTO: 337 THOUSANDS/UL (ref 149–390)
PMV BLD AUTO: 9.3 FL (ref 8.9–12.7)
POTASSIUM SERPL-SCNC: 3.8 MMOL/L (ref 3.5–5)
PR INTERVAL: 149 MS
PROT SERPL-MCNC: 6.9 G/DL (ref 6.4–8.3)
PROT UR STRIP-MCNC: NEGATIVE MG/DL
QRS AXIS: -1 DEGREES
QRSD INTERVAL: 100 MS
QT INTERVAL: 417 MS
QTC INTERVAL: 454 MS
RBC # BLD AUTO: 4.48 MILLION/UL (ref 3.81–5.12)
SODIUM SERPL-SCNC: 141 MMOL/L (ref 133–145)
SP GR UR STRIP.AUTO: <=1.005 (ref 1–1.03)
T WAVE AXIS: -7 DEGREES
TROPONIN I SERPL-MCNC: <0.03 NG/ML (ref 0–0.07)
UROBILINOGEN UR QL STRIP.AUTO: 0.2 E.U./DL
VENTRICULAR RATE: 71 BPM
WBC # BLD AUTO: 5.56 THOUSAND/UL (ref 4.31–10.16)

## 2021-03-22 PROCEDURE — 93005 ELECTROCARDIOGRAM TRACING: CPT

## 2021-03-22 PROCEDURE — 99284 EMERGENCY DEPT VISIT MOD MDM: CPT

## 2021-03-22 PROCEDURE — 81003 URINALYSIS AUTO W/O SCOPE: CPT

## 2021-03-22 PROCEDURE — 93010 ELECTROCARDIOGRAM REPORT: CPT | Performed by: INTERNAL MEDICINE

## 2021-03-22 PROCEDURE — 85025 COMPLETE CBC W/AUTO DIFF WBC: CPT

## 2021-03-22 PROCEDURE — 80053 COMPREHEN METABOLIC PANEL: CPT

## 2021-03-22 PROCEDURE — 36415 COLL VENOUS BLD VENIPUNCTURE: CPT

## 2021-03-22 PROCEDURE — 84484 ASSAY OF TROPONIN QUANT: CPT

## 2021-03-22 NOTE — TELEPHONE ENCOUNTER
Pt called to make you aware she was seen in ED today for /96  /73 in hospital    Pt will increase fluids (stated she felt dehydrated), monitor BP, and f/u in May  Appt 5/14/21  Offered a sooner appt with NP   She will c/b if BP's remain elevated

## 2021-03-22 NOTE — ED NOTES
D/c reviewed with pt  Pt verbalized understanding and has no further questions at this time        Nabor Chowdary, CAROLEE  03/22/21 6784

## 2021-03-22 NOTE — ED PROVIDER NOTES
History  Chief Complaint   Patient presents with    Hypertension     pt reports elevated pressures at home for the last couple days; This AM 12/96     18-year-old female history of no significant medical problems presents secondary to feeling slightly weak and was complaining of some heaviness sensation in bilateral legs  Patient has been taking her blood pressure at home noted that her blood pressure got up to 169/96 got concerned called EMS to bring her in for an evaluation  She is awake alert oriented no significant distress on my evaluation her arrival blood pressure is 151/73  Patient denies any chest pain she denies any dyspnea she denies any palpitations denies any nausea or vomiting she denies any significant distress at this point  Patient denies any headache she denies any focal neurological symptoms  Patient denies any prior history of hypertension has not been on the medications for blood pressure in the past           Prior to Admission Medications   Prescriptions Last Dose Informant Patient Reported? Taking? ALPRAZolam (XANAX) 0 25 mg tablet   No No   Sig: TAKE ONE TABLET BY MOUTH EVERY DAY AS NEEDED   Calcium-Vitamin D (CALTRATE 600 PLUS-VIT D PO)  Self Yes No   Sig: Take by mouth daily     LUMIGAN 0 01 % ophthalmic drops  Self Yes No   Sig: PLACE 1 DROP INTO IN [facility] EYE AT BEDTIME   Multiple Vitamins-Minerals (MULTIVITAMIN ADULTS 50+) TABS  Self Yes No   Sig: Take by mouth daily   NON FORMULARY  Self Yes No   Sig: CBD Gummies   acetaminophen (TYLENOL) 325 mg tablet  Self Yes No   Sig: Take 650 mg by mouth every 6 (six) hours as needed for mild pain   cyanocobalamin (VITAMIN B-12) 100 mcg tablet  Self Yes No   Sig: Take by mouth daily   docusate sodium (STOOL SOFTENER) 100 mg capsule  Self Yes No   Sig: Take 300 mg by mouth 2 (two) times a day as needed    ezetimibe (ZETIA) 10 mg tablet   No No   Sig: TAKE ONE TABLET BY MOUTH EVERY DAY   fenofibrate (TRICOR) 48 mg tablet   No No   Sig: Take 1 tablet (48 mg total) by mouth daily   meclizine (ANTIVERT) 25 mg tablet  Self No No   Sig: Take 1 tablet (25 mg total) by mouth every 8 (eight) hours as needed for dizziness   mometasone (NASONEX) 50 mcg/act nasal spray  Self Yes No   Si sprays into each nostril daily   sertraline (ZOLOFT) 100 mg tablet   No No   Sig: TAKE ONE TABLET BY MOUTH EVERY DAY      Facility-Administered Medications: None       Past Medical History:   Diagnosis Date    Anxiety disorder     BRCA1 negative     BRCA2 negative     Breast [place] ([place] [address] ) 2014    left breast    Cancer ([place] [address] )     Constipation     Esophageal reflux     History of bone density study     Dual [employer] X-Ray Absorptiometry    History of radiation therapy     left breast ca    Hydrocephalus ([place] [address] )     secondary to subarachnoid hemorrhage    Hyperlipidemia     Hypertension     Osteoarthritis of hip     Osteopenia     Stroke, hemorrhagic ([place] [address] ) 2015    Subarachnoid hemorrhage ([place] [address] )        Past Surgical History:   Procedure Laterality Date    BREAST LUMPECTOMY Left 2014    COLONOSCOPY      Fiberoptic - ,  5 year f/u    ESOPHAGOGASTRODUODENOSCOPY  2009    Diag, Resolved - 2006    EXOSTECTOMY  2005    Simple Bunion (Silver Procedure)   John Spinner HYSTERECTOMY  1981    JOINT REPLACEMENT      Hip    OOPHORECTOMY      not sure which one    VENTRICULOSTOMY         Family History   Problem Relation Age of Onset    Cancer Mother 71        bone    Hypertension Mother     Arthritis Mother     Stroke Father         TIA    Stroke Maternal Grandmother         CVA    Deep vein thrombosis Daughter     Heart attack Neg Hx     Anuerysm Neg Hx     Clotting disorder Neg Hx     Arrhythmia Neg Hx     Heart failure Neg Hx     Coronary artery disease Neg Hx     Hyperlipidemia Neg Hx     Breast cancer Neg Hx      I have reviewed and agree with the history as documented      E-Cigarette/Vaping     E-Cigarette/Vaping Substances     Social History Tobacco Use    Smoking status: Never Smoker    Smokeless tobacco: Never Used   Substance Use Topics    Alcohol use: Yes     Comment: occasionally    Drug use: No       Review of Systems   Constitutional: Positive for fatigue  Negative for chills and fever  HENT: Negative for congestion  Eyes: Negative for visual disturbance  Respiratory: Negative for shortness of breath  Cardiovascular: Negative for chest pain  Gastrointestinal: Negative for abdominal pain  Endocrine: Negative for cold intolerance  Genitourinary: Negative for frequency  Musculoskeletal: Negative for gait problem  Skin: Negative for rash  Neurological: Positive for weakness  Negative for dizziness  Psychiatric/Behavioral: Negative for behavioral problems and confusion  Physical Exam  Physical Exam  Vitals signs and nursing note reviewed  Constitutional:       Appearance: She is well-developed  HENT:      Head: Normocephalic and atraumatic  Eyes:      Conjunctiva/sclera: Conjunctivae normal       Pupils: Pupils are equal, round, and reactive to light  Neck:      Musculoskeletal: Normal range of motion and neck supple  Cardiovascular:      Rate and Rhythm: Normal rate and regular rhythm  Heart sounds: Normal heart sounds  Pulmonary:      Effort: Pulmonary effort is normal       Breath sounds: Normal breath sounds  Abdominal:      General: Bowel sounds are normal       Palpations: Abdomen is soft  Musculoskeletal: Normal range of motion  Skin:     General: Skin is warm and dry  Capillary Refill: Capillary refill takes less than 2 seconds  Neurological:      Mental Status: She is alert and oriented to person, place, and time     Psychiatric:         Behavior: Behavior normal          Vital Signs  ED Triage Vitals   Temperature Pulse Respirations Blood Pressure SpO2   03/22/21 1148 03/22/21 1143 03/22/21 1143 03/22/21 1146 03/22/21 1146   98 °F (36 7 °C) 73 18 151/73 98 %      Temp Source Heart Rate Source Patient Position - Orthostatic VS BP Location FiO2 (%)   03/22/21 1148 03/22/21 1143 03/22/21 1143 03/22/21 1143 --   Oral Monitor Lying Right arm       Pain Score       --                  Vitals:    03/22/21 1143 03/22/21 1146   BP:  151/73   Pulse: 73 71   Patient Position - Orthostatic VS: Lying          Visual Acuity      ED Medications  Medications - No data to display    Diagnostic Studies  Results Reviewed     Procedure Component Value Units Date/Time    UA w Reflex to Microscopic w Reflex to Culture [[id number]]  (Normal) Collected: 03/22/21 1224    Lab Status: Final result Specimen: Urine, Other Updated: 03/22/21 1230     Color, UA Yellow     Clarity, UA Clear     Specific Gravity, UA <=1 005     pH, UA 6 5     Leukocytes, UA Negative     Nitrite, UA Negative     Protein, UA Negative mg/dl      Glucose, UA Negative mg/dl      Ketones, UA Negative mg/dl      Urobilinogen, UA 0 2 E U /dl      Bilirubin, UA Negative     Blood, UA Negative    Troponin I [[id number]]  (Normal) Collected: 03/22/21 1201    Lab Status: Final result Specimen: Blood from Arm, Left Updated: 03/22/21 1224     Troponin I <0 03 ng/mL     Comprehensive metabolic panel [[id number]] Collected: 03/22/21 1201    Lab Status: Final result Specimen: Blood from Arm, Left Updated: 03/22/21 1223     Sodium 141 mmol/L      Potassium 3 8 mmol/L      Chloride 104 mmol/L      CO2 30 mmol/L      ANION GAP 7 mmol/L      BUN 11 mg/dL      Creatinine 0 70 mg/dL      Glucose 97 mg/dL      Calcium 9 3 mg/dL      AST 18 U/L      ALT 13 U/L      Alkaline Phosphatase 75 5 U/L      Total Protein 6 9 g/dL      Albumin 4 1 g/dL      Total Bilirubin 0 38 mg/dL      eGFR 85 ml/min/1 73sq m     Narrative:      Autumn guidelines for Chronic Kidney Disease (CKD):     Stage 1 with normal or high GFR (GFR > 90 mL/min/1 73 square meters)    Stage 2 Mild CKD (GFR = 60-89 mL/min/1 73 square meters)    Stage 3A Moderate CKD (GFR = 45-59 mL/min/1 73 square meters)    Stage 3B Moderate CKD (GFR = 30-44 mL/min/1 73 square meters)    Stage 4 Severe CKD (GFR = 15-29 mL/min/1 73 square meters)    Stage 5 End Stage CKD (GFR <15 mL/min/1 73 square meters)  Note: GFR calculation is accurate only with a steady state creatinine    CBC and differential [[id number]]  (Normal) Collected: 03/22/21 1201    Lab Status: Final result Specimen: Blood from Arm, Left Updated: 03/22/21 1212     WBC 5 56 Thousand/uL      RBC 4 48 Million/uL      Hemoglobin 12 5 g/dL      Hematocrit 39 3 %      MCV 88 fL      MCH 27 9 pg      MCHC 31 8 g/dL      RDW 13 5 %      MPV 9 3 fL      Platelets 317 Thousands/uL      Neutrophils Relative 54 %      Immat GRANS % 0 %      Lymphocytes Relative 32 %      Monocytes Relative 10 %      Eosinophils Relative 4 %      Basophils Relative 0 %      Neutrophils Absolute 3 00 Thousands/µL      Immature Grans Absolute 0 01 Thousand/uL      Lymphocytes Absolute 1 75 Thousands/µL      Monocytes Absolute 0 56 Thousand/µL      Eosinophils Absolute 0 22 Thousand/µL      Basophils Absolute 0 02 Thousands/µL                  No orders to display              Procedures  Procedures         ED Course                                           MDM  Number of Diagnoses or Management Options  Diagnosis management comments: Patient was monitored in the emergency department blood pressure remained in no borderline range systolic at this point time would not recommend any type of treatment  Patient's lab work was all within normal limits EKG demonstrated a sinus rhythm with some nonspecific ST changes otherwise no acute findings  Plan will be to discharge patient home recommend close follow-up with her primary care physician in the next 2 days for re-evaluation and consideration for management of her blood pressure        Disposition  Final diagnoses:   Hypertension, unspecified type     Time reflects when diagnosis was documented in both MDM as applicable and the Disposition within this note     Time User Action Codes Description Comment    3/22/2021 12:37 PM Saray Randys Add [I10] Hypertension, unspecified type       ED Disposition     ED Disposition Condition Date/Time Comment    Discharge Stable Mon Mar 22, 2021 12:37 PM Shirline Pulse discharge to home/self care  Follow-up Information     Follow up With Specialties Details Why Contact Meredith Corral MD [facility] Medicine Schedule an appointment as soon as possible for a visit in 2 days  [address] [facility] [address]  [phone]            Current Discharge Medication List      CONTINUE these medications which have NOT CHANGED    Details   acetaminophen (TYLENOL) 325 mg tablet Take 650 mg by mouth every 6 (six) hours as needed for mild pain      ALPRAZolam (XANAX) 0 25 mg tablet TAKE ONE TABLET BY MOUTH EVERY DAY AS NEEDED  Qty: 30 tablet, Refills: 0    Associated Diagnoses: Anxiety      Calcium-Vitamin D (CALTRATE 600 PLUS-VIT D PO) Take by mouth daily        cyanocobalamin (VITAMIN B-12) 100 mcg tablet Take by mouth daily      docusate sodium (STOOL SOFTENER) 100 mg capsule Take 300 mg by mouth 2 (two) times a day as needed       ezetimibe (ZETIA) 10 mg tablet TAKE ONE TABLET BY MOUTH EVERY DAY  Qty: 30 tablet, Refills: 5    Associated Diagnoses: Hypercholesterolemia      fenofibrate (TRICOR) 48 mg tablet Take 1 tablet (48 mg total) by mouth daily  Qty: 90 tablet, Refills: 3    Associated Diagnoses: Pure hypercholesterolemia      LUMIGAN 0 01 % ophthalmic drops PLACE 1 DROP INTO IN [facility] EYE AT BEDTIME  Refills: 11      meclizine (ANTIVERT) 25 mg tablet Take 1 tablet (25 mg total) by mouth every 8 (eight) hours as needed for dizziness  Qty: 30 tablet, Refills: 0    Associated Diagnoses: Vertigo      mometasone (NASONEX) 50 mcg/act nasal spray 2 sprays into each nostril daily      Multiple Vitamins-Minerals (MULTIVITAMIN ADULTS 50+) TABS Take by mouth daily NON FORMULARY CBD Gummies      sertraline (ZOLOFT) 100 mg tablet TAKE ONE TABLET BY MOUTH EVERY DAY  Qty: 90 tablet, Refills: 1    Associated Diagnoses: Depression with anxiety           No discharge procedures on file      PDMP Review       Value Time User    PDMP Reviewed  Yes 12/4/2020  2:06 PM Kenton Catalan MD          ED Provider  Electronically Signed by           Stalin Johnson MD  03/22/21 0931

## 2021-03-24 ENCOUNTER — OFFICE VISIT (OUTPATIENT)
Dept: FAMILY MEDICINE CLINIC | Facility: MEDICAL CENTER | Age: 76
End: 2021-03-24
Payer: COMMERCIAL

## 2021-03-24 VITALS
HEART RATE: 76 BPM | BODY MASS INDEX: 25.83 KG/M2 | DIASTOLIC BLOOD PRESSURE: 80 MMHG | TEMPERATURE: 97.6 F | SYSTOLIC BLOOD PRESSURE: 124 MMHG | RESPIRATION RATE: 16 BRPM | WEIGHT: 169.9 LBS

## 2021-03-24 DIAGNOSIS — R03.0 ELEVATED BLOOD PRESSURE READING: Primary | ICD-10-CM

## 2021-03-24 PROCEDURE — 1036F TOBACCO NON-USER: CPT | Performed by: FAMILY MEDICINE

## 2021-03-24 PROCEDURE — 99213 OFFICE O/P EST LOW 20 MIN: CPT | Performed by: FAMILY MEDICINE

## 2021-03-24 PROCEDURE — 1160F RVW MEDS BY RX/DR IN RCRD: CPT | Performed by: FAMILY MEDICINE

## 2021-03-24 NOTE — PROGRESS NOTES
Yesenia Amezcua is here for BP elevation  Got elevated BP on 3/11 with 145/79  Got 3/21 147/91/  On 3/22 169/96  Called ambulance  174/104  Seen in ER and got tsystolic 991   EKG blood work all were normal    She said she was given fluids and her blood pressure came down  Advised to stay hydrated  Advised follow-up with PCP  She called   Her cardiologist and has an appt in May  she is not sure which she did differently the day before this happened  She did attend a picnic in a lot of food  Did drink Pepsi  She was not overly anxious  However she did become quite anxious when she took the blood pressure  Review of the chart indicates a similar episode last year after she ate a lot of ham for Easter  O: /80 (Cuff Size: Standard)   Pulse 76   Temp 97 6 °F (36 4 °C)   Resp 16   Wt 77 1 kg (169 lb 14 4 oz)   BMI 25 83 kg/m²      BP by me  118/76    Assessment  Blood pressure elevations -need to 1st assess the accuracy of her blood pressure cuff  I believe  this is at least partly anxiety related  Plan   will revisit for nurse visit to check her blood pressure cuff  She will continue to monitor at home  Any other elevations we will consider  Further evaluation

## 2021-03-26 ENCOUNTER — CLINICAL SUPPORT (OUTPATIENT)
Dept: FAMILY MEDICINE CLINIC | Facility: MEDICAL CENTER | Age: 76
End: 2021-03-26

## 2021-03-26 VITALS — SYSTOLIC BLOOD PRESSURE: 124 MMHG | DIASTOLIC BLOOD PRESSURE: 72 MMHG

## 2021-03-26 DIAGNOSIS — Z01.30 BP CHECK: Primary | ICD-10-CM

## 2021-03-26 NOTE — PROGRESS NOTES
Pt was in the office for a BP check  States cuff at home has been running high  In office cuff was 124/72  Patient's cuff 5 minutes later was 124/73  She was not placing the cuff on correctly  She was also taking her BP with her arm bent at home   Once I showed her the correct technique her BP number were almost identical

## 2021-04-21 ENCOUNTER — APPOINTMENT (OUTPATIENT)
Dept: LAB | Facility: CLINIC | Age: 76
End: 2021-04-21
Payer: COMMERCIAL

## 2021-04-21 DIAGNOSIS — E78.00 PURE HYPERCHOLESTEROLEMIA: ICD-10-CM

## 2021-04-21 DIAGNOSIS — I77.1 CELIAC ARTERY STENOSIS (HCC): ICD-10-CM

## 2021-04-21 LAB
CHOLEST SERPL-MCNC: 250 MG/DL (ref 50–200)
HDLC SERPL-MCNC: 85 MG/DL
LDLC SERPL CALC-MCNC: 145 MG/DL (ref 0–100)
NONHDLC SERPL-MCNC: 165 MG/DL
TRIGL SERPL-MCNC: 102 MG/DL

## 2021-04-21 PROCEDURE — 36415 COLL VENOUS BLD VENIPUNCTURE: CPT

## 2021-04-21 PROCEDURE — 80061 LIPID PANEL: CPT

## 2021-05-11 ENCOUNTER — TELEPHONE (OUTPATIENT)
Dept: CARDIOLOGY CLINIC | Facility: CLINIC | Age: 76
End: 2021-05-11

## 2021-05-11 NOTE — TELEPHONE ENCOUNTER
Tresa called and left a message on nurse line wanting to confirm appt  I returned her call, advised appt is Friday 5/14 @ 140 SLT-- verbally understood

## 2021-05-14 ENCOUNTER — OFFICE VISIT (OUTPATIENT)
Dept: CARDIOLOGY CLINIC | Facility: CLINIC | Age: 76
End: 2021-05-14
Payer: COMMERCIAL

## 2021-05-14 VITALS
WEIGHT: 170 LBS | SYSTOLIC BLOOD PRESSURE: 132 MMHG | OXYGEN SATURATION: 96 % | BODY MASS INDEX: 25.76 KG/M2 | DIASTOLIC BLOOD PRESSURE: 80 MMHG | HEART RATE: 75 BPM | HEIGHT: 68 IN

## 2021-05-14 DIAGNOSIS — E78.00 PURE HYPERCHOLESTEROLEMIA: Primary | ICD-10-CM

## 2021-05-14 DIAGNOSIS — I77.1 CELIAC ARTERY STENOSIS (HCC): ICD-10-CM

## 2021-05-14 PROCEDURE — 1036F TOBACCO NON-USER: CPT | Performed by: INTERNAL MEDICINE

## 2021-05-14 PROCEDURE — 3008F BODY MASS INDEX DOCD: CPT | Performed by: INTERNAL MEDICINE

## 2021-05-14 PROCEDURE — 1160F RVW MEDS BY RX/DR IN RCRD: CPT | Performed by: INTERNAL MEDICINE

## 2021-05-14 PROCEDURE — 99213 OFFICE O/P EST LOW 20 MIN: CPT | Performed by: INTERNAL MEDICINE

## 2021-05-14 RX ORDER — FENOFIBRATE 48 MG/1
48 TABLET, COATED ORAL DAILY
Qty: 90 TABLET | Refills: 3 | Status: SHIPPED | OUTPATIENT
Start: 2021-05-14

## 2021-05-14 NOTE — PROGRESS NOTES
Follow-up - Cardiology   Mansoor Drake 77 y o  female MRN: [id number]        Problems    1  Pure hypercholesterolemia     2  Celiac artery stenosis (HCC)         Impression    I had the pleasure of having Samson return to see me at the  [facility] office    Hypertension  Normal today, situational only    Hyperlipidemia  Severe intolerance to red yeast rice and statins  Taking Zetia  , total 250, likely due to [place] her chocolate and her current diet which includes 3 unhealthy snacks a day including ice cream, Fig Newtons, and Strudel    Celiac artery stenosis  70% by ultrasound in 2016  No abdominal or postprandial symptoms  No further testing needed    PLAN:    We discussed dietary modification, at the moment she is eating 3 helping some of poor choice foods during the day, and her frozen dinners are probably not that healthy either  I have asked her to add fenofibrate to her Zetia  Also recent cholesterol probably coincided with Easter chocolates splurging  Will reassess cholesterol in 6 months      HPI: Mansoor Drake is a 77y o  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 with longstanding severe hypercholesterolemia, situational hypertension, celiac artery stenosis, and a history of subarachnoid hemorrhage presents to the office for a follow-up visit  cholesterol is recently up a bit, she had a lot of chocolate for Easter, she eats 3 different kinds of sugary treats per day, and eats TV dinners every night, while she is back to going to the gym, she has got limited exercise capacity and only going 2 days a week  LDL is 145  She has celiac artery stenosis without any abdominal complaints  She only has situational hypertension, and her blood pressure is normal today    Review of Systems   Respiratory: Negative  Cardiovascular: Negative            Past Medical History:   Diagnosis Date    Anxiety disorder     BRCA1 negative     BRCA2 negative     Breast cancer ([facility] [address] ) 2014    left breast    Cancer ([facility] [address] )     Constipation     Esophageal reflux     History of bone density study 2011    [employer] X-Ray Absorptiometry    History of radiation therapy 2014    left breast ca    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    Hyperlipidemia     Hypertension     Osteoarthritis of hip     Osteopenia     Stroke[facility] hemorrhagic [facility] [address] ) 12/2015    Subarachnoid hemorrhage (HCC)      Social History     Substance and Sexual Activity   Alcohol Use Yes    Comment: occasionally     Social History     Substance and Sexual Activity   Drug Use No     Social History     Tobacco Use   Smoking Status Never Smoker   Smokeless Tobacco Never Used       Allergies: Allergies   Allergen Reactions    Oxycodone Vomiting    Monascus Purpureus Went Yeast      Pt unsure   Atorvastatin Other (See Comments)     leg pain    Bactrim [Sulfamethoxazole-Trimethoprim] Itching and Rash    Keppra [Levetiracetam] Rash    Livalo [Pitavastatin] Itching    Penicillins Rash     Agitation        Medications:     Current Outpatient Medications:     ALPRAZolam (XANAX) 0 25 mg tablet, TAKE ONE TABLET BY MOUTH EVERY DAY AS NEEDED, Disp: 30 tablet, Rfl: 0    Calcium-Vitamin D (CALTRATE 600 PLUS-VIT D PO), Take by mouth daily  , Disp: , Rfl:     cyanocobalamin (VITAMIN B-12) 100 mcg tablet, Take by mouth daily, Disp: , Rfl:     docusate sodium (STOOL SOFTENER) 100 mg capsule, Take 300 mg by mouth 2 (two) times a day as needed , Disp: , Rfl:     ezetimibe (ZETIA) 10 mg tablet, TAKE ONE TABLET BY MOUTH EVERY DAY, Disp: 30 tablet, Rfl: 5    LUMIGAN 0 01 % ophthalmic drops, PLACE 1 DROP INTO IN EACH EYE AT BEDTIME, Disp: , Rfl: 11    meclizine (ANTIVERT) 25 mg tablet, Take 1 tablet (25 mg total) by mouth every 8 (eight) hours as needed for dizziness, Disp: 30 tablet, Rfl: 0    mometasone (NASONEX) 50 mcg/act nasal spray, 2 sprays into each nostril daily, Disp: , Rfl:     Multiple Vitamins-Minerals (MULTIVITAMIN ADULTS 50+) TABS, Take by mouth daily, Disp: , Rfl:     NON FORMULARY, CBD Gummies, Disp: , Rfl:     sertraline (ZOLOFT) 100 mg tablet, TAKE ONE TABLET BY MOUTH EVERY DAY, Disp: 90 tablet, Rfl: 1    acetaminophen (TYLENOL) 325 mg tablet, Take 650 mg by mouth every 6 (six) hours as needed for mild pain, Disp: , Rfl:     fenofibrate (TRICOR) 48 mg tablet, Take 1 tablet (48 mg total) by mouth daily (Patient not taking: Reported on 5/14/2021), Disp: 90 tablet, Rfl: 3      Vitals:    05/14/21 1322   BP: 132/80   Pulse: 75   SpO2: 96%     Weight (last 2 days)     Date/Time   Weight    05/14/21 1322   77 1 (170)            Physical Exam  Constitutional:       General: She is not in acute distress  Appearance: She is not diaphoretic  HENT:      Head: Normocephalic and atraumatic  Eyes:      General: No scleral icterus  Conjunctiva/sclera: Conjunctivae normal    Neck:      Musculoskeletal: Normal range of motion  Vascular: No JVD  Cardiovascular:      Rate and Rhythm: Normal rate and regular rhythm  Heart sounds: Normal heart sounds  No murmur  Pulmonary:      Effort: Pulmonary effort is normal  No respiratory distress  Breath sounds: Normal breath sounds  No wheezing, rhonchi or rales  Musculoskeletal:         General: No tenderness  Right lower leg: Normal  No edema  Left lower leg: Normal  No edema  Skin:     General: Skin is warm and dry             Laboratory Studies:  CMP:  Lab Results   Component Value Date    CREATININE 0 70 03/22/2021    CREATININE 0 68 01/08/2016    BUN 11 03/22/2021    BUN 12 01/08/2016     (L) 01/08/2016    K 3 8 03/22/2021    K 3 7 01/08/2016     03/22/2021     01/08/2016    CO2 30 03/22/2021    CO2 27 01/08/2016    GLUCOSE 99 01/08/2016    PROT 7 0 01/03/2016    ALKPHOS 75 5 03/22/2021    ALKPHOS 104 01/03/2016    ALT 13 03/22/2021    ALT 43 01/03/2016    AST 18 03/22/2021    AST 35 01/03/2016    BILIDIR 0 12 11/03/2016     Lipid Profile:   Lab Results   Component Value Date CHOL 250 07/29/2015    CHOL 238 07/24/2014     Lab Results   Component Value Date    HDL 85 04/21/2021    HDL 62 05/07/2020    HDL 76 (H) 05/11/2019     Lab Results   Component Value Date    LDLCALC 145 (H) 04/21/2021    LDLCALC 137 (H) 05/07/2020    LDLCALC 122 (H) 05/11/2019     Lab Results   Component Value Date    TRIG 102 04/21/2021    TRIG 124 05/07/2020    TRIG 82 05/11/2019     EKG personally reviewed            Ana María Kerns MD    Portions of the record may have been created with voice recognition software   Occasional wrong word or "sound a like" substitutions may have occurred due to the inherent limitations of voice recognition software   Read the chart carefully and recognize, using context, where substitutions have occurred

## 2021-07-08 ENCOUNTER — TELEPHONE (OUTPATIENT)
Dept: FAMILY MEDICINE CLINIC | Facility: MEDICAL CENTER | Age: 76
End: 2021-07-08

## 2021-07-08 NOTE — TELEPHONE ENCOUNTER
Pt needs a letter for her housing through [facility] stating the meds/supplies she purchases herself, and that she has a medical need for them  They are as follows:    -Prilosec  -Stayfree/Always for her urinary incontinence-she's needed these since her stroke  -dulcolax  -Flonase    Pt said we only need to call her if we have a question, if not just mail to the pt

## 2021-08-10 DIAGNOSIS — E78.00 HYPERCHOLESTEROLEMIA: ICD-10-CM

## 2021-08-13 RX ORDER — EZETIMIBE 10 MG/1
TABLET ORAL
Qty: 30 TABLET | Refills: 5 | Status: SHIPPED | OUTPATIENT
Start: 2021-08-13 | End: 2022-02-14

## 2021-08-27 ENCOUNTER — TELEPHONE (OUTPATIENT)
Dept: FAMILY MEDICINE CLINIC | Facility: MEDICAL CENTER | Age: 76
End: 2021-08-27

## 2021-08-27 DIAGNOSIS — Z12.31 OTHER SCREENING MAMMOGRAM: Primary | ICD-10-CM

## 2021-09-01 ENCOUNTER — RA CDI HCC (OUTPATIENT)
Dept: OTHER | Facility: HOSPITAL | Age: 76
End: 2021-09-01

## 2021-09-01 NOTE — PROGRESS NOTES
[facility]  coding opportunities       Chart reviewed, no opportunity found: CHART REVIEWED, NO OPPORTUNITY FOUND                        Patients insurance company: [employer] (Medicare Advantage and Commercial)

## 2021-09-09 ENCOUNTER — ANNUAL EXAM (OUTPATIENT)
Dept: FAMILY MEDICINE CLINIC | Facility: MEDICAL CENTER | Age: 76
End: 2021-09-09
Payer: COMMERCIAL

## 2021-09-09 VITALS
TEMPERATURE: 97.8 F | DIASTOLIC BLOOD PRESSURE: 84 MMHG | BODY MASS INDEX: 25.82 KG/M2 | WEIGHT: 170.4 LBS | SYSTOLIC BLOOD PRESSURE: 128 MMHG | HEIGHT: 68 IN | RESPIRATION RATE: 16 BRPM | HEART RATE: 68 BPM

## 2021-09-09 DIAGNOSIS — Z13.820 SCREENING FOR OSTEOPOROSIS: Primary | ICD-10-CM

## 2021-09-09 DIAGNOSIS — Z78.0 POST-MENOPAUSAL: ICD-10-CM

## 2021-09-09 DIAGNOSIS — E78.00 PURE HYPERCHOLESTEROLEMIA: ICD-10-CM

## 2021-09-09 DIAGNOSIS — N39.41 URGE INCONTINENCE OF URINE: ICD-10-CM

## 2021-09-09 DIAGNOSIS — Z71.89 COUNSELED ABOUT COVID-19 VIRUS INFECTION: ICD-10-CM

## 2021-09-09 DIAGNOSIS — M85.89 OSTEOPENIA OF MULTIPLE SITES: ICD-10-CM

## 2021-09-09 DIAGNOSIS — F41.9 ANXIETY DISORDER, UNSPECIFIED TYPE: ICD-10-CM

## 2021-09-09 DIAGNOSIS — I10 ESSENTIAL HYPERTENSION: ICD-10-CM

## 2021-09-09 DIAGNOSIS — K21.9 GASTROESOPHAGEAL REFLUX DISEASE WITHOUT ESOPHAGITIS: ICD-10-CM

## 2021-09-09 DIAGNOSIS — Z86.79 HISTORY OF SUBARACHNOID HEMORRHAGE: ICD-10-CM

## 2021-09-09 PROCEDURE — 99215 OFFICE O/P EST HI 40 MIN: CPT | Performed by: FAMILY MEDICINE

## 2021-09-09 PROCEDURE — 1160F RVW MEDS BY RX/DR IN RCRD: CPT | Performed by: FAMILY MEDICINE

## 2021-09-09 PROCEDURE — 1036F TOBACCO NON-USER: CPT | Performed by: FAMILY MEDICINE

## 2021-09-09 PROCEDURE — 3008F BODY MASS INDEX DOCD: CPT | Performed by: FAMILY MEDICINE

## 2021-09-09 RX ORDER — OMEPRAZOLE 20 MG/1
20 CAPSULE, DELAYED RELEASE ORAL DAILY
COMMUNITY
End: 2022-01-13 | Stop reason: ALTCHOICE

## 2021-09-09 NOTE — PROGRESS NOTES
Silvana Somers is here for 6 month checkup  She is doing well overall  She  has been exercising at the health center at her building  Unable to get to the [facility] anymore  She plays games on her phone  She had a health assessment from her insurance company  She complains of urinary frequency and some incontinence  Wears  a pad  She saw cardiology several months ago  She was started on Tricor but she said she was unable to tolerate this due to side effects  She has also been intolerant of statins  She continues to take her omeprazole for her esophageal reflux  Gets symptoms if she does not take it  Denies chest pain, shortness of breath, palpitations, headaches  O: /84 (BP Location: Left arm, Patient Position: Sitting, Cuff Size: Adult)   Pulse 68   Temp 97 8 °F (36 6 °C)   Resp 16   Ht 5' 8" (1 727 m)   Wt 77 3 kg (170 lb 6 4 oz)   BMI 25 91 kg/m²    Physical Exam  Constitutional:       General: She is not in acute distress  Appearance: She is well-developed  Neck:      Thyroid: No thyromegaly  Vascular: No carotid bruit  Cardiovascular:      Rate and Rhythm: Normal rate and regular rhythm  Heart sounds: Normal heart sounds  Pulmonary:      Effort: Pulmonary effort is normal       Breath sounds: Normal breath sounds  Abdominal:      General: Bowel sounds are normal       Palpations: Abdomen is soft  There is no hepatomegaly or mass  Tenderness: There is no abdominal tenderness  Lymphadenopathy:      Cervical: No cervical adenopathy  Assessment    1  Health maintenance- she is due for mammogram and DEXA scan  Colonoscopy is up-to-date  Immunizations are updated in her are up-to-date including Shingrix and COVID vaccine  Advised Tdap, flu shot, and COVID vaccine booster  2    Osteopenia -due for DEXA scan  Advised to continue her calcium and vitamin-D supplement  Continue her weight-bearing exercise  3    Hypertension-controlled   4   Status post CVA/Subarachnoid hemorrhage-continue good blood pressure control ; Unfortunately unable to tolerate statins   Or fenofibrate  Encouraged regular exercise  Has follow-up with neurosurgery next year  5  Hyperlipidemia -unfortunately unable to take statins  Encouraged a healthy diet  Can you a shin of Zetia  6  Esophageal reflux-controlled with omeprazole   7  Anxiety -uses occasional Xanax which is not inappropriate;   Continue sertraline   8  Urinary incontinence -offered referral but declines for now  Discussed bladder retraining  Plan    as above  Mammogram   DEXA scan  Recheck 6 months

## 2021-09-19 DIAGNOSIS — F41.8 DEPRESSION WITH ANXIETY: ICD-10-CM

## 2021-09-20 RX ORDER — SERTRALINE HYDROCHLORIDE 100 MG/1
TABLET, FILM COATED ORAL
Qty: 90 TABLET | Refills: 1 | Status: SHIPPED | OUTPATIENT
Start: 2021-09-20 | End: 2022-03-07

## 2021-09-29 ENCOUNTER — VBI (OUTPATIENT)
Dept: ADMINISTRATIVE | Facility: OTHER | Age: 76
End: 2021-09-29

## 2021-11-12 ENCOUNTER — APPOINTMENT (OUTPATIENT)
Dept: LAB | Facility: CLINIC | Age: 76
End: 2021-11-12
Payer: COMMERCIAL

## 2021-11-12 DIAGNOSIS — E78.00 PURE HYPERCHOLESTEROLEMIA: ICD-10-CM

## 2021-11-12 LAB
CHOLEST SERPL-MCNC: 225 MG/DL (ref 50–200)
HDLC SERPL-MCNC: 70 MG/DL
LDLC SERPL CALC-MCNC: 135 MG/DL (ref 0–100)
NONHDLC SERPL-MCNC: 155 MG/DL
TRIGL SERPL-MCNC: 100 MG/DL

## 2021-11-12 PROCEDURE — 80061 LIPID PANEL: CPT

## 2021-11-12 PROCEDURE — 36415 COLL VENOUS BLD VENIPUNCTURE: CPT

## 2021-11-18 ENCOUNTER — OFFICE VISIT (OUTPATIENT)
Dept: CARDIOLOGY CLINIC | Facility: MEDICAL CENTER | Age: 76
End: 2021-11-18
Payer: COMMERCIAL

## 2021-11-18 VITALS
HEIGHT: 68 IN | DIASTOLIC BLOOD PRESSURE: 70 MMHG | SYSTOLIC BLOOD PRESSURE: 120 MMHG | BODY MASS INDEX: 25.76 KG/M2 | WEIGHT: 170 LBS | OXYGEN SATURATION: 96 % | HEART RATE: 76 BPM

## 2021-11-18 DIAGNOSIS — E78.00 PURE HYPERCHOLESTEROLEMIA: Primary | ICD-10-CM

## 2021-11-18 PROCEDURE — 1036F TOBACCO NON-USER: CPT | Performed by: INTERNAL MEDICINE

## 2021-11-18 PROCEDURE — 99213 OFFICE O/P EST LOW 20 MIN: CPT | Performed by: INTERNAL MEDICINE

## 2021-11-18 PROCEDURE — 1160F RVW MEDS BY RX/DR IN RCRD: CPT | Performed by: INTERNAL MEDICINE

## 2021-11-18 PROCEDURE — 3008F BODY MASS INDEX DOCD: CPT | Performed by: INTERNAL MEDICINE

## 2021-12-17 ENCOUNTER — HOSPITAL ENCOUNTER (OUTPATIENT)
Dept: RADIOLOGY | Age: 76
Discharge: HOME | End: 2021-12-17
Payer: COMMERCIAL

## 2021-12-17 VITALS — WEIGHT: 170 LBS | BODY MASS INDEX: 25.76 KG/M2 | HEIGHT: 68 IN

## 2021-12-17 DIAGNOSIS — Z12.31 OTHER SCREENING MAMMOGRAM: ICD-10-CM

## 2021-12-17 DIAGNOSIS — Z12.31 ENCOUNTER FOR SCREENING MAMMOGRAM FOR MALIGNANT NEOPLASM OF BREAST: ICD-10-CM

## 2021-12-17 PROCEDURE — 77063 BREAST TOMOSYNTHESIS BI: CPT

## 2021-12-17 PROCEDURE — 77067 SCR MAMMO BI INCL CAD: CPT

## 2021-12-18 DIAGNOSIS — F41.9 ANXIETY: ICD-10-CM

## 2021-12-22 RX ORDER — ALPRAZOLAM 0.25 MG/1
TABLET ORAL
Qty: 30 TABLET | Refills: 0 | Status: SHIPPED | OUTPATIENT
Start: 2021-12-22

## 2022-01-13 ENCOUNTER — OFFICE VISIT (OUTPATIENT)
Dept: FAMILY MEDICINE CLINIC | Facility: MEDICAL CENTER | Age: 77
End: 2022-01-13
Payer: COMMERCIAL

## 2022-01-13 VITALS
HEIGHT: 68 IN | HEART RATE: 79 BPM | SYSTOLIC BLOOD PRESSURE: 122 MMHG | WEIGHT: 169.2 LBS | BODY MASS INDEX: 25.64 KG/M2 | OXYGEN SATURATION: 97 % | DIASTOLIC BLOOD PRESSURE: 70 MMHG | TEMPERATURE: 98.7 F

## 2022-01-13 DIAGNOSIS — R82.90 ABNORMAL URINALYSIS: ICD-10-CM

## 2022-01-13 DIAGNOSIS — R39.9 UTI SYMPTOMS: Primary | ICD-10-CM

## 2022-01-13 DIAGNOSIS — Z00.00 HEALTHCARE MAINTENANCE: ICD-10-CM

## 2022-01-13 DIAGNOSIS — N30.00 ACUTE CYSTITIS WITHOUT HEMATURIA: ICD-10-CM

## 2022-01-13 LAB
SL AMB  POCT GLUCOSE, UA: ABNORMAL
SL AMB LEUKOCYTE ESTERASE,UA: ABNORMAL
SL AMB POCT BILIRUBIN,UA: ABNORMAL
SL AMB POCT BLOOD,UA: ABNORMAL
SL AMB POCT KETONES,UA: ABNORMAL
SL AMB POCT NITRITE,UA: ABNORMAL
SL AMB POCT PH,UA: 5.5
SL AMB POCT SPECIFIC GRAVITY,UA: 1.02
SL AMB POCT URINE PROTEIN: ABNORMAL
SL AMB POCT UROBILINOGEN: ABNORMAL

## 2022-01-13 PROCEDURE — 3725F SCREEN DEPRESSION PERFORMED: CPT | Performed by: STUDENT IN AN ORGANIZED HEALTH CARE EDUCATION/TRAINING PROGRAM

## 2022-01-13 PROCEDURE — 87086 URINE CULTURE/COLONY COUNT: CPT | Performed by: STUDENT IN AN ORGANIZED HEALTH CARE EDUCATION/TRAINING PROGRAM

## 2022-01-13 PROCEDURE — 99213 OFFICE O/P EST LOW 20 MIN: CPT | Performed by: STUDENT IN AN ORGANIZED HEALTH CARE EDUCATION/TRAINING PROGRAM

## 2022-01-13 PROCEDURE — 1160F RVW MEDS BY RX/DR IN RCRD: CPT | Performed by: STUDENT IN AN ORGANIZED HEALTH CARE EDUCATION/TRAINING PROGRAM

## 2022-01-13 PROCEDURE — 1036F TOBACCO NON-USER: CPT | Performed by: STUDENT IN AN ORGANIZED HEALTH CARE EDUCATION/TRAINING PROGRAM

## 2022-01-13 PROCEDURE — 81002 URINALYSIS NONAUTO W/O SCOPE: CPT | Performed by: STUDENT IN AN ORGANIZED HEALTH CARE EDUCATION/TRAINING PROGRAM

## 2022-01-13 PROCEDURE — 3288F FALL RISK ASSESSMENT DOCD: CPT | Performed by: STUDENT IN AN ORGANIZED HEALTH CARE EDUCATION/TRAINING PROGRAM

## 2022-01-13 PROCEDURE — 1101F PT FALLS ASSESS-DOCD LE1/YR: CPT | Performed by: STUDENT IN AN ORGANIZED HEALTH CARE EDUCATION/TRAINING PROGRAM

## 2022-01-13 RX ORDER — SACCHAROMYCES BOULARDII 250 MG
250 CAPSULE ORAL 2 TIMES DAILY
Qty: 14 CAPSULE | Refills: 0 | Status: SHIPPED | OUTPATIENT
Start: 2022-01-13 | End: 2022-01-20

## 2022-01-13 RX ORDER — FLUTICASONE PROPIONATE 50 MCG
1 SPRAY, SUSPENSION (ML) NASAL DAILY
COMMUNITY

## 2022-01-13 RX ORDER — NITROFURANTOIN 25; 75 MG/1; MG/1
100 CAPSULE ORAL 2 TIMES DAILY
Qty: 10 CAPSULE | Refills: 0 | Status: SHIPPED | OUTPATIENT
Start: 2022-01-13 | End: 2022-01-18

## 2022-01-13 RX ORDER — ESOMEPRAZOLE MAGNESIUM 10 MG/1
10 GRANULE, FOR SUSPENSION, EXTENDED RELEASE ORAL
COMMUNITY

## 2022-01-13 NOTE — PATIENT INSTRUCTIONS
Urinary Tract Infection in Older Adults   WHAT YOU NEED TO KNOW:   A urinary tract infection (UTI) is caused by bacteria that get inside your urinary tract  Your urinary tract includes your kidneys, ureters, bladder, and urethra  Urine is made in your kidneys, and it flows from the ureters to the bladder  Urine leaves the bladder through the urethra  A UTI is more common in your lower urinary tract, which includes your bladder and urethra  DISCHARGE INSTRUCTIONS:   Return to the emergency department if:   · You are urinating very little or not at all  · You are vomiting  · You have a high fever with shaking chills  · You have side or back pain that gets worse  Call your doctor if:   · You have a fever  · You are a woman and you have increased white or yellow discharge from your vagina  · You do not feel better after 2 days of taking antibiotics  · You have questions or concerns about your condition or care  Medicines:   · Medicines  help treat the bacterial infection or decrease pain and burning when you urinate  You may also need medicines to decrease the urge to urinate often  If you have UTIs often (called recurrent UTIs), you may be given antibiotics to take regularly  You will be given directions for when and how to use antibiotics  The goal is to prevent UTIs but not cause antibiotic resistance by using antibiotics too often  · Take your medicine as directed  Contact your healthcare provider if you think your medicine is not helping or if you have side effects  Tell him or her if you are allergic to any medicine  Keep a list of the medicines, vitamins, and herbs you take  Include the amounts, and when and why you take them  Bring the list or the pill bottles to follow-up visits  Carry your medicine list with you in case of an emergency  Self-care:   · Drink liquids as directed  Liquids can help flush bacteria from your urinary tract   Ask how much liquid to drink each day and which liquids are best for you  You may need to drink more liquids than usual to help flush out the bacteria  Do not drink alcohol, caffeine, and citrus juices  These can irritate your bladder and increase your symptoms  · Apply heat  on your abdomen for 20 to 30 minutes every 2 hours for as many days as directed  Heat helps decrease discomfort and pressure in your bladder  Prevent a UTI:   · Urinate when you feel the urge  Do not hold your urine  Bacteria can grow if urine stays in the bladder too long  It may be helpful to urinate at least every 3 to 4 hours  · Urinate after you have sex  to flush away bacteria that can enter your urinary tract during sex  · Wear cotton underwear and clothes that are loose  Tight pants and nylon underwear can trap moisture and cause bacteria to grow  · Cranberry juice or cranberry supplements  may help prevent UTIs  Your healthcare provider can recommend the right juice or supplement for you  · Women should wipe front to back  after urinating or having a bowel movement  This may prevent germs from getting into the urinary tract  Do not douche or use feminine deodorants  These can change the chemical balance in your vagina  You may also be given vaginal estrogen medicine  This medicine helps prevent recurrent UTIs in women who have gone through menopause or are in praneeth-menopause  Follow up with your doctor as directed:  Write down your questions so you remember to ask them during your visits  © Copyright [employer] 2021 Information is for End User's use only and may not be sold, redistributed or otherwise used for commercial purposes  All illustrations and images included in CareNotes® are the copyrighted property of A D A M , Inc  or [facility] [employer]   The above information is an  only  It is not intended as medical advice for individual conditions or treatments   Talk to your doctor, nurse or pharmacist before following any medical regimen to see if it is safe and effective for you

## 2022-01-13 NOTE — PROGRESS NOTES
[address] Inf - Clinic Note  [facility]     Flora Quesada MRN: [id number] : 1945 Age: 77 y o  Assessment/Plan     1  UTI symptoms    - POCT urine dip  Component      Latest Ref Rng & Units 2022   Leukocytes, UA       1+   Nitrite, UA       neg   SL AMB POCT UROBILINOGEN       neg   POCT URINE PROTEIN       neg   pH, UA       5 5   Blood, UA       2+   SL AMB SPECIFIC GRAVITY-URINE       1 025   Ketones, UA       neg   BILIRUBIN,UA       neg   Glucose, UA       neg       2  Abnormal urinalysis    - Urine culture    3  Acute cystitis without hematuria    - nitrofurantoin (MACROBID) 100 mg capsule; Take 1 capsule (100 mg total) by mouth 2 (two) times a day for 5 days  Dispense: 10 capsule; Refill: 0  - saccharomyces boulardii (FLORASTOR) 250 mg capsule; Take 1 capsule (250 mg total) by mouth 2 (two) times a day for 7 days  Dispense: 14 capsule; Refill: 0  Counseled about benefit of probiotic use with antibiotic   - Patient with urinary frequency, urinary incontinence episode, and suprapubic pain  Start abx course and follow up urine culture  Continue with adequate hydration  Continue with bowel regimen  Follow up if symptoms not improving, and prn  Patient made aware of concerning signs and symptoms in which case she should seek prompt medical attention  Flora Quesada acknowledged understanding of treatment plan, all questions answered  Quinn Quintanilla is a 77 y o  female who complains of constipation, frequency and incontinence  Onset this morning and went on for the next few hours  Patient denies congestion, cough, fever, headache, rhinitis, sorethroat, stomach ache and vaginal discharge  Patient has low back pain but, she describes this is chronic in nature due to her arthritis  Patient does have a history of recurrent UTI  Patient does not have a history of pyelonephritis    Patient reports that in general she does have good bladder control however, she does wear pads indicates she ever has an accident which is not often  Patient's intake and hydration has been normal  Patient stays physically active  Patient reports PCN and Bactrim allergies  The following portions of the patient's history were reviewed and updated as appropriate: allergies, current medications, past family history, past medical history, past social history, past surgical history and problem list      Past Medical History:   Diagnosis Date    Anxiety disorder     BRCA1 negative     BRCA2 negative     Breast cancer ([place] [address] ) 2014    left breast    Cancer ([place] [address] )     Constipation     Esophageal reflux     History of bone density study 2011    [employer] X-Ray Absorptiometry    History of radiation therapy 2014    left breast ca    Hydrocephalus ([place] [address] )     secondary to subarachnoid hemorrhage    Hyperlipidemia     Hypertension     Osteoarthritis of hip     Osteopenia     Stroke, hemorrhagic ([place] [address] ) 12/2015    Subarachnoid hemorrhage (HCC)        Allergies   Allergen Reactions    Oxycodone Vomiting    Monascus Purpureus Went Yeast      Pt unsure      Atorvastatin Other (See Comments)     leg pain    Bactrim [Sulfamethoxazole-Trimethoprim] Itching and Rash    Keppra [Levetiracetam] Rash    Livalo [Pitavastatin] Itching    Penicillins Rash     Agitation        Past Surgical History:   Procedure Laterality Date    BREAST BIOPSY Left 2014    BREAST LUMPECTOMY Left 2014    COLONOSCOPY      Fiberoptic - 2009, 2015 5 year f/u    ESOPHAGOGASTRODUODENOSCOPY  2009    Diag, Resolved - 2006 / 2009    EXOSTECTOMY  2005    Simple Bunion (Silver Procedure)    HYSTERECTOMY  1981    JOINT REPLACEMENT      Hip    OOPHORECTOMY  1981    not sure which one    VENTRICULOSTOMY         Family History   Problem Relation Age of Onset    Cancer Mother 71        bone    Hypertension Mother     Arthritis Mother     Stroke Father         TIA    Stroke Maternal Grandmother         CVA    Deep vein thrombosis Daughter     Heart attack Neg Hx     Anuerysm Neg Hx     Clotting disorder Neg Hx     Arrhythmia Neg Hx     Heart failure Neg Hx     Coronary artery disease Neg Hx     Hyperlipidemia Neg Hx     Breast cancer Neg Hx        Social History     Socioeconomic History    Marital status:      Spouse name: None    Number of children: None    Years of education: None    Highest education level: None   Occupational History    None   Tobacco Use    Smoking status: Never Smoker    Smokeless tobacco: Never Used   Substance and Sexual Activity    Alcohol use: Yes     Comment: occasionally    Drug use: No    Sexual activity: None   Other Topics Concern    None   Social History Narrative    Caffeine use    Marital Status -  per [employer]      Social Determinants of Health     Financial Resource Strain: Not on file   Food Insecurity: Not on file   Transportation Needs: Not on file   Physical Activity: Not on file   Stress: Not on file   Social Connections: Not on file   Intimate Partner Violence: Not on file   Housing Stability: Not on file       Current Outpatient Medications   Medication Sig Dispense Refill    acetaminophen (TYLENOL) 325 mg tablet Take 650 mg by mouth every 6 (six) hours as needed for mild pain      ALPRAZolam (XANAX) 0 25 mg tablet TAKE ONE TABLET BY MOUTH EVERY DAY AS NEEDED 30 tablet 0    Calcium-Vitamin D (CALTRATE 600 PLUS-VIT D PO) Take by mouth daily        cyanocobalamin (VITAMIN B-12) 100 mcg tablet Take by mouth daily      docusate sodium (STOOL SOFTENER) 100 mg capsule Take 300 mg by mouth 2 (two) times a day as needed       esomeprazole (NexIUM) 10 MG packet Take 10 mg by mouth every morning before breakfast      ezetimibe (ZETIA) 10 mg tablet TAKE ONE TABLET BY MOUTH EVERY DAY 30 tablet 5    fluticasone (FLONASE) 50 mcg/act nasal spray 1 spray into each nostril daily      LUMIGAN 0 01 % ophthalmic drops PLACE 1 DROP INTO IN [facility] EYE AT BEDTIME 11    meclizine (ANTIVERT) 25 mg tablet Take 1 tablet (25 mg total) by mouth every 8 (eight) hours as needed for dizziness 30 tablet 0    Multiple Vitamins-Minerals (MULTIVITAMIN ADULTS 50+) TABS Take by mouth daily      NON FORMULARY CBD Gummies      sertraline (ZOLOFT) 100 mg tablet TAKE ONE TABLET BY MOUTH EVERY DAY 90 tablet 1    fenofibrate (TRICOR) 48 mg tablet Take 1 tablet (48 mg total) by mouth daily (Patient not taking: Reported on 11/18/2021 ) 90 tablet 3    nitrofurantoin (MACROBID) 100 mg capsule Take 1 capsule (100 mg total) by mouth 2 (two) times a day for 5 days 10 capsule 0    saccharomyces boulardii (FLORASTOR) 250 mg capsule Take 1 capsule (250 mg total) by mouth 2 (two) times a day for 7 days 14 capsule 0     No current facility-administered medications for this visit  Review of Systems   Constitutional: Negative for appetite change, chills and fever  HENT: Negative for congestion and rhinorrhea  Respiratory: Negative for cough and shortness of breath  Cardiovascular: Negative for chest pain and leg swelling  Gastrointestinal: Positive for constipation  Negative for abdominal pain, blood in stool, diarrhea, nausea and vomiting  Genitourinary: Positive for frequency  Negative for flank pain, hematuria and vaginal discharge  Musculoskeletal: Positive for back pain (chronic)  Skin: Negative for rash  Objective      /70 (BP Location: Left arm, Patient Position: Sitting, Cuff Size: Adult)   Pulse 79   Temp 98 7 °F (37 1 °C)   Ht 5' 8" (1 727 m)   Wt 76 7 kg (169 lb 3 2 oz)   SpO2 97%   BMI 25 73 kg/m²     Physical Exam  Vitals reviewed  Constitutional:       General: She is not in acute distress  Appearance: Normal appearance  She is normal weight  She is not ill-appearing, toxic-appearing or diaphoretic  HENT:      Head: Normocephalic and atraumatic        Right Ear: External ear normal       Left Ear: External ear normal       Nose: Nose normal  Mouth/Throat:      Mouth: Mucous membranes are moist       Pharynx: Oropharynx is clear  Eyes:      Extraocular Movements: Extraocular movements intact  Conjunctiva/sclera: Conjunctivae normal       Pupils: Pupils are equal, round, and reactive to light  Cardiovascular:      Rate and Rhythm: Normal rate and regular rhythm  Pulses: Normal pulses  Heart sounds: Normal heart sounds  Pulmonary:      Effort: Pulmonary effort is normal       Breath sounds: Normal breath sounds  Abdominal:      General: Abdomen is flat  Bowel sounds are normal       Palpations: Abdomen is soft  Tenderness: There is abdominal tenderness in the suprapubic area  There is no right CVA tenderness, left CVA tenderness, guarding or rebound  Musculoskeletal:      Cervical back: Neck supple  Lymphadenopathy:      Cervical: No cervical adenopathy  Skin:     General: Skin is warm and dry  Neurological:      Mental Status: She is alert and oriented to person, place, and time  Gait: Gait normal    Psychiatric:         Mood and Affect: Mood normal          Behavior: Behavior normal          Thought Content: Thought content normal          Judgment: Judgment normal            Some portions of this record may have been generated with voice recognition software  There may be translation, syntax, or grammatical errors  Occasional wrong word or "sound-a-like" substitutions may have occurred due to the inherent limitations of the voice recognition software  Read the chart carefully and recognize, using context, where substations may have occurred  If you have any questions, please contact the dictating provider for clarification or correction, as needed

## 2022-01-14 LAB — BACTERIA UR CULT: ABNORMAL

## 2022-01-19 ENCOUNTER — HOSPITAL ENCOUNTER (OUTPATIENT)
Dept: RADIOLOGY | Facility: MEDICAL CENTER | Age: 77
Discharge: HOME | End: 2022-01-19
Payer: COMMERCIAL

## 2022-01-19 DIAGNOSIS — Z13.820 SCREENING FOR OSTEOPOROSIS: ICD-10-CM

## 2022-01-19 DIAGNOSIS — Z78.0 POST-MENOPAUSAL: ICD-10-CM

## 2022-01-19 PROCEDURE — 77080 DXA BONE DENSITY AXIAL: CPT

## 2022-02-11 DIAGNOSIS — E78.00 HYPERCHOLESTEROLEMIA: ICD-10-CM

## 2022-02-14 RX ORDER — EZETIMIBE 10 MG/1
TABLET ORAL
Qty: 30 TABLET | Refills: 5 | Status: SHIPPED | OUTPATIENT
Start: 2022-02-14

## 2022-02-28 ENCOUNTER — RA CDI HCC (OUTPATIENT)
Dept: OTHER | Facility: HOSPITAL | Age: 77
End: 2022-02-28

## 2022-02-28 NOTE — PROGRESS NOTES
[facility]  [facility]       Chart reviewed, no opportunity found: CHART REVIEWED, NO OPPORTUNITY FOUND                        Patients insurance company:  [employer] [facility] (Medicare Advantage and Commercial)

## 2022-03-05 DIAGNOSIS — F41.8 DEPRESSION WITH ANXIETY: ICD-10-CM

## 2022-03-07 RX ORDER — SERTRALINE HYDROCHLORIDE 100 MG/1
TABLET, FILM COATED ORAL
Qty: 90 TABLET | Refills: 1 | Status: SHIPPED | OUTPATIENT
Start: 2022-03-07

## 2022-03-09 ENCOUNTER — OFFICE VISIT (OUTPATIENT)
Dept: FAMILY MEDICINE CLINIC | Facility: MEDICAL CENTER | Age: 77
End: 2022-03-09
Payer: COMMERCIAL

## 2022-03-09 VITALS
BODY MASS INDEX: 25.7 KG/M2 | WEIGHT: 169.6 LBS | OXYGEN SATURATION: 97 % | HEART RATE: 80 BPM | SYSTOLIC BLOOD PRESSURE: 138 MMHG | DIASTOLIC BLOOD PRESSURE: 72 MMHG | TEMPERATURE: 97.8 F | HEIGHT: 68 IN

## 2022-03-09 DIAGNOSIS — M85.89 OSTEOPENIA OF MULTIPLE SITES: ICD-10-CM

## 2022-03-09 DIAGNOSIS — R41.9 COGNITIVE COMPLAINTS: ICD-10-CM

## 2022-03-09 DIAGNOSIS — Z86.79 HISTORY OF SUBARACHNOID HEMORRHAGE: ICD-10-CM

## 2022-03-09 DIAGNOSIS — E78.00 PURE HYPERCHOLESTEROLEMIA: Primary | ICD-10-CM

## 2022-03-09 PROCEDURE — 99214 OFFICE O/P EST MOD 30 MIN: CPT | Performed by: FAMILY MEDICINE

## 2022-03-09 PROCEDURE — 1160F RVW MEDS BY RX/DR IN RCRD: CPT | Performed by: FAMILY MEDICINE

## 2022-03-09 NOTE — PROGRESS NOTES
Maritza Dangelo is here for 6 month checkup  She has been having low back pain  Started about 2 months ago  Relieved by heat and Tylenol  Used an OTC patch which helped  No pain radiation or paresthesias  Aggravated by lifting  Doesn't affect sleep  She says her memory is bad  However doesn't think it is  any worse  Does word games, computer games, finances  Taking sertraline and doing well  Last  MMSE was 2020 28/30  She is taking a calcium and vitamin-D supplement  She does try to exercise although she does get some low back pain  She saw Cardiology recently  She knew he notes he notes that her lipids have slightly improved however need to be better  She is unable to tolerate statins  She feels well with no complaints  O: /72 (BP Location: Left arm, Patient Position: Sitting, Cuff Size: Adult)   Pulse 80   Temp 97 8 °F (36 6 °C)   Ht 5' 8" (1 727 m)   Wt 76 9 kg (169 lb 9 6 oz)   SpO2 97%   BMI 25 79 kg/m²    Physical Exam  Constitutional:       General: She is not in acute distress  Appearance: She is well-developed  Neck:      Thyroid: No thyromegaly  Vascular: No carotid bruit  Cardiovascular:      Rate and Rhythm: Normal rate and regular rhythm  Heart sounds: Normal heart sounds  Pulmonary:      Effort: Pulmonary effort is normal       Breath sounds: Normal breath sounds  Abdominal:      General: Bowel sounds are normal       Palpations: Abdomen is soft  There is no hepatomegaly or mass  Tenderness: There is no abdominal tenderness  Lymphadenopathy:      Cervical: No cervical adenopathy  Back-no spinal tenderness  Positive straight leg raising bilaterally at 75°    Assessment  1  Low back pain-likely arthritis  Continue stretching and use Tylenol as needed  Offered PT   2  History of CVA/subarachnoid hemorrhage-due to see Neurosurgery this year  3  Osteopenia hip and spine/osteoporosis forearm-continue calcium supplement and exercise    Declines treatment  4  Cognitive complaints-relatively stable  Encouraged her continued mentally stimulating activities  as well as exercise ;  5  Hyperlipidemia-unfortunately unable to tolerate statins or fenofibrate    Plan  As above  Recheck 6 months

## 2022-05-13 ENCOUNTER — OFFICE VISIT (OUTPATIENT)
Dept: FAMILY MEDICINE CLINIC | Facility: MEDICAL CENTER | Age: 77
End: 2022-05-13
Payer: COMMERCIAL

## 2022-05-13 VITALS
RESPIRATION RATE: 16 BRPM | DIASTOLIC BLOOD PRESSURE: 74 MMHG | WEIGHT: 168.4 LBS | HEIGHT: 68 IN | HEART RATE: 68 BPM | SYSTOLIC BLOOD PRESSURE: 128 MMHG | BODY MASS INDEX: 25.52 KG/M2 | TEMPERATURE: 98.4 F

## 2022-05-13 DIAGNOSIS — N30.90 CYSTITIS: Primary | ICD-10-CM

## 2022-05-13 DIAGNOSIS — R39.9 URINARY SYMPTOM OR SIGN: ICD-10-CM

## 2022-05-13 DIAGNOSIS — N39.0 FREQUENT UTI: ICD-10-CM

## 2022-05-13 LAB
SL AMB  POCT GLUCOSE, UA: ABNORMAL
SL AMB LEUKOCYTE ESTERASE,UA: ABNORMAL
SL AMB POCT BILIRUBIN,UA: ABNORMAL
SL AMB POCT BLOOD,UA: ABNORMAL
SL AMB POCT CLARITY,UA: CLEAR
SL AMB POCT COLOR,UA: YELLOW
SL AMB POCT KETONES,UA: ABNORMAL
SL AMB POCT NITRITE,UA: ABNORMAL
SL AMB POCT PH,UA: 5
SL AMB POCT SPECIFIC GRAVITY,UA: 1.01
SL AMB POCT URINE PROTEIN: ABNORMAL
SL AMB POCT UROBILINOGEN: ABNORMAL

## 2022-05-13 PROCEDURE — 81002 URINALYSIS NONAUTO W/O SCOPE: CPT

## 2022-05-13 PROCEDURE — 1160F RVW MEDS BY RX/DR IN RCRD: CPT

## 2022-05-13 PROCEDURE — 1036F TOBACCO NON-USER: CPT

## 2022-05-13 PROCEDURE — 99213 OFFICE O/P EST LOW 20 MIN: CPT

## 2022-05-13 PROCEDURE — 87086 URINE CULTURE/COLONY COUNT: CPT

## 2022-05-13 RX ORDER — CIPROFLOXACIN 500 MG/1
500 TABLET, FILM COATED ORAL EVERY 24 HOURS
Qty: 3 TABLET | Refills: 0 | Status: SHIPPED | OUTPATIENT
Start: 2022-05-13 | End: 2022-05-16

## 2022-05-13 NOTE — PROGRESS NOTES
Assessment/Plan:         1  Cystitis  Advised patient to take Cipro once a day for the next 3 days and complete course of antibiotics  May continue to drink 1 small glass of cranberry juice daily as she has been, stay well hydrated otherwise  Patient has been on Macrobid 3-4 times over the past year for recurrent UTIs,   - ciprofloxacin (CIPRO) 500 mg tablet; Take 1 tablet (500 mg total) by mouth every 24 hours for 3 days  Dispense: 3 tablet; Refill: 0    2  Frequent UTI  Discussed referring patient to Urology for frequent UTIs  Patient agrees  Referral placed  She reports 4 UTIs over the past 11 months  Send urine for culture  - Ambulatory Referral to Urology; Future  - Urine culture    3  Urinary symptom or sign  - POCT urine dip    Component      Latest Ref Rng & Units 5/13/2022   Leukocytes, UA       trace   Nitrite, UA       neg   SL AMB POCT UROBILINOGEN       neg   POCT URINE PROTEIN       neg   pH, UA       5 0   Blood, UA       trace   SL AMB SPECIFIC GRAVITY-URINE       1 015   Ketones, UA       neg   BILIRUBIN,UA       neg   Glucose, UA       neg   Color, UA       yellow   Clarity, UA       clear     Subjective:      Patient ID: Belia Dimas is a 69 y o  female  60-year-old female presents today with complaints of urinary frequency and urgency x 3 days  She reports hematuria over the first 1-2 days  of symptom onset, however denies hematuria today  Denies abdominal pain, flank pain, fever, chills  Reports hx of frequent UTIs  She reports having 4 UTIs since 6/21  Last treated for UTI in January of this year with Macrobid  Denies history of Pyelonephritis  Discussed having patient follow up with Urology for frequent UTIs, patient agrees and would like to see Urology        The following portions of the patient's history were reviewed and updated as appropriate: allergies, past family history, past medical history, past social history, past surgical history and problem list     Review of Systems   Constitutional: Negative  HENT: Negative  Eyes: Negative  Respiratory: Negative  Cardiovascular: Negative  Gastrointestinal: Positive for constipation (chronic)  Negative for abdominal pain, blood in stool, diarrhea, nausea and vomiting  Endocrine: Negative  Genitourinary: Positive for frequency and urgency  Negative for difficulty urinating, dysuria, hematuria, vaginal bleeding, vaginal discharge and vaginal pain  Musculoskeletal: Negative  Skin: Negative  Allergic/Immunologic: Negative  Neurological: Negative  Hematological: Negative  Psychiatric/Behavioral: Negative  Objective:      /74 (BP Location: Left arm, Patient Position: Sitting, Cuff Size: Adult)   Pulse 68   Temp 98 4 °F (36 9 °C)   Resp 16   Ht 5' 8" (1 727 m)   Wt 76 4 kg (168 lb 6 4 oz)   BMI 25 61 kg/m²          Physical Exam  Vitals and nursing note reviewed  Constitutional:       General: She is not in acute distress  Appearance: Normal appearance  She is not ill-appearing  HENT:      Head: Normocephalic and atraumatic  Eyes:      Conjunctiva/sclera: Conjunctivae normal       Pupils: Pupils are equal, round, and reactive to light  Cardiovascular:      Rate and Rhythm: Normal rate and regular rhythm  Pulses: Normal pulses  Heart sounds: Normal heart sounds  Pulmonary:      Effort: Pulmonary effort is normal       Breath sounds: Normal breath sounds  Abdominal:      General: Abdomen is flat  Bowel sounds are normal       Palpations: Abdomen is soft  Tenderness: There is no abdominal tenderness  There is no right CVA tenderness or left CVA tenderness  Musculoskeletal:         General: Normal range of motion  Cervical back: Normal range of motion and neck supple  Skin:     General: Skin is warm and dry  Neurological:      General: No focal deficit present  Mental Status: She is alert and oriented to person, place, and time   Mental status is at baseline  Psychiatric:         Mood and Affect: Mood normal          Behavior: Behavior normal          Thought Content:  Thought content normal                     Danie Timothy

## 2022-05-14 LAB — BACTERIA UR CULT: NORMAL

## 2022-08-21 DIAGNOSIS — E78.00 HYPERCHOLESTEROLEMIA: ICD-10-CM

## 2022-08-22 RX ORDER — EZETIMIBE 10 MG/1
TABLET ORAL
Qty: 30 TABLET | Refills: 5 | Status: SHIPPED | OUTPATIENT
Start: 2022-08-22

## 2022-08-28 ENCOUNTER — RA CDI HCC (OUTPATIENT)
Dept: OTHER | Facility: HOSPITAL | Age: 77
End: 2022-08-28

## 2022-08-28 NOTE — PROGRESS NOTES
[facility]  coding opportunities       Chart reviewed, no opportunity found:   [address]        Patients Insurance     Medicare Insurance: [employer]

## 2022-08-30 ENCOUNTER — OFFICE VISIT (OUTPATIENT)
Dept: UROLOGY | Facility: CLINIC | Age: 77
End: 2022-08-30
Payer: COMMERCIAL

## 2022-08-30 VITALS
BODY MASS INDEX: 25.01 KG/M2 | HEART RATE: 72 BPM | DIASTOLIC BLOOD PRESSURE: 80 MMHG | WEIGHT: 165 LBS | HEIGHT: 68 IN | SYSTOLIC BLOOD PRESSURE: 120 MMHG | OXYGEN SATURATION: 98 %

## 2022-08-30 DIAGNOSIS — N39.0 FREQUENT UTI: Primary | ICD-10-CM

## 2022-08-30 LAB
POST-VOID RESIDUAL VOLUME, ML POC: 121 ML
SL AMB  POCT GLUCOSE, UA: NORMAL
SL AMB LEUKOCYTE ESTERASE,UA: NORMAL
SL AMB POCT BILIRUBIN,UA: NORMAL
SL AMB POCT BLOOD,UA: NORMAL
SL AMB POCT CLARITY,UA: CLEAR
SL AMB POCT COLOR,UA: YELLOW
SL AMB POCT KETONES,UA: NORMAL
SL AMB POCT NITRITE,UA: NORMAL
SL AMB POCT PH,UA: 6
SL AMB POCT SPECIFIC GRAVITY,UA: 1
SL AMB POCT URINE PROTEIN: NORMAL
SL AMB POCT UROBILINOGEN: 0.2

## 2022-08-30 PROCEDURE — 99203 OFFICE O/P NEW LOW 30 MIN: CPT | Performed by: PHYSICIAN ASSISTANT

## 2022-08-30 PROCEDURE — 81002 URINALYSIS NONAUTO W/O SCOPE: CPT | Performed by: PHYSICIAN ASSISTANT

## 2022-08-30 PROCEDURE — 1160F RVW MEDS BY RX/DR IN RCRD: CPT | Performed by: PHYSICIAN ASSISTANT

## 2022-08-30 PROCEDURE — 51798 US URINE CAPACITY MEASURE: CPT | Performed by: PHYSICIAN ASSISTANT

## 2022-08-30 NOTE — PROGRESS NOTES
There are no diagnoses linked to this encounter  Assessment and plan:       1  Urinary infections  -patient with 2 episodes infection over the past year  We discussed that this can be a normal variant  We reviewed proper hydration and dietary modifications in order minimize future infections including proper hydration and probiotic  -patient will call as as needed for breakthrough infections  Will follow up lizzette buenrostro     -     Radha Lovett PA-C      Chief Complaint     Recurrent urinary infections    History of Present Illness     Vipin Manuel is a 69 y o  female presenting for consultation of recurrent urinary infections  Previously had in 2016 with a subarachnoid hemorrhage  She had acute urinary retention in which Urology was consulted  Patient was able to void volitionally thereafter  In January of 2022 patient had low colony count bacterial growth  Urine culture in in May 2022 was negative  Patient reports that she is asymptomatic now  Denies any gross hematuria, suprapubic pressure, flank pain for fevers, or chills  Patient feels that her infections are precipitated from wiping from back to front  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  Medical comorbidities include GERD, hypertension, stroke, hydrocephalus, osteopenia, history of breast cancer, anxiety  Laboratory     Lab Results   Component Value Date    CREATININE 0 70 03/22/2021       Review of Systems     Review of Systems   Constitutional: Negative for activity change, appetite change, chills, diaphoresis, fatigue, fever and unexpected weight change  Respiratory: Negative for chest tightness and shortness of breath  Cardiovascular: Negative for chest pain, palpitations and leg swelling  Gastrointestinal: Negative for abdominal distention, abdominal pain, constipation, diarrhea, nausea and vomiting     Genitourinary: Negative for decreased urine volume, difficulty urinating, dysuria, enuresis, flank pain, frequency, genital sores, hematuria and urgency  Musculoskeletal: Negative for back pain, gait problem and myalgias  Skin: Negative for color change, pallor, rash and wound  Psychiatric/Behavioral: Negative for behavioral problems  The patient is not nervous/anxious  Allergies     Allergies   Allergen Reactions    Oxycodone Vomiting    Monascus Purpureus Went Yeast      Pt unsure   Atorvastatin Other (See Comments)     leg pain    Bactrim [Sulfamethoxazole-Trimethoprim] Itching and Rash    Keppra [Levetiracetam] Rash    Livalo [Pitavastatin] Itching    Penicillins Rash     Agitation        Physical Exam     Physical Exam  Constitutional:       General: She is not in acute distress  Appearance: Normal appearance  She is normal weight  She is not ill-appearing, toxic-appearing or diaphoretic  HENT:      Head: Normocephalic and atraumatic  Eyes:      General:         Right eye: No discharge  Left eye: No discharge  Conjunctiva/sclera: Conjunctivae normal    Pulmonary:      Effort: Pulmonary effort is normal  No respiratory distress  Musculoskeletal:         General: No swelling or tenderness  Normal range of motion  Skin:     General: Skin is warm and dry  Coloration: Skin is not jaundiced or pale  Neurological:      General: No focal deficit present  Mental Status: She is alert and oriented to person, place, and time  Psychiatric:         Mood and Affect: Mood normal          Behavior: Behavior normal          Thought Content:  Thought content normal            Vital Signs     Vitals:    08/30/22 0853   BP: 120/80   Pulse: 72   SpO2: 98%   Weight: 74 8 kg (165 lb)   Height: 5' 8" (1 727 m)         Current Medications       Current Outpatient Medications:     acetaminophen (TYLENOL) 325 mg tablet, Take 650 mg by mouth every 6 (six) hours as needed for mild pain, Disp: , Rfl:     ALPRAZolam (XANAX) 0 25 mg tablet, TAKE ONE TABLET BY MOUTH EVERY DAY AS NEEDED, Disp: 30 tablet, Rfl: 0    Calcium-Vitamin D (CALTRATE 600 PLUS-VIT D PO), Take by mouth daily  , Disp: , Rfl:     cyanocobalamin (VITAMIN B-12) 100 mcg tablet, Take by mouth daily, Disp: , Rfl:     esomeprazole (NexIUM) 10 MG packet, Take 10 mg by mouth every morning before breakfast, Disp: , Rfl:     ezetimibe (ZETIA) 10 mg tablet, TAKE ONE TABLET BY MOUTH EVERY DAY, Disp: 30 tablet, Rfl: 5    fluticasone (FLONASE) 50 mcg/act nasal spray, 1 spray into each nostril daily, Disp: , Rfl:     LUMIGAN 0 01 % ophthalmic drops, PLACE 1 DROP INTO IN EACH EYE AT BEDTIME, Disp: , Rfl: 11    Multiple Vitamins-Minerals (MULTIVITAMIN ADULTS 50+) TABS, Take by mouth daily, Disp: , Rfl:     NON FORMULARY, CBD Gummies, Disp: , Rfl:     psyllium (METAMUCIL) 0 52 g capsule, Take by mouth daily 2-3 caps daily, Disp: , Rfl:     sertraline (ZOLOFT) 100 mg tablet, TAKE ONE TABLET BY MOUTH EVERY DAY, Disp: 90 tablet, Rfl: 1    docusate sodium (COLACE) 100 mg capsule, Take 300 mg by mouth 2 (two) times a day as needed  (Patient not taking: Reported on 8/30/2022), Disp: , Rfl:     fenofibrate (TRICOR) 48 mg tablet, Take 1 tablet (48 mg total) by mouth daily (Patient not taking: No sig reported), Disp: 90 tablet, Rfl: 3    meclizine (ANTIVERT) 25 mg tablet, Take 1 tablet (25 mg total) by mouth every 8 (eight) hours as needed for dizziness (Patient not taking: No sig reported), Disp: 30 tablet, Rfl: 0      Active Problems     Patient Active Problem List   Diagnosis    History of subarachnoid hemorrhage    Anxiety disorder    Hx of breast cancer    Hydrocephalus (HCC)    Hyperlipidemia    Celiac artery stenosis (HCC)    Osteopenia    Constipation    Vertigo    Thyroid nodule         Past Medical History     Past Medical History:   Diagnosis Date    Anxiety disorder     BRCA1 negative     BRCA2 negative     Breast cancer ([facility] [address] ) 2014    left breast    Cancer (HCC)     Constipation     Esophageal reflux  History of bone density study 2011    [employer]    History of radiation therapy 2014    left breast ca    History of recurrent UTIs     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    Hyperlipidemia     Hypertension     Osteoarthritis of hip     Osteopenia     Stroke, hemorrhagic ([facility] [address] ) 12/2015    Subarachnoid hemorrhage ([facility] [address] )          Surgical History     Past Surgical History:   Procedure Laterality Date    BREAST BIOPSY Left 2014    BREAST LUMPECTOMY Left 2014    COLONOSCOPY      Fiberoptic - 2009, 2015 5 year f/u    ESOPHAGOGASTRODUODENOSCOPY  2009    Diag, Resolved - 2006 / 2009    EXOSTECTOMY  2005    Simple Bunion (Silver Procedure)    HYSTERECTOMY  1981    JOINT REPLACEMENT      Hip    OOPHORECTOMY  1981    not sure which one    VENTRICULOSTOMY           Family History     Family History   Problem Relation Age of Onset    Cancer Mother 71        bone    Hypertension Mother     Arthritis Mother     Stroke Father         TIA    Stroke Maternal Grandmother         CVA    Deep vein thrombosis Daughter     Heart attack Neg Hx     Anuerysm Neg Hx     Clotting disorder Neg Hx     Arrhythmia Neg Hx     Heart failure Neg Hx     Coronary artery disease Neg Hx     Hyperlipidemia Neg Hx     Breast cancer Neg Hx          Social History     Social History       Radiology

## 2022-09-06 DIAGNOSIS — F41.8 DEPRESSION WITH ANXIETY: ICD-10-CM

## 2022-09-06 RX ORDER — SERTRALINE HYDROCHLORIDE 100 MG/1
TABLET, FILM COATED ORAL
Qty: 90 TABLET | Refills: 1 | Status: SHIPPED | OUTPATIENT
Start: 2022-09-06

## 2022-11-11 ENCOUNTER — APPOINTMENT (OUTPATIENT)
Dept: LAB | Facility: CLINIC | Age: 77
End: 2022-11-11

## 2022-11-11 DIAGNOSIS — E78.00 PURE HYPERCHOLESTEROLEMIA: ICD-10-CM

## 2022-11-11 LAB
CHOLEST SERPL-MCNC: 207 MG/DL
HDLC SERPL-MCNC: 77 MG/DL
LDLC SERPL CALC-MCNC: 114 MG/DL (ref 0–100)
NONHDLC SERPL-MCNC: 130 MG/DL
TRIGL SERPL-MCNC: 82 MG/DL

## 2022-11-16 ENCOUNTER — OFFICE VISIT (OUTPATIENT)
Dept: FAMILY MEDICINE CLINIC | Facility: CLINIC | Age: 77
End: 2022-11-16

## 2022-11-16 VITALS
DIASTOLIC BLOOD PRESSURE: 88 MMHG | SYSTOLIC BLOOD PRESSURE: 158 MMHG | WEIGHT: 167 LBS | RESPIRATION RATE: 16 BRPM | HEIGHT: 69 IN | OXYGEN SATURATION: 98 % | HEART RATE: 68 BPM | BODY MASS INDEX: 24.73 KG/M2 | TEMPERATURE: 97.3 F

## 2022-11-16 DIAGNOSIS — Z11.59 ENCOUNTER FOR HEPATITIS C SCREENING TEST FOR LOW RISK PATIENT: Primary | ICD-10-CM

## 2022-11-16 DIAGNOSIS — G31.84 MCI (MILD COGNITIVE IMPAIRMENT): ICD-10-CM

## 2022-11-16 DIAGNOSIS — C50.919 MALIGNANT NEOPLASM OF FEMALE BREAST, UNSPECIFIED ESTROGEN RECEPTOR STATUS, UNSPECIFIED LATERALITY, UNSPECIFIED SITE OF BREAST (HCC): ICD-10-CM

## 2022-11-16 DIAGNOSIS — I77.1 CELIAC ARTERY STENOSIS (HCC): ICD-10-CM

## 2022-11-16 DIAGNOSIS — R26.89 BALANCE DISORDER: ICD-10-CM

## 2022-11-16 DIAGNOSIS — G91.9 HYDROCEPHALUS, UNSPECIFIED TYPE (HCC): ICD-10-CM

## 2022-11-16 DIAGNOSIS — E78.00 PURE HYPERCHOLESTEROLEMIA: ICD-10-CM

## 2022-11-16 DIAGNOSIS — I60.10: ICD-10-CM

## 2022-11-16 PROBLEM — I67.1 ANEURYSM OF ANTERIOR CEREBRAL ARTERY: Status: ACTIVE | Noted: 2017-10-03

## 2022-11-16 PROBLEM — H70.099: Status: ACTIVE | Noted: 2022-11-16

## 2022-11-16 PROBLEM — N39.3 STRESS INCONTINENCE, FEMALE: Status: ACTIVE | Noted: 2017-04-12

## 2022-11-16 PROBLEM — F32.9 MAJOR DEPRESSIVE DISORDER, SINGLE EPISODE, UNSPECIFIED: Status: ACTIVE | Noted: 2019-05-16

## 2022-11-16 PROBLEM — H81.13 BENIGN PAROXYSMAL VERTIGO, BILATERAL: Status: ACTIVE | Noted: 2019-05-16

## 2022-11-16 PROBLEM — E04.1 NONTOXIC SINGLE THYROID NODULE: Status: ACTIVE | Noted: 2019-05-16

## 2022-11-16 NOTE — PROGRESS NOTES
Arian Collins 1945 female MRN: [id number]  [address]    Visit to Establish Care: Family Medicine    Assessment/Plan   MCI -  Will conduct memory evaluation at our next visit  Obtain labs as ordered    Hx [facility]  - message sent to neurosurg for follow up, which was indicated every 5 years  MRA ordered for follow up  Balance concern - start PT    Santiago West was seen today for establish care  Diagnoses and all orders for this visit:    Encounter for hepatitis C screening test for low risk patient  -     Hepatitis C antibody; Future    MCI (mild cognitive impairment)  -     Lipid Panel with Direct LDL reflex; Future  -     CBC; Future  -     Comprehensive metabolic panel; Future  -     TSH, 3rd generation with Free T4 reflex; Future    Pure hypercholesterolemia  -     Lipid Panel with Direct LDL reflex; Future    Hydrocephalus, unspecified type ([facility] )    Celiac artery stenosis (HCC)  -     MRA head wo contrast; Future    Malignant neoplasm of female breast, unspecified estrogen receptor status, unspecified laterality, unspecified site of breast ([facility] )    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, unspecified laterality (HCC)  -     MRA head wo contrast; Future    Balance disorder  -     Ambulatory Referral to Physical Therapy; Future    In addition to the above, the patient was counseled on general preventative health care subjects, including but not limited to:  - Nutrition, healthy weight, aerobic and weight-bearing exercise  - Mental health, social support, and self care  - Advised of the importance of dental hygiene and routine dental visits   - Patient made aware of  services at the office      Future Appointments   Date Time Provider Dereje Mauricio   12/20/2022  1:00 PM DO SAMSON Barahona [facility] SPECIALTY [facility]   1/10/2023  1:00 PM Micaela Morgan MD [facility]       Micaela Morgan MD  [address] [facility] Family [facility] - Adolfochitra MercadoPhilipp  11/16/2022 Please be aware that this note contains text that was dictated and there may be errors pertaining to "sound-alike" words during the dictation process  SUBJECTIVE    HPI:  Yumiko Meza is a 69 y o  female who presented to establish care with this family medicine practice  Her previous PCP retired  Patient overall in stable health  Has lived in a senior care home [facility]) and has meals provided, exercise classes, and social time  She is independent with her apartment and bills  She doesn't drive and needs help with getting to appointments  Can prepare meals, take care of self  She had a brain bleed [facility]) 01/2016 and has some neuro deficits since then  She reports memory issues, irritability, bladder incontinence, and balance concerns  Not sure when she needs follow up  She has a note from her kids reporting some symptoms: they say she's freed, cries easily, forgetful, ornary (requests [place] water only as the example she gives), off-balance  She does exercise at [facility]  She's not sure if her cognition has declined more since the bleed or not  She writes everything down and feels this system works well for her  Review of Systems   Constitutional: Negative for activity change, appetite change, fatigue, fever and unexpected weight change  HENT: Negative for congestion and trouble swallowing  Eyes: Negative for visual disturbance  Respiratory: Negative for chest tightness and shortness of breath  Cardiovascular: Negative for palpitations  Gastrointestinal: Negative for diarrhea and nausea  Genitourinary: Negative for difficulty urinating  Urinary incontinence   Musculoskeletal: Negative for myalgias  Skin: Negative for rash  Neurological: Negative for weakness, light-headedness and headaches  Coordination issues   Psychiatric/Behavioral: Positive for dysphoric mood  Negative for sleep disturbance  The patient is not nervous/anxious      All other systems reviewed and are negative  Historical Information   Past Medical History:   Diagnosis Date   • Anxiety disorder    • BRCA1 negative    • BRCA2 negative    • Breast cancer ([facility] ) 2014    left breast   • [facility] )    • Constipation    • Esophageal reflux    • History of bone density study 2011    [employer]   • History of radiation therapy 2014    left breast ca   • History of recurrent UTIs    • 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   • Hyperlipidemia    • Hypertension    • Osteoarthritis of hip    • Osteopenia    • Stroke, hemorrhagic ([facility] ) 12/2015   • Subarachnoid hemorrhage ([facility] )      Past Surgical History:   Procedure Laterality Date   • BREAST BIOPSY Left 2014   • BREAST LUMPECTOMY Left 2014   • COLONOSCOPY      Fiberoptic - 2009, 2015 5 year f/u   • ESOPHAGOGASTRODUODENOSCOPY  2009    Diag, Resolved - 2006 / 2009   • EXOSTECTOMY  2005    Simple Bunion ([facility])   • HYSTERECTOMY  1981   • JOINT REPLACEMENT      Hip   • OOPHORECTOMY  1981    not sure which one   • VENTRICULOSTOMY       Family History   Problem Relation Age of Onset   • Cancer Mother 71        bone   • Hypertension Mother    • Arthritis Mother    • Stroke Father         TIA   • Stroke Maternal Grandmother         CVA   • Deep vein thrombosis Daughter    • Heart attack Neg Hx    • Anuerysm Neg Hx    • Clotting disorder Neg Hx    • Arrhythmia Neg Hx    • Heart failure Neg Hx    • Coronary artery disease Neg Hx    • Hyperlipidemia Neg Hx    • Breast cancer Neg Hx      Social History     Socioeconomic History   • Marital status:      Spouse name: Not on file   • Number of children: Not on file   • Years of education: Not on file   • Highest education level: Not on file   Occupational History   • Not on file   Tobacco Use   • Smoking status: Never   • Smokeless tobacco: Never   Vaping Use   • Vaping Use: Never used   Substance and Sexual Activity   • Alcohol use:  Yes Comment: occasionally   • Drug use: No   • Sexual activity: Not Currently   Other Topics Concern   • Not on file   Social History Narrative    Caffeine use    Marital Status -  per [employer]      Social Determinants of Health     Financial Resource Strain: Not on file   Food Insecurity: Not on file   Transportation Needs: Not on file   Physical Activity: Not on file   Stress: Not on file   Social Connections: Not on file   Intimate Partner Violence: Not on file   Housing Stability: Not on file     OB History        2    Para   2    Term   2            AB        Living           SAB        IAB        Ectopic        Multiple        Live Births                       Medications:      Current Outpatient Medications:   •  acetaminophen (TYLENOL) 325 mg tablet, Take 650 mg by mouth every 6 (six) hours as needed for mild pain, Disp: , Rfl:   •  ALPRAZolam (XANAX) 0 25 mg tablet, TAKE ONE TABLET BY MOUTH EVERY DAY AS NEEDED, Disp: 30 tablet, Rfl: 0  •  Calcium-Vitamin D (CALTRATE 600 PLUS-VIT D PO), Take by mouth daily  , Disp: , Rfl:   •  cyanocobalamin (VITAMIN B-12) 100 mcg tablet, Take by mouth daily, Disp: , Rfl:   •  esomeprazole (NexIUM) 10 MG packet, Take 10 mg by mouth every morning before breakfast, Disp: , Rfl:   •  ezetimibe (ZETIA) 10 mg tablet, TAKE ONE TABLET BY MOUTH EVERY DAY, Disp: 30 tablet, Rfl: 5  •  fluticasone (FLONASE) 50 mcg/act nasal spray, 1 spray into each nostril daily, Disp: , Rfl:   •  LUMIGAN 0 01 % ophthalmic drops, PLACE 1 DROP INTO IN [facility] EYE AT BEDTIME, Disp: , Rfl: 11  •  meclizine (ANTIVERT) 25 mg tablet, Take 1 tablet (25 mg total) by mouth every 8 (eight) hours as needed for dizziness, Disp: 30 tablet, Rfl: 0  •  Multiple Vitamins-Minerals (MULTIVITAMIN ADULTS 50+) TABS, Take by mouth daily, Disp: , Rfl:   •  NON FORMULARY, CBD Gummies, Disp: , Rfl:   •  psyllium (METAMUCIL) 0 52 g capsule, Take by mouth daily 2-3 caps daily, Disp: , Rfl:   •  sertraline (ZOLOFT) 100 mg tablet, TAKE ONE TABLET BY MOUTH EVERY DAY, Disp: 90 tablet, Rfl: 1    Allergies   Allergen Reactions   • Oxycodone Vomiting   • Monascus Purpureus Went Yeast      Pt unsure  • Atorvastatin Other (See Comments)     leg pain   • Bactrim [Sulfamethoxazole-Trimethoprim] Itching and Rash   • Keppra [Levetiracetam] Rash   • Livalo [Pitavastatin] Itching   • Penicillins Rash     Agitation        Most Recent Immunizations   Administered Date(s) Administered   • INFLUENZA 10/02/2021   • Influenza Quadrivalent, 6-35 Months IM 10/05/2016   • Influenza Split High Dose Preservative Free IM 10/15/2022   • Influenza, seasonal, injectable 11/01/2015   • Pneumococcal Conjugate 13-Valent 12/01/2015   • Pneumococcal Polysaccharide PPV23 10/19/2011   • Tdap 08/04/2009   • Zoster 01/01/2011   • Zoster Vaccine Recombinant 08/19/2020       OBJECTIVE  Vitals:   Vitals:    11/16/22 0902   BP: 158/88   BP Location: Left arm   Patient Position: Sitting   Cuff Size: Standard   Pulse: 68   Resp: 16   Temp: (!) 97 3 °F (36 3 °C)   SpO2: 98%   Weight: 75 8 kg (167 lb)   Height: 5' 9 25" (1 759 m)     Wt Readings from Last 3 Encounters:   11/16/22 75 8 kg (167 lb)   08/30/22 74 8 kg (165 lb)   05/13/22 76 4 kg (168 lb 6 4 oz)     Body mass index is 24 48 kg/m²  BP Readings from Last 3 Encounters:   11/16/22 158/88   08/30/22 120/80   05/13/22 128/74     No LMP recorded  Patient has had a hysterectomy  Physical Exam  Vitals and nursing note reviewed  Constitutional:       General: She is not in acute distress  Vital signs are normal       Appearance: Normal appearance  She is well-developed and well-nourished  She is not ill-appearing or diaphoretic  HENT:      Head: Normocephalic and atraumatic        Right Ear: External ear normal       Left Ear: External ear normal       Nose: Nose normal    Eyes:      General: Lids are normal       Extraocular Movements: EOM normal       Conjunctiva/sclera: Conjunctivae normal    Neck: Vascular: No JVD  Trachea: No tracheal deviation  Cardiovascular:      Rate and Rhythm: Normal rate and regular rhythm  Pulses: Normal pulses and intact distal pulses  Heart sounds: Normal heart sounds  No murmur heard  Pulmonary:      Effort: No accessory muscle usage or respiratory distress  Breath sounds: Normal breath sounds  No rhonchi or rales  Skin:     Findings: No rash  Neurological:      Mental Status: She is alert  Psychiatric:         Mood and Affect: Mood and affect normal           Lab:  I have personally reviewed all pertinent results  Imaging:  I have personally reviewed all pertinent results

## 2022-11-16 NOTE — PATIENT INSTRUCTIONS
- please fast for 10 hrs and go for labs  - Please call and schedule your MRI  - Please schedule physical therapy for your balance   - We will do a memory assessment at your next visit  - We will consider medication for your mood and memory next time

## 2022-11-18 ENCOUNTER — TELEPHONE (OUTPATIENT)
Dept: NEUROSURGERY | Facility: CLINIC | Age: 77
End: 2022-11-18

## 2022-11-18 NOTE — TELEPHONE ENCOUNTER
11/18/22- LMOM TO SCHEDULE 5YR F/U W/ MRA HEAD PRIOR  (PA APT PER NB) LEFT CENTRAL SCHEDULING NUMBER FOR MRA AND OFFICE NUMBER FOR F/U APT  (PT LAST SEEN 2017 BY KENYATTA)    ----- Message from Pawan Zhao sent at 11/18/2022  1:11 PM EST -----  Regarding: FW: patient follow up    ----- Message -----  From: Calista Null MD  Sent: 11/18/2022   7:32 AM EST  To: Pawan Zhao  Subject: FW: patient follow up                            Could you please schedule follow up with her  ----- Message -----  From: Maria Del Rosario Saha MD  Sent: 11/16/2022  12:22 PM EST  To: Calista Null MD  Subject: patient follow up                                Good afternoon,    You saw this patient in 2017 for a [address] with significant family history of CVA/cerebral aneurysms  Per your note, she was supposed to follow up in 5 years in your office  I did order an MRA for her  Please have your staff reach out as she is due for follow up  Thanks!     Iraida Felix MD  Family Medicine

## 2022-11-21 ENCOUNTER — TELEPHONE (OUTPATIENT)
Dept: FAMILY MEDICINE CLINIC | Facility: CLINIC | Age: 77
End: 2022-11-21

## 2022-11-21 DIAGNOSIS — F41.9 ANXIETY: ICD-10-CM

## 2022-11-21 RX ORDER — ALPRAZOLAM 0.25 MG/1
0.25 TABLET ORAL DAILY PRN
Qty: 30 TABLET | Refills: 0 | Status: SHIPPED | OUTPATIENT
Start: 2022-11-21

## 2022-11-21 NOTE — TELEPHONE ENCOUNTER
Pt called stating she was informed that [facility] only has closed MRI machines and she is extremely claustrophobic  Asking if you know where an open air machine one is or if you could prescribe something to sedate her while she has it done

## 2022-11-22 NOTE — PROGRESS NOTES
PT EVALUATION    Today's date: 22  Patient name: Salo Beckwith  : 1945  MRN: [id number]  Referring provider: Gail Brandt MD  Dx:   1  Balance disorder        ASSESSMENT:   Salo Beckwith is a 69 y o  female who presents with signs and symptoms consistent of balance dysfunction  Patient has a complex medical history with previous stroke leading to on-going balance deficits  Patient challenged the most with dynamic balance testing when 1 or more of the balance systems are unavailable  Patient does score slightly below age matched norms on most balance assessments indicating potential for fall risk  Patient limited by leg weakness and giving out with prolong tasks  Overall, patient presents with decreased strength, ambulatory dysfunction and balance dysfunction  Due to these impairments, patient has difficulty performing a/iadls and recreational activities  If there deficits are left unaddressed, these symptoms will markedly worsen, leading to decreased independence with functional tasks an inability to maintain current roles that life including employment  Patient would benefit from skilled physical therapy to address the impairments, improve their level of function, and to improve their overall quality of life  Impairments:    decreased strength   activity intolerance   abnormal gait   imbalance   Postural dysfunction   abnormal movement    Prognosis:  Good  Positive and negative prognostic indicator(s):  pain >3 months    Goals:    Short Term Goals: to be achieved by 4 weeks  1) Patient to be independent with basic HEP  2) Increase LE strength by 1/2 MMT grade in all deficient planes  3) Patient will demonstrate improvement with MCTSIB indicating improvement with static and dynamic balance  Long Term Goals: to be achieved by discharge  1) FOTO equal to or greater than target score indicating improvements with overall function    2) Patient will demonstrate an improvement in VALENCIA score by the ALLISON of 3 3 pts in order to decrease fall risk and improvement dynamic mobility  3) Patient will demonstrate an improvement in TUG time by the ALLISON of 4 14 seconds in order to decrease fall risk and improvement dynamic mobility  5) Patient will be independent in comprehensive [facility]  Planned interventions:  home exercise program, patient education, graded activity, functional range of motion exercises, strengthening, abdominal trunk stabilization, balance and weight bearing training and gait training    Duration in visits:  4-8  Frequency: 2 visits per week  Duration in weeks:  6-8    History of Current Injury: Patient notes that she had a brain bleed about 6 years ago  Patient notes that she was good in the beginning  Patient notes that she did have some affects from it but then lately in the last few months or so she has been noticing she is more off balance  Patient notes that she has good days and bad days  Patient notes on the bad days her legs just feel heavy and shaky  and don't work that well  Patient notes that she lives alone in a senior apartment  Patient notes that she does do exercise classes at her [facility] center  Patient notes that she does have on and off low back pain and sometimes with that she notices that's when her legs get heavier  Patient notes that she does sleep at night with usually going to the bathroom 1x secondary to bladder issues post brain bleed  Patient ntoes that she uses her walker to walk throughout the [facility] and the [facility] out in the community since its less bulky  Patient notes that she relies on a shopping cart for balance       Pain location: chronic low back pain       Imaging: see imaging section   Special Questions:   Dizziness: No  Dysphagia: Yes   Dysarthria: No  Diploplia: No  Drop Attacks: No  Numbness: No  Nausea: No  Nystagmus: No    Cancer:  Family history: No  Personal history: No  Recent weight loss: No  Night pain: No    Infection:  Recent history of infection: No  Recent or current fever: No    Fracture:  Recent history of trauma: No  History of corticosteroid use: No  History of bone density disorders: No    Cauda Equina:  Recent change in bowel or bladder control: No  Presence of numbness or saddle anesthesia: No  Leg weakness or difficulty walking: Yes       Hobbies/Interests: going to exercise class, watching [employer]   Occupation: n/a  Patient goals: Patient reports goals for physical therapy would be increased mobility and increased strength      Objective     Concurrent Complaints  Negative for headaches, nausea/motion sickness, tinnitus, visual change, hearing loss, memory loss, aural fullness, poor concentration and peripheral neuropathy    Strength/Myotome Testing     Left Hip   Planes of Motion   Flexion: 4+  Extension: 4-  Abduction: 4-    Right Hip   Planes of Motion   Flexion: 4+  Extension: 4-  Abduction: 4-    Left Knee   Flexion: 4+  Extension: 4+    Right Knee   Flexion: 4+  Extension: 4+    Left Ankle/Foot   Dorsiflexion: 4+  Plantar flexion: 4+    Right Ankle/Foot   Dorsiflexion: 4+  Plantar flexion: 4+    Functional Assessment        Comments      Neuro Exam:     Dizziness  Negative for disequilibrium, vertigo, oscillopsia, motion sickness, light-headedness, rocking or swaying, diplopia and floating or swimming       Headaches   Patient reports headaches: No      Sensation   Light touch LE: left WNL and right WNL  Proprioception LE: left WNL and right WNL    Coordination   Heel to shin: left WNL and right WNL  Rapid alternating movements: UE WNL and LE impaired     Functional outcomes   5x sit to stand: 26 seconds  (seconds)  2 minute walk test: Perform NV  TU seconds  (seconds)  Functional outcome comment: TU seconds without cane   TU seconds with SPC       VALENCIA/56    MCTSIB:   Firm Eyes Open: 1 13  Firm Eyes Closed: 2 22  Foam Eyes Open: 1 94  Foam Eyes Closed: 5 90  Composite Score: 2 79              Precautions: anxiety, history of stroke, history of breast cancer      Manuals                                                                 Neuro Re-Ed             [facility]             FTEO             Tandem stance             SLS             Bolster taps                           Forward step and reach              Lateral step and reach              Backward step and reach                                        Ther Ex             Lateral side stepping              3 way hip              Leg press                                                                               Ther Activity             Mini squats              Sit to stands              Step ups with contralateral leg lift                                        Gait Training             Retrowalking nuria             Forward walking nuria              Lateral steps on foam beam              Hurdles                                        Modalities

## 2022-11-23 ENCOUNTER — EVALUATION (OUTPATIENT)
Dept: PHYSICAL THERAPY | Facility: CLINIC | Age: 77
End: 2022-11-23

## 2022-11-23 ENCOUNTER — APPOINTMENT (OUTPATIENT)
Dept: LAB | Facility: CLINIC | Age: 77
End: 2022-11-23

## 2022-11-23 DIAGNOSIS — E78.00 PURE HYPERCHOLESTEROLEMIA: ICD-10-CM

## 2022-11-23 DIAGNOSIS — R26.89 BALANCE DISORDER: Primary | ICD-10-CM

## 2022-11-23 DIAGNOSIS — G31.84 MCI (MILD COGNITIVE IMPAIRMENT): ICD-10-CM

## 2022-11-23 DIAGNOSIS — Z11.59 ENCOUNTER FOR HEPATITIS C SCREENING TEST FOR LOW RISK PATIENT: ICD-10-CM

## 2022-11-23 LAB
ALBUMIN SERPL BCP-MCNC: 3.8 G/DL (ref 3.5–5)
ALP SERPL-CCNC: 91 U/L (ref 46–116)
ALT SERPL W P-5'-P-CCNC: 23 U/L (ref 12–78)
ANION GAP SERPL CALCULATED.3IONS-SCNC: 5 MMOL/L (ref 4–13)
AST SERPL W P-5'-P-CCNC: 20 U/L (ref 5–45)
BILIRUB SERPL-MCNC: 0.51 MG/DL (ref 0.2–1)
BUN SERPL-MCNC: 13 MG/DL (ref 5–25)
CALCIUM SERPL-MCNC: 9.4 MG/DL (ref 8.3–10.1)
CHLORIDE SERPL-SCNC: 105 MMOL/L (ref 96–108)
CHOLEST SERPL-MCNC: 228 MG/DL
CO2 SERPL-SCNC: 29 MMOL/L (ref 21–32)
CREAT SERPL-MCNC: 0.78 MG/DL (ref 0.6–1.3)
ERYTHROCYTE [DISTWIDTH] IN BLOOD BY AUTOMATED COUNT: 13.3 % (ref 11.6–15.1)
GFR SERPL CREATININE-BSD FRML MDRD: 74 ML/MIN/1.73SQ M
GLUCOSE P FAST SERPL-MCNC: 95 MG/DL (ref 65–99)
HCT VFR BLD AUTO: 41 % (ref 34.8–46.1)
HCV AB SER QL: NORMAL
HDLC SERPL-MCNC: 77 MG/DL
HGB BLD-MCNC: 13.1 G/DL (ref 11.5–15.4)
LDLC SERPL CALC-MCNC: 130 MG/DL (ref 0–100)
MCH RBC QN AUTO: 28.4 PG (ref 26.8–34.3)
MCHC RBC AUTO-ENTMCNC: 32 G/DL (ref 31.4–37.4)
MCV RBC AUTO: 89 FL (ref 82–98)
PLATELET # BLD AUTO: 359 THOUSANDS/UL (ref 149–390)
PMV BLD AUTO: 9.6 FL (ref 8.9–12.7)
POTASSIUM SERPL-SCNC: 3.9 MMOL/L (ref 3.5–5.3)
PROT SERPL-MCNC: 7.3 G/DL (ref 6.4–8.4)
RBC # BLD AUTO: 4.62 MILLION/UL (ref 3.81–5.12)
SODIUM SERPL-SCNC: 139 MMOL/L (ref 135–147)
TRIGL SERPL-MCNC: 103 MG/DL
TSH SERPL DL<=0.05 MIU/L-ACNC: 2.83 UIU/ML (ref 0.45–4.5)
WBC # BLD AUTO: 6.39 THOUSAND/UL (ref 4.31–10.16)

## 2022-11-29 ENCOUNTER — OFFICE VISIT (OUTPATIENT)
Dept: PHYSICAL THERAPY | Facility: CLINIC | Age: 77
End: 2022-11-29

## 2022-11-29 DIAGNOSIS — R26.89 BALANCE DISORDER: Primary | ICD-10-CM

## 2022-11-29 NOTE — PROGRESS NOTES
Daily Note     Today's date: 2022  Patient name: Jessica Goss  : 1945  MRN: [id number]  Referring provider: Zuleima Sanchez MD  Dx:   Encounter Diagnosis     ICD-10-CM    1  Balance disorder  R26 89                      Subjective: Patient reports compliance with HEP and she noticed improvement in static standing balance already  Objective: See treatment diary below      Assessment: Initiated PT POC as indicated and patient tolerated treatment well  Patient demonstrated fatigue post treatment and would benefit from continued PT  Moderate sway noted with static balance on foam with eyes closed though patient was able to recover from mild LOB independently  Cueing needed to avoid LE valgus and excessive anterior translation of knees during squats with good carry over  Seated rest breaks taken as needed due to LE fatigue  Plan: Progress treatment as tolerated         Precautions: anxiety, history of stroke, history of breast cancer      Manuals                                                                 Neuro Re-Ed             [facility] yellow foam :20x3            FTEO yellow foam :20x3 EC            Tandem stance :20x3 ea            SLS             Bolster taps                           Forward step and reach  10 ea over  Anastasiya step only            Lateral step and reach  10 ea over  Anastasiya step only            Backward step and reach  10 ea over flattened anastasiya step only                                      Ther Ex             Lateral side stepping  2 laps at bar            3 way hip  2x10 ea            Leg press                                                     Nu step warm up 5' L3                         Ther Activity             Mini squats  2x10            Sit to stands              Step ups with contralateral leg lift                                        Gait Training             Retrowalking nuria             Forward walking nuria              Lateral steps on foam beam Hurdles                                        Modalities

## 2022-12-06 ENCOUNTER — OFFICE VISIT (OUTPATIENT)
Dept: PHYSICAL THERAPY | Facility: CLINIC | Age: 77
End: 2022-12-06

## 2022-12-06 DIAGNOSIS — R26.89 BALANCE DISORDER: Primary | ICD-10-CM

## 2022-12-06 NOTE — PROGRESS NOTES
Daily Note     Today's date: 2022  Patient name: Mansoor Drake  : 1945  MRN: [id number]  Referring provider: Jessi Fine MD  Dx:   Encounter Diagnosis     ICD-10-CM    1  Balance disorder  R26 89                      Subjective: Patient reports minimal muscular soreness after last session and she feels her balance is improving already  Objective: See treatment diary below      Assessment: Tolerated treatment well  Patient demonstrated fatigue post treatment and would benefit from continued PT  Added resistance to standing hip strengthening with good tolerance and cueing needed for upright posture when performing  Initiated side and forward stepping on foam beam with intermittent use of handrail to avoid LOB  Patient notes LE fatigue toward conclusion of session  Plan: Progress treatment as tolerated         Precautions: anxiety, history of stroke, history of breast cancer      Manuals                                                                Neuro Re-Ed             [facility] yellow foam :20x3 yellow foam :20x3           FTEO yellow foam :20x3 EC yellow foam :20x3 EC           Tandem stance :20x3 ea            SLS             Bolster taps                           Forward step and reach  10 ea over  Kami step only 10 over hudle step only           Lateral step and reach  10 ea over  Kami step only 10 ea over kami step only           Backward step and reach  10 ea over flattened kami step only 10 ea over kami step only                                     Ther Ex             Lateral side stepping  2 laps at bar 2# 2 laps at bar           3 way hip  2x10 ea 2# 2x10 ea           Leg press                                                     Nu step warm up 5' L3 6' L3                        Ther Activity             Mini squats  2x10 2x10           Sit to stands              Step ups with contralateral leg lift                                        Gait Training Retrowalking nuria             Forward walking nuria              Lateral steps on foam beam   + forward 6 laps ea           Hurdles                                        Modalities

## 2022-12-07 ENCOUNTER — TELEPHONE (OUTPATIENT)
Dept: NEUROSURGERY | Facility: CLINIC | Age: 77
End: 2022-12-07

## 2022-12-07 NOTE — TELEPHONE ENCOUNTER
12/7/22 - PT  CALLED LMNEGRITO (12/6) TO MAKE F/U APPT  Jennifer Noble - PT   SEEN ABOUT 5 YEARS AGO     NO NEW IMAGING   - MRA HEAD SCHEDULED FOR 12/17/22    INFORMED PT  AN  WILL BE IN CONTACT WITH HER

## 2022-12-07 NOTE — TELEPHONE ENCOUNTER
12/7/22 CALLED PT AND LMOM FOR HER TO CB TO SCHEDULE NP APPT WITH AP ON DKO DAY FOR 5 YR F/U MRA HEAD 12/17/22 SL

## 2022-12-13 ENCOUNTER — OFFICE VISIT (OUTPATIENT)
Dept: PHYSICAL THERAPY | Facility: CLINIC | Age: 77
End: 2022-12-13

## 2022-12-13 DIAGNOSIS — R26.89 BALANCE DISORDER: Primary | ICD-10-CM

## 2022-12-13 NOTE — PROGRESS NOTES
Daily Note     Today's date: 2022  Patient name: Marcela Reyes  : 1945  MRN: [id number]  Referring provider: Tara Panchal MD  Dx:   Encounter Diagnosis     ICD-10-CM    1  Balance disorder  R26 89                      Subjective: Patient reports her hips and legs are very stiff today  Did not sleep well last night  Objective: See treatment diary below      Assessment: Tolerated treatment well  Patient demonstrated fatigue post treatment and would benefit from continued PT  Patient challenged with balance activities today and required frequent UE support  Initiated Leg press exercises with good tolerance  Patient notes LE fatigue toward conclusion of session  Plan: Progress treatment as tolerated         Precautions: anxiety, history of stroke, history of breast cancer      Manuals                                                               Neuro Re-Ed             [facility] yellow foam :20x3 yellow foam :20x3 Yellow foam :20 x3          FTEO yellow foam :20x3 EC yellow foam :20x3 EC Yellow foam  :20 x3 EC          Tandem stance :20x3 ea            SLS             Bolster taps                           Forward step and reach  10 ea over  Anastasiya step only 10 over hudle step only 10 over anastasiya step and reach          Lateral step and reach  10 ea over  Anastasiya step only 10 ea over anastasiya step only 10 ea over anastasiya step only          Backward step and reach  10 ea over flattened anastasiya step only 10 ea over anastasiya step only 10 ea over anastasiya step only                                    Ther Ex             Lateral side stepping  2 laps at bar 2# 2 laps at bar 2# 2 laps at bar          3 way hip  2x10 ea 2# 2x10 ea 2# 2x10 ea          Leg press    65# 2x10                                                 Nu step warm up 5' L3 6' L3 6' L3                       Ther Activity             Mini squats  2x10 2x10 2x10          Sit to stands              Step ups with contralateral leg lift                                        Gait Training             Retrowalking nuria             Forward walking nuria              Lateral steps on foam beam   + forward 6 laps ea Lat and forward 6 laps ea          Hurdles                                        Modalities

## 2022-12-17 ENCOUNTER — HOSPITAL ENCOUNTER (OUTPATIENT)
Dept: MRI IMAGING | Facility: HOSPITAL | Age: 77
Discharge: HOME | End: 2022-12-17
Attending: FAMILY MEDICINE

## 2022-12-17 DIAGNOSIS — I77.1 CELIAC ARTERY STENOSIS (HCC): ICD-10-CM

## 2022-12-17 DIAGNOSIS — I60.10: ICD-10-CM

## 2022-12-20 ENCOUNTER — OFFICE VISIT (OUTPATIENT)
Dept: PHYSICAL THERAPY | Facility: CLINIC | Age: 77
End: 2022-12-20

## 2022-12-20 DIAGNOSIS — R26.89 BALANCE DISORDER: Primary | ICD-10-CM

## 2022-12-20 NOTE — PROGRESS NOTES
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 and the legs feel much better  Objective: See treatment diary below      Assessment: Patient tolerated treatment well  Patient demonstrating increased stability and LE strength compared to initial evaluation  Mild complaints of left hip pain throughout sessio but able to perform all sets and reps as noted  Will continue to progress each session  Patient demonstrated fatigue post treatment and would benefit from continued PT  Plan: Progress treatment as tolerated         Precautions: anxiety, history of stroke, history of breast cancer      Manuals                                                              Neuro Re-Ed             [facility] yellow foam :20x3 yellow foam :20x3 Yellow foam :20 x3 Yellow foam :20 x3         FTEO yellow foam :20x3 EC yellow foam :20x3 EC Yellow foam  :20 x3 EC Yellow foam :20 x3         Tandem stance :20x3 ea            SLS             Bolster taps                           Forward step and reach  10 ea over  Anastasiya step only 10 over hudle step only 10 over anastasiya step and reach 10 over anastasiya step and reach         Lateral step and reach  10 ea over  Anastasiya step only 10 ea over anastasiya step only 10 ea over anastasiya step only 10 over anastasiya step and reach         Backward step and reach  10 ea over flattened anastasiya step only 10 ea over anastasiya step only 10 ea over anastasiya step only Ther Ex             Lateral side stepping  2 laps at bar 2# 2 laps at bar 2# 2 laps at bar 3# 2 laps at bar         3 way hip  2x10 ea 2# 2x10 ea 2# 2x10 ea 3# 2x10 ea         Leg press    65# 2x10 65# 2x10                                                Nu step warm up 5' L3 6' L3 6' L3 6' L3                      Ther Activity             Mini squats  2x10 2x10 2x10 2x10         Sit to stands              Step ups with contralateral leg lift                                        Gait Training             Retrowalking nuria             Forward walking nuria              Lateral steps on foam beam   + forward 6 laps ea Lat and forward 6 laps ea Lat and forward 6 laps ea         Hurdles                                        Modalities

## 2022-12-27 ENCOUNTER — APPOINTMENT (OUTPATIENT)
Dept: PHYSICAL THERAPY | Facility: CLINIC | Age: 77
End: 2022-12-27

## 2022-12-30 ENCOUNTER — OFFICE VISIT (OUTPATIENT)
Dept: PHYSICAL THERAPY | Facility: CLINIC | Age: 77
End: 2022-12-30

## 2022-12-30 DIAGNOSIS — R26.89 BALANCE DISORDER: Primary | ICD-10-CM

## 2022-12-30 NOTE — PROGRESS NOTES
Daily Note     Today's date: 2022  Patient name: Kalyn Peres  : 1945  MRN: [id number]  Referring provider: Maritza Cruz MD  Dx:   Encounter Diagnosis     ICD-10-CM    1  Balance disorder  R26 89           Start Time: 930  Stop Time:   Total time in clinic (min): 45 minutes    Subjective: Patient reports that she is doing a lot better  Patient notes that she is doing things at home that she wasn't able to do before  Patient notes that she is going to be done today because she is going to start going to the gym with her daughter  Patient notes that today she is a little lightheaded but she gets that on and off fairly often for years  Objective: See treatment diary below      Assessment: Patient tolerated treatment well  Patient continues to have good response to established POC  Updated HEP for completion at the gym  Patient appropriate for DC this date  Plan: Progress treatment as tolerated         Precautions: anxiety, history of stroke, history of breast cancer      Manuals                                                             Neuro Re-Ed             [facility] yellow foam :20x3 yellow foam :20x3 Yellow foam :20 x3 Yellow foam :20 x3 Yellow foam :20 x3        FTEO yellow foam :20x3 EC yellow foam :20x3 EC Yellow foam  :20 x3 EC Yellow foam :20 x3 Yellow foam :20 x3        Tandem stance :20x3 ea            SLS             Bolster taps                           Forward step and reach  10 ea over  Anastasiya step only 10 over hudle step only 10 over anastasiya step and reach 10 over anastasiya step and reach 10 over anastasiya step and reach        Lateral step and reach  10 ea over  Anastasiya step only 10 ea over anastasiya step only 10 ea over anastasiya step only 10 over anastasiya step and reach 10 over anastasiya step and reach        Backward step and reach  10 ea over flattened anastasiya step only 10 ea over anastasiya step only 10 ea over anastasiya step only Ther Ex             Lateral side stepping  2 laps at bar 2# 2 laps at bar 2# 2 laps at bar 3# 2 laps at bar 3# 2 laps at bar        3 way hip  2x10 ea 2# 2x10 ea 2# 2x10 ea 3# 2x10 ea 3# 2x10 ea        Leg press    65# 2x10 65# 2x10 65# 2x10                                               Nu step warm up 5' L3 6' L3 6' L3 6' L3 6' L3                     Ther Activity             Mini squats  2x10 2x10 2x10 2x10 2x10        Sit to stands              Step ups with contralateral leg lift                                        Gait Training             Retrowalking nuria             Forward walking nuria              Lateral steps on foam beam   + forward 6 laps ea Lat and forward 6 laps ea Lat and forward 6 laps ea Lat and forward 6 laps ea        Hurdles                                        Modalities

## 2023-01-03 ENCOUNTER — RA CDI HCC (OUTPATIENT)
Dept: OTHER | Facility: HOSPITAL | Age: 78
End: 2023-01-03

## 2023-01-03 NOTE — PROGRESS NOTES
[facility]  coding opportunities       Chart reviewed, no opportunity found:   [address]        Patients Insurance     Medicare Insurance: [employer]

## 2023-01-10 ENCOUNTER — OFFICE VISIT (OUTPATIENT)
Dept: FAMILY MEDICINE CLINIC | Facility: CLINIC | Age: 78
End: 2023-01-10

## 2023-01-10 VITALS
SYSTOLIC BLOOD PRESSURE: 138 MMHG | BODY MASS INDEX: 24.88 KG/M2 | HEIGHT: 69 IN | TEMPERATURE: 96.2 F | WEIGHT: 168 LBS | OXYGEN SATURATION: 98 % | HEART RATE: 64 BPM | DIASTOLIC BLOOD PRESSURE: 82 MMHG

## 2023-01-10 DIAGNOSIS — Z00.00 MEDICARE ANNUAL WELLNESS VISIT, SUBSEQUENT: Primary | ICD-10-CM

## 2023-01-10 DIAGNOSIS — G91.9 HYDROCEPHALUS, UNSPECIFIED TYPE (HCC): ICD-10-CM

## 2023-01-10 DIAGNOSIS — I77.1 CELIAC ARTERY STENOSIS (HCC): ICD-10-CM

## 2023-01-10 DIAGNOSIS — F41.8 DEPRESSION WITH ANXIETY: ICD-10-CM

## 2023-01-10 DIAGNOSIS — I49.9 IRREGULAR HEART BEAT: ICD-10-CM

## 2023-01-10 PROBLEM — C50.919 MALIGNANT NEOPLASM OF BREAST (HCC): Status: RESOLVED | Noted: 2022-11-16 | Resolved: 2023-01-10

## 2023-01-10 PROBLEM — H70.099: Status: RESOLVED | Noted: 2022-11-16 | Resolved: 2023-01-10

## 2023-01-10 RX ORDER — SERTRALINE HYDROCHLORIDE 100 MG/1
200 TABLET, FILM COATED ORAL DAILY
Qty: 180 TABLET | Refills: 1 | Status: SHIPPED | OUTPATIENT
Start: 2023-01-10

## 2023-01-10 NOTE — PATIENT INSTRUCTIONS
Please take note of how you feel every day in terms of weakness/strength  and write it down on a scale of 1-10 with 10 being perfect strength and 1 being extremely weak  Bring the log with you next visit  Medicare Preventive Visit Patient Instructions  Thank you for completing your Welcome to Medicare Visit or Medicare Annual Wellness Visit today  Your next wellness visit will be due in one year (1/11/2024)  The screening/preventive services that you may require over the next 5-10 years are detailed below  Some tests may not apply to you based off risk factors and/or age  Screening tests ordered at today's visit but not completed yet may show as past due  Also, please note that scanned in results may not display below  Preventive Screenings:  Service Recommendations Previous Testing/Comments   Colorectal Cancer Screening  * Colonoscopy    * Fecal Occult Blood Test (FOBT)/Fecal Immunochemical Test (FIT)  * Fecal DNA/Cologuard Test  * Flexible Sigmoidoscopy Age: 39-70 years old   Colonoscopy: every 10 years (may be performed more frequently if at higher risk)  OR  FOBT/FIT: every 1 year  OR  Cologuard: every 3 years  OR  Sigmoidoscopy: every 5 years  Screening may be recommended earlier than age 39 if at higher risk for colorectal cancer  Also, an individualized decision between you and your healthcare provider will decide whether screening between the ages of 74-80 would be appropriate  Colonoscopy: 08/05/2015  FOBT/FIT: Not on file  Cologuard: 05/21/2020  Sigmoidoscopy: Not on file          Breast Cancer Screening Age: 36 years old  Frequency: every 1-2 years  Not required if history of left and right mastectomy Mammogram: 12/17/2021    History Breast Cancer   Cervical Cancer Screening Between the ages of 21-29, pap smear recommended once every 3 years  Between the ages of 33-67, can perform pap smear with HPV co-testing every 5 years     Recommendations may differ for women with a history of total hysterectomy, cervical cancer, or abnormal pap smears in past  Pap Smear: Not on file    Screening Not Indicated   Hepatitis C Screening Once for adults born between 1945 and 1965  More frequently in patients at high risk for Hepatitis C Hep C Antibody: 11/23/2022    Screening Current   Diabetes Screening 1-2 times per year if you're at risk for diabetes or have pre-diabetes Fasting glucose: 95 mg/dL (11/23/2022)  A1C: 5 6 % (5/11/2019)  Screening Current   Cholesterol Screening Once every 5 years if you don't have a lipid disorder  May order more often based on risk factors  Lipid panel: 11/23/2022    Screening Not Indicated  History Lipid Disorder     Other Preventive Screenings Covered by Medicare:  Abdominal Aortic Aneurysm (AAA) Screening: covered once if your at risk  You're considered to be at risk if you have a family history of AAA  Lung Cancer Screening: covers low dose CT scan once per year if you meet all of the following conditions: (1) Age 50-69; (2) No signs or symptoms of lung cancer; (3) Current smoker or have quit smoking within the last 15 years; (4) You have a tobacco smoking history of at least 20 pack years (packs per day multiplied by number of years you smoked); (5) You get a written order from a healthcare provider  Glaucoma Screening: covered annually if you're considered high risk: (1) You have diabetes OR (2) Family history of glaucoma OR (3)  aged 48 and older OR (3)  American aged 72 and older  Osteoporosis Screening: covered every 2 years if you meet one of the following conditions: (1) You're estrogen deficient and at risk for osteoporosis based off medical history and other findings; (2) Have a vertebral abnormality; (3) On glucocorticoid therapy for more than 3 months; (4) Have primary hyperparathyroidism; (5) On osteoporosis medications and need to assess response to drug therapy  Last bone density test (DXA Scan): 01/19/2022    HIV Screening: covered annually if you're between the age of 12-76  Also covered annually if you are younger than 13 and older than 72 with risk factors for HIV infection  For pregnant patients, it is covered up to 3 times per pregnancy  Immunizations:  Immunization Recommendations   Influenza Vaccine Annual influenza vaccination during flu season is recommended for all persons aged >= 6 months who do not have contraindications   Pneumococcal Vaccine   * Pneumococcal conjugate vaccine = PCV13 (Prevnar 13), PCV15 (Vaxneuvance), PCV20 (Prevnar 20)  * Pneumococcal polysaccharide vaccine = PPSV23 (Pneumovax) Adults 25-60 years old: 1-3 doses may be recommended based on certain risk factors  Adults 72 years old: 1-2 doses may be recommended based off what pneumonia vaccine you previously received   Hepatitis B Vaccine 3 dose series if at intermediate or high risk (ex: diabetes, end stage renal disease, liver disease)   Tetanus (Td) Vaccine - COST NOT COVERED BY MEDICARE PART B Following completion of primary series, a booster dose should be given every 10 years to maintain immunity against tetanus  Td may also be given as tetanus wound prophylaxis  Tdap Vaccine - COST NOT COVERED BY MEDICARE PART B Recommended at least once for all adults  For pregnant patients, recommended with each pregnancy  Shingles Vaccine (Shingrix) - COST NOT COVERED BY MEDICARE PART B  2 shot series recommended in those aged 48 and above     Health Maintenance Due:      Topic Date Due    Breast Cancer Screening: Mammogram  12/17/2023    DXA SCAN  01/19/2027    Hepatitis C Screening  Completed    Colorectal Cancer Screening  Discontinued     Immunizations Due:      Topic Date Due    Hepatitis B Vaccine (1 of 3 - 3-dose series) Never done    COVID-19 Vaccine (1) Never done     Advance Directives   What are advance directives? Advance directives are legal documents that state your wishes and plans for medical care   These plans are made ahead of time in case you lose your ability to make decisions for yourself  Advance directives can apply to any medical decision, such as the treatments you want, and if you want to donate organs  What are the types of advance directives? There are many types of advance directives, and each state has rules about how to use them  You may choose a combination of any of the following:  Living will: This is a written record of the treatment you want  You can also choose which treatments you do not want, which to limit, and which to stop at a certain time  This includes surgery, medicine, IV fluid, and tube feedings  Durable power of  for healthcare [facility]): This is a written record that states who you want to make healthcare choices for you when you are unable to make them for yourself  This person, called a proxy, is usually a family member or a friend  You may choose more than 1 proxy  Do not resuscitate (DNR) order:  A DNR order is used in case your heart stops beating or you stop breathing  It is a request not to have certain forms of treatment, such as CPR  A DNR order may be included in other types of advance directives  Medical directive: This covers the care that you want if you are in a coma, near death, or unable to make decisions for yourself  You can list the treatments you want for each condition  Treatment may include pain medicine, surgery, blood transfusions, dialysis, IV or tube feedings, and a ventilator (breathing machine)  Values history: This document has questions about your views, beliefs, and how you feel and think about life  This information can help others choose the care that you would choose  Why are advance directives important? An advance directive helps you control your care  Although spoken wishes may be used, it is better to have your wishes written down  Spoken wishes can be misunderstood, or not followed  Treatments may be given even if you do not want them   An advance directive may make it easier for your family to make difficult choices about your care  Urinary Incontinence   Urinary incontinence (UI)  is when you lose control of your bladder  UI develops because your bladder cannot store or empty urine properly  The 3 most common types of UI are stress incontinence, urge incontinence, or both  Medicines:   May be given to help strengthen your bladder control  Report any side effects of medication to your healthcare provider  Do pelvic muscle exercises often:  Your pelvic muscles help you stop urinating  Squeeze these muscles tight for 5 seconds, then relax for 5 seconds  Gradually work up to squeezing for 10 seconds  Do 3 sets of 15 repetitions a day, or as directed  This will help strengthen your pelvic muscles and improve bladder control  Train your bladder:  Go to the bathroom at set times, such as every 2 hours, even if you do not feel the urge to go  You can also try to hold your urine when you feel the urge to go  For example, hold your urine for 5 minutes when you feel the urge to go  As that becomes easier, hold your urine for 10 minutes  Self-care:   Keep a UI record  Write down how often you leak urine and how much you leak  Make a note of what you were doing when you leaked urine  Drink liquids as directed  You may need to limit the amount of liquid you drink to help control your urine leakage  Do not drink any liquid right before you go to bed  Limit or do not have drinks that contain caffeine or alcohol  Prevent constipation  Eat a variety of high-fiber foods  Good examples are high-fiber cereals, beans, vegetables, and whole-grain breads  Walking is the best way to trigger your intestines to have a bowel movement  Exercise regularly and maintain a healthy weight  Weight loss and exercise will decrease pressure on your bladder and help you control your leakage  Use a catheter as directed  to help empty your bladder   A catheter is a tiny, plastic tube that is put into your bladder to drain your urine  Go to behavior therapy as directed  Behavior therapy may be used to help you learn to control your urge to urinate  © Copyright [facility] 2018 Information is for End User's use only and may not be sold, redistributed or otherwise used for commercial purposes   All illustrations and images included in CareNotes® are the copyrighted property of A D A M , Inc  or [address] [place] [address]

## 2023-01-10 NOTE — PROGRESS NOTES
Assessment and Plan:     Problem List Items Addressed This Visit        Cardiovascular and Mediastinum    Celiac artery stenosis (HCC)       Nervous and Auditory    Hydrocephalus (HCC)       Other    Depression with anxiety     Increase Zoloft to 200mg QD for improved mood control  F/u 1 month          Relevant Medications    sertraline (ZOLOFT) 100 mg tablet   Other Visit Diagnoses     Medicare annual wellness visit, subsequent    -  Primary    Irregular heart beat        Relevant Orders    POCT ECG (Completed)          Urinary Incontinence Plan of Care: counseling topics discussed: limit alcohol, caffeine, spicy foods, and acidic foods and limiting fluid intake 3-4 hours before bed  Preventive health issues were discussed with patient, and age appropriate screening tests were ordered as noted in patient's After Visit Summary  Personalized health advice and appropriate referrals for health education or preventive services given if needed, as noted in patient's After Visit Summary       History of Present Illness:     Patient presents for a Medicare Wellness Visit    HPI   Patient Care Team:  Sharath Teague MD as PCP - General (Family Medicine)  Romelle Gaucher, MD Landy Shepherd, MD Georgine Roy, PA-C Latina Pounds, MD     Review of Systems:     Review of Systems     Problem List:     Patient Active Problem List   Diagnosis   • History of subarachnoid hemorrhage   • Depression with anxiety   • Hx of breast cancer   • Hydrocephalus [facility])   • Hyperlipidemia   • Celiac artery stenosis (HCC)   • Osteopenia   • Constipation   • Vertigo   • Thyroid nodule   • Abnormal gait   • Allergic rhinitis   • Aneurysm of anterior cerebral artery   • Arthritis   • Benign paroxysmal vertigo, bilateral   • Depression   • Major depressive disorder, single episode, unspecified   • Mild cognitive disorder   • Nontoxic single thyroid nodule   • Osteoarthritis of hip   • Stress incontinence, female   • 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 [facility])      Past Medical and Surgical History:     Past Medical History:   Diagnosis Date   • Acute mastoiditis with intracranial complication 54/81/7239   • Anxiety disorder    • BRCA1 negative    • BRCA2 negative    • Breast cancer ([facility] ) 2014    left breast   • Cancer ([facility] )    • Constipation    • Esophageal reflux    • History of bone density study 2011    [employer] X-[employer]or[employer]iometry   • History of radiation therapy 2014    left breast ca   • History of recurrent UTIs    • 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   • Hyperlipidemia    • Hypertension    • Malignant neoplasm of female breast ([facility] ) 4/22/2014   • Osteoarthritis of hip    • Osteopenia    • Stroke, hemorrhagic ([facility] ) 12/2015   • Subarachnoid hemorrhage ([place] [address] )      Past Surgical History:   Procedure Laterality Date   • BREAST BIOPSY Left 2014   • BREAST LUMPECTOMY Left 2014   • COLONOSCOPY      Fiberoptic - 2009, 2015 5 year f/u   • ESOPHAGOGASTRODUODENOSCOPY  2009    Diag, Resolved - 2006 / 2009   • EXOSTECTOMY  2005    Simple Bunion ([facility])   • HYSTERECTOMY  1981   • JOINT REPLACEMENT      Hip   • OOPHORECTOMY  1981    not sure which one   • VENTRICULOSTOMY        Family History:     Family History   Problem Relation Age of Onset   • Cancer Mother 71        bone   • Hypertension Mother    • Arthritis Mother    • Stroke Father         TIA   • Stroke Maternal Grandmother         CVA   • Deep vein thrombosis Daughter    • Heart attack Neg Hx    • Anuerysm Neg Hx    • Clotting disorder Neg Hx    • Arrhythmia Neg Hx    • Heart failure Neg Hx    • Coronary artery disease Neg Hx    • Hyperlipidemia Neg Hx    • Breast cancer Neg Hx       Social History:     Social History     Socioeconomic History   • Marital status:       Spouse name: None   • Number of children: None   • Years of education: None   • Highest education level: None   Occupational History   • None   Tobacco Use   • Smoking status: Never   • Smokeless tobacco: Never   Vaping Use   • Vaping Use: Never used   Substance and Sexual Activity   • Alcohol use: Yes     Comment: occasionally   • Drug use: No   • Sexual activity: Not Currently   Other Topics Concern   • None   Social History Narrative    Caffeine use    Marital Status -  per [employer]      Social Determinants of Health     Financial Resource Strain: Not on file   Food Insecurity: Not on file   Transportation Needs: Unmet Transportation Needs   • Lack of Transportation (Medical): Yes   • Lack of Transportation (Non-Medical): Yes   Physical Activity: Not on file   Stress: Not on file   Social Connections: Not on file   Intimate Partner Violence: Not on file   Housing Stability: Not on file      Medications and Allergies:     Current Outpatient Medications   Medication Sig Dispense Refill   • acetaminophen (TYLENOL) 325 mg tablet Take 650 mg by mouth every 6 (six) hours as needed for mild pain     • ALPRAZolam (XANAX) 0 25 mg tablet Take 1 tablet (0 25 mg total) by mouth daily as needed for anxiety Take 2 pills 30 min prior to procedure  30 tablet 0   • Calcium-Vitamin D (CALTRATE 600 PLUS-VIT D PO) Take by mouth daily       • cyanocobalamin (VITAMIN B-12) 100 mcg tablet Take by mouth daily     • esomeprazole (NexIUM) 10 MG packet Take 10 mg by mouth every morning before breakfast     • ezetimibe (ZETIA) 10 mg tablet TAKE ONE TABLET BY MOUTH EVERY DAY 30 tablet 5   • fluticasone (FLONASE) 50 mcg/act nasal spray 1 spray into each nostril daily     • LUMIGAN 0 01 % ophthalmic drops PLACE 1 DROP INTO IN [facility] EYE AT BEDTIME  11   • meclizine (ANTIVERT) 25 mg tablet Take 1 tablet (25 mg total) by mouth every 8 (eight) hours as needed for dizziness 30 tablet 0   • Multiple Vitamins-Minerals (MULTIVITAMIN ADULTS 50+) TABS Take by mouth daily     • NON FORMULARY CBD Gummies     • psyllium (METAMUCIL) 0 52 g capsule Take by mouth daily 2-3 caps daily     • sertraline (ZOLOFT) 100 mg tablet Take 2 tablets (200 mg total) by mouth daily 180 tablet 1     No current facility-administered medications for this visit  Allergies   Allergen Reactions   • Oxycodone Vomiting   • Atorvastatin Other (See Comments)     leg pain   • Bactrim [Sulfamethoxazole-Trimethoprim] Itching and Rash   • Keppra [Levetiracetam] Rash   • Livalo [Pitavastatin] Itching   • Penicillins Rash     Agitation       Immunizations:     Immunization History   Administered Date(s) Administered   • INFLUENZA 11/01/2015, 10/05/2016, 09/28/2018, 10/18/2019, 10/12/2020, 10/02/2021   • Influenza Quadrivalent, 6-35 Months IM 10/05/2016   • Influenza Split High Dose Preservative Free IM 10/15/2022   • Influenza, seasonal, injectable 09/21/2012, 11/01/2015   • Pneumococcal Conjugate 13-Valent 12/01/2015   • Pneumococcal Polysaccharide PPV23 10/19/2011   • Tdap 08/04/2009   • Zoster 01/01/2011   • Zoster Vaccine Recombinant 11/25/2019, 08/19/2020      Health Maintenance:         Topic Date Due   • Breast Cancer Screening: Mammogram  12/17/2023   • DXA SCAN  01/19/2027   • Hepatitis C Screening  Completed   • Colorectal Cancer Screening  Discontinued         Topic Date Due   • COVID-19 Vaccine (1) Never done      Medicare Screening Tests and Risk Assessments:     Lori Collier is here for her Subsequent Wellness visit  Health Risk Assessment:   Patient rates overall health as very good  Patient feels that their physical health rating is same  Patient is satisfied with their life  Eyesight was rated as same  Hearing was rated as same  Patient feels that their emotional and mental health rating is slightly worse  Patients states they are sometimes angry  Patient states they are sometimes unusually tired/fatigued  Patient states that she has experienced no weight loss or gain in last 6 months  Depression Screening:   PHQ-9 Score: 1      Fall Risk Screening:    In the past year, patient has experienced: no history of falling in past year      Home Safety:  Patient does not have trouble with stairs inside or outside of their home  Patient has working smoke alarms and has no working carbon monoxide detector  Home safety hazards include: none  Nutrition:   Current diet is Regular  Medications:   Patient is currently taking over-the-counter supplements  OTC medications include: see medication list  Patient is able to manage medications  Activities of Daily Living (ADLs)/Instrumental Activities of Daily Living (IADLs):   Walk and transfer into and out of bed and chair?: Yes  Dress and groom yourself?: Yes    Bathe or shower yourself?: Yes    Feed yourself? Yes  Do your laundry/housekeeping?: Yes  Manage your money, pay your bills and track your expenses?: Yes  Make your own meals?: Yes    Do your own shopping?: Yes    Previous Hospitalizations:   Any hospitalizations or ED visits within the last 12 months?: No      Advance Care Planning:   Living will: Yes    Durable POA for healthcare: Yes    Advanced directive: Yes    Advanced directive counseling given: No    Five wishes given: No    End of Life Decisions reviewed with patient: Yes      Cognitive Screening:   Provider or family/friend/caregiver concerned regarding cognition?: Yes  Cesar Cognitive Assessment (MoCA) Score: 24  Interpretation:  MoCA Score < 25: abnormal/possible cognitive impairment    PREVENTIVE SCREENINGS      Cardiovascular Screening:    General: Screening Not Indicated and History Lipid Disorder      Diabetes Screening:     General: Screening Current      Colorectal Cancer Screening:     General: Screening Not Indicated      Breast Cancer Screening:     General: History Breast Cancer      Cervical Cancer Screening:    General: Screening Not Indicated      Osteoporosis Screening:    General: Screening Current      Abdominal Aortic Aneurysm (AAA) Screening:        General: Screening Not Indicated      Lung Cancer Screening:     General: Screening Not Indicated      Hepatitis C Screening:    General: Screening Current    Screening, Brief Intervention, and Referral to Treatment (SBIRT)    Screening  Typical number of drinks in a day: 0  Typical number of drinks in a week: 0  Interpretation: Low risk drinking behavior  Single Item Drug Screening:  How often have you used an illegal drug (including marijuana) or a prescription medication for non-medical reasons in the past year? never    Single Item Drug Screen Score: 0  Interpretation: Negative screen for possible drug use disorder    Brief Intervention  Alcohol & drug use screenings were reviewed  No concerns regarding substance use disorder identified  Other Counseling Topics:   Calcium and vitamin D intake and regular weightbearing exercise  No results found  Physical Exam:     /82   Pulse 64   Temp (!) 96 2 °F (35 7 °C)   Ht 5' 9 25" (1 759 m)   Wt 76 2 kg (168 lb)   SpO2 98%   BMI 24 63 kg/m²     Physical Exam  Vitals and nursing note reviewed  Constitutional:       General: She is not in acute distress  Appearance: Normal appearance  She is well-developed  She is not ill-appearing, toxic-appearing or diaphoretic  HENT:      Head: Normocephalic and atraumatic  Right Ear: External ear normal       Left Ear: External ear normal       Nose: Nose normal    Eyes:      Conjunctiva/sclera: Conjunctivae normal    Neck:      Trachea: No tracheal deviation  Cardiovascular:      Rate and Rhythm: Normal rate and regular rhythm  Pulses: Normal pulses  Heart sounds: Normal heart sounds  No murmur heard  Pulmonary:      Effort: Pulmonary effort is normal  No respiratory distress  Breath sounds: Normal breath sounds  No wheezing, rhonchi or rales  Abdominal:      Tenderness: There is no abdominal tenderness  Skin:     General: Skin is warm and dry  Capillary Refill: Capillary refill takes less than 2 seconds  Findings: No rash     Neurological: General: No focal deficit present  Mental Status: She is alert  Mental status is at baseline  Cranial Nerves: No cranial nerve deficit            Adolfo Mares MD

## 2023-02-14 ENCOUNTER — OFFICE VISIT (OUTPATIENT)
Dept: NEUROSURGERY | Facility: CLINIC | Age: 78
End: 2023-02-14

## 2023-02-14 VITALS
RESPIRATION RATE: 16 BRPM | HEART RATE: 57 BPM | TEMPERATURE: 98.7 F | HEIGHT: 69 IN | BODY MASS INDEX: 24.69 KG/M2 | DIASTOLIC BLOOD PRESSURE: 85 MMHG | SYSTOLIC BLOOD PRESSURE: 147 MMHG | WEIGHT: 166.7 LBS

## 2023-02-14 DIAGNOSIS — F09 MILD COGNITIVE DISORDER: Primary | ICD-10-CM

## 2023-02-14 RX ORDER — ALPRAZOLAM 0.25 MG/1
0.25 TABLET ORAL AS NEEDED
COMMUNITY

## 2023-02-14 RX ORDER — ACETAMINOPHEN 500 MG
1000 TABLET ORAL AS NEEDED
COMMUNITY

## 2023-02-14 NOTE — ASSESSMENT & PLAN NOTE
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 with no interval change  · Patient complaining of some worsening memory loss as well as blurry vision secondary to cataracts  Imaging reviewed with Dr Hooker:    MRA head wo 12/17/22:Patent major vasculature of the [place] without significant stenosis  Stable possible tiny (1 mm) aneurysm at the A1/A2 junction of right LIANET  Plan:  · Reviewed imaging with patient and daughter  · Extensively discussed natural history of aneurysms  Discussed modifiable risk factors including hypertension, hyperlipidemia, and smoking  · Discussed signs and symptoms of aneurysm rupture including severe, sudden onset headache, neck pain, nausea and vomiting, and seizure  Reiterated that the symptoms should prompt patient to visit emergency department immediately  · Patient will follow-up as needed or if symptoms worsen  · Referred patient to neurology for ongoing and worsening memory loss  · Patient made aware to seek care sooner if she develops any new or worsening neurological change or red flag signs  · Patient made aware to contact neurosurgery with any further questions or concerns

## 2023-02-14 NOTE — PROGRESS NOTES
Neurosurgery Office Note  Giorgi Marks 69 y o  female MRN: [id number]      Assessment/Plan     Aneurysm of anterior cerebral artery  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 with no interval change  · Patient complaining of some worsening memory loss as well as blurry vision secondary to cataracts  Imaging reviewed with Dr Hooker:    MRA head wo 12/17/22:Patent major vasculature of the [place] without significant stenosis  Stable possible tiny (1 mm) aneurysm at the A1/A2 junction of right LIANET  Plan:  · Reviewed imaging with patient and daughter  · Extensively discussed natural history of aneurysms  Discussed modifiable risk factors including hypertension, hyperlipidemia, and smoking  · Discussed signs and symptoms of aneurysm rupture including severe, sudden onset headache, neck pain, nausea and vomiting, and seizure  Reiterated that the symptoms should prompt patient to visit emergency department immediately  · Patient will follow-up as needed or if symptoms worsen  · Referred patient to neurology for ongoing and worsening memory loss  · Patient made aware to seek care sooner if she develops any new or worsening neurological change or red flag signs  · Patient made aware to contact neurosurgery with any further questions or concerns  Diagnoses and all orders for this visit:    Mild cognitive disorder  -     Ambulatory referral to Neurology; Future    Other orders  -     acetaminophen (TYLENOL) 500 mg tablet; Take 1,000 mg by mouth if needed for mild pain  -     ALPRAZolam (XANAX) 0 25 mg tablet;  Take 0 25 mg by mouth if needed for anxiety          I spent 30 minutes with the patient today in which >50% of the time was spent counseling/coordination of care regarding diagnosis, imaging review, symptoms and treatment plan  CHIEF COMPLAINT    Chief Complaint   Patient presents with   • Follow-up     Paige Travis/KENYATTA, New/Return patient for 5 year f/u with MRA head 12/17/22; Last seen 10/3/17       HISTORY    History of Present Illness     69y o  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 with past medical history significant for hydrocephalus, vertigo, mild cognitive disorder, osteopenia, arthritis, history of breast cancer, hyperlipidemia, depression, anxiety, and stress incontinence  Patient seen in outpatient office today for 5-year follow-up  Patient known to our service who unfortunately had a spontaneous subarachnoid hemorrhage on 1/3/2016  The possibility that her subarachnoid hemorrhage was the result of a ruptured aneurysm which may have thrombosed post rupture  We have been following patient since that time for a 1 mm outpouching at the junction of the right A1/A2 segments with no interval change  Patient states since we last saw her she does endorse worsening memory loss  She also endorses some blurry vision secondary to cataracts and she recently has new glasses  She states she fell 3 weeks ago when trying to pick something up but denies head strike  She states her balance is okay with use of a cane  [place] a senior [place] independently  She denies any headaches, dizziness, chest pain, shortness of breath, abdominal pain, nausea, Meding, diarrhea, no problems of bowel or bladder, no new weakness or numbness/tingling  HPI    See Discussion    REVIEW OF SYSTEMS    Review of Systems   Constitutional: Negative  HENT: Negative  Eyes: Positive for visual disturbance (Wears glasses, may need new glasses, has upcoming appt)  Respiratory: Negative  Cardiovascular: Negative  Gastrointestinal: Negative  Endocrine: Negative  Genitourinary: Positive for frequency (nocturia x1)  Since brain bleed has to wear pad daily and all day due to leakage and has an alarm set for every 2 hours since she can't feel urge to urinate   Musculoskeletal: Positive for gait problem (uses cane for assistance, no falls within the last 5 years except for 1 fall about 3 weeks ago while bending down to  an item while entering door of her home)  Skin: Negative  Allergic/Immunologic: Negative  Neurological: Positive for speech difficulty (mild and age related) and weakness (bilateral legs)  Negative for dizziness, tremors, seizures, syncope, numbness and headaches  Hematological: Negative  Psychiatric/Behavioral: Negative  Worsening memory loss, short term memory    Recent PCP/Medicare Wellness visit, past cogenitive assessment        ROS reviewed with patient and agree and changes are made as needed  Meds/Allergies     Current Outpatient Medications   Medication Sig Dispense Refill   • acetaminophen (TYLENOL) 500 mg tablet Take 1,000 mg by mouth if needed for mild pain     • ALPRAZolam (XANAX) 0 25 mg tablet Take 0 25 mg by mouth if needed for anxiety     • Calcium-Vitamin D (CALTRATE 600 PLUS-VIT D PO) Take by mouth daily       • cyanocobalamin (VITAMIN B-12) 100 mcg tablet Take by mouth daily     • esomeprazole (NexIUM) 10 MG packet Take 10 mg by mouth every morning before breakfast     • ezetimibe (ZETIA) 10 mg tablet TAKE ONE TABLET BY MOUTH EVERY DAY 30 tablet 5   • fluticasone (FLONASE) 50 mcg/act nasal spray 1 spray into each nostril daily     • LUMIGAN 0 01 % ophthalmic drops PLACE 1 DROP INTO IN [facility] EYE AT BEDTIME  11   • meclizine (ANTIVERT) 25 mg tablet Take 1 tablet (25 mg total) by mouth every 8 (eight) hours as needed for dizziness (Patient taking differently: Take 25 mg by mouth if needed for dizziness) 30 tablet 0   • Multiple Vitamins-Minerals (MULTIVITAMIN ADULTS 50+) TABS Take by mouth daily     • NON FORMULARY CBD Gummies     • psyllium (METAMUCIL) 0 52 g capsule Take by mouth daily 2-3 caps daily     • sertraline (ZOLOFT) 100 mg tablet Take 2 tablets (200 mg total) by mouth daily 180 tablet 1   • acetaminophen (TYLENOL) 325 mg tablet Take 650 mg by mouth every 6 (six) hours as needed for mild pain (Patient not taking: Reported on 2/14/2023)     • ALPRAZolam (XANAX) 0 25 mg tablet Take 1 tablet (0 25 mg total) by mouth daily as needed for anxiety Take 2 pills 30 min prior to procedure  (Patient not taking: Reported on 2/14/2023) 30 tablet 0     No current facility-administered medications for this visit         Allergies   Allergen Reactions   • Oxycodone Vomiting   • Atorvastatin Other (See Comments)     Leg/knee pain (joint pain)   • Bactrim [Sulfamethoxazole-Trimethoprim] Itching and Rash   • Keppra [Levetiracetam] Rash   • Livalo [Pitavastatin] Itching   • Penicillins Rash     Agitation        PAST HISTORY    Past Medical History:   Diagnosis Date   • Acute mastoiditis with intracranial complication 05/34/9013   • Anxiety disorder    • BRCA1 negative    • BRCA2 negative    • Breast cancer ([facility] [address] ) 2014    left breast   • Cancer (HCC)    • Constipation    • Esophageal reflux    • History of bone density study 2011    [employer]   • History of radiation therapy 2014    left breast ca   • History of recurrent UTIs    • 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   • Hyperlipidemia    • Hypertension    • Malignant neoplasm of female breast ([facility] [address] ) 4/22/2014   • Osteoarthritis of hip    • Osteopenia    • Stroke, hemorrhagic ([facility] [address] ) 12/2015   • Subarachnoid hemorrhage [facility])        Past Surgical History:   Procedure Laterality Date   • BREAST BIOPSY Left 2014   • BREAST LUMPECTOMY Left 2014   • COLONOSCOPY      Fiberoptic - 2009, 2015 5 year f/u   • ESOPHAGOGASTRODUODENOSCOPY  2009    Diag, Resolved - 2006 / 2009   • EXOSTECTOMY  2005    Simple Bunion ([facility])   • HYSTERECTOMY  1981   • JOINT REPLACEMENT Right 2012    Right Hip   • OOPHORECTOMY  1981    not sure which one   • REPLACEMENT TOTAL KNEE Right 2018   • VENTRICULOSTOMY         Social History     Tobacco Use   • Smoking status: Never   • Smokeless tobacco: Never   Vaping Use   • Vaping Use: Never used   Substance Use Topics   • Alcohol use: Yes     Comment: occasionally   • Drug use: No       Family History   Problem Relation Age of Onset   • Cancer Mother 71        bone   • Hypertension Mother    • Arthritis Mother    • Stroke Father         TIA   • Stroke Maternal Grandmother         CVA   • Deep vein thrombosis Daughter    • Heart attack Neg Hx    • Anuerysm Neg Hx    • Clotting disorder Neg Hx    • Arrhythmia Neg Hx    • Heart failure Neg Hx    • Coronary artery disease Neg Hx    • Hyperlipidemia Neg Hx    • Breast cancer Neg Hx          Above history personally reviewed  EXAM    Vitals:Blood pressure 147/85, pulse 57, temperature 98 7 °F (37 1 °C), resp  rate 16, height 5' 9 25" (1 759 m), weight 75 6 kg (166 lb 11 2 oz), not currently breastfeeding  ,Body mass index is 24 44 kg/m²  Physical Exam  Vitals reviewed  Constitutional:       General: She is awake  She is not in acute distress  Appearance: Normal appearance  She is not ill-appearing  HENT:      Head: Normocephalic and atraumatic  Eyes:      Extraocular Movements: Extraocular movements intact and EOM normal       Conjunctiva/sclera: Conjunctivae normal       Pupils: Pupils are equal, round, and reactive to light  Cardiovascular:      Rate and Rhythm: Normal rate  Pulmonary:      Effort: Pulmonary effort is normal  No respiratory distress  Chest:      Chest wall: No tenderness  Abdominal:      General: There is no distension  Palpations: Abdomen is soft  Tenderness: There is no abdominal tenderness  Musculoskeletal:         General: Normal range of motion        Cervical back: Normal range of motion and neck supple  Skin:     General: Skin is warm and dry  Neurological:      Mental Status: She is alert and oriented to person, place, and time  Motor: Motor strength is normal       Coordination: Finger-Nose-Finger Test normal       Gait: Gait is intact  Deep Tendon Reflexes:      Reflex Scores:       Bicep reflexes are 2+ on the right side and 2+ on the left side  Patellar reflexes are 2+ on the right side and 2+ on the left side  Psychiatric:         Attention and Perception: Attention and perception normal          Mood and Affect: Mood and affect normal          Speech: Speech normal          Behavior: Behavior normal  Behavior is cooperative  Thought Content: Thought content normal          Cognition and Memory: Cognition and memory normal          Judgment: Judgment normal          Neurologic Exam     Mental Status   Oriented to person, place, and time  Follows 2 step commands  Attention: normal  Concentration: normal    Speech: speech is normal   Level of consciousness: alert  Knowledge: good  Able to perform simple calculations  Able to name object  Normal comprehension  Cranial Nerves     CN III, IV, VI   Pupils are equal, round, and reactive to light  Extraocular motions are normal    CN III: no CN III palsy  CN VI: no CN VI palsy  Nystagmus: none   Diplopia: none  Conjugate gaze: present    CN V   Facial sensation intact  CN VII   Facial expression full, symmetric  CN VIII   CN VIII normal    Hearing: intact    CN IX, X   CN IX normal      CN XI   CN XI normal      CN XII   CN XII normal      Motor Exam   Muscle bulk: normal  Overall muscle tone: normal  Right arm pronator drift: absent  Left arm pronator drift: absent    Strength   Strength 5/5 throughout       Sensory Exam   Light touch normal    Proprioception normal    JPS and DST intact     Gait, Coordination, and Reflexes     Gait  Gait: normal    Coordination   Finger to nose coordination: normal    Tremor   Resting tremor: absent  Intention tremor: absent  Action tremor: absent    Reflexes   Right biceps: 2+  Left biceps: 2+  Right patellar: 2+  Left patellar: 2+  Right [place]: absent  Left [place]: absent  Right ankle clonus: absent  Left ankle clonus: absent        MEDICAL DECISION MAKING    Imaging Studies:     No results found  I have personally reviewed pertinent reports     and I have personally reviewed pertinent films in PACS

## 2023-02-17 ENCOUNTER — TELEPHONE (OUTPATIENT)
Dept: NEUROLOGY | Facility: CLINIC | Age: 78
End: 2023-02-17

## 2023-02-17 NOTE — TELEPHONE ENCOUNTER
[facility] offering patient sooner appt   3/21/23 @ 9:00am Kevin Fofana direct # until 3:30pm, central # for return call to confirm/deny offer

## 2023-02-19 DIAGNOSIS — E78.00 HYPERCHOLESTEROLEMIA: ICD-10-CM

## 2023-02-20 RX ORDER — EZETIMIBE 10 MG/1
TABLET ORAL
Qty: 30 TABLET | Refills: 0 | Status: SHIPPED | OUTPATIENT
Start: 2023-02-20

## 2023-03-21 ENCOUNTER — OFFICE VISIT (OUTPATIENT)
Dept: FAMILY MEDICINE CLINIC | Facility: CLINIC | Age: 78
End: 2023-03-21

## 2023-03-21 VITALS
WEIGHT: 166 LBS | BODY MASS INDEX: 24.59 KG/M2 | SYSTOLIC BLOOD PRESSURE: 110 MMHG | DIASTOLIC BLOOD PRESSURE: 80 MMHG | HEIGHT: 69 IN | HEART RATE: 75 BPM | TEMPERATURE: 97.9 F | OXYGEN SATURATION: 95 %

## 2023-03-21 DIAGNOSIS — M25.562 ACUTE PAIN OF LEFT KNEE: ICD-10-CM

## 2023-03-21 DIAGNOSIS — F32.0 CURRENT MILD EPISODE OF MAJOR DEPRESSIVE DISORDER WITHOUT PRIOR EPISODE (HCC): Primary | ICD-10-CM

## 2023-03-21 NOTE — PROGRESS NOTES
Name: Omar Barkley      : [id number]      MRN: [id number]  Encounter Provider: Alba Garcia MD  Encounter Date: 3/21/2023   Encounter department: [address] [place] [address]     1  Current mild episode of major depressive disorder without prior episode ([facility] [address] )  Assessment & Plan:  Significant improvement since Zoloft increase to 200mg qd  Continue same dosing  F/u 6 months       2  Acute pain of left knee  Comments:  Continue to monitor - if not improving, recommend evaluation, XR, consider intervention if needed      F/u 6 months with Sharon for routine f/u  1 year AWV with me   Patient aware of maternity leave        Subjective      HPI   Patient presents for follow up  She and her daughter have both noticed a difference since she had the increase in her Zoloft dose  She is less tearful, less irritable  No side effects noted  She notes she has some left knee pain that is flaring today due to season changes  She doesn't normally have much pain there  It is not impairing her ability to do activity  Review of Systems   Constitutional: Negative for chills and fever  HENT: Negative for congestion  Respiratory: Negative for cough  Gastrointestinal: Negative for nausea  Musculoskeletal: Positive for arthralgias  Negative for myalgias  Psychiatric/Behavioral: Negative for dysphoric mood  The patient is not nervous/anxious  All other systems reviewed and are negative  Current Outpatient Medications on File Prior to Visit   Medication Sig   • acetaminophen (TYLENOL) 500 mg tablet Take 1,000 mg by mouth if needed for mild pain   • ALPRAZolam (XANAX) 0 25 mg tablet Take 1 tablet (0 25 mg total) by mouth daily as needed for anxiety Take 2 pills 30 min prior to procedure  (Patient taking differently: Take 0 25 mg by mouth as needed for anxiety)   • Calcium-Vitamin D (CALTRATE 600 PLUS-VIT D PO) Take by mouth daily     • cyanocobalamin (VITAMIN B-12) 100 mcg tablet Take by mouth daily   • esomeprazole (NexIUM) 10 MG packet Take 10 mg by mouth every morning before breakfast   • ezetimibe (ZETIA) 10 mg tablet TAKE ONE TABLET BY MOUTH EVERY DAY   • fluticasone (FLONASE) 50 mcg/act nasal spray 1 spray into each nostril as needed   • LUMIGAN 0 01 % ophthalmic drops PLACE 1 DROP INTO IN [facility] EYE AT BEDTIME   • meclizine (ANTIVERT) 25 mg tablet Take 1 tablet (25 mg total) by mouth every 8 (eight) hours as needed for dizziness (Patient taking differently: Take 25 mg by mouth if needed for dizziness)   • Multiple Vitamins-Minerals (MULTIVITAMIN ADULTS 50+) TABS Take by mouth daily   • NON FORMULARY CBD Gummies   • psyllium (METAMUCIL) 0 52 g capsule Take by mouth daily 2-3 caps daily   • sertraline (ZOLOFT) 100 mg tablet Take 2 tablets (200 mg total) by mouth daily   • acetaminophen (TYLENOL) 325 mg tablet Take 650 mg by mouth every 6 (six) hours as needed for mild pain (Patient not taking: Reported on 3/21/2023)   • [DISCONTINUED] ALPRAZolam (XANAX) 0 25 mg tablet Take 0 25 mg by mouth if needed for anxiety       Objective     /80   Pulse 75   Temp 97 9 °F (36 6 °C)   Ht 5' 9 25" (1 759 m)   Wt 75 3 kg (166 lb)   SpO2 95%   BMI 24 34 kg/m²     Physical Exam  Vitals and nursing note reviewed  Constitutional:       General: She is not in acute distress  Appearance: Normal appearance  She is well-developed  She is not ill-appearing or diaphoretic  HENT:      Head: Normocephalic and atraumatic  Right Ear: External ear normal       Left Ear: External ear normal       Nose: Nose normal    Eyes:      General: Lids are normal       Conjunctiva/sclera: Conjunctivae normal    Neck:      Vascular: No JVD  Trachea: No tracheal deviation  Pulmonary:      Effort: No accessory muscle usage or respiratory distress  Skin:     Findings: No rash  Neurological:      Mental Status: She is alert         Terrie Moreira MD

## 2023-03-25 DIAGNOSIS — E78.00 HYPERCHOLESTEROLEMIA: ICD-10-CM

## 2023-03-27 RX ORDER — EZETIMIBE 10 MG/1
TABLET ORAL
Qty: 30 TABLET | Refills: 0 | Status: SHIPPED | OUTPATIENT
Start: 2023-03-27

## 2023-04-29 DIAGNOSIS — E78.00 HYPERCHOLESTEROLEMIA: ICD-10-CM

## 2023-05-01 RX ORDER — EZETIMIBE 10 MG/1
TABLET ORAL
Qty: 30 TABLET | Refills: 0 | Status: SHIPPED | OUTPATIENT
Start: 2023-05-01

## 2023-05-05 ENCOUNTER — TELEPHONE (OUTPATIENT)
Dept: NEUROLOGY | Facility: CLINIC | Age: 78
End: 2023-05-05

## 2023-05-05 NOTE — TELEPHONE ENCOUNTER
[facility] to confirm your upcoming appt, 5/9/23 @11:00am(10:45am) at the [facility], to confirm/RS if you are unable to keep this appt please call [phone]

## 2023-05-08 ENCOUNTER — TELEPHONE (OUTPATIENT)
Dept: NEUROLOGY | Facility: CLINIC | Age: 78
End: 2023-05-08

## 2023-05-09 ENCOUNTER — OFFICE VISIT (OUTPATIENT)
Dept: NEUROLOGY | Facility: CLINIC | Age: 78
End: 2023-05-09

## 2023-05-09 VITALS
WEIGHT: 165 LBS | HEIGHT: 69 IN | BODY MASS INDEX: 24.44 KG/M2 | HEART RATE: 75 BPM | DIASTOLIC BLOOD PRESSURE: 66 MMHG | SYSTOLIC BLOOD PRESSURE: 114 MMHG

## 2023-05-09 DIAGNOSIS — I60.11 SUBARACHNOID HEMORRHAGE FROM ANEURYSM OF RIGHT MIDDLE CEREBRAL ARTERY (HCC): ICD-10-CM

## 2023-05-09 DIAGNOSIS — F09 MILD COGNITIVE DISORDER: Primary | ICD-10-CM

## 2023-05-09 DIAGNOSIS — F09 MILD COGNITIVE DISORDER: ICD-10-CM

## 2023-05-09 DIAGNOSIS — R26.9 ABNORMAL GAIT: ICD-10-CM
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
## 2023-05-09 NOTE — ASSESSMENT & PLAN NOTE
Ms Stanford Torres is a 69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 with pmh of SAH in 2016,  Anterior cerebral artery aneurysm, and, depression/anxiety presenting as a new patient for memory loss  Patient has no family history of dementia  Patient stated that she lives at the [facility] in [place]  They stated that they have noticed the patient stories don't make sense sometimes and she has had a shorter temper  The patient's daughthort term memory was decreased after the subarachnoid hemorrahe in 2016, however, It was until the beginning of this year that she noticed a significant decline in her mother's memory  She does not forget close family and friends names  The daughter stated that the patient will repeat her self 5-6x in a time span of minutes at times  She also stated the patient has to have thing repeated to her as well several times  She stated that since the patient can't remember some events, then she will just make up stories that didn't happen  The patient's daughter stated that she had to taker over her mother's financials within the last 3 weeks due to overdrafting  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  Patient doesn not cook, she only makes pepper meals in the microwave or oven  Patient manages her own medications, laundry, and grooming  Her daughter is looking into a visiting nurse to help out with monitoring  Though  Patient stated she hasn't driven since her [address] in 2016  She also state that since the [address] she has had urinary incontinence  Patient had to do time voiding  Patient recently had an appointment with neurosurgery, and they stated that her recent imaging looked stable  Patient had a MoCA exam from her PCP in january which she scored a 24/30  Today patient MoCA 22/30         Plan:  - will get Memory labs :B1,B6,B12,Folate (Patient already had TSH)  - MRI Brain neuroquant  - referral for neurocognitive evaluation  -f/u with neuro clinic after MRI results and memory labs , likely  4 months

## 2023-05-13 ENCOUNTER — APPOINTMENT (OUTPATIENT)
Dept: LAB | Facility: MEDICAL CENTER | Age: 78
End: 2023-05-13

## 2023-05-13 DIAGNOSIS — R26.9 ABNORMAL GAIT: ICD-10-CM

## 2023-05-13 DIAGNOSIS — I60.11 SUBARACHNOID HEMORRHAGE FROM ANEURYSM OF RIGHT MIDDLE CEREBRAL ARTERY (HCC): ICD-10-CM

## 2023-05-13 DIAGNOSIS — F09 MILD COGNITIVE DISORDER: ICD-10-CM

## 2023-05-13 LAB
FOLATE SERPL-MCNC: >20 NG/ML (ref 3.1–17.5)
VIT B12 SERPL-MCNC: 1535 PG/ML (ref 100–900)

## 2023-05-16 LAB — VIT B6 SERPL-MCNC: 34.6 UG/L (ref 3.4–65.2)

## 2023-05-17 LAB — VIT B1 BLD-SCNC: 177.6 NMOL/L (ref 66.5–200)

## 2023-05-25 ENCOUNTER — TELEPHONE (OUTPATIENT)
Dept: NEUROLOGY | Facility: CLINIC | Age: 78
End: 2023-05-25

## 2023-05-25 NOTE — TELEPHONE ENCOUNTER
----- Message from Tyron Garcia MD sent at 5/14/2023  7:03 AM EDT -----  Let pt know vut b12 elevated at 1535  If she is taking supplement, she can likely reduce

## 2023-05-25 NOTE — TELEPHONE ENCOUNTER
Spoke w/pt  Advised her of results and recommendations below  She discontinued taking her B12 1000mcg supplement - 1 tab daily when she saw her lab result

## 2023-05-28 ENCOUNTER — HOSPITAL ENCOUNTER (OUTPATIENT)
Dept: MRI IMAGING | Facility: HOSPITAL | Age: 78
Discharge: HOME | End: 2023-05-28
Attending: PSYCHIATRY & NEUROLOGY

## 2023-05-28 DIAGNOSIS — I60.11 SUBARACHNOID HEMORRHAGE FROM ANEURYSM OF RIGHT MIDDLE CEREBRAL ARTERY (HCC): ICD-10-CM

## 2023-05-28 DIAGNOSIS — R26.9 ABNORMAL GAIT: ICD-10-CM

## 2023-05-28 DIAGNOSIS — F09 MILD COGNITIVE DISORDER: ICD-10-CM

## 2023-05-30 ENCOUNTER — TELEPHONE (OUTPATIENT)
Dept: PSYCHIATRY | Facility: CLINIC | Age: 78
End: 2023-05-30

## 2023-05-31 ENCOUNTER — TELEPHONE (OUTPATIENT)
Dept: PSYCHIATRY | Facility: CLINIC | Age: 78
End: 2023-05-31

## 2023-06-01 DIAGNOSIS — E78.00 HYPERCHOLESTEROLEMIA: ICD-10-CM

## 2023-06-01 RX ORDER — EZETIMIBE 10 MG/1
TABLET ORAL
Qty: 30 TABLET | Refills: 5 | Status: SHIPPED | OUTPATIENT
Start: 2023-06-01

## 2023-06-21 ENCOUNTER — TELEPHONE (OUTPATIENT)
Dept: NEUROLOGY | Facility: CLINIC | Age: 78
End: 2023-06-21

## 2023-06-21 DIAGNOSIS — G91.9 HYDROCEPHALUS (HCC): Primary | ICD-10-CM

## 2023-06-21 DIAGNOSIS — G93.89 CEREBRAL VENTRICULOMEGALY: ICD-10-CM

## 2023-06-21 NOTE — TELEPHONE ENCOUNTER
----- Message from [address] sent at 6/21/2023 10:40 AM EDT -----  If concern for NPH can send to NPH clinic for evaluation   ----- Message -----  From: Felecia Wild MD  Sent: 6/2/2023   3:22 PM EDT  To: EYAL Schwartz    [place] pt  Updated imaging for memory  Any need to see Neurosurgery re any further eval of ventricular system   Pt already following post [facility]

## 2023-06-21 NOTE — TELEPHONE ENCOUNTER
Let pt know mri head no acute changes  Pt with some evidence of sequellae of prior SAH  Neuro quant does not support specific neurodegeneration  There is possible expansion of ventricular system , question of NPH  Pt already follows with [facility]  Reached out to them and pt recommended to see NPH clinic  I am placing referral  It will be in same group but possible different neurosurgeon for visit

## 2023-06-27 ENCOUNTER — HOSPITAL ENCOUNTER (EMERGENCY)
Facility: HOSPITAL | Age: 78
Discharge: HOME | End: 2023-06-27
Attending: EMERGENCY MEDICINE
Payer: COMMERCIAL

## 2023-06-27 VITALS
SYSTOLIC BLOOD PRESSURE: 142 MMHG | WEIGHT: 166.89 LBS | TEMPERATURE: 97.9 F | HEART RATE: 76 BPM | RESPIRATION RATE: 16 BRPM | DIASTOLIC BLOOD PRESSURE: 72 MMHG | OXYGEN SATURATION: 96 % | BODY MASS INDEX: 24.47 KG/M2

## 2023-06-27 DIAGNOSIS — N39.0 UTI (URINARY TRACT INFECTION): Primary | ICD-10-CM

## 2023-06-27 LAB
BACTERIA UR QL AUTO: ABNORMAL /HPF
BILIRUB UR QL STRIP: NEGATIVE
CLARITY UR: ABNORMAL
COLOR UR: ABNORMAL
GLUCOSE UR STRIP-MCNC: NEGATIVE MG/DL
HGB UR QL STRIP.AUTO: ABNORMAL
KETONES UR STRIP-MCNC: NEGATIVE MG/DL
LEUKOCYTE ESTERASE UR QL STRIP: ABNORMAL
NITRITE UR QL STRIP: NEGATIVE
NON-SQ EPI CELLS URNS QL MICRO: ABNORMAL /HPF
PH UR STRIP.AUTO: 5.5 [PH]
PROT UR STRIP-MCNC: NEGATIVE MG/DL
RBC #/AREA URNS AUTO: ABNORMAL /HPF
SP GR UR STRIP.AUTO: 1 (ref 1–1.03)
UROBILINOGEN UR STRIP-ACNC: <2 MG/DL
WBC #/AREA URNS AUTO: ABNORMAL /HPF
WBC CLUMPS # UR AUTO: PRESENT /UL

## 2023-06-27 PROCEDURE — 81001 URINALYSIS AUTO W/SCOPE: CPT

## 2023-06-27 RX ORDER — CEFPODOXIME PROXETIL 200 MG/1
200 TABLET, FILM COATED ORAL ONCE
Status: COMPLETED | OUTPATIENT
Start: 2023-06-27 | End: 2023-06-27

## 2023-06-27 RX ORDER — CEFPODOXIME PROXETIL 200 MG/1
200 TABLET, FILM COATED ORAL 2 TIMES DAILY
Qty: 20 TABLET | Refills: 0 | Status: SHIPPED | OUTPATIENT
Start: 2023-06-27 | End: 2023-07-07

## 2023-06-27 RX ADMIN — CEFPODOXIME PROXETIL 200 MG: 200 TABLET, FILM COATED ORAL at 12:17

## 2023-06-27 NOTE — ED PROVIDER NOTES
History  Chief Complaint   Patient presents with   • Possible UTI     Burning on urination, onset this am     Patient is a 71-year-old female presenting to the emergency department for evaluation of dysuria  She reports that she has had multiple UTIs in the past   This morning she woke up and use the restroom and felt some burning with urination  She denies fevers, cough, chills, abdominal pain, back pain  She is not currently sexually active  She is status post hysterectomy  Patient states she has no additional complaints at this time  Prior to Admission Medications   Prescriptions Last Dose Informant Patient Reported? Taking? ALPRAZolam (XANAX) 0 25 mg tablet  Self, Child No No   Sig: Take 1 tablet (0 25 mg total) by mouth daily as needed for anxiety Take 2 pills 30 min prior to procedure  Patient taking differently: Take 0 25 mg by mouth as needed for anxiety   Calcium-Vitamin D (CALTRATE 600 PLUS-VIT D PO)  Self, Child Yes No   Sig: Take by mouth daily     LUMIGAN 0 01 % ophthalmic drops  Self, Child Yes No   Sig: PLACE 1 DROP INTO IN [facility] EYE AT BEDTIME   Multiple Vitamins-Minerals (MULTIVITAMIN ADULTS 50+) TABS  Self, Child Yes No   Sig: Take by mouth daily   NON FORMULARY  Self, Child Yes No   Sig: CBD Gummies   acetaminophen (TYLENOL) 325 mg tablet  Self, Child Yes No   Sig: Take 650 mg by mouth every 6 (six) hours as needed for mild pain   Patient not taking: Reported on 3/21/2023   acetaminophen (TYLENOL) 500 mg tablet  Self, Child Yes No   Sig: Take 1,000 mg by mouth if needed for mild pain   cyanocobalamin (VITAMIN B-12) 100 mcg tablet  Self, Child Yes No   Sig: Take by mouth daily   esomeprazole (NexIUM) 10 MG packet  Self, Child Yes No   Sig: Take 10 mg by mouth every morning before breakfast   ezetimibe (ZETIA) 10 mg tablet   No No   Sig: TAKE ONE TABLET BY MOUTH EVERY DAY   fluticasone (FLONASE) 50 mcg/act nasal spray  Self, Child Yes No   Si spray into each nostril as needed meclizine (ANTIVERT) 25 mg tablet  Self, Child No No   Sig: Take 1 tablet (25 mg total) by mouth every 8 (eight) hours as needed for dizziness   Patient not taking: Reported on 5/9/2023   psyllium (METAMUCIL) 0 52 g capsule  Self, Child Yes No   Sig: Take by mouth daily 2-3 caps daily   sertraline (ZOLOFT) 100 mg tablet  Self, Child No No   Sig: Take 2 tablets (200 mg total) by mouth daily      Facility-Administered Medications: None       Past Medical History:   Diagnosis Date   • Acute mastoiditis with intracranial complication 19/07/1608   • Anxiety disorder    • BRCA1 negative    • BRCA2 negative    • Breast cancer ([facility] [address] ) 2014    left breast   • Cancer ([facility] [address] )    • Constipation    • Esophageal reflux    • History of bone density study 2011    [employer]   • History of radiation therapy 2014    left breast ca   • History of recurrent UTIs    • Hydrocephalus ([facility] [address] )     secondary to subarachnoid hemorrhage   • Hyperlipidemia    • Hypertension    • Malignant neoplasm of female breast ([facility] [address] ) 4/22/2014   • Osteoarthritis of hip    • Osteopenia    • Stroke, hemorrhagic ([facility] [address]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   • Subarachnoid hemorrhage ([facility] [address] )        Past Surgical History:   Procedure Laterality Date   • BREAST BIOPSY Left 2014   • BREAST LUMPECTOMY Left 2014   • COLONOSCOPY      Fiberoptic - 2009, 2015 5 year f/u   • ESOPHAGOGASTRODUODENOSCOPY  2009    Diag, Resolved - 2006 / 2009   • EXOSTECTOMY  2005    Simple Bunion ([facility])   • HYSTERECTOMY  1981   • JOINT REPLACEMENT Right 2012    Right Hip   • OOPHORECTOMY  1981    not sure which one   • REPLACEMENT TOTAL KNEE Right 2018   • VENTRICULOSTOMY         Family History   Problem Relation Age of Onset   • Cancer Mother 71        bone   • Hypertension Mother    • Arthritis Mother    • Stroke Father         TIA   • Stroke Maternal Grandmother         CVA   • Deep vein thrombosis Daughter    • Heart attack Neg Hx    • Anuerysm Neg Hx    • Clotting disorder Neg Hx    • Arrhythmia Neg Hx    • Heart failure Neg Hx    • Coronary artery disease Neg Hx    • Hyperlipidemia Neg Hx    • Breast cancer Neg Hx      I have reviewed and agree with the history as documented  E-Cigarette/Vaping   • E-Cigarette Use Never User      E-Cigarette/Vaping Substances   • Nicotine No    • THC No    • CBD No    • Flavoring No    • Other No    • Unknown No      Social History     Tobacco Use   • Smoking status: Never     Passive exposure: Past   • Smokeless tobacco: Never   Vaping Use   • Vaping Use: Never used   Substance Use Topics   • Alcohol use: Yes     Comment: occasionally   • Drug use: No        Review of Systems   Constitutional: Negative  HENT: Negative  Eyes: Negative  Respiratory: Negative  Cardiovascular: Negative  Gastrointestinal: Negative  Endocrine: Negative  Genitourinary: Positive for dysuria  Musculoskeletal: Negative  Skin: Negative  Allergic/Immunologic: Negative  Neurological: Negative  Hematological: Negative  Psychiatric/Behavioral: Negative  All other systems reviewed and are negative  Physical Exam  ED Triage Vitals [06/27/23 1032]   Temperature Pulse Respirations Blood Pressure SpO2   97 9 °F (36 6 °C) 76 16 142/72 96 %      Temp src Heart Rate Source Patient Position - Orthostatic VS BP Location FiO2 (%)   -- Monitor Sitting Right arm --      Pain Score       No Pain             Orthostatic Vital Signs  Vitals:    06/27/23 1032   BP: 142/72   Pulse: 76   Patient Position - Orthostatic VS: Sitting       Physical Exam  Vitals and nursing note reviewed  Constitutional:       General: She is not in acute distress  Appearance: Normal appearance  She is not ill-appearing, toxic-appearing or diaphoretic  HENT:      Head: Normocephalic and atraumatic  Eyes:      General: No scleral icterus  Right eye: No discharge  Left eye: No discharge  Extraocular Movements: Extraocular movements intact  Conjunctiva/sclera: Conjunctivae normal       Pupils: Pupils are equal, round, and reactive to light  Cardiovascular:      Rate and Rhythm: Normal rate  Pulses: Normal pulses  Heart sounds: Normal heart sounds  No murmur heard  No friction rub  No gallop  Pulmonary:      Effort: Pulmonary effort is normal  No respiratory distress  Breath sounds: Normal breath sounds  No stridor  No wheezing, rhonchi or rales  Abdominal:      General: Abdomen is flat  Bowel sounds are normal  There is no distension  Palpations: Abdomen is soft  Tenderness: There is no abdominal tenderness  There is no guarding or rebound  Genitourinary:     Comments:  exam deferred  Musculoskeletal:         General: No swelling  Normal range of motion  Cervical back: Normal range of motion  No rigidity  Right lower leg: No edema  Left lower leg: No edema  Skin:     General: Skin is warm and dry  Capillary Refill: Capillary refill takes less than 2 seconds  Coloration: Skin is not jaundiced  Findings: No bruising or lesion  Neurological:      General: No focal deficit present  Mental Status: She is alert and oriented to person, place, and time  Mental status is at baseline  Psychiatric:         Mood and Affect: Mood normal          Behavior: Behavior normal          Thought Content:  Thought content normal          Judgment: Judgment normal          ED Medications  Medications   cefpodoxime (VANTIN) tablet 200 mg (200 mg Oral Given 6/27/23 1217)       Diagnostic Studies  Results Reviewed     Procedure Component Value Units Date/Time    Urine Microscopic [[id number]]  (Abnormal) Collected: 06/27/23 1049    Lab Status: Final result Specimen: Urine, Clean Catch Updated: 06/27/23 1109     RBC, UA 1-2 /hpf      WBC, UA Innumerable /hpf      Epithelial Cells Occasional /hpf      Bacteria, UA Occasional /hpf      WBC Clumps Present    UA (URINE) with reflex to Scope [[id number]] (Abnormal) Collected: 06/27/23 1049    Lab Status: Final result Specimen: Urine, Clean Catch Updated: 06/27/23 1100     Color, UA Light Yellow     Clarity, UA Turbid     Specific [place], UA 1 003     pH, UA 5 5     Leukocytes, UA Large     Nitrite, UA Negative     Protein, UA Negative mg/dl      Glucose, UA Negative mg/dl      Ketones, UA Negative mg/dl      Urobilinogen, UA <2 0 mg/dl      Bilirubin, UA Negative     Occult Blood, UA Large                 No orders to display         Procedures  Procedures      ED Course  ED Course as of 06/28/23 0811   Tue Jun 27, 2023   1043 Patient evaluated, c/o dysuria  UA sent  1110 UA (URINE) with reflex to Scope(!)   1110 Leukocytes, UA(!): Large   1110 Blood, UA(!): Large  Large leukocytes, large blood                                       Medical Decision Making  This is a 60-year-old female presenting to the emergency department for evaluation of dysuria  Patient physical exam is largely unremarkable   exam deferred on this patient  Urinalysis sent which was suggestive of acute cystitis  Given patient's age we will treat this as complicated cystitis, prescribed Vantin twice daily x10 days  Return precautions given, patient discharged  Amount and/or Complexity of Data Reviewed  Labs: ordered  Decision-making details documented in ED Course  Risk  Prescription drug management  Disposition  Final diagnoses:   UTI (urinary tract infection)     Time reflects when diagnosis was documented in both MDM as applicable and the Disposition within this note     Time User Action Codes Description Comment    6/27/2023 11:57 AM Peggy Angelucci Add [N39 0] UTI (urinary tract infection)       ED Disposition     ED Disposition   Discharge    Condition   Stable    Date/Time   Tue Jun 27, 2023 11:57 AM    Patricia Miller discharge to home/self care                 Follow-up Information     Follow up With Specialties Details Why [id number] Sunny Argueta MD [facility] In 3 days As needed, If symptoms worsen [id number] [facility]  [address] [facility]  [phone]            Discharge Medication List as of 6/27/2023 11:58 AM      START taking these medications    Details   cefpodoxime (VANTIN) 200 mg tablet Take 1 tablet (200 mg total) by mouth 2 (two) times a day for 10 days, Starting Tue 6/27/2023, Until Fri 7/7/2023, Normal         CONTINUE these medications which have NOT CHANGED    Details   !! acetaminophen (TYLENOL) 325 mg tablet Take 650 mg by mouth every 6 (six) hours as needed for mild pain, Historical Med      !! acetaminophen (TYLENOL) 500 mg tablet Take 1,000 mg by mouth if needed for mild pain, Historical Med      ALPRAZolam (XANAX) 0 25 mg tablet Take 1 tablet (0 25 mg total) by mouth daily as needed for anxiety Take 2 pills 30 min prior to procedure , Starting Mon 11/21/2022, Normal      Calcium-Vitamin D (CALTRATE 600 PLUS-VIT D PO) Take by mouth daily  , Historical Med      cyanocobalamin (VITAMIN B-12) 100 mcg tablet Take by mouth daily, Historical Med      esomeprazole (NexIUM) 10 MG packet Take 10 mg by mouth every morning before breakfast, Historical Med      ezetimibe (ZETIA) 10 mg tablet TAKE ONE TABLET BY MOUTH EVERY DAY, Normal      fluticasone (FLONASE) 50 mcg/act nasal spray 1 spray into each nostril as needed, Historical Med      LUMIGAN 0 01 % ophthalmic drops PLACE 1 DROP INTO IN [facility] EYE AT BEDTIME, Historical Med      meclizine (ANTIVERT) 25 mg tablet Take 1 tablet (25 mg total) by mouth every 8 (eight) hours as needed for dizziness, Starting Tue 7/30/2019, Normal      Multiple Vitamins-Minerals (MULTIVITAMIN ADULTS 50+) TABS Take by mouth daily, Historical Med      NON FORMULARY CBD Gummies, Historical Med      psyllium (METAMUCIL) 0 52 g capsule Take by mouth daily 2-3 caps daily, Historical Med      sertraline (ZOLOFT) 100 mg tablet Take 2 tablets (200 mg total) by mouth daily, Starting Tue 1/10/2023, Normal !! - Potential duplicate medications found  Please discuss with provider  No discharge procedures on file  PDMP Review       Value Time User    PDMP Reviewed  Yes 11/21/2022 11:46 AM Sukhwinder Huitron MD           ED Provider  Attending physically available and evaluated Jaxson Smith  I managed the patient along with the ED Attending      Electronically Signed by         Farhan Mansifeld DO  06/28/23 9308

## 2023-06-27 NOTE — ED ATTENDING ATTESTATION
6/27/2023  I, Elmer Gibson MD, saw and evaluated the patient  I have discussed the patient with the resident/non-physician practitioner and agree with the resident's/non-physician practitioner's findings, Plan of Care, and MDM as documented in the resident's/non-physician practitioner's note, except where noted  All available labs and Radiology studies were reviewed  I was present for key portions of any procedure(s) performed by the resident/non-physician practitioner and I was immediately available to provide assistance  At this point I agree with the current assessment done in the Emergency Department  I have conducted an independent evaluation of this patient a history and physical is as follows:    41-year-old female with history of prior UTIs presenting for evaluation of 1 day of urinary urgency with mild discomfort with urination  Also reports 1 episode of blood when wiping after urinating this morning  Denies vaginal bleeding and is status post hysterectomy  No abdominal pain, flank pain, nausea, or vomiting  Denies fevers or chills  She was constipated but had a good bowel movement here in the emergency department  Physical Exam  Vitals and nursing note reviewed  Constitutional:       General: She is not in acute distress  Appearance: She is well-developed  She is not ill-appearing, toxic-appearing or diaphoretic  HENT:      Head: Normocephalic and atraumatic  Right Ear: External ear normal       Left Ear: External ear normal       Nose: Nose normal    Eyes:      Conjunctiva/sclera: Conjunctivae normal    Cardiovascular:      Rate and Rhythm: Normal rate and regular rhythm  Pulses: Normal pulses  Heart sounds: Normal heart sounds  No murmur heard  No friction rub  No gallop  Pulmonary:      Effort: Pulmonary effort is normal  No respiratory distress  Breath sounds: Normal breath sounds  No wheezing or rales     Abdominal:      General: Bowel sounds are normal  There is no distension  Palpations: Abdomen is soft  Tenderness: There is no abdominal tenderness  There is no guarding  Comments: Abdomen completely benign to deep palpation  No CVA tenderness   Musculoskeletal:         General: No deformity  Normal range of motion  Cervical back: Normal range of motion and neck supple  Right lower leg: No edema  Left lower leg: No edema  Comments: No midline TTP over the lumbar spine   Skin:     General: Skin is warm and dry  Neurological:      Mental Status: She is alert and oriented to person, place, and time  Motor: No abnormal muscle tone  ED Course     Patient's urine is leuk esterase positive  Symptoms consistent with urinary tract infection  She reports urgency of urination with difficulty controlling her urine but is able to feel when she has to urinate, has no low back pain, no numbness or weakness in the legs or saddle anesthesia  No signs or symptoms of pyelonephritis on exam here today  Discussed reasons to return to the emergency department  We will treat with course of Vantin      Critical Care Time  Procedures

## 2023-06-28 ENCOUNTER — VBI (OUTPATIENT)
Dept: ADMINISTRATIVE | Facility: OTHER | Age: 78
End: 2023-06-28

## 2023-06-28 NOTE — TELEPHONE ENCOUNTER
Micaela Franz    ED Visit Information     Ed visit date: 6/27/2023  Diagnosis Description: UTI  In Network? Yes [address] [facility]  Discharge status: Home  Discharged with meds ? Yes  Number of ED visits to date: 1  ED Severity:4     Outreach Information    Outreach successful: Yes 2  Date letter mailed:N/A  Date Finalized:6/29/2023    Care Coordination    Follow up appointment with pcp: no follow up not scheduled  Transportation issues ? No    Value [employer] type: 3 Day Outreach  Emergent necessity warranted by diagnosis: Yes  [facility] PCP: Yes  Transportation: Friend/Family Transport  If able to choose an ED   Would you have chosen [facility]: Yes  Called PCP first?: No  Feels able to call PCP for urgent problems ?: Yes  Understands what emergencies can be handled by PCP ?: Yes  Ever any problems getting appointment with PCP for minor emergency/urgency problems?: No  Practice Contacted Patient ?: No  Pt had ED follow up with pcp/staff ?: No    Seen for follow-up out of network ?: No  Reason Patient went to ED instead of Urgent Care or PCP?: Perceived Severity of Illness  Urgent care Education?: Yes  06/28/2023 02:02 PM EDT by Ulysses Apt 06/28/2023 02:02 PM EDT by Deniz BUTT (Self) [phone] (RUHAIO)[phone] (Mobile)  [phone] (Mobile) Remove  - Left MessageCommunicated - [email or web address]    06/29/2023 10:32 AM EDT by Ulysses Martin 06/29/2023 10:32 AM EDT by Rafy Pinedo (Self) [phone] (ALASRD)[phone] (Mobile)  [phone] (Mobile) Remove  - Call CompleteCommunicated - ED outreach successful

## 2023-07-02 DIAGNOSIS — F41.8 DEPRESSION WITH ANXIETY: ICD-10-CM

## 2023-07-03 RX ORDER — SERTRALINE HYDROCHLORIDE 100 MG/1
TABLET, FILM COATED ORAL
Qty: 180 TABLET | Refills: 1 | Status: SHIPPED | OUTPATIENT
Start: 2023-07-03

## 2023-07-05 ENCOUNTER — TRANSCRIBE ORDERS (OUTPATIENT)
Dept: NEUROSURGERY | Facility: CLINIC | Age: 78
End: 2023-07-05

## 2023-07-05 DIAGNOSIS — R26.9 GAIT DISTURBANCE: Primary | ICD-10-CM

## 2023-08-02 DIAGNOSIS — F41.9 ANXIETY: ICD-10-CM

## 2023-08-03 RX ORDER — ALPRAZOLAM 0.25 MG/1
0.25 TABLET ORAL DAILY PRN
Qty: 30 TABLET | Refills: 0 | Status: SHIPPED | OUTPATIENT
Start: 2023-08-03 | End: 2023-08-14

## 2023-08-03 NOTE — TELEPHONE ENCOUNTER
11/21/2022 11/21/2022   1  Alprazolam 0.25 Mg Tablet 30.00  29  El Kurt  [id number]   Nesha ([id number])  0  0.52 LME  [place]are  [place]

## 2023-08-03 NOTE — ASSESSMENT & PLAN NOTE
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 Possible expansion of ventricular system without medial temporal lobe focused ex-vacuo process. Plan:  • Exam: GCS 15, slight restricted upgaze, magnetic gait, otherwise moving all extremities without focal weakness  • Imaging results reviewed with patient and daughter. She does demonstrate some slightly enlarged ventricles. Clinically I do feel that she is presenting with some NPH symptoms specifically with her gait. It is difficult to say if her cognitive and urinary incontinence issues are related to possible NPH as they has been present since her [facility] in 2016 however do seem to have worsened since 1/2023. Discussed treatment options moving forward including formal LP and post LP physical therapy to assess whether or not patient has improvement. If this does demonstrate improvement she may be a candidate for shunt placement. She is aware that shunt helps with gait the most; cognition and incontinence may not always fully improve especially if present for prolonged time. Patient and daughter would like to move forward with this. Nurse navigator to discuss further with them.    • Patient is to return to the office after LP and PT session with AP and Dr. Panda King to discuss results and possible surgery or sooner should patient develop worsening symptoms or red flag signs

## 2023-08-04 ENCOUNTER — OFFICE VISIT (OUTPATIENT)
Dept: PHYSICAL THERAPY | Facility: CLINIC | Age: 78
End: 2023-08-04
Payer: COMMERCIAL

## 2023-08-04 ENCOUNTER — TELEPHONE (OUTPATIENT)
Age: 78
End: 2023-08-04

## 2023-08-04 ENCOUNTER — OFFICE VISIT (OUTPATIENT)
Dept: NEUROSURGERY | Facility: CLINIC | Age: 78
End: 2023-08-04
Payer: COMMERCIAL

## 2023-08-04 ENCOUNTER — HOSPITAL ENCOUNTER (INPATIENT)
Facility: HOSPITAL | Age: 78
LOS: 1 days | Discharge: HOME | End: 2023-08-06
Attending: EMERGENCY MEDICINE | Admitting: INTERNAL MEDICINE
Payer: COMMERCIAL

## 2023-08-04 ENCOUNTER — TELEPHONE (OUTPATIENT)
Dept: NEUROSURGERY | Facility: CLINIC | Age: 78
End: 2023-08-04

## 2023-08-04 ENCOUNTER — APPOINTMENT (EMERGENCY)
Dept: CT IMAGING | Facility: HOSPITAL | Age: 78
End: 2023-08-04
Payer: COMMERCIAL

## 2023-08-04 ENCOUNTER — TRANSCRIBE ORDERS (OUTPATIENT)
Dept: NEUROSURGERY | Facility: CLINIC | Age: 78
End: 2023-08-04

## 2023-08-04 ENCOUNTER — APPOINTMENT (EMERGENCY)
Dept: RADIOLOGY | Facility: HOSPITAL | Age: 78
End: 2023-08-04
Payer: COMMERCIAL

## 2023-08-04 VITALS
HEIGHT: 69 IN | TEMPERATURE: 98.2 F | RESPIRATION RATE: 16 BRPM | HEART RATE: 84 BPM | BODY MASS INDEX: 23.25 KG/M2 | SYSTOLIC BLOOD PRESSURE: 104 MMHG | OXYGEN SATURATION: 97 % | DIASTOLIC BLOOD PRESSURE: 60 MMHG | WEIGHT: 157 LBS

## 2023-08-04 DIAGNOSIS — R26.9 GAIT DISTURBANCE: ICD-10-CM

## 2023-08-04 DIAGNOSIS — G91.2 IDIOPATHIC NORMAL PRESSURE HYDROCEPHALUS (HCC): Primary | ICD-10-CM

## 2023-08-04 DIAGNOSIS — R29.898 LOWER EXTREMITY WEAKNESS: ICD-10-CM

## 2023-08-04 DIAGNOSIS — G93.89 CEREBRAL VENTRICULOMEGALY: ICD-10-CM

## 2023-08-04 DIAGNOSIS — R26.2 AMBULATORY DYSFUNCTION: Primary | ICD-10-CM

## 2023-08-04 DIAGNOSIS — F09 MILD COGNITIVE DISORDER: Primary | ICD-10-CM

## 2023-08-04 DIAGNOSIS — G91.2 NPH (NORMAL PRESSURE HYDROCEPHALUS) (HCC): ICD-10-CM

## 2023-08-04 DIAGNOSIS — G91.8 OTHER HYDROCEPHALUS (HCC): Primary | ICD-10-CM

## 2023-08-04 LAB
ALBUMIN SERPL BCP-MCNC: 4.1 G/DL (ref 3.5–5)
ALP SERPL-CCNC: 88 U/L (ref 34–104)
ALT SERPL W P-5'-P-CCNC: 16 U/L (ref 7–52)
ANION GAP SERPL CALCULATED.3IONS-SCNC: 8 MMOL/L
APTT PPP: 30 SECONDS (ref 23–37)
AST SERPL W P-5'-P-CCNC: 21 U/L (ref 13–39)
BASOPHILS # BLD AUTO: 0.03 THOUSANDS/ÂΜL (ref 0–0.1)
BASOPHILS NFR BLD AUTO: 0 % (ref 0–1)
BILIRUB SERPL-MCNC: 0.37 MG/DL (ref 0.2–1)
BUN SERPL-MCNC: 12 MG/DL (ref 5–25)
CALCIUM SERPL-MCNC: 9.3 MG/DL (ref 8.4–10.2)
CARDIAC TROPONIN I PNL SERPL HS: 5 NG/L
CHLORIDE SERPL-SCNC: 101 MMOL/L (ref 96–108)
CO2 SERPL-SCNC: 29 MMOL/L (ref 21–32)
CREAT SERPL-MCNC: 0.8 MG/DL (ref 0.6–1.3)
EOSINOPHIL # BLD AUTO: 0.03 THOUSAND/ÂΜL (ref 0–0.61)
EOSINOPHIL NFR BLD AUTO: 0 % (ref 0–6)
ERYTHROCYTE [DISTWIDTH] IN BLOOD BY AUTOMATED COUNT: 13.9 % (ref 11.6–15.1)
GFR SERPL CREATININE-BSD FRML MDRD: 71 ML/MIN/1.73SQ M
GLUCOSE SERPL-MCNC: 108 MG/DL (ref 65–140)
HCT VFR BLD AUTO: 39.9 % (ref 34.8–46.1)
HGB BLD-MCNC: 12.8 G/DL (ref 11.5–15.4)
IMM GRANULOCYTES # BLD AUTO: 0.02 THOUSAND/UL (ref 0–0.2)
IMM GRANULOCYTES NFR BLD AUTO: 0 % (ref 0–2)
INR PPP: 0.99 (ref 0.84–1.19)
LYMPHOCYTES # BLD AUTO: 0.76 THOUSANDS/ÂΜL (ref 0.6–4.47)
LYMPHOCYTES NFR BLD AUTO: 11 % (ref 14–44)
MAGNESIUM SERPL-MCNC: 2 MG/DL (ref 1.9–2.7)
MCH RBC QN AUTO: 27.9 PG (ref 26.8–34.3)
MCHC RBC AUTO-ENTMCNC: 32.1 G/DL (ref 31.4–37.4)
MCV RBC AUTO: 87 FL (ref 82–98)
MONOCYTES # BLD AUTO: 1 THOUSAND/ÂΜL (ref 0.17–1.22)
MONOCYTES NFR BLD AUTO: 14 % (ref 4–12)
NEUTROPHILS # BLD AUTO: 5.11 THOUSANDS/ÂΜL (ref 1.85–7.62)
NEUTS SEG NFR BLD AUTO: 75 % (ref 43–75)
NRBC BLD AUTO-RTO: 0 /100 WBCS
PHOSPHATE SERPL-MCNC: 2.8 MG/DL (ref 2.3–4.1)
PLATELET # BLD AUTO: 278 THOUSANDS/UL (ref 149–390)
PMV BLD AUTO: 9.1 FL (ref 8.9–12.7)
POTASSIUM SERPL-SCNC: 3.5 MMOL/L (ref 3.5–5.3)
PROT SERPL-MCNC: 7 G/DL (ref 6.4–8.4)
PROTHROMBIN TIME: 13.3 SECONDS (ref 11.6–14.5)
RBC # BLD AUTO: 4.58 MILLION/UL (ref 3.81–5.12)
SODIUM SERPL-SCNC: 138 MMOL/L (ref 135–147)
TSH SERPL DL<=0.05 MIU/L-ACNC: 1.27 UIU/ML (ref 0.45–4.5)
WBC # BLD AUTO: 6.95 THOUSAND/UL (ref 4.31–10.16)

## 2023-08-04 PROCEDURE — 84484 ASSAY OF TROPONIN QUANT: CPT | Performed by: EMERGENCY MEDICINE

## 2023-08-04 PROCEDURE — 70450 CT HEAD/BRAIN W/O DYE: CPT

## 2023-08-04 PROCEDURE — 85025 COMPLETE CBC W/AUTO DIFF WBC: CPT | Performed by: EMERGENCY MEDICINE

## 2023-08-04 PROCEDURE — 80053 COMPREHEN METABOLIC PANEL: CPT | Performed by: EMERGENCY MEDICINE

## 2023-08-04 PROCEDURE — 97161 PT EVAL LOW COMPLEX 20 MIN: CPT | Performed by: PHYSICAL THERAPIST

## 2023-08-04 PROCEDURE — 99285 EMERGENCY DEPT VISIT HI MDM: CPT | Performed by: EMERGENCY MEDICINE

## 2023-08-04 PROCEDURE — 99214 OFFICE O/P EST MOD 30 MIN: CPT | Performed by: PHYSICIAN ASSISTANT

## 2023-08-04 PROCEDURE — 83735 ASSAY OF MAGNESIUM: CPT

## 2023-08-04 PROCEDURE — 71045 X-RAY EXAM CHEST 1 VIEW: CPT

## 2023-08-04 PROCEDURE — 99285 EMERGENCY DEPT VISIT HI MDM: CPT

## 2023-08-04 PROCEDURE — 85610 PROTHROMBIN TIME: CPT

## 2023-08-04 PROCEDURE — G1004 CDSM NDSC: HCPCS

## 2023-08-04 PROCEDURE — 84443 ASSAY THYROID STIM HORMONE: CPT

## 2023-08-04 PROCEDURE — 36415 COLL VENOUS BLD VENIPUNCTURE: CPT

## 2023-08-04 PROCEDURE — 85730 THROMBOPLASTIN TIME PARTIAL: CPT

## 2023-08-04 PROCEDURE — 84100 ASSAY OF PHOSPHORUS: CPT

## 2023-08-04 PROCEDURE — 93005 ELECTROCARDIOGRAM TRACING: CPT

## 2023-08-04 RX ORDER — ONDANSETRON 2 MG/ML
4 INJECTION INTRAMUSCULAR; INTRAVENOUS ONCE
Status: DISCONTINUED | OUTPATIENT
Start: 2023-08-04 | End: 2023-08-04

## 2023-08-04 RX ORDER — METRONIDAZOLE 500 MG/100ML
500 INJECTION, SOLUTION INTRAVENOUS ONCE
Status: DISCONTINUED | OUTPATIENT
Start: 2023-08-04 | End: 2023-08-04

## 2023-08-04 NOTE — PROGRESS NOTES
Neurosurgery Office Note  Angie Jones 69 y.o. female MRN: [id number]      Assessment/Plan     Hydrocephalus  Patient presents at the request of neurology for the evaluation of possible NPH  · Known to the [id number] [facility] office, s/p [facility] 1/2016 thought to be due to possible aneurysm rupture which thrombosed  · Has been struggling with worsening memory issues, gait problems and urinary incontinence for which she underwent MRI brain neuroquant which demonstrated slightly enlarged ventricles    Imaging:  • MRI brain neuroquant wo contrast 5/28/2023: Normal hippocampal volume and enlarged ventricular system: Findings do not support hippocampal degeneration. Possible expansion of ventricular system without medial temporal lobe focused ex-vacuo process. Plan:  • Exam: GCS 15, slight restricted upgaze, magnetic gait, otherwise moving all extremities without focal weakness  • Imaging results reviewed with patient and daughter. She does demonstrate some slightly enlarged ventricles. Clinically I do feel that she is presenting with some NPH symptoms specifically with her gait. It is difficult to say if her cognitive and urinary incontinence issues are related to possible NPH as they has been present since her [facility] in 2016 however do seem to have worsened since 1/2023. Discussed treatment options moving forward including formal LP and post LP physical therapy to assess whether or not patient has improvement. If this does demonstrate improvement she may be a candidate for shunt placement. She is aware that shunt helps with gait the most; cognition and incontinence may not always fully improve especially if present for prolonged time. Patient and daughter would like to move forward with this. Nurse navigator to discuss further with them.    • Patient is to return to the office after LP and PT session with STEFAN and Dr. Gabriela Temple to discuss results and possible surgery or sooner should patient develop worsening symptoms or red flag signs         Diagnoses and all orders for this visit:    Other hydrocephalus [facility])    Cerebral ventriculomegaly  -     Ambulatory referral to Neurosurgery          I have spent a total time of 40 minutes on 08/04/23 in caring for this patient including Diagnostic results, Risks and benefits of tx options, Instructions for management, Patient and family education, Impressions, Counseling / Coordination of care, Documenting in the medical record, Reviewing / ordering tests, medicine, procedures   and Obtaining or reviewing history  . CHIEF COMPLAINT    Chief Complaint   Patient presents with   • Consult     NPH(CONSULT       HISTORY    History of Present Illness       69y.o. 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 with past medical history significant for hydrocephalus, vertigo, mild cognitive disorder, osteopenia, arthritis, history of breast cancer, hyperlipidemia, depression, anxiety, and stress incontinence who presents for evaluation of possible NPH. Patient known to our service who unfortunately had a spontaneous subarachnoid hemorrhage on 1/3/2016. The possibility that her subarachnoid hemorrhage was the result of a ruptured aneurysm which may have thrombosed post rupture. Today she is here with her daughter. She states that since her subarachnoid hemorrhage she has had some mild cognitive issues as well as urinary incontinence however since January she feels like the symptoms have worsened alongside gait instability. She states that she used to wear pads for urinary incontinence but now is wearing depends and will go through 4 or 5 depends per day. She states that time she does not realize she has to go to the bathroom and once she takes her depends off will note that it is soaked through. Her daughter states that patient has always had some mild cognitive issues but since January she has become more forgetful and repetitive with her speech.   Her gait has been declining, she is now using assistive devices to ambulate. She did have a fall a few days ago without head strike as well as over July 4 weekend. She underwent an MRI neuro quant with neurology which did not demonstrate any findings to explain her cognitive decline however did note enlarged ventricles with concern for possible normal pressure hydrocephalus if clinically correlated. Patient underwent MoCA testing, 24 out of 30. She underwent pre-LP physical therapy which did note that she was a fall risk with some gait imbalance, steppage issues, history of falls. See Discussion    REVIEW OF SYSTEMS    Review of Systems   Constitutional: Negative. HENT: Negative. Eyes: Positive for visual disturbance (Wears glasses, may need new glasses, has upcoming appt). Respiratory: Negative. Cardiovascular: Negative. Gastrointestinal: Negative. Endocrine: Negative. Genitourinary: Positive for enuresis, frequency (nocturia x1) and urgency. Since brain bleed has to wear pad daily and all day due to leakage and has an alarm set for every 2 hours since she can't feel urge to urinate   Musculoskeletal: Positive for gait problem (uses cane for assistance, no falls within the last 5 years except for 1 fall about 3 weeks ago while bending down to  an item while entering door of her home). Skin: Negative. Allergic/Immunologic: Negative. Neurological: Positive for speech difficulty (duaghter states forgets words) and weakness (bilateral legs). Negative for dizziness, tremors, seizures, syncope, numbness and headaches.         NPH(CONSULT)- referred by neuro    PT @ noon *work up by [facility] first to determine if LP is warranted per HDM*    difficulty walking ( uses cane)   urinary incontinence and confusion and memory loss sicne 2016 and gradually declining since 1/2023      NPH scale dated 8/4/23, 9/15.     PT gait assessment dated 8/4/23. 10 meter walking test .625 m/s (<1m/s predictive of falls), TUG 28 sec (<12 sec predictive of falls), FGA 7/30 (< 24 predictive of falls).     MoCA dated 8/4/23, 24/30 (<26 cognitive deficit present). - Last MOCA done 5/10/23-   Hematological: Negative. Psychiatric/Behavioral: Positive for confusion (memory loss ). Worsening memory loss, short term memory    Recent PCP/Medicare Wellness visit, past cogenitive assessment        ROS obtained by MA. Reviewed. See HPI. Meds/Allergies     Current Outpatient Medications   Medication Sig Dispense Refill   • acetaminophen (TYLENOL) 500 mg tablet Take 1,000 mg by mouth if needed for mild pain     • ALPRAZolam (XANAX) 0.25 mg tablet Take 1 tablet (0.25 mg total) by mouth daily as needed for anxiety Take 2 pills 30 min prior to procedure. 30 tablet 0   • Calcium-Vitamin D (CALTRATE 600 PLUS-VIT D PO) Take by mouth daily. • esomeprazole (NexIUM) 10 MG packet Take 10 mg by mouth every morning before breakfast     • ezetimibe (ZETIA) 10 mg tablet TAKE ONE TABLET BY MOUTH EVERY DAY 30 tablet 5   • fluticasone (FLONASE) 50 mcg/act nasal spray 1 spray into each nostril as needed     • LUMIGAN 0.01 % ophthalmic drops PLACE 1 DROP INTO IN [facility] EYE AT BEDTIME  11   • meclizine (ANTIVERT) 25 mg tablet Take 1 tablet (25 mg total) by mouth every 8 (eight) hours as needed for dizziness 30 tablet 0   • Multiple Vitamins-Minerals (MULTIVITAMIN ADULTS 50+) TABS Take by mouth daily     • NON FORMULARY CBD Gummies     • psyllium (METAMUCIL) 0.52 g capsule Take by mouth daily 2-3 caps daily     • sertraline (ZOLOFT) 100 mg tablet TAKE 2 TABLETS BY MOUTH DAILY 180 tablet 1   • acetaminophen (TYLENOL) 325 mg tablet Take 650 mg by mouth every 6 (six) hours as needed for mild pain (Patient not taking: Reported on 3/21/2023)     • cyanocobalamin (VITAMIN B-12) 100 mcg tablet Take by mouth daily       No current facility-administered medications for this visit.        Allergies   Allergen Reactions   • Oxycodone Vomiting   • Atorvastatin Other (See Comments)     Leg/knee pain (joint pain)   • Bactrim [Sulfamethoxazole-Trimethoprim] Itching and Rash   • Keppra [Levetiracetam] Rash   • Livalo [Pitavastatin] Itching   • Penicillins Rash     Agitation        PAST HISTORY    Past Medical History:   Diagnosis Date   • Acute mastoiditis with intracranial complication 24/27/1778   • Anxiety disorder    • Brain hemangioma ([address])     2016   • BRCA1 negative    • BRCA2 negative    • Breast cancer ([address]) 2014    left breast   • Cancer (HCC)    • Constipation    • Esophageal reflux    • History of bone density study 2011    [employer]   • History of radiation therapy 2014    left breast ca   • History of recurrent UTIs    • 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   • Hyperlipidemia    • Hypertension    • Malignant neoplasm of female breast ([address]) 04/22/2014   • Osteoarthritis of hip    • Osteopenia    • Stroke, hemorrhagic ([address]) 12/2015   • Subarachnoid hemorrhage ([address])        Past Surgical History:   Procedure Laterality Date   • BREAST BIOPSY Left 2014   • BREAST LUMPECTOMY Left 2014   • COLONOSCOPY      Fiberoptic - 2009, 2015 5 year f/u   • ESOPHAGOGASTRODUODENOSCOPY  2009    Diag, Resolved - 2006 / 2009   • EXOSTECTOMY  2005    Simple Bunion ([facility])   • HYSTERECTOMY  1981   • JOINT REPLACEMENT Right 2012    Right Hip   • OOPHORECTOMY  1981    not sure which one   • REPLACEMENT TOTAL KNEE Right 2018   • VENTRICULOSTOMY         Social History     Tobacco Use   • Smoking status: Never     Passive exposure: Past   • Smokeless tobacco: Never   Vaping Use   • Vaping Use: Never used   Substance Use Topics   • Alcohol use: Yes     Comment: occasionally   • Drug use: No       Family History   Problem Relation Age of Onset   • Cancer Mother 71        bone   • Hypertension Mother    • Arthritis Mother    • Stroke Father         TIA   • Stroke Maternal Grandmother         CVA   • Deep vein thrombosis Daughter    • Heart attack Neg Hx    • Anuerysm Neg Hx    • Clotting disorder Neg Hx    • Arrhythmia Neg Hx    • Heart failure Neg Hx    • Coronary artery disease Neg Hx    • Hyperlipidemia Neg Hx    • Breast cancer Neg Hx          Above history personally reviewed. EXAM    Vitals:Blood pressure 104/60, pulse 84, temperature 98.2 °F (36.8 °C), temperature source Temporal, resp. rate 16, height 5' 9.25" (1.759 m), weight 71.2 kg (157 lb), SpO2 97 %, not currently breastfeeding. ,Body mass index is 23.02 kg/m². Physical Exam  Constitutional:       General: She is awake. Appearance: Normal appearance. HENT:      Head: Normocephalic and atraumatic. Eyes:      Extraocular Movements: EOM normal.      Conjunctiva/sclera: Conjunctivae normal.      Comments: Slightly limited upgaze   Cardiovascular:      Rate and Rhythm: Normal rate. Pulmonary:      Effort: Pulmonary effort is normal. No respiratory distress. Skin:     General: Skin is warm and dry. Neurological:      Mental Status: She is alert and oriented to person, place, and time. Motor: Motor strength is normal.     Coordination: Finger-Nose-Finger Test normal.   Psychiatric:         Attention and Perception: Attention and perception normal.         Mood and Affect: Mood and affect normal.         Speech: Speech normal.         Behavior: Behavior normal. Behavior is cooperative. Thought Content: Thought content normal.         Cognition and Memory: Cognition and memory normal.         Judgment: Judgment normal.         Neurologic Exam     Mental Status   Oriented to person, place, and time. Follows 1 step commands. Attention: normal. Concentration: normal.   Speech: speech is normal   Level of consciousness: alert  Knowledge: good. Normal comprehension.      Cranial Nerves     CN III, IV, VI   Extraocular motions are normal.   CN III: no CN III palsy  CN VI: no CN VI palsy  Nystagmus: none   Diplopia: none  Ophthalmoparesis: none  Upgaze: normal  Downgaze: normal  Conjugate gaze: present    CN V   Right facial sensation deficit: none  Left facial sensation deficit: none    CN VII   Right facial weakness: none  Left facial weakness: none    CN VIII   Hearing: intact    CN IX, X   CN IX normal.   CN X normal.     CN XI   Right trapezius strength: normal  Left trapezius strength: normal    CN XII   CN XII normal.     Motor Exam   Muscle bulk: normal  Overall muscle tone: normal  Right arm pronator drift: absent  Left arm pronator drift: absent    Strength   Strength 5/5 throughout. Sensory Exam   Light touch normal.     Gait, Coordination, and Reflexes     Gait  Gait: shuffling (magnetic gait, has to take several turns to change directions)    Coordination   Finger to nose coordination: normal    Tremor   Resting tremor: absent  Intention tremor: absent  Action tremor: absent    Reflexes   Right : 2+  Left : 2+  Right [place]: absent  Left [place]: absent  Right ankle clonus: absent  Left ankle clonus: absent        MEDICAL DECISION MAKING    Imaging Studies:     No results found. I have personally reviewed pertinent reports.    and I have personally reviewed pertinent films in PACS

## 2023-08-04 NOTE — TELEPHONE ENCOUNTER
Patient saw CAMI ESCOBAR today in office and would like to schedule LP. Scheduled for the appointment on Friday, 9/1/23 daughter will call me if it needs to be r/s. She doesn't take any blood thinning medications.

## 2023-08-04 NOTE — PROGRESS NOTES
PT Evaluation    Today's date: 2023  Patient name: Jessie Cabrera  : [id number]  MRN: [id number]  Referring provider: Yair Biswas MD  Dx:   Encounter Diagnosis     ICD-10-CM    1. Idiopathic normal pressure hydrocephalus (HCC)  G91.2       2. Gait disturbance  R26.9 Ambulatory referral to Physical Therapy              Subjective Evaluation     History of Present Illness    Patient presents with c/o imbalance since  when she had a hemorrhagic stroke. She had trouble remembering with her car. Her daughter feels like it took her. In the last week she has had shuffling of the week. She feels her legs won't work right. Neuro signs: urinary incontinence-getting worse  Red flags: none  Occupation: retired banking      Pain  At best pain ratin  At worst pain ratin    Social Support  Lives in a high rise senior floor independent living and is independent       Patient Goals  Patient goals for therapy: Stay alive    STGs  1. Safely ambulate       LTGs  1. Will assess prn        Objective Measurements:  A& Ox 3  Gait: shuffling gait pattern c cane  Walking s AD mod imbalance c turns and needed mod A to regain balance. TU sec c cane  FGA7/10  Gait speed: .625 m/s        stairs: step to pattern c rail         Assessment:    Please refer to the NPH Exam form for all objective measures. Patient was seen for a gait and balance assessment and presents with mod-high fall risk. She will be seen for re-assessment of her gait/balance as needed as part of the management of their condition. Impairments include: Decreased balance, Decreased endurance, Gait deficits    Etiologic factors include stroke in  and steady decline since January. Plan  Patient would benefit from:outpatient PT for posture, balance, and L knee pain to help her with transfers and safety in her home. She would also benefit from consult c neurosurgeon to assess need for treatment.      Treatment plan discussed with: patient    Please contact me if you have any further questions or recommendations.  Thank you very much for the kind referral.

## 2023-08-05 PROBLEM — R26.2 AMBULATORY DYSFUNCTION: Status: ACTIVE | Noted: 2023-08-05

## 2023-08-05 PROBLEM — G91.2 NPH (NORMAL PRESSURE HYDROCEPHALUS) (HCC): Status: ACTIVE | Noted: 2023-08-05

## 2023-08-05 LAB
2HR DELTA HS TROPONIN: -1 NG/L
ANION GAP SERPL CALCULATED.3IONS-SCNC: 7 MMOL/L
BILIRUB UR QL STRIP: NEGATIVE
BUN SERPL-MCNC: 9 MG/DL (ref 5–25)
CALCIUM SERPL-MCNC: 9 MG/DL (ref 8.4–10.2)
CARDIAC TROPONIN I PNL SERPL HS: 4 NG/L
CHLORIDE SERPL-SCNC: 103 MMOL/L (ref 96–108)
CLARITY UR: CLEAR
CO2 SERPL-SCNC: 30 MMOL/L (ref 21–32)
COLOR UR: NORMAL
CREAT SERPL-MCNC: 0.72 MG/DL (ref 0.6–1.3)
ERYTHROCYTE [DISTWIDTH] IN BLOOD BY AUTOMATED COUNT: 14.1 % (ref 11.6–15.1)
GFR SERPL CREATININE-BSD FRML MDRD: 81 ML/MIN/1.73SQ M
GLUCOSE SERPL-MCNC: 95 MG/DL (ref 65–140)
GLUCOSE UR STRIP-MCNC: NEGATIVE MG/DL
HCT VFR BLD AUTO: 39.9 % (ref 34.8–46.1)
HGB BLD-MCNC: 12.7 G/DL (ref 11.5–15.4)
HGB UR QL STRIP.AUTO: NEGATIVE
KETONES UR STRIP-MCNC: NEGATIVE MG/DL
LEUKOCYTE ESTERASE UR QL STRIP: NEGATIVE
MCH RBC QN AUTO: 27.8 PG (ref 26.8–34.3)
MCHC RBC AUTO-ENTMCNC: 31.8 G/DL (ref 31.4–37.4)
MCV RBC AUTO: 87 FL (ref 82–98)
NITRITE UR QL STRIP: NEGATIVE
PH UR STRIP.AUTO: 5.5 [PH]
PLATELET # BLD AUTO: 272 THOUSANDS/UL (ref 149–390)
PMV BLD AUTO: 9.6 FL (ref 8.9–12.7)
POTASSIUM SERPL-SCNC: 3.8 MMOL/L (ref 3.5–5.3)
PROT UR STRIP-MCNC: NEGATIVE MG/DL
RBC # BLD AUTO: 4.57 MILLION/UL (ref 3.81–5.12)
SODIUM SERPL-SCNC: 140 MMOL/L (ref 135–147)
SP GR UR STRIP.AUTO: 1.02 (ref 1–1.03)
UROBILINOGEN UR STRIP-ACNC: <2 MG/DL
WBC # BLD AUTO: 5.7 THOUSAND/UL (ref 4.31–10.16)

## 2023-08-05 PROCEDURE — 81003 URINALYSIS AUTO W/O SCOPE: CPT

## 2023-08-05 PROCEDURE — 80048 BASIC METABOLIC PNL TOTAL CA: CPT

## 2023-08-05 PROCEDURE — 85027 COMPLETE CBC AUTOMATED: CPT

## 2023-08-05 PROCEDURE — 99223 1ST HOSP IP/OBS HIGH 75: CPT | Performed by: INTERNAL MEDICINE

## 2023-08-05 RX ORDER — ACETAMINOPHEN 325 MG/1
650 TABLET ORAL EVERY 6 HOURS PRN
Status: DISCONTINUED | OUTPATIENT
Start: 2023-08-05 | End: 2023-08-06 | Stop reason: HOSPADM

## 2023-08-05 RX ORDER — PANTOPRAZOLE SODIUM 20 MG/1
20 TABLET, DELAYED RELEASE ORAL
Status: DISCONTINUED | OUTPATIENT
Start: 2023-08-05 | End: 2023-08-06 | Stop reason: HOSPADM

## 2023-08-05 RX ORDER — EZETIMIBE 10 MG/1
10 TABLET ORAL DAILY
Status: DISCONTINUED | OUTPATIENT
Start: 2023-08-05 | End: 2023-08-06 | Stop reason: HOSPADM

## 2023-08-05 RX ORDER — MECLIZINE HYDROCHLORIDE 25 MG/1
25 TABLET ORAL EVERY 8 HOURS PRN
Status: DISCONTINUED | OUTPATIENT
Start: 2023-08-05 | End: 2023-08-06 | Stop reason: HOSPADM

## 2023-08-05 RX ORDER — SERTRALINE HYDROCHLORIDE 100 MG/1
200 TABLET, FILM COATED ORAL DAILY
Status: DISCONTINUED | OUTPATIENT
Start: 2023-08-05 | End: 2023-08-06 | Stop reason: HOSPADM

## 2023-08-05 RX ORDER — BIMATOPROST 0.3 MG/ML
1 SOLUTION/ DROPS OPHTHALMIC DAILY
Status: DISCONTINUED | OUTPATIENT
Start: 2023-08-05 | End: 2023-08-06 | Stop reason: HOSPADM

## 2023-08-05 RX ORDER — POLYETHYLENE GLYCOL 3350 17 G/17G
17 POWDER, FOR SOLUTION ORAL DAILY PRN
Status: DISCONTINUED | OUTPATIENT
Start: 2023-08-05 | End: 2023-08-06 | Stop reason: HOSPADM

## 2023-08-05 RX ORDER — LANOLIN ALCOHOL/MO/W.PET/CERES
3 CREAM (GRAM) TOPICAL
Status: DISCONTINUED | OUTPATIENT
Start: 2023-08-05 | End: 2023-08-06 | Stop reason: HOSPADM

## 2023-08-05 RX ORDER — FLUTICASONE PROPIONATE 50 MCG
1 SPRAY, SUSPENSION (ML) NASAL AS NEEDED
Status: DISCONTINUED | OUTPATIENT
Start: 2023-08-05 | End: 2023-08-06 | Stop reason: HOSPADM

## 2023-08-05 RX ORDER — ALPRAZOLAM 0.25 MG/1
0.25 TABLET ORAL DAILY PRN
Status: DISCONTINUED | OUTPATIENT
Start: 2023-08-05 | End: 2023-08-06 | Stop reason: HOSPADM

## 2023-08-05 RX ORDER — ENOXAPARIN SODIUM 100 MG/ML
40 INJECTION SUBCUTANEOUS DAILY
Status: DISCONTINUED | OUTPATIENT
Start: 2023-08-05 | End: 2023-08-06 | Stop reason: HOSPADM

## 2023-08-05 RX ADMIN — EZETIMIBE 10 MG: 10 TABLET ORAL at 10:43

## 2023-08-05 RX ADMIN — POLYETHYLENE GLYCOL 3350 17 G: 17 POWDER, FOR SOLUTION ORAL at 10:43

## 2023-08-05 RX ADMIN — PANTOPRAZOLE SODIUM 20 MG: 20 TABLET, DELAYED RELEASE ORAL at 05:07

## 2023-08-05 RX ADMIN — ENOXAPARIN SODIUM 40 MG: 40 INJECTION SUBCUTANEOUS at 10:43

## 2023-08-05 RX ADMIN — MULTIPLE VITAMINS W/ MINERALS TAB 1 TABLET: TAB ORAL at 10:53

## 2023-08-05 RX ADMIN — SERTRALINE 200 MG: 100 TABLET, FILM COATED ORAL at 10:43

## 2023-08-05 RX ADMIN — Medication 1 PACKET: at 10:43

## 2023-08-05 RX ADMIN — FLUTICASONE PROPIONATE 1 SPRAY: 50 SPRAY, METERED NASAL at 21:13

## 2023-08-05 RX ADMIN — VITAM B12 100 MCG: 100 TAB at 10:43

## 2023-08-05 NOTE — H&P
[id number] [facility]  H&P  Name: Huma Spine 69 y.o. 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 • 5/2023 vitamin B12: 1535  • 8/5 CT head: No hemorrhage. Chronic microangiopathic changes  • Etiology less likely secondary to worsening NPH. Less likely stroke given presentation and timeline of symptoms. Also consider ALS    Plan:  • Neurology consulted for further evaluation of NPH  • Fall precautions. PT/OT eval      NPH (normal pressure hydrocephalus) [facility])  Assessment & Plan  Assessment:  • 5/28/2023 MRI Brain: Normal hippocampal volume and enlarged ventricular system. Possible expansion of ventricular system without medial temporal lobe focused ex-vacuo process. • MoCA 24 out of 30  • Patient underwent pre-LP physical therapy that noted she was a fall risk with some gait imbalance. Plan:  • Defer to neurology for LP or shunt placement      Major depressive disorder, single episode, unspecified  Assessment & Plan  · Continue home meds sertraline 200 mg and 0.25 mg Ativan as needed. Benign paroxysmal vertigo, bilateral  Assessment & Plan  · Continue home med meclizine       VTE Pharmacologic Prophylaxis: VTE Score: 4 Moderate Risk (Score 3-4) - Pharmacological DVT Prophylaxis Ordered: enoxaparin (Lovenox). Code Status: Level 1 - Full Code   Discussion with family: Patient has requested that her daughter be updated during the day. Anticipated Length of Stay: Patient will be admitted on an inpatient basis with an anticipated length of stay of greater than 2 midnights secondary to Ambulatory dysfunction work-up and neurology evaluation.     Chief Complaint: Difficulty walking    History of Present Illness:  Dave Sol is a 69 y.o. female with a PMH of NPH, history of cerebral arachnoid hemorrhage and anxiety who presents with a 2-week history of difficulty ambulating. Patient describes that she feels like her legs are "not there" and sometimes shuffles when she walks. She also endorses frequent need to urinate but denies any burning or hematuria. She said 2 weeks ago she woke up in the middle of the night and went to go use the restroom before she sustained a mechanical fall. She denies any prodromal symptoms and was able to lean against the nightstand and did not strike her head. She attributes this fall to her legs "not working". Patient endorses some memory difficulties, but demonstrated normal memory recall when talking to provider. Patient denies any headache, fever, chills, chest pain, abdominal pain, nausea, vomiting, constipation, or blood in stool. Patient endorses a dry cough for the past few weeks, but denies any recent sickness. Patient has never smoked tobacco.    In the ED, imaging was negative for any intracranial bleed. Blood work was largely within normal limits. Patient was admitted with neuro consult given her history of NPH and worsening ambulatory dysfunction. ED messaged neurosurgery at [facility], and they did not feel that she needed acute neurosurgery care warranting transfer. .  Review of Systems:  Review of Systems   Constitutional: Negative for chills and fever. HENT: Negative for ear pain and sore throat. Eyes: Negative for pain and visual disturbance. Respiratory: Positive for cough. Negative for chest tightness, shortness of breath and wheezing. Cardiovascular: Negative for chest pain and palpitations. Gastrointestinal: Negative for abdominal distention, abdominal pain, constipation, diarrhea, nausea and vomiting. Genitourinary: Positive for frequency and urgency.  Negative for difficulty urinating, dysuria, flank pain and hematuria. Musculoskeletal: Positive for gait problem. Negative for arthralgias and back pain. Skin: Negative for color change, rash and wound. Neurological: Positive for weakness and numbness. Negative for dizziness, seizures, syncope, facial asymmetry, light-headedness and headaches. Psychiatric/Behavioral: Positive for decreased concentration. All other systems reviewed and are negative. Past Medical and Surgical History:   Past Medical History:   Diagnosis Date   • Acute mastoiditis with intracranial complication 35/84/7756   • Anxiety disorder    • Brain hemangioma ([address])     2016   • BRCA1 negative    • BRCA2 negative    • Breast cancer ([address]) 2014    left breast   • Cancer (HCC)    • Constipation    • Esophageal reflux    • History of bone density study 2011    [employer]   • History of radiation therapy 2014    left breast ca   • History of recurrent UTIs    • 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   • Hyperlipidemia    • Hypertension    • Malignant neoplasm of female breast ([address]) 04/22/2014   • Osteoarthritis of hip    • Osteopenia    • Stroke, hemorrhagic ([address]) 12/2015   • Subarachnoid hemorrhage ([address])        Past Surgical History:   Procedure Laterality Date   • BREAST BIOPSY Left 2014   • BREAST LUMPECTOMY Left 2014   • COLONOSCOPY      Fiberoptic - 2009, 2015 5 year f/u   • ESOPHAGOGASTRODUODENOSCOPY  2009    Diag, Resolved - 2006 / 2009   • EXOSTECTOMY  2005    Simple Bunion ([facility])   • HYSTERECTOMY  1981   • JOINT REPLACEMENT Right 2012    Right Hip   • OOPHORECTOMY  1981    not sure which one   • REPLACEMENT TOTAL KNEE Right 2018   • VENTRICULOSTOMY         Meds/Allergies:  Prior to Admission medications    Medication Sig Start Date End Date Taking?  Authorizing Provider   acetaminophen (TYLENOL) 500 mg tablet Take 1,000 mg by mouth if needed for mild pain   Yes Historical Provider, MD   ALPRAZolam (XANAX) 0.25 mg tablet Take 1 tablet (0.25 mg total) by mouth daily as needed for anxiety Take 2 pills 30 min prior to procedure. 8/3/23  Yes Art Crowell MD   Calcium-Vitamin D (CALTRATE 600 PLUS-VIT D PO) Take by mouth daily. Yes Historical Provider, MD   esomeprazole (NexIUM) 10 MG packet Take 10 mg by mouth every morning before breakfast   Yes Historical Provider, MD   ezetimibe (ZETIA) 10 mg tablet TAKE ONE TABLET BY MOUTH EVERY DAY 6/1/23  Yes Toño Thakkar MD   fluticasone (FLONASE) 50 mcg/act nasal spray 1 spray into each nostril as needed   Yes Historical Provider, MD   LUMJAVIER 0.01 % ophthalmic drops PLACE 1 DROP INTO IN [facility] E[facility] AT BEDTIME 10/3/19  Yes Historical Provider, MD   meclizine (ANTIVERT) 25 mg tablet Take 1 tablet (25 mg total) by mouth every 8 (eight) hours as needed for dizziness 7/30/19  Yes Joesph Townsend MD   Multiple Vitamins-Minerals (MULTIVITAMIN ADULTS 50+) TABS Take by mouth daily   Yes Historical Provider, MD   NON FORMULARY CBD Gummies   Yes Historical Provider, MD   psyllium (METAMUCIL) 0.52 g capsule Take by mouth daily 2-3 caps daily   Yes Historical Provider, MD   sertraline (ZOLOFT) 100 mg tablet TAKE 2 TABLETS BY MOUTH DAILY 7/3/23  Yes Art Crowell MD   cyanocobalamin (VITAMIN B-12) 100 mcg tablet Take by mouth daily    Historical Provider, MD   acetaminophen (TYLENOL) 325 mg tablet Take 650 mg by mouth every 6 (six) hours as needed for mild pain  Patient not taking: Reported on 3/21/2023  8/5/23  Historical Provider, MD     I have reviewed home medications with patient personally. Allergies: Allergies   Allergen Reactions   • Oxycodone Vomiting   • Atorvastatin Other (See Comments)     Leg/knee pain (joint pain)   • Bactrim [Sulfamethoxazole-Trimethoprim] Itching and Rash   • Keppra [Levetiracetam] Rash   • Livalo [Pitavastatin] Itching   • Penicillins Rash     Agitation        Social History:  Marital Status:     Occupation: Retired  Patient Pre-hospital Living Situation: Home  Patient Pre-hospital Level of Mobility: walks with walker  Patient Pre-hospital Diet Restrictions: None  Substance Use History:   Social History     Substance and Sexual Activity   Alcohol Use Yes    Comment: occasionally     Social History     Tobacco Use   Smoking Status Never   • Passive exposure: Past   Smokeless Tobacco Never     Social History     Substance and Sexual Activity   Drug Use No       Family History:  Family History   Problem Relation Age of Onset   • Cancer Mother 71        bone   • Hypertension Mother    • Arthritis Mother    • Stroke Father         TIA   • Stroke Maternal Grandmother         CVA   • Deep vein thrombosis Daughter    • Heart attack Neg Hx    • Anuerysm Neg Hx    • Clotting disorder Neg Hx    • Arrhythmia Neg Hx    • Heart failure Neg Hx    • Coronary artery disease Neg Hx    • Hyperlipidemia Neg Hx    • Breast cancer Neg Hx        Physical Exam:     Vitals:   Blood Pressure: 142/60 (08/05/23 0200)  Pulse: 77 (08/05/23 0200)  Temperature: 98.2 °F (36.8 °C) (08/04/23 2137)  Respirations: 16 (08/04/23 2137)  Weight - Scale: 77.7 kg (171 lb 4.8 oz) (08/04/23 2137)  SpO2: 94 % (08/05/23 0200)    Physical Exam  Vitals and nursing note reviewed. Constitutional:       General: She is not in acute distress. Appearance: She is well-developed. She is not ill-appearing. HENT:      Head: Normocephalic and atraumatic. Mouth/Throat:      Mouth: Mucous membranes are moist.   Eyes:      Extraocular Movements: Extraocular movements intact. Conjunctiva/sclera: Conjunctivae normal.      Pupils: Pupils are equal, round, and reactive to light. Comments: Sunken eyes bilaterally   Cardiovascular:      Rate and Rhythm: Normal rate and regular rhythm. Pulses: Normal pulses. Heart sounds: Normal heart sounds. No murmur heard. No friction rub. No gallop. Pulmonary:      Effort: Pulmonary effort is normal. No respiratory distress.       Breath sounds: Normal breath sounds. No wheezing or rales. Abdominal:      General: There is no distension. Palpations: Abdomen is soft. There is no mass. Tenderness: There is no abdominal tenderness. Musculoskeletal:         General: No swelling or tenderness. Cervical back: Neck supple. Right lower leg: No edema. Left lower leg: No edema. Skin:     General: Skin is warm and dry. Capillary Refill: Capillary refill takes less than 2 seconds. Neurological:      Mental Status: She is alert and oriented to person, place, and time. Cranial Nerves: Cranial nerves 2-12 are intact. No cranial nerve deficit, dysarthria or facial asymmetry. Sensory: Sensation is intact. Motor: No weakness, atrophy, abnormal muscle tone or pronator drift. Deep Tendon Reflexes: Babinski sign absent on the right side. Babinski sign absent on the left side. Reflex Scores:       Bicep reflexes are 2+ on the right side and 3+ on the left side. Brachioradialis reflexes are 2+ on the right side and 3+ on the left side. Patellar reflexes are 3+ on the right side and 3+ on the left side. Achilles reflexes are 3+ on the right side and 3+ on the left side. Comments: Gait deferred   Psychiatric:         Attention and Perception: Attention normal.         Mood and Affect: Mood and affect normal.         Speech: Speech normal.         Behavior: Behavior normal.         Thought Content:  Thought content normal.         Cognition and Memory: Cognition and memory normal.         Judgment: Judgment normal.          Additional Data:     Lab Results:  Results from last 7 days   Lab Units 08/04/23  2145   WBC Thousand/uL 6.95   HEMOGLOBIN g/dL 12.8   HEMATOCRIT % 39.9   PLATELETS Thousands/uL 278   NEUTROS PCT % 75   LYMPHS PCT % 11*   MONOS PCT % 14*   EOS PCT % 0     Results from last 7 days   Lab Units 08/04/23  2145   SODIUM mmol/L 138   POTASSIUM mmol/L 3.5   CHLORIDE mmol/L 101   CO2 mmol/L 29   BUN mg/dL 12   CREATININE mg/dL 0.80   ANION GAP mmol/L 8   CALCIUM mg/dL 9.3   ALBUMIN g/dL 4.1   TOTAL BILIRUBIN mg/dL 0.37   ALK PHOS U/L 88   ALT U/L 16   AST U/L 21   GLUCOSE RANDOM mg/dL 108     Results from last 7 days   Lab Units 08/04/23  2145   INR  0.99                   Lines/Drains:  Invasive Devices     Peripheral Intravenous Line  Duration           Peripheral IV 08/04/23 Left;Proximal;Ventral (anterior) Forearm <1 day                    Imaging: Reviewed radiology reports from this admission including: chest xray and CT head  CT head wo contrast   Final Result by Perley Riedel, MD (08/04 7032)      No acute intracranial hemorrhage. Chronic microangiopathic changes. Workstation performed: [id number]         XR chest 1 view portable   ED Interpretation by Olinda Sullivan MD (08/04 3251)   Possible BILAT pleural effusions in bases, no other acute cardiopulmonary process          EKG and Other Studies Reviewed on Admission:   · EKG: No EKG obtained. ** Please Note: This note has been constructed using a voice recognition system.  **

## 2023-08-05 NOTE — ED PROVIDER NOTES
History  Chief Complaint   Patient presents with   • Weakness - Generalized     Pt arrives via EMS from home with c/o generalized weakness and difficulty ambulating for the last few days. 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 Possible expansion of ventricular system without medial temporal lobe focused ex-vacuo process. Prior to Admission Medications   Prescriptions Last Dose Informant Patient Reported? Taking? ALPRAZolam (XANAX) 0.25 mg tablet 8/4/2023  No Yes   Sig: Take 1 tablet (0.25 mg total) by mouth daily as needed for anxiety Take 2 pills 30 min prior to procedure. Calcium-Vitamin D (CALTRATE 600 PLUS-VIT D PO) 8/4/2023 Self, Child Yes Yes   Sig: Take by mouth daily.    LUMIGAN 0.01 % ophthalmic drops 8/4/2023 Self, Child Yes Yes   Sig: PLACE 1 DROP INTO IN [facility] EYE AT BEDTIME   Multiple Vitamins-Minerals (MULTIVITAMIN ADULTS 50+) TABS 8/4/2023 Self, Child Yes Yes   Sig: Take by mouth daily   NON FORMULARY 8/4/2023 Self, Child Yes Yes   Sig: CBD Gummies   acetaminophen (TYLENOL) 500 mg tablet 8/4/2023 Self, Child Yes Yes   Sig: Take 1,000 mg by mouth if needed for mild pain   cyanocobalamin (VITAMIN B-12) 100 mcg tablet  Self, Child Yes No   Sig: Take by mouth daily   esomeprazole (NexIUM) 10 MG packet 8/4/2023 Self, Child Yes Yes Sig: Take 10 mg by mouth every morning before breakfast   ezetimibe (ZETIA) 10 mg tablet 2023  No Yes   Sig: TAKE ONE TABLET BY MOUTH EVERY DAY   fluticasone (FLONASE) 50 mcg/act nasal spray 2023 Self, Child Yes Yes   Si spray into each nostril as needed   meclizine (ANTIVERT) 25 mg tablet 2023 Self, Child No Yes   Sig: Take 1 tablet (25 mg total) by mouth every 8 (eight) hours as needed for dizziness   psyllium (METAMUCIL) 0.52 g capsule 2023 Self, Child Yes Yes   Sig: Take by mouth daily 2-3 caps daily   sertraline (ZOLOFT) 100 mg tablet 2023  No Yes   Sig: TAKE 2 TABLETS BY MOUTH DAILY      Facility-Administered Medications: None       Past Medical History:   Diagnosis Date   • Acute mastoiditis with intracranial complication    • Anxiety disorder    • Brain hemangioma ([address])        • BRCA1 negative    • BRCA2 negative    • Breast cancer ([address]) 2014    left breast   • Cancer ([address])    • Constipation    • Esophageal reflux    • History of bone density study     [employer]ometry   • History of radiation therapy 2014    left breast ca   • History of recurrent UTIs    • 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   • Hyperlipidemia    • Hypertension    • Malignant neoplasm of female breast ([address]) 2014   • Osteoarthritis of hip    • Osteopenia    • Stroke, hemorrhagic ([address]) 2015   • Subarachnoid hemorrhage ([address])        Past Surgical History:   Procedure Laterality Date   • BREAST BIOPSY Left 2014   • BREAST LUMPECTOMY Left 2014   • COLONOSCOPY      Fiberoptic - 2015 5 year f/u   • ESOPHAGOGASTRODUODENOSCOPY  2009    Diag, Resolved - 2006   • EXOSTECTOMY      Simple Bunion ([facility])   • HYSTERECTOMY     • JOINT REPLACEMENT Right 2012    Right Hip   • OOPHORECTOMY      not sure which one   • REPLACEMENT TOTAL KNEE Right 2018   • VENTRICULOSTOMY         Family History   Problem Relation Age of Onset   • Cancer Mother 71        bone   • Hypertension Mother    • Arthritis Mother    • Stroke Father         TIA   • Stroke Maternal Grandmother         CVA   • Deep vein thrombosis Daughter    • Heart attack Neg Hx    • Anuerysm Neg Hx    • Clotting disorder Neg Hx    • Arrhythmia Neg Hx    • Heart failure Neg Hx    • Coronary artery disease Neg Hx    • Hyperlipidemia Neg Hx    • Breast cancer Neg Hx      I have reviewed and agree with the history as documented. E-Cigarette/Vaping   • E-Cigarette Use Never User      E-Cigarette/Vaping Substances   • Nicotine No    • THC No    • CBD No    • Flavoring No    • Other No    • Unknown No      Social History     Tobacco Use   • Smoking status: Never     Passive exposure: Past   • Smokeless tobacco: Never   Vaping Use   • Vaping Use: Never used   Substance Use Topics   • Alcohol use: Yes     Comment: occasionally   • Drug use: No        Review of Systems   Constitutional: Positive for appetite change and fatigue. Negative for chills and fever. HENT: Negative for ear pain and sore throat. Eyes: Negative for pain and visual disturbance. Respiratory: Negative for cough and shortness of breath. Cardiovascular: Negative for chest pain and palpitations. Gastrointestinal: Negative for abdominal pain, diarrhea, nausea and vomiting. Genitourinary: Negative for dysuria and hematuria. Musculoskeletal: Negative for arthralgias and back pain. Skin: Negative for color change and rash. Neurological: Positive for weakness. Negative for seizures and syncope. All other systems reviewed and are negative.       Physical Exam  ED Triage Vitals   Temperature Pulse Respirations Blood Pressure SpO2   08/04/23 2137 08/04/23 2137 08/04/23 2137 08/04/23 2137 08/04/23 2137   98.2 °F (36.8 °C) 84 16 142/67 95 %      Temp src Heart Rate Source Patient Position - Orthostatic VS BP Location FiO2 (%)   -- 08/04/23 2137 -- 08/04/23 2137 --    Monitor  Left arm       Pain Score 08/05/23 0246       No Pain             Orthostatic Vital Signs  Vitals:    08/04/23 2137 08/05/23 0000 08/05/23 0200   BP: 142/67 134/63 142/60   Pulse: 84 85 77       Physical Exam  Vitals and nursing note reviewed. Constitutional:       General: She is in acute distress. Appearance: Normal appearance. She is well-developed. She is not ill-appearing, toxic-appearing or diaphoretic. HENT:      Head: Normocephalic and atraumatic. Right Ear: External ear normal.      Left Ear: External ear normal.      Nose: Nose normal.      Mouth/Throat:      Mouth: Mucous membranes are moist.   Eyes:      Extraocular Movements: Extraocular movements intact. Conjunctiva/sclera: Conjunctivae normal.   Cardiovascular:      Rate and Rhythm: Normal rate and regular rhythm. Pulses: Normal pulses. Heart sounds: Normal heart sounds. No murmur heard. Pulmonary:      Effort: Pulmonary effort is normal. No respiratory distress. Breath sounds: Normal breath sounds. Abdominal:      General: Abdomen is flat. Bowel sounds are normal.      Palpations: Abdomen is soft. Tenderness: There is no abdominal tenderness. There is no guarding or rebound. Musculoskeletal:         General: No swelling or tenderness. Normal range of motion. Cervical back: Normal range of motion and neck supple. Right lower leg: No edema. Left lower leg: No edema. Skin:     General: Skin is warm and dry. Capillary Refill: Capillary refill takes less than 2 seconds. Neurological:      Mental Status: She is alert and oriented to person, place, and time. Cranial Nerves: No cranial nerve deficit. Sensory: No sensory deficit. Motor: Weakness present.       Coordination: Coordination normal.      Gait: Gait abnormal.   Psychiatric:         Mood and Affect: Mood normal.         Behavior: Behavior normal.         ED Medications  Medications   cyanocobalamin (VITAMIN B-12) tablet 100 mcg (has no administration in time range)   ALPRAZolam (XANAX) tablet 0.25 mg (has no administration in time range)   pantoprazole (PROTONIX) EC tablet 20 mg (20 mg Oral Given 8/5/23 4007)   ezetimibe (ZETIA) tablet 10 mg (has no administration in time range)   fluticasone (FLONASE) 50 mcg/act nasal spray 1 spray (has no administration in time range)   bimatoprost (LUMIGAN) 0.03 % ophthalmic drops 1 drop (has no administration in time range)   meclizine (ANTIVERT) tablet 25 mg (has no administration in time range)   multivitamin-minerals (CENTRUM) tablet 1 tablet (has no administration in time range)   psyllium (METAMUCIL) 1 packet (has no administration in time range)   sertraline (ZOLOFT) tablet 200 mg (has no administration in time range)   enoxaparin (LOVENOX) subcutaneous injection 40 mg (has no administration in time range)   polyethylene glycol (MIRALAX) packet 17 g (has no administration in time range)   acetaminophen (TYLENOL) tablet 650 mg (has no administration in time range)   melatonin tablet 3 mg (has no administration in time range)       Diagnostic Studies  Results Reviewed     Procedure Component Value Units Date/Time    HS Troponin I 2hr [[id number]]  (Normal) Collected: 08/04/23 2349    Lab Status: Final result Specimen: Blood from Arm, Left Updated: 08/05/23 0028     hs TnI 2hr 4 ng/L      Delta 2hr hsTnI -1 ng/L     UA w Reflex to Microscopic w Reflex to Culture [[id number]] Collected: 08/05/23 0013    Lab Status: Final result Specimen: Urine, Clean Catch Updated: 08/05/23 0023     Color, UA Light Yellow     Clarity, UA Clear     Specific Gravity, UA 1.016     pH, UA 5.5     Leukocytes, UA Negative     Nitrite, UA Negative     Protein, UA Negative mg/dl      Glucose, UA Negative mg/dl      Ketones, UA Negative mg/dl      Urobilinogen, UA <2.0 mg/dl      Bilirubin, UA Negative     Occult Blood, UA Negative    Protime-INR [[id number]]  (Normal) Collected: 08/04/23 2145    Lab Status: Final result Specimen: Blood from Arm, Left Updated: 08/04/23 2351     Protime 13.3 seconds      INR 0.99    APTT [[id number]]  (Normal) Collected: 08/04/23 2145    Lab Status: Final result Specimen: Blood from Arm, Left Updated: 08/04/23 2351     PTT 30 seconds     TSH, 3rd generation with Free T4 reflex [[id number]]  (Normal) Collected: 08/04/23 2145    Lab Status: Final result Specimen: Blood from Arm, Left Updated: 08/04/23 2312     TSH 3RD GENERATON 1.266 uIU/mL     Magnesium [[id number]]  (Normal) Collected: 08/04/23 2145    Lab Status: Final result Specimen: Blood from Arm, Left Updated: 08/04/23 2307     Magnesium 2.0 mg/dL     Phosphorus [[id number]]  (Normal) Collected: 08/04/23 2145    Lab Status: Final result Specimen: Blood from Arm, Left Updated: 08/04/23 2307     Phosphorus 2.8 mg/dL     HS Troponin 0hr (reflex protocol) [[id number]]  (Normal) Collected: 08/04/23 2145    Lab Status: Final result Specimen: Blood from Arm, Left Updated: 08/04/23 2214     hs TnI 0hr 5 ng/L     Comprehensive metabolic panel [[id number]] Collected: 08/04/23 2145    Lab Status: Final result Specimen: Blood from Arm, Left Updated: 08/04/23 2207     Sodium 138 mmol/L      Potassium 3.5 mmol/L      Chloride 101 mmol/L      CO2 29 mmol/L      ANION GAP 8 mmol/L      BUN 12 mg/dL      Creatinine 0.80 mg/dL      Glucose 108 mg/dL      Calcium 9.3 mg/dL      AST 21 U/L      ALT 16 U/L      Alkaline Phosphatase 88 U/L      Total Protein 7.0 g/dL      Albumin 4.1 g/dL      Total Bilirubin 0.37 mg/dL      eGFR 71 ml/min/1.73sq m     Narrative:      [place] guidelines for Chronic Kidney Disease (CKD):   •  Stage 1 with normal or high GFR (GFR > 90 mL/min/1.73 square meters)  •  Stage 2 Mild CKD (GFR = 60-89 mL/min/1.73 square meters)  •  Stage 3A Moderate CKD (GFR = 45-59 mL/min/1.73 square meters)  •  Stage 3B Moderate CKD (GFR = 30-44 mL/min/1.73 square meters)  •  Stage 4 Severe CKD (GFR = 15-29 mL/min/1.73 square meters)  •  Stage 5 End Stage CKD (GFR <15 mL/min/1.73 square meters)  Note: GFR calculation is accurate only with a steady state creatinine    CBC and differential [[id number]]  (Abnormal) Collected: 08/04/23 2145    Lab Status: Final result Specimen: Blood from Arm, Left Updated: 08/04/23 2151     WBC 6.95 Thousand/uL      RBC 4.58 Million/uL      Hemoglobin 12.8 g/dL      Hematocrit 39.9 %      MCV 87 fL      MCH 27.9 pg      MCHC 32.1 g/dL      RDW 13.9 %      MPV 9.1 fL      Platelets 737 Thousands/uL      nRBC 0 /100 WBCs      Neutrophils Relative 75 %      Immat GRANS % 0 %      Lymphocytes Relative 11 %      Monocytes Relative 14 %      Eosinophils Relative 0 %      Basophils Relative 0 %      Neutrophils Absolute 5.11 Thousands/µL      Immature Grans Absolute 0.02 Thousand/uL      Lymphocytes Absolute 0.76 Thousands/µL      Monocytes Absolute 1.00 Thousand/µL      Eosinophils Absolute 0.03 Thousand/µL      Basophils Absolute 0.03 Thousands/µL                  CT head wo contrast   Final Result by Maria Alejandra Mak MD (08/04 0332)      No acute intracranial hemorrhage. Chronic microangiopathic changes.                   Workstation performed: [id number]         XR chest 1 view portable   ED Interpretation by Mary Fuller MD (08/04 3632)   Possible BILAT pleural effusions in bases, no other acute cardiopulmonary process            Procedures  ECG 12 Lead Documentation Only    Date/Time: 8/4/2023 10:58 PM    Performed by: Mary Fuller MD  Authorized by: Mary Fuller MD    Indications / Diagnosis:  Weakness  ECG reviewed by me, the ED Provider: yes    Patient location:  ED  Previous ECG:     Previous ECG:  Compared to current    Comparison ECG info:  3/22/21    Similarity:  No change    Comparison to cardiac monitor: Yes    Interpretation:     Interpretation: non-specific    Rate:     ECG rate:  76    ECG rate assessment: normal    Rhythm:     Rhythm: sinus rhythm    Ectopy:     Ectopy: PAC    QRS:     QRS axis:  Normal    QRS intervals: Normal  Conduction:     Conduction: normal    ST segments:     ST segments:  Normal  T waves:     T waves: normal            ED Course                             SBIRT 20yo+    Flowsheet Row Most Recent Value   Initial Alcohol Screen: US AUDIT-C     1. How often do you have a drink containing alcohol? 0 Filed at: 08/04/2023 2142   2. How many drinks containing alcohol do you have on a typical day you are drinking? 0 Filed at: 08/04/2023 2142   3b. FEMALE Any Age, or MALE 65+: How often do you have 4 or more drinks on one occassion? 0 Filed at: 08/04/2023 2142   Audit-C Score 0 Filed at: 08/04/2023 2142   CL[place]: How many times in the past year have you. .. Used an illegal drug or used a prescription medication for non-medical reasons? Never Filed at: 08/04/2023 2142                Medical Decision Making  Patient remained stable throughout ED course. Given report of significantly worsening bilateral lower extremity weakness in the setting of more progressive weakness over the past 2 weeks and chronic weakness with ambulatory problems for an extended period of time leading up to this along with worsening confusion, CT head was performed for further evaluation. Patient's history of SAH in 2016 as well as recent neurosurgical evaluation for possible NPH was concerning for acutely worsening NPH today in light of recent MRI brain 5/28 showing  "NeuroQuant analysis was performed: Normal hippocampal volume and enlarged ventricular system: Findings do not support hippocampal degeneration. Possible expansion of ventricular system without medial temporal lobe focused ex-vacuo process."  CT head without contrast however was nonacute today. CBC, CMP, Phos, mag, TSH with reflex, troponin all nonacute today. Chest x-ray as well as EKG was also within normal limits. Doubt CVA, posterior circulation stroke, BPPV.   Given report of amatory dysfunction with progressive weakness with recent fall however patient was admitted to [facility] for ambulatory dysfunction as patient lives at home alone and is unsafe to return home at this time. Patient will also benefit from further management work-up with neuro consult and concern for other etiology for today's concerns such as NPH, Guillain-Barré, other peripheral versus central demyelinating conditions, overall deconditioning. Pt understands and agreed with plan. All questions answered. No other concerns. Amount and/or Complexity of Data Reviewed  Labs: ordered. Radiology: ordered and independent interpretation performed. Risk  Decision regarding hospitalization. Disposition  Final diagnoses:   Lower extremity weakness   Ambulatory dysfunction     Time reflects when diagnosis was documented in both MDM as applicable and the Disposition within this note     Time User Action Codes Description Comment    8/5/2023  1:25 AM Luis Manuel Alexander Add [R29.898] Lower extremity weakness     8/5/2023  1:25 AM Renay Pace Add [R26.2] Ambulatory dysfunction     8/5/2023  2:08 AM Vinita Velma Add [G91.2] NPH (normal pressure hydrocephalus) [facility])       ED Disposition     ED Disposition   Admit    Condition   Stable    Date/Time   Sat Aug 5, 2023  1:24 AM    Comment   Case was discussed with Dr. Srinath Dunn ([address] [place] [address] resident) and the patient's admission status was agreed to be Admission Status: inpatient status to the service of Dr. Max Delvalle . Follow-up Information    None         Current Discharge Medication List      CONTINUE these medications which have NOT CHANGED    Details   acetaminophen (TYLENOL) 500 mg tablet Take 1,000 mg by mouth if needed for mild pain      ALPRAZolam (XANAX) 0.25 mg tablet Take 1 tablet (0.25 mg total) by mouth daily as needed for anxiety Take 2 pills 30 min prior to procedure. Qty: 30 tablet, Refills: 0    Associated Diagnoses: Anxiety      Calcium-Vitamin D (CALTRATE 600 PLUS-VIT D PO) Take by mouth daily.       esomeprazole (NexIUM) 10 MG packet Take 10 mg by mouth every morning before breakfast      ezetimibe (ZETIA) 10 mg tablet TAKE ONE TABLET BY MOUTH EVERY DAY  Qty: 30 tablet, Refills: 5    Associated Diagnoses: Hypercholesterolemia      fluticasone (FLONASE) 50 mcg/act nasal spray 1 spray into each nostril as needed      LUMIGAN 0.01 % ophthalmic drops PLACE 1 DROP INTO IN [facility] EYE AT BEDTIME  Refills: 11      meclizine (ANTIVERT) 25 mg tablet Take 1 tablet (25 mg total) by mouth every 8 (eight) hours as needed for dizziness  Qty: 30 tablet, Refills: 0    Associated Diagnoses: Vertigo      Multiple Vitamins-Minerals (MULTIVITAMIN ADULTS 50+) TABS Take by mouth daily      NON FORMULARY CBD Gummies      psyllium (METAMUCIL) 0.52 g capsule Take by mouth daily 2-3 caps daily      sertraline (ZOLOFT) 100 mg tablet TAKE 2 TABLETS BY MOUTH DAILY  Qty: 180 tablet, Refills: 1    Associated Diagnoses: Depression with anxiety      cyanocobalamin (VITAMIN B-12) 100 mcg tablet Take by mouth daily           No discharge procedures on file. PDMP Review       Value Time User    PDMP Reviewed  Yes 8/4/2023 10:03 PM Rita Mckeon MD           ED Provider  Attending physically available and evaluated Ida Martinez. I managed the patient along with the ED Attending.     Electronically Signed by         Rafi Dutton MD  08/05/23 2523

## 2023-08-05 NOTE — ASSESSMENT & PLAN NOTE
Assessment:  • Patient with history of 2016 SAH and NPH presents with difficulty ambulating, urinary incontinence, and bilateral lower extremity hyperreflexia. • 5/2023 vitamin B12: 1535  • 8/5 CT head: No hemorrhage. Chronic microangiopathic changes  • Etiology less likely secondary to worsening NPH. Less likely stroke given presentation and timeline of symptoms. Also consider ALS    Plan:  • Neurology consulted for further evaluation of NPH  • Fall precautions.   PT/OT eval

## 2023-08-05 NOTE — ASSESSMENT & PLAN NOTE
Assessment:  • 5/28/2023 MRI Brain: Normal hippocampal volume and enlarged ventricular system. Possible expansion of ventricular system without medial temporal lobe focused ex-vacuo process. • MoCA 24 out of 30  • Patient underwent pre-LP physical therapy that noted she was a fall risk with some gait imbalance.     Plan:  • Defer to neurology for LP or shunt placement

## 2023-08-05 NOTE — ED ATTENDING ATTESTATION
8/4/2023  IJohn MD, saw and evaluated the patient. I have discussed the patient with the resident/non-physician practitioner and agree with the resident's/non-physician practitioner's findings, Plan of Care, and MDM as documented in the resident's/non-physician practitioner's note, except where noted. All available labs and Radiology studies were reviewed. I was present for key portions of any procedure(s) performed by the resident/non-physician practitioner and I was immediately available to provide assistance. At this point I agree with the current assessment done in the Emergency Department. I have conducted an independent evaluation of this patient a history and physical is as follows: Patient is a 69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 with increased confusion and difficulty ambulating for past few days. Got dizzy a few days ago and fell but no injury or head trauma or LOC. No fever. No N/V. No headache. Worsening urinary incontinence over past few weeks. Has had urinary incontinence since [facility] in 2016. Was last seen at [facility] [address] [place] [address] Neurosurgery in [facility] on 8/4/23 for hydrocephalus. [facility] website checked on this patient and last Rx filled was on 11/21/22 for xanax for 29 day supply. Normocephalic. PERRL. Fundi: no hemorrhage, unable to assess for papilledema. Neck supple. Lungs clear. Heart regular without murmur. Abdomen soft and nontender. Good bowel sounds. No edema. No rash noted. FTN and HTS intact. Strength and sensation intact bilaterally. AAOX3. Gait exam and rest of neuro exam as per ED resident. DDx including but not limited to: NPH, ICH, UTI, metabolic abnormality; doubt CVA or cardiac etiology. Will check labs, EKG, CXR and  CT head.      ED Course         Critical Care Time  Procedures

## 2023-08-05 NOTE — CASE MANAGEMENT
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 ([address]) 2022   • Benign paroxysmal vertigo, bilateral 2019   • Major depressive disorder, single episode, unspecified 2019   • Nontoxic single thyroid nodule 2019   • Thyroid nodule 2019   • Vertigo 05/10/2019   • Osteopenia 2018   • Constipation 2018   • Hyperlipidemia 2018   • Celiac artery stenosis (HCC) 2018   • Aneurysm of anterior cerebral artery 10/03/2017   • Stress incontinence, female 2017   • Mild cognitive disorder 2016   • Abnormal gait 2016   • Depression 2016   • Depression with anxiety 2016   • Hx of breast cancer 2016   • Osteoarthritis of hip 2012   • Allergic rhinitis 2012   • Arthritis 2012      LOS (days): 0  Geometric Mean LOS (GMLOS) (days):   Days to GMLOS:     OBJECTIVE:    Risk of Unplanned Readmission Score: 10.78         Current admission status: Inpatient       Preferred Pharmacy:   [address], [facility] [place] [address]  Phone: [phone] Fax: [phone]    Primary Care Provider: Rosalind Caldwell MD    Primary Insurance: [address] [facility]  Secondary Insurance:     ASSESSMENT:  [facility], [address] [place] [address] Representative - Daughter   Primary Phone: [phone] (Home)                              Patient Information  Admitted from[de-identified] Home  Mental Status: Alert  During Assessment patient was accompanied by: Not accompanied during assessment  Assessment information provided by[de-identified] Patient  Primary Caregiver: Self  Support Systems: Friends/neighbors, Children  What city do you live in[place] entry access options. Select all that apply.: Elevator  Type of Current Residence: Apartment  Floor Level: 4  Upon entering residence, is there a bedroom on the main floor (no further steps)?: Yes  Upon entering residence, is there a bathroom on the main floor (no further steps)?: Yes  In the last 12 months, was there a time when you were not able to pay the mortgage or rent on time?: No  In the last 12 months, was there a time when you did not have a steady place to sleep or slept in a shelter (including now)?: No  Homeless/housing insecurity resource given?: N/A  Living Arrangements: Lives Alone    Activities of Daily Living Prior to Admission  Functional Status: Independent  Completes ADLs independently?: Yes  Ambulates independently?: Yes  Does patient use assisted devices?: Yes  Assisted Devices (DME) used:  Shower Chair, Rollator, Straight Cane  Does patient currently own DME?: Yes  What DME does the patient currently own?: Straight Cane, Rollator, Shower Chair  Does patient have a history of Outpatient Therapy (PT/OT)?: Yes  Does the patient have a history of Short-Term Rehab?: No  Does patient have a history of HHC?: No  Does patient currently have [facility]?: No         Patient Information Continued  Income Source: SSI/SSD  Does patient have prescription coverage?: Yes  Within the past 12 months, you worried that your food would run out before you got the money to buy more.: Never true  Within the past 12 months, the food you bought just didn't last and you didn't have money to get more.: Never true  Food insecurity resource given?: N/A  Does patient receive dialysis treatments?: No  Does patient have a history of substance abuse?: No  Does patient have a history of Mental Health Diagnosis?: No         Means of Transportation  Means of Transport to Appts[de-identified] Family transport  In the past 12 months, has lack of transportation kept you from medical appointments or from getting medications?: No  In the past 12 months, has lack of transportation kept you from meetings, work, or from getting things needed for daily living?: No  Was application for public transport provided?: N/A        DISCHARGE DETAILS:    Discharge planning discussed with[de-identified] Patient at bedside  Freedom of Choice: Yes     CM contacted family/caregiver?: No- see comments (declined)  Were Treatment Team discharge recommendations reviewed with patient/caregiver?: Yes  Did patient/caregiver verbalize understanding of patient care needs?: Yes  Were patient/caregiver advised of the risks associated with not following Treatment Team discharge recommendations?: Yes    Contacts  Patient Contacts: Deysi Cruz  Contact Method: In Person  Reason/Outcome: Discharge [address]         Is the patient interested in [facility] at discharge?: No    DME Referral Provided  Referral made for DME?: No              Treatment Team Recommendation: Home (pending PT/OT recommendations)  Discharge Destination Plan[de-identified] Home (pending PT/OT recommendations)                                         Additional Comments: CM spoke with pt at bedside to introduce role of CM and begin discharge planning. Pt reports residing alone in a [address] [facility] apartment ([facility]) on the 4th floor w/ elevator access. She uses a rollator (and SPC) at baseline and attends daily exercises in the community room at her apartment. She reports having a strong support system among her friends and family -- Her youngest daughter lives 1 mile from her home, and various friends/neighbors visit frequently throughout the day. Pt does not drive -- Daughters assist with transportation to/from medical appointments.  Pt reports being entirely self-sufficient prior to admission and does not anticipate STR needs, but confirmed understanding of the possibility of IP rehab depending on PT/OT recommendations. Pt also open to exploring at-home PT/OT if needed. CM will monitor for discharge planning recommendations.

## 2023-08-05 NOTE — CONSULTS
NEUROLOGY RESIDENCY CONSULT NOTE     Name: Leonides Roberts   Age & Sex: 69 y.o. female   MRN: [id number]  Unit/Bed#: W -01   Encounter: [id number]  Length of Stay: 0    ASSESSMENT & PLAN     * Ambulatory dysfunction  Assessment & Plan  69 y.o.  female with [place] in 2016, possible NPH and anxiety who presents with worsening ambulatory dysfunction. History was mainly provided by patient's daughter. 8/4 CTH: unremarkable    Impression: it seems that patient over-exerted herself yesterday causing transient worsening weakness of her lower extremities bilaterally. She was able to ambulate with a walker on examination and strength is 5/5    Plan:  -Discussed plan with neurology attending, Dr. Nayana Ndiaye  -PT/OT john. Patient lives alone and might need short term rehab after discharge  -Recommend outpatient LP as per NPH clinic; it was scheduled in September, 2023  -Fall precautions  -No additional management per Neurology  -Please call with questions/concerns        Leonides Roberts will need follow up with NPH clinic at a regular appointment and in 6-8 weeks with Neurology. She will require outpatient LP per NPH clinic. Pending for discharge: Per Primary team    SUBJECTIVE     Reason for Consult / Principal Problem: worsening ambulatory dysfunction  Hx and PE limited by: memory impairment. Patient's daughter provided the history    HPI: Leonides Roberts is a 69 y.o.  female with [place] in 2016, possible NPH and anxiety who presents with worsening ambulatory dysfunction. History was mainly provided by patient's daughter. Since 2016 [place], patient started having ambulatory dysfunction, urinary incontinence, and frequent falls. However, for the past few months, ambulatory dysfunction has been worsening. Patient saw NPH clinic on 8/4/2023 morning. Presented to the ED in the evening due to unable to lift her lower extremities bilaterally. Neurology consulted for further management due to possible NPH.     Patient reported that she was able to lift her legs when Neurology examined her. She reported over-exerting herself yesterday which was already a bad day for her. Patient does not have any focal neurological deficits. Inpatient consult to Neurology  Consult performed by:  Nanda Nickerson MD  Consult ordered by: Asim Palmer MD          Historical Information   Past Medical History:   Diagnosis Date   • Acute mastoiditis with intracranial complication 80/04/7010   • Anxiety disorder    • Brain hemangioma ([address])     2016   • BRCA1 negative    • BRCA2 negative    • Breast cancer ([address]) 2014    left breast   • Cancer ([address])    • Constipation    • Esophageal reflux    • History of bone density study 2011    [employer]or[employer]   • History of radiation therapy 2014    left breast ca   • History of recurrent UTIs    • 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   • Hyperlipidemia    • Hypertension    • Malignant neoplasm of female breast ([address]) 04/22/2014   • Osteoarthritis of hip    • Osteopenia    • Stroke, hemorrhagic ([address]) 12/2015   • Subarachnoid hemorrhage ([address])      Past Surgical History:   Procedure Laterality Date   • BREAST BIOPSY Left 2014   • BREAST LUMPECTOMY Left 2014   • COLONOSCOPY      Fiberoptic - 2009, 2015 5 year f/u   • ESOPHAGOGASTRODUODENOSCOPY  2009    Diag, Resolved - 2006 / 2009   • EXOSTECTOMY  2005    Simple Bunion (Silver Procedure)   • HYSTERECTOMY  1981   • JOINT REPLACEMENT Right 2012    Right Hip   • OOPHORECTOMY  1981    not sure which one   • REPLACEMENT TOTAL KNEE Right 2018   • VENTRICULOSTOMY       Social History   Social History     Substance and Sexual Activity   Alcohol Use Yes    Comment: occasionally     Social History     Substance and Sexual Activity   Drug Use No     E-Cigarette/Vaping   • E-Cigarette Use Never User      E-Cigarette/Vaping Substances   • Nicotine No    • THC No    • CBD No    • Flavoring No    • Other No    • Unknown No      Social History     Tobacco Use   Smoking Status Never   • Passive exposure: Past   Smokeless Tobacco Never     Family History:   Family History   Problem Relation Age of Onset   • Cancer Mother 71        bone   • Hypertension Mother    • Arthritis Mother    • Stroke Father         TIA   • Stroke Maternal Grandmother         CVA   • Deep vein thrombosis Daughter    • Heart attack Neg Hx    • Anuerysm Neg Hx    • Clotting disorder Neg Hx    • Arrhythmia Neg Hx    • Heart failure Neg Hx    • Coronary artery disease Neg Hx    • Hyperlipidemia Neg Hx    • Breast cancer Neg Hx      Meds/Allergies   PTA meds:   Prior to Admission Medications   Prescriptions Last Dose Informant Patient Reported? Taking? ALPRAZolam (XANAX) 0.25 mg tablet 2023  No Yes   Sig: Take 1 tablet (0.25 mg total) by mouth daily as needed for anxiety Take 2 pills 30 min prior to procedure. Calcium-Vitamin D (CALTRATE 600 PLUS-VIT D PO) 2023 Self, Child Yes Yes   Sig: Take by mouth daily.    LUMIGAN 0.01 % ophthalmic drops 2023 Self, Child Yes Yes   Sig: PLACE 1 DROP INTO IN [facility] EYE AT BEDTIME   Multiple Vitamins-Minerals (MULTIVITAMIN ADULTS 50+) TABS 2023 Self, Child Yes Yes   Sig: Take by mouth daily   NON FORMULARY 2023 Self, Child Yes Yes   Sig: CBD Gummies   acetaminophen (TYLENOL) 500 mg tablet 2023 Self, Child Yes Yes   Sig: Take 1,000 mg by mouth if needed for mild pain   cyanocobalamin (VITAMIN B-12) 100 mcg tablet  Self, Child Yes No   Sig: Take by mouth daily   esomeprazole (NexIUM) 10 MG packet 2023 Self, Child Yes Yes   Sig: Take 10 mg by mouth every morning before breakfast   ezetimibe (ZETIA) 10 mg tablet 2023  No Yes   Sig: TAKE ONE TABLET BY MOUTH EVERY DAY   fluticasone (FLONASE) 50 mcg/act nasal spray 2023 Self, Child Yes Yes   Si spray into each nostril as needed   meclizine (ANTIVERT) 25 mg tablet 2023 Self, Child No Yes   Sig: Take 1 tablet (25 mg total) by mouth every 8 (eight) hours as needed for dizziness psyllium (METAMUCIL) 0.52 g capsule 2023 Self, Child Yes Yes   Sig: Take by mouth daily 2-3 caps daily   sertraline (ZOLOFT) 100 mg tablet 2023  No Yes   Sig: TAKE 2 TABLETS BY MOUTH DAILY      Facility-Administered Medications: None     Allergies   Allergen Reactions   • Oxycodone Vomiting   • Atorvastatin Other (See Comments)     Leg/knee pain (joint pain)   • Bactrim [Sulfamethoxazole-Trimethoprim] Itching and Rash   • Keppra [Levetiracetam] Rash   • Livalo [Pitavastatin] Itching   • Penicillins Rash     Agitation        Review of previous medical records was completed. Review of Systems   Constitutional: Negative for chills and fever. HENT: Negative for ear pain and sore throat. Eyes: Negative for pain and visual disturbance. Respiratory: Negative for cough and shortness of breath. Cardiovascular: Negative for chest pain and palpitations. Gastrointestinal: Negative for abdominal pain and vomiting. Genitourinary: Negative for dysuria and hematuria. Urinary incontinence   Musculoskeletal: Negative for arthralgias and back pain. Skin: Negative for color change and rash. Neurological: Positive for weakness. Negative for seizures and syncope. All other systems reviewed and are negative. OBJECTIVE     Patient ID: Marivel Arana is a 69 y.o. female. Vitals:   Vitals:    23 0000 23 0200 23 0246 23 1546   BP: 134/63 142/60  149/73   BP Location: Left arm Left arm  Right arm   Pulse: 85 77  72   Resp:    16   Temp:    98.9 °F (37.2 °C)   TempSrc:    Oral   SpO2: 97% 94%  95%   Weight:   77 kg (169 lb 12.1 oz)    Height:   5' 9" (1.753 m)       Body mass index is 25.07 kg/m².      Intake/Output Summary (Last 24 hours) at 2023 1901  Last data filed at 2023 1753  Gross per 24 hour   Intake 120 ml   Output --   Net 120 ml       Temperature:   Temp (24hrs), Av.6 °F (37 °C), Min:98.2 °F (36.8 °C), Max:98.9 °F (37.2 °C)    Temperature: 98.9 °F (37.2 °C)    Invasive Devices: Invasive Devices     Peripheral Intravenous Line  Duration           Peripheral IV 08/04/23 Left;Proximal;Ventral (anterior) Forearm <1 day                Physical Exam  Vitals and nursing note reviewed. Constitutional:       General: She is not in acute distress. Appearance: She is well-developed. HENT:      Head: Normocephalic and atraumatic. Eyes:      Extraocular Movements: Extraocular movements intact and EOM normal.      Conjunctiva/sclera: Conjunctivae normal.      Pupils: Pupils are equal, round, and reactive to light. Cardiovascular:      Rate and Rhythm: Normal rate. Heart sounds: No murmur heard. Pulmonary:      Effort: Pulmonary effort is normal. No respiratory distress. Abdominal:      Palpations: Abdomen is soft. Tenderness: There is no abdominal tenderness. Musculoskeletal:         General: No swelling. Cervical back: Neck supple. Skin:     General: Skin is warm and dry. Capillary Refill: Capillary refill takes less than 2 seconds. Neurological:      Mental Status: She is alert and oriented to person, place, and time. Coordination: Finger-Nose-Finger Test normal.   Psychiatric:         Mood and Affect: Mood normal.         Speech: Speech normal.          Neurologic Exam     Mental Status   Oriented to person, place, and time. Speech: speech is normal   Level of consciousness: alert    Cranial Nerves     CN II   Visual fields full to confrontation. CN III, IV, VI   Pupils are equal, round, and reactive to light. Extraocular motions are normal.     CN V   Facial sensation intact. CN VII   Facial expression full, symmetric. CN VIII   CN VIII normal.     CN IX, X   CN IX normal.   CN X normal.     CN XI   CN XI normal.     CN XII   CN XII normal.     Motor Exam   Muscle bulk: normal  Overall muscle tone: normal    Strength   Strength 5/5 except as noted.      Sensory Exam   Light touch normal.     Gait, Coordination, and Reflexes     Coordination   Finger to nose coordination: normal    Reflexes   Right plantar: normal  Left plantar: normal  Right [place]: absent  Left [place]: absent  Right ankle clonus: absent  Left ankle clonus: absentUses a walker to ambulate. Gait is fairly normal.     Brisk reflexes throughout worse on bilateral patella        LABORATORY DATA     Labs: I have personally reviewed pertinent reports. Results from last 7 days   Lab Units 08/05/23  0519 08/04/23  2145   WBC Thousand/uL 5.70 6.95   HEMOGLOBIN g/dL 12.7 12.8   HEMATOCRIT % 39.9 39.9   PLATELETS Thousands/uL 272 278   NEUTROS PCT %  --  75   MONOS PCT %  --  14*   EOS PCT %  --  0      Results from last 7 days   Lab Units 08/05/23  0519 08/04/23  2145   POTASSIUM mmol/L 3.8 3.5   CHLORIDE mmol/L 103 101   CO2 mmol/L 30 29   BUN mg/dL 9 12   CREATININE mg/dL 0.72 0.80   CALCIUM mg/dL 9.0 9.3   ALK PHOS U/L  --  88   ALT U/L  --  16   AST U/L  --  21     Results from last 7 days   Lab Units 08/04/23  2145   MAGNESIUM mg/dL 2.0     Results from last 7 days   Lab Units 08/04/23  2145   PHOSPHORUS mg/dL 2.8      Results from last 7 days   Lab Units 08/04/23  2145   INR  0.99   PTT seconds 30               IMAGING & DIAGNOSTIC TESTING     Radiology Results: I have personally reviewed pertinent reports. CT head wo contrast   Final Result by Jolly Nuñez MD (08/04 2334)      No acute intracranial hemorrhage. Chronic microangiopathic changes. Workstation performed: [id number]         XR chest 1 view portable   ED Interpretation by Jason Charles MD (08/04 2347)   Possible BILAT pleural effusions in bases, no other acute cardiopulmonary process      Final Result by Darian Carey MD (08/05 2787)      No acute cardiopulmonary disease. Workstation performed: [id number]             Other Diagnostic Testing: I have personally reviewed pertinent reports.       ACTIVE MEDICATIONS     Current Facility-Administered Medications   Medication Dose Route Frequency   • acetaminophen (TYLENOL) tablet 650 mg  650 mg Oral Q6H PRN   • ALPRAZolam (XANAX) tablet 0.25 mg  0.25 mg Oral Daily PRN   • bimatoprost (LUMIGAN) 0.03 % ophthalmic drops 1 drop  1 drop Ophthalmic Daily   • enoxaparin (LOVENOX) subcutaneous injection 40 mg  40 mg Subcutaneous Daily   • ezetimibe (ZETIA) tablet 10 mg  10 mg Oral Daily   • fluticasone (FLONASE) 50 mcg/act nasal spray 1 spray  1 spray Nasal PRN   • meclizine (ANTIVERT) tablet 25 mg  25 mg Oral Q8H PRN   • melatonin tablet 3 mg  3 mg Oral HS PRN   • multivitamin-minerals (CENTRUM) tablet 1 tablet  1 tablet Oral Daily   • pantoprazole (PROTONIX) EC tablet 20 mg  20 mg Oral Early Morning   • polyethylene glycol (MIRALAX) packet 17 g  17 g Oral Daily PRN   • psyllium (METAMUCIL) 1 packet  1 packet Oral Daily   • sertraline (ZOLOFT) tablet 200 mg  200 mg Oral Daily       Prior to Admission medications    Medication Sig Start Date End Date Taking? Authorizing Provider   acetaminophen (TYLENOL) 500 mg tablet Take 1,000 mg by mouth if needed for mild pain   Yes Historical Provider, MD   ALPRAZolam (XANAX) 0.25 mg tablet Take 1 tablet (0.25 mg total) by mouth daily as needed for anxiety Take 2 pills 30 min prior to procedure. 8/3/23  Yes Art Crowell MD   Calcium-Vitamin D (CALTRATE 600 PLUS-VIT D PO) Take by mouth daily.    Yes Historical Provider, MD   esomeprazole (NexIUM) 10 MG packet Take 10 mg by mouth every morning before breakfast   Yes Historical Provider, MD   ezetimibe (ZETIA) 10 mg tablet TAKE ONE TABLET BY MOUTH EVERY DAY 6/1/23  Yes Massiel Restrepo MD   fluticasone (FLONASE) 50 mcg/act nasal spray 1 spray into each nostril as needed   Yes Historical Provider, MD   LUMIGAN 0.01 % ophthalmic drops PLACE 1 DROP INTO IN [facility] EYE AT BEDTIME 10/3/19  Yes Historical Provider, MD   meclizine (ANTIVERT) 25 mg tablet Take 1 tablet (25 mg total) by mouth every 8 (eight) hours as needed for dizziness 7/30/19  Yes Mayur Laguna MD   Multiple Vitamins-Minerals (MULTIVITAMIN ADULTS 50+) TABS Take by mouth daily   Yes Historical Provider, MD   NON FORMULARY CBD Gummies   Yes Historical Provider, MD   psyllium (METAMUCIL) 0.52 g capsule Take by mouth daily 2-3 caps daily   Yes Historical Provider, MD   sertraline (ZOLOFT) 100 mg tablet TAKE 2 TABLETS BY MOUTH DAILY 7/3/23  Yes Art Crowell MD   cyanocobalamin (VITAMIN B-12) 100 mcg tablet Take by mouth daily    Historical Provider, MD   acetaminophen (TYLENOL) 325 mg tablet Take 650 mg by mouth every 6 (six) hours as needed for mild pain  Patient not taking: Reported on 3/21/2023  8/5/23  Historical Provider, MD         CODE STATUS & ADVANCED DIRECTIVES     Code Status: Level 1 - Full Code  Advance Directive and Living Will:      Power of :    POLST:      VTE Pharmacologic Prophylaxis: Enoxaparin (Lovenox)  VTE Mechanical Prophylaxis: sequential compression device    ==  Ma Su Verner Hoe, MD [facility], PGY-3

## 2023-08-06 VITALS
SYSTOLIC BLOOD PRESSURE: 96 MMHG | HEART RATE: 77 BPM | BODY MASS INDEX: 25.14 KG/M2 | OXYGEN SATURATION: 94 % | DIASTOLIC BLOOD PRESSURE: 64 MMHG | RESPIRATION RATE: 18 BRPM | TEMPERATURE: 98.5 F | WEIGHT: 169.75 LBS | HEIGHT: 69 IN

## 2023-08-06 LAB
ATRIAL RATE: 76 BPM
FLUAV RNA RESP QL NAA+PROBE: NEGATIVE
FLUBV RNA RESP QL NAA+PROBE: NEGATIVE
P AXIS: 77 DEGREES
PR INTERVAL: 146 MS
QRS AXIS: 54 DEGREES
QRSD INTERVAL: 78 MS
QT INTERVAL: 388 MS
QTC INTERVAL: 436 MS
RSV RNA RESP QL NAA+PROBE: NEGATIVE
SARS-COV-2 RNA RESP QL NAA+PROBE: POSITIVE
T WAVE AXIS: 77 DEGREES
VENTRICULAR RATE: 76 BPM

## 2023-08-06 PROCEDURE — 0241U HB NFCT DS VIR RESP RNA 4 TRGT: CPT

## 2023-08-06 PROCEDURE — 99239 HOSP IP/OBS DSCHRG MGMT >30: CPT | Performed by: INTERNAL MEDICINE

## 2023-08-06 PROCEDURE — 93010 ELECTROCARDIOGRAM REPORT: CPT | Performed by: INTERNAL MEDICINE

## 2023-08-06 RX ADMIN — SERTRALINE 200 MG: 100 TABLET, FILM COATED ORAL at 10:42

## 2023-08-06 RX ADMIN — POLYETHYLENE GLYCOL 3350 17 G: 17 POWDER, FOR SOLUTION ORAL at 10:41

## 2023-08-06 RX ADMIN — MULTIPLE VITAMINS W/ MINERALS TAB 1 TABLET: TAB ORAL at 10:42

## 2023-08-06 RX ADMIN — PANTOPRAZOLE SODIUM 20 MG: 20 TABLET, DELAYED RELEASE ORAL at 05:47

## 2023-08-06 RX ADMIN — Medication 1 PACKET: at 10:41

## 2023-08-06 RX ADMIN — ENOXAPARIN SODIUM 40 MG: 40 INJECTION SUBCUTANEOUS at 10:41

## 2023-08-06 RX ADMIN — EZETIMIBE 10 MG: 10 TABLET ORAL at 10:42

## 2023-08-06 RX ADMIN — ACETAMINOPHEN 650 MG: 325 TABLET, FILM COATED ORAL at 05:52

## 2023-08-06 NOTE — UTILIZATION REVIEW
Initial Clinical Review    Admission: Date/Time/Statement:   Admission Orders (From admission, onward)     Ordered        08/05/23 0127  INPATIENT ADMISSION  Once                      Orders Placed This Encounter   Procedures   • INPATIENT ADMISSION     Standing Status:   Standing     Number of Occurrences:   1     Order Specific Question:   Level of Care     Answer:   Med Surg [16]     Order Specific Question:   Estimated length of stay     Answer:   Not Applicable     ED Arrival Information     Expected   -    Arrival   8/4/2023 21:36    Acuity   Urgent            Means of arrival   Ambulance    Escorted by   [facility]   Hospitalist    Admission type   Emergency            Arrival complaint   ems-weakness           Chief Complaint   Patient presents with   • Weakness - Generalized     Pt arrives via EMS from home with c/o generalized weakness and difficulty ambulating for the last few days. Hx brain bleed 2016; seen OP today for possible stent placement to drain extra CSF d/t recent memory loss and difficulty ambulating       Initial Presentation: 69 y.o. female  PMH of NPH, history of cerebral arachnoid hemorrhage, vertigo,  and anxiety who presents with a 2-week history of difficulty ambulating. Kenan Zamarripa 2 weeks ago w/o head strike  Patient describes that she feels like her legs are "not there" and sometimes shuffles when she walks. Pt reports urinary frequency, dry cough x several weeks and some memory difficulties . On exam, sunken eyes bilat, normal hear and lung sounds . Motor weakness with abnormal gait. Imaging was negative for any intracranial bleed. Labs - UA clear  Pt admitted as Inpatient with ambulatory dysfunction, hx NPH. Plan - neuro consult, fall monitoring. PT/OT consults    Neurology consult- Impression - seems that patient over-exerted herself yesterday causing transient worsening weakness of her lower extremities bilaterally.  She  able to ambulate with a walker on examination and strength is 5/5. No focal neuro deficits . PT/OT . Recommend outpatient LP as per NPH clinic; it was scheduled in September, 2023    Date: 8/6  Day 2:   Pt COVID +, is asymptomatic other than congestion . Andre Kelley No retention on bladder scan. Pt feels steady with moving today, alert, oriented . PT recommends  outpatient PT for posture, balance, and L knee pain to help her with transfers and safety in her home. ED Triage Vitals   Temperature Pulse Respirations Blood Pressure SpO2   08/04/23 2137 08/04/23 2137 08/04/23 2137 08/04/23 2137 08/04/23 2137   98.2 °F (36.8 °C) 84 16 142/67 95 %      Temp Source Heart Rate Source Patient Position - Orthostatic VS BP Location FiO2 (%)   08/05/23 1546 08/04/23 2137 08/05/23 1546 08/04/23 2137 --   Oral Monitor Lying Left arm       Pain Score       08/05/23 0246       No Pain          Wt Readings from Last 1 Encounters:   08/05/23 77 kg (169 lb 12.1 oz)     Additional Vital Signs:   Time Temp Pulse Resp BP MAP (mmHg) SpO2 Patient Position - Orthostatic VS   08/06/23 15:55:50 98.5 °F (36.9 °C) 77 18 96/64 75 94 % --   08/06/23 08:11:57 97.8 °F (36.6 °C) 84 18 102/68 79 94 % --   08/05/23 2042 -- -- -- 172/104 Abnormal  -- -- Standing - Orthostatic VS   08/05/23 2040 -- -- -- 174/102 Abnormal  -- -- Sitting - Orthostatic VS   08/05/23 2038 98.6 °F (37 °C) 77 16 147/74 -- 95 % Lying - Orthostatic VS   08/05/23 1546 98.9 °F (37.2 °C) 72 16 149/73 -- 95 % Lying   08/05/23 0200 -- 77 -- 142/60 86 94 % --   08/05/23 0000 -- 85 -- 134/63 91 97 % --     Date and Time Eye Opening Best Verbal Response Best Motor Response Collin Coma Scale Score   08/06/23 0800 4 5 6 15   08/05/23 2038 4 5 6 15   08/05/23 0900 4 5 6 15   08/05/23 0245 4 5 6 15   08/04/23 2143 4 5 6 15             Pertinent Labs/Diagnostic Test Results:    8/4 ECG- Sinus rhythm with Premature atrial complexes  CT head wo contrast   Final Result by Kel Townsend MD (08/04 2334)      No acute intracranial hemorrhage.   Chronic microangiopathic changes. Workstation performed: [id number]         XR chest 1 view portable   ED Interpretation by Shaylee Guerrero MD (08/04 2347)   Possible BILAT pleural effusions in bases, no other acute cardiopulmonary process      Final Result by Anne-Marie Avilez MD (08/05 1313)      No acute cardiopulmonary disease.                Workstation performed: [id number]           Results from last 7 days   Lab Units 08/06/23  1453   SARS-COV-2  Positive*     Results from last 7 days   Lab Units 08/05/23  0519 08/04/23  2145   WBC Thousand/uL 5.70 6.95   HEMOGLOBIN g/dL 12.7 12.8   HEMATOCRIT % 39.9 39.9   PLATELETS Thousands/uL 272 278   NEUTROS ABS Thousands/µL  --  5.11         Results from last 7 days   Lab Units 08/05/23 0519 08/04/23  2145   SODIUM mmol/L 140 138   POTASSIUM mmol/L 3.8 3.5   CHLORIDE mmol/L 103 101   CO2 mmol/L 30 29   ANION GAP mmol/L 7 8   BUN mg/dL 9 12   CREATININE mg/dL 0.72 0.80   EGFR ml/min/1.73sq m 81 71   CALCIUM mg/dL 9.0 9.3   MAGNESIUM mg/dL  --  2.0   PHOSPHORUS mg/dL  --  2.8     Results from last 7 days   Lab Units 08/04/23  2145   AST U/L 21   ALT U/L 16   ALK PHOS U/L 88   TOTAL PROTEIN g/dL 7.0   ALBUMIN g/dL 4.1   TOTAL BILIRUBIN mg/dL 0.37         Results from last 7 days   Lab Units 08/05/23  0519 08/04/23  2145   GLUCOSE RANDOM mg/dL 95 108               Results from last 7 days   Lab Units 08/04/23  2349 08/04/23  2145   HS TNI 0HR ng/L  --  5   HS TNI 2HR ng/L 4  --    HSTNI D2 ng/L -1  --          Results from last 7 days   Lab Units 08/04/23  2145   PROTIME seconds 13.3   INR  0.99   PTT seconds 30     Results from last 7 days   Lab Units 08/04/23  2145   TSH 3RD GENERATON uIU/mL 1.266                   Results from last 7 days   Lab Units 08/05/23  0013   CLARITY UA  Clear   COLOR UA  Light Yellow   SPEC GRAV UA  1.016   PH UA  5.5   GLUCOSE UA mg/dl Negative   KETONES UA mg/dl Negative   BLOOD UA  Negative   PROTEIN UA mg/dl Negative   NITRITE UA Negative   BILIRUBIN UA  Negative   UROBILINOGEN UA (BE) mg/dl <2.0   LEUKOCYTES UA  Negative     Results from last 7 days   Lab Units 08/06/23  1453   INFLUENZA A PCR  Negative   INFLUENZA B PCR  Negative   RSV PCR  Negative                         ED Treatment:   Medication Administration from 08/04/2023 2134 to 08/05/2023 0231     None        Past Medical History:   Diagnosis Date   • Acute mastoiditis with intracranial complication 08/05/7767   • Anxiety disorder    • Brain hemangioma ([address])     2016   • BRCA1 negative    • BRCA2 negative    • Breast cancer ([address]) 2014    left breast   • Cancer ([address])    • Constipation    • Esophageal reflux    • History of bone density study 2011    [employer]   • History of radiation therapy 2014    left breast ca   • History of recurrent UTIs    • 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   • Hyperlipidemia    • Hypertension    • Malignant neoplasm of female breast ([address]) 04/22/2014   • Osteoarthritis of hip    • Osteopenia    • Stroke, hemorrhagic ([address]) 12/2015   • Subarachnoid hemorrhage (HCC)      Present on Admission:  • Ambulatory dysfunction  • NPH (normal pressure hydrocephalus) (HCC)  • Major depressive disorder, single episode, unspecified  • Benign paroxysmal vertigo, bilateral      Admitting Diagnosis: NPH (normal pressure hydrocephalus) ([address]) [G91.2]  Weakness [R53.1]  Lower extremity weakness [R29.898]  Ambulatory dysfunction [R26.2]  Age/Sex: 69 y.o. female  Admission Orders:  Scheduled Medications:  bimatoprost, 1 drop, Ophthalmic, Daily  enoxaparin, 40 mg, Subcutaneous, Daily  ezetimibe, 10 mg, Oral, Daily  multivitamin-minerals, 1 tablet, Oral, Daily  pantoprazole, 20 mg, Oral, Early Morning  psyllium, 1 packet, Oral, Daily  sertraline, 200 mg, Oral, Daily      Continuous IV Infusions:     PRN Meds:  acetaminophen, 650 mg, Oral, Q6H PRN x1 8/6  ALPRAZolam, 0.25 mg, Oral, Daily PRN  fluticasone, 1 spray, Nasal, PRN x1 8/5  meclizine, 25 mg, Oral, Q8H PRN  melatonin, 3 mg, Oral, HS PRN  polyethylene glycol, 17 g, Oral, Daily PRN x1 8/5, x1 8/6    fall monitoring   OOB w/ assist   orthostatic BP    IP CONSULT TO NEUROLOGY  IP CONSULT TO CASE MANAGEMENT    Network Utilization Review Department  ATTENTION: Please call with any questions or concerns to [phone] and carefully listen to the prompts so that you are directed to the right person. All voicemails are confidential.  Dania Perez all requests for admission clinical reviews, approved or denied determinations and any other requests to dedicated fax number below belonging to the campus where the patient is receiving treatment.  List of dedicated fax numbers for the Facilities:  [place] DENIALS (Administrative/Medical Necessity) [phone] [address] [facility] [phone]   [address] [place] [address] Drive [phone]   [employer] [address] [facility] [phone]   [address] [place] [address] [address] [place] [address] [phone]   [address] [facility] [address] [facility] [address] [facility] [address] [phone]

## 2023-08-06 NOTE — ASSESSMENT & PLAN NOTE
Assessment:  • Patient with history of 2016 SAH and NPH presents with difficulty ambulating, urinary incontinence, and bilateral lower extremity hyperreflexia. • 5/2023 vitamin B12: 1535  • 8/5 CT head: No hemorrhage. Chronic microangiopathic changes  • Etiology less likely secondary to worsening NPH. Less likely stroke given presentation and timeline of symptoms. Also consider ALS  • Today she feels steady when moving    Plan:  • Follow up with neurosurgery for LP  • Fall precautions. • PT recommended to discharge to home with outpatient PT for posture, balance, and L knee pain to help her with transfers and safety in her home.

## 2023-08-06 NOTE — ASSESSMENT & PLAN NOTE
Assessment:  • 5/28/2023 MRI Brain: Normal hippocampal volume and enlarged ventricular system. Possible expansion of ventricular system without medial temporal lobe focused ex-vacuo process. • MoCA 24 out of 30  • Patient underwent pre-LP physical therapy that noted she was a fall risk with some gait imbalance.   • No gait instability, altered mental status but having urinary frequency  • Bladder scan shows no retention    Plan:  • Defer to neurology for LP or shunt placement  • Follow up with outpatient PT

## 2023-08-06 NOTE — DISCHARGE SUMMARY
[address] [facility]  Discharge- [place] 1945, 69 y.o. female MRN: [id number]  Unit/Bed#: W -01 Encounter: [id number]  Primary Care Provider: Wes Lira MD   Date and time admitted to hospital: 8/4/2023  9:36 PM    * Ambulatory dysfunction  Assessment & Plan  Assessment:  • Patient with history of 2016 SAH and NPH presents with difficulty ambulating, urinary incontinence, and bilateral lower extremity hyperreflexia. • 5/2023 vitamin B12: 1535  • 8/5 CT head: No hemorrhage. Chronic microangiopathic changes  • Etiology less likely secondary to worsening NPH. Less likely stroke given presentation and timeline of symptoms. Also consider ALS  • Today she feels steady when moving    Plan:  • Follow up with neurosurgery for LP  • Fall precautions. • PT recommended to discharge to home with outpatient PT for posture, balance, and L knee pain to help her with transfers and safety in her home. NPH (normal pressure hydrocephalus) [facility])  Assessment & Plan  Assessment:  • 5/28/2023 MRI Brain: Normal hippocampal volume and enlarged ventricular system. Possible expansion of ventricular system without medial temporal lobe focused ex-vacuo process. • MoCA 24 out of 30  • Patient underwent pre-LP physical therapy that noted she was a fall risk with some gait imbalance. • No gait instability, altered mental status but having urinary frequency  • Bladder scan shows no retention    Plan:  • Defer to neurology for LP or shunt placement  • Follow up with outpatient PT      Major depressive disorder, single episode, unspecified  Assessment & Plan  · Enjoying her life in the senior home with her co residents   · Continue home meds sertraline 200 mg and 0.25 mg Ativan as needed.      Benign paroxysmal vertigo, bilateral  Assessment & Plan  · Denied dizziness, vertigo, nausea, vomiting today  · Continue home med meclizine        Medical Problems     Resolved Problems  Date Reviewed: 8/6/2023          Resolved    History of subarachnoid hemorrhage 8/5/2023     Resolved by  Martínez Yoon MD        Discharging Physician / Practitioner: Santosh Millard MD  PCP: Juanjose Mendoza MD  Admission Date:   Admission Orders (From admission, onward)     Ordered        08/05/23 0127  INPATIENT ADMISSION  Once                      Discharge Date: 08/06/23    Consultations During Hospital Stay:  · Neurology  · Neurosurgery    Procedures Performed:   · None    Significant Findings / Test Results:   · None    Incidental Findings:   · None  · I reviewed the above mentioned incidental findings with the patient and/or family and they expressed understanding. Test Results Pending at Discharge (will require follow up): · None     Outpatient Tests Requested:  · None    Complications:  None    Reason for Admission: Difficulty walking    Hospital Course:   Aaron Romero is a 69 y.o. female patient who originally presented to the hospital on 8/4/2023 due to difficulty walking for last 2 weeks. She was feeling like her both leg were not there. Also having complaints of increase urinary frequency but denied any burning sensation or hematuria. Also admitted having memory difficulties but could recall when she was talking to the provider. She also had history of dry cough and mentioned some of her co residents were COVID positive. Denied fever, chest pain, shortness of breath, abdominal pain. She has history of NPH after the cerebral subarachnoid hemorrhage. She has appointment as outpatient neurosurgery to have LP on September. [facility] mentioned no need inpatient intervention now. This morning she was doing well without any difficulty walking. She was oriented, alert. Only having frequency in urinating. Bladder scan shows no urinary retention. She also having congestion and got COVID positive. She is stable to discharge with outpatient PT.       Please see above list of diagnoses and related plan for additional information. Condition at Discharge: stable    Discharge Day Visit / Exam:   Subjective:  Patient was seen and examined at bedside. No acute event overnight. This morning patient was sitting comfortably on his bed and eating breakfast. No dizziness, vertigo, SOB. She was able to walk around without having any difficulty. Only she had urinary frequency as her pant got saturated today with urine. Was feeling well until this noon. But then she was complaining of having nasal congestion and some of her co-resident had COVID positive. Vitals: Blood Pressure: 102/68 (08/06/23 0811)  Pulse: 84 (08/06/23 0811)  Temperature: 97.8 °F (36.6 °C) (08/06/23 0811)  Temp Source: Oral (08/05/23 2038)  Respirations: 18 (08/06/23 0811)  Height: 5' 9" (175.3 cm) (08/05/23 0246)  Weight - Scale: 77 kg (169 lb 12.1 oz) (08/05/23 0246)  SpO2: 94 % (08/06/23 0811)  Exam:   Physical Exam  Vitals and nursing note reviewed. Constitutional:       General: She is not in acute distress. Appearance: Normal appearance. She is well-developed. She is not ill-appearing. HENT:      Head: Normocephalic and atraumatic. Nose: Nose normal. No congestion or rhinorrhea. Mouth/Throat:      Mouth: Mucous membranes are moist.      Pharynx: Oropharynx is clear. Eyes:      Conjunctiva/sclera: Conjunctivae normal.   Cardiovascular:      Rate and Rhythm: Normal rate and regular rhythm. Pulses: Normal pulses. Heart sounds: Normal heart sounds. No murmur heard. Pulmonary:      Effort: Pulmonary effort is normal. No respiratory distress. Breath sounds: Normal breath sounds. No rales. Abdominal:      General: Abdomen is flat. Bowel sounds are normal. There is no distension. Palpations: Abdomen is soft. Tenderness: There is no abdominal tenderness. Musculoskeletal:         General: No swelling or deformity. Normal range of motion. Cervical back: Normal range of motion and neck supple.  No rigidity or tenderness. Right lower leg: No edema. Left lower leg: No edema. Skin:     General: Skin is warm and dry. Capillary Refill: Capillary refill takes less than 2 seconds. Findings: No lesion. Neurological:      General: No focal deficit present. Mental Status: She is alert and oriented to person, place, and time. Sensory: No sensory deficit. Motor: No weakness. Psychiatric:         Mood and Affect: Mood normal.         Behavior: Behavior normal.         Thought Content: Thought content normal.           Discussion with Family: Patient declined call to . Discharge instructions/Information to patient and family:   See after visit summary for information provided to patient and family. Provisions for Follow-Up Care:  See after visit summary for information related to follow-up care and any pertinent home health orders. Disposition:   Home    Planned Readmission: No     Discharge Statement:  I spent 45 minutes discharging the patient. This time was spent on the day of discharge. I had direct contact with the patient on the day of discharge. Greater than 50% of the total time was spent examining patient, answering all patient questions, arranging and discussing plan of care with patient as well as directly providing post-discharge instructions. Additional time then spent on discharge activities. Discharge Medications:  See after visit summary for reconciled discharge medications provided to patient and/or family.       **Please Note: This note may have been constructed using a voice recognition system**

## 2023-08-06 NOTE — PLAN OF CARE
Problem: Potential for Falls  Goal: Patient will remain free of falls  Description: INTERVENTIONS:  - Educate patient/family on patient safety including physical limitations  - Instruct patient to call for assistance with activity   - Consult OT/PT to assist with strengthening/mobility   - Keep Call bell within reach  - Keep bed low and locked with side rails adjusted as appropriate  - Keep care items and personal belongings within reach  - Initiate and maintain comfort rounds  - Make Fall Risk Sign visible to staff  - Offer Toileting every  Hours, in advance of need  - Initiate/Maintain alarm  - Obtain necessary fall risk management equipment:   - Apply yellow socks and bracelet for high fall risk patients  - Consider moving patient to room near nurses station  Outcome: Progressing     Problem: MOBILITY - ADULT  Goal: Maintain or return to baseline ADL function  Description: INTERVENTIONS:  -  Assess patient's ability to carry out ADLs; assess patient's baseline for ADL function and identify physical deficits which impact ability to perform ADLs (bathing, care of mouth/teeth, toileting, grooming, dressing, etc.)  - Assess/evaluate cause of self-care deficits   - Assess range of motion  - Assess patient's mobility; develop plan if impaired  - Assess patient's need for assistive devices and provide as appropriate  - Encourage maximum independence but intervene and supervise when necessary  - Involve family in performance of ADLs  - Assess for home care needs following discharge   - Consider OT consult to assist with ADL evaluation and planning for discharge  - Provide patient education as appropriate  Outcome: Progressing  Goal: Maintains/Returns to pre admission functional level  Description: INTERVENTIONS:  - Perform BMAT or MOVE assessment daily.   - Set and communicate daily mobility goal to care team and patient/family/caregiver.    - Collaborate with rehabilitation services on mobility goals if consulted  - Perform Range of Motion  times a day. - Reposition patient every hours. - Dangle patient  times a day  - Stand patient  times a day  - Ambulate patient  times a day  - Out of bed to chair  times a day   - Out of bed for meals times a day  - Out of bed for toileting  - Record patient progress and toleration of activity level   Outcome: Progressing     Problem: NEUROSENSORY - ADULT  Goal: Achieves maximal functionality and self care  Description: INTERVENTIONS  - Monitor swallowing and airway patency with patient fatigue and changes in neurological status  - Encourage and assist patient to increase activity and self care.    - Encourage visually impaired, hearing impaired and aphasic patients to use assistive/communication devices  Outcome: Progressing     Problem: MUSCULOSKELETAL - ADULT  Goal: Maintain or return mobility to safest level of function  Description: INTERVENTIONS:  - Assess patient's ability to carry out ADLs; assess patient's baseline for ADL function and identify physical deficits which impact ability to perform ADLs (bathing, care of mouth/teeth, toileting, grooming, dressing, etc.)  - Assess/evaluate cause of self-care deficits   - Assess range of motion  - Assess patient's mobility  - Assess patient's need for assistive devices and provide as appropriate  - Encourage maximum independence but intervene and supervise when necessary  - Involve family in performance of ADLs  - Assess for home care needs following discharge   - Consider OT consult to assist with ADL evaluation and planning for discharge  - Provide patient education as appropriate  Outcome: Progressing

## 2023-08-06 NOTE — DISCHARGE INSTR - AVS FIRST PAGE
Dear Edis Oliver,     It was our pleasure to care for you here at [facility], [facility]. It is our hope that we were always able to exceed the expected standards for your care during your stay. You were hospitalized due to Ambulatory dysfunction. You were cared for on the [address] [facility] floor by Laura Guzman MD under the service of Jorge Leach MD with the [facility] who covers for your primary care physician (PCP), César Worthy MD, while you were hospitalized. If you have any questions or concerns related to this hospitalization, you may contact us at [phone]. For follow up as well as any medication refills, we recommend that you follow up with your primary care physician. A registered nurse will reach out to you by phone within a few days after your discharge to answer any additional questions that you may have after going home. However, at this time we provide for you here, the most important instructions / recommendations at discharge:     Notable Medication Adjustments -   None  Testing Required after Discharge -   None  ** Please contact your PCP to request testing orders for any of the testing recommended here **  Important follow up information -   Follow up with outpatient neurosurgery  Follow up with outpatient Physical Therapy  Other Instructions -   Continue home medications  Keep hydrate  Maintain good hygiene  Be compliant with home medications  Please review this entire after visit summary as additional general instructions including medication list, appointments, activity, diet, any pertinent wound care, and other additional recommendations from your care team that may be provided for you.       Sincerely,     Laura Guzamn MD

## 2023-08-07 ENCOUNTER — TRANSITIONAL CARE MANAGEMENT (OUTPATIENT)
Dept: FAMILY MEDICINE CLINIC | Facility: CLINIC | Age: 78
End: 2023-08-07

## 2023-08-07 DIAGNOSIS — F41.8 DEPRESSION WITH ANXIETY: ICD-10-CM

## 2023-08-07 NOTE — UTILIZATION REVIEW
NOTIFICATION OF ADMISSION DISCHARGE   This is a Notification of Discharge from [facility]. Please be advised that this patient has been discharge from our facility. Below you will find the admission and discharge date and time including the patient’s disposition. UTILIZATION REVIEW CONTACT:  Aimee Paz MA  Utilization   Network Utilization Review Department  Phone: [phone] x carefully listen to the prompts. All voicemails are confidential.  Email: [email or web address]     ADMISSION INFORMATION  PRESENTATION DATE: 8/4/2023  9:36 PM  OBERVATION ADMISSION DATE:   INPATIENT ADMISSION DATE: 8/5/23  1:27 AM   DISCHARGE DATE: 8/6/2023  5:07 PM   DISPOSITION:Home/Self Care    IMPORTANT INFORMATION:  Send all requests for admission clinical reviews, approved or denied determinations and any other requests to dedicated fax number below belonging to the campus where the patient is receiving treatment.  List of dedicated fax numbers:  [place] DENIALS (Administrative/Medical Necessity) [phone] [address] [facility] (Maternity/NICU/Pediatrics) [phone]   [facility] [phone]   [facility] [phone] 16[address] [facility] [phone]   [address] [place] [address] [phone]   [facility] [phone]   [address] [place] [address] [address] [facility] [phone]   [address] [place] [address] [phone] 3[facility] [phone] 2720 [facility] [address] [facility] [phone]

## 2023-08-08 RX ORDER — SERTRALINE HYDROCHLORIDE 100 MG/1
TABLET, FILM COATED ORAL
Qty: 180 TABLET | Refills: 1 | Status: SHIPPED | OUTPATIENT
Start: 2023-08-08

## 2023-08-10 ENCOUNTER — TELEPHONE (OUTPATIENT)
Dept: FAMILY MEDICINE CLINIC | Facility: CLINIC | Age: 78
End: 2023-08-10

## 2023-08-10 DIAGNOSIS — U07.1 COVID-19: Primary | ICD-10-CM

## 2023-08-10 RX ORDER — NIRMATRELVIR AND RITONAVIR 300-100 MG
3 KIT ORAL 2 TIMES DAILY
Qty: 30 TABLET | Refills: 0 | Status: SHIPPED | OUTPATIENT
Start: 2023-08-10 | End: 2023-08-15

## 2023-08-10 NOTE — TELEPHONE ENCOUNTER
Hi, good afternoon. This is Julian Angie's List, the [place] for [employer]. Her birth date is [birth year]. She currently has COVID and is doing very well. So today she reported to me that she did cough up a little bit of blood, not a large amount but just some. And a friend of hers in the building who also has COVID coughed up some blood and went to the hospital last night and they issued that antibiotic that starts with a Pi. Don't know Paxil liver, whatever it is. Just wanted to see maybe if we should get that prescribed for my mom or if we need to take her to the and like at what point is the blood? Not good. So if someone could just give me a call back, my phone number is [phone]. Thank you.  jamie Crowell.

## 2023-08-10 NOTE — TELEPHONE ENCOUNTER
Also received a fax that Alprazolam needs to be refilled. The system will not let me start a separate message

## 2023-08-10 NOTE — TELEPHONE ENCOUNTER
Spoke to Dennie Millman and Gabriel refill is not needed and [facility] does not take this just for procedures. Per our conversation Dennie Millman now is asking for more medical professional instruction on Paxlovid and also some concerns with symptoms. She prefers to speak with you directly. Can you please reach out to her?

## 2023-08-14 ENCOUNTER — TELEMEDICINE (OUTPATIENT)
Dept: FAMILY MEDICINE CLINIC | Facility: CLINIC | Age: 78
End: 2023-08-14
Payer: COMMERCIAL

## 2023-08-14 DIAGNOSIS — R41.89 ALTERATION IN COGNITION: Primary | ICD-10-CM

## 2023-08-14 DIAGNOSIS — F41.9 ANXIETY: ICD-10-CM

## 2023-08-14 PROCEDURE — 99495 TRANSJ CARE MGMT MOD F2F 14D: CPT | Performed by: NURSE PRACTITIONER

## 2023-08-14 PROCEDURE — 1111F DSCHRG MED/CURRENT MED MERGE: CPT | Performed by: NURSE PRACTITIONER

## 2023-08-14 RX ORDER — ALPRAZOLAM 0.25 MG/1
0.25 TABLET ORAL DAILY PRN
Qty: 30 TABLET | Refills: 0
Start: 2023-08-14

## 2023-08-15 NOTE — PROGRESS NOTES
TCM Call     Date and time call was made  2023  2:36 PM    Hospital care reviewed  Discussed with Inpatient Physician    Patient was hospitialized at  [facility]    Date of Admission  23    Date of discharge  23    Diagnosis  Ambulatory dysfunction    Disposition  Home    Were the patients medications reviewed and updated  Yes    Current Symptoms  Cough    Cough Severity  Moderate      TCM Call     Post hospital issues  Reduced activity    Should patient be enrolled in anticoag monitoring? No    Scheduled for follow up? Yes    Did you obtain your prescribed medications  Yes    Do you need help managing your prescriptions or medications  No    Is transportation to your appointment needed  No    I have advised the patient to call PCP with any new or worsening symptoms  Lauren Petty MA      Virtual TCM Visit:    Verification of patient location:    Patient is located at Home in the following state in which I hold an active license [place]      Name: Cliff Eastman      : 1945      MRN: [id number]  Encounter Provider: EYAL Bush  Encounter Date: 2023   Encounter department: [address] [place] [address]     1. Alteration in cognition  -     Ambulatory Referral to Senior Care; Future    2. Anxiety  -     ALPRAZolam (XANAX) 0.25 mg tablet; Take 1 tablet (0.25 mg total) by mouth daily as needed for anxiety    After connecting through [employer], the patient was identified by name and date of birth. Cliff Eastman was informed that this is a telemedicine visit and that the visit is being conducted through the [employer]. She agrees to proceed. .  My office door was closed. No one else was in the room. She acknowledged consent and understanding of privacy and security of the video platform. The patient has agreed to participate and understands they can discontinue the visit at any time. Patient is aware this is a billable service. Subjective      Chief complaint: 69 y. o.female presenting for hospital follow up for ambulatory dysfunction. HMI: Patient was admitted on 08/04/2023 to [address] [place] [address] for bilateral weakness of legs. She had also mentioned having a history of a dry cough with some of her co residents were COVID positive. She did not have a fever, chills, shortness of breath, headache, respiratory or GI complainants at that time. Patient discharged on 08/06/2023 with the diagnoses of ambulatory dysfunction, normal pressure hydrocephalus (NPH), major depressive disorder, single episode, unspecified, and benign paroxysmal vertigo, bilateral. 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 She has appointment as outpatient neurosurgery to have LP on September. The hospital neurosurgery did not feel need for inpatient intervention. Patient did test positive for COVID and was discharged with outpatient physical therapy. Her daughter is concerned that her mother's memory issues are worsening and requesting her to be further evaluated. Discussed recommendation to refer to [facility] for further cognition assessment. Patient's discharge medications were unchanged upon review. Review of Systems   Constitutional: Negative. Respiratory: Negative. Cardiovascular: Negative. Gastrointestinal: Negative. Musculoskeletal: Negative. Neurological: Negative. Psychiatric/Behavioral:        Memory issues       Current Outpatient Medications on File Prior to Visit   Medication Sig   • acetaminophen (TYLENOL) 500 mg tablet Take 1,000 mg by mouth if needed for mild pain   • Calcium-Vitamin D (CALTRATE 600 PLUS-VIT D PO) Take by mouth daily.    • esomeprazole (NexIUM) 10 MG packet Take 10 mg by mouth every morning before breakfast   • ezetimibe (ZETIA) 10 mg tablet TAKE ONE TABLET BY MOUTH EVERY DAY   • fluticasone (FLONASE) 50 mcg/act nasal spray 1 spray into each nostril as needed   • LUMIGAN 0.01 % ophthalmic drops PLACE 1 DROP INTO IN [facility] EYE AT BEDTIME   • meclizine (ANTIVERT) 25 mg tablet Take 1 tablet (25 mg total) by mouth every 8 (eight) hours as needed for dizziness   • Multiple Vitamins-Minerals (MULTIVITAMIN ADULTS 50+) TABS Take by mouth daily   • nirmatrelvir & ritonavir (Paxlovid, 300/100,) tablet therapy pack Take 3 tablets by mouth 2 (two) times a day for 5 days Take 2 nirmatrelvir tablets + 1 ritonavir tablet together per dose   • NON FORMULARY CBD Gummies   • psyllium (METAMUCIL) 0.52 g capsule Take by mouth daily 2-3 caps daily   • sertraline (ZOLOFT) 100 mg tablet TAKE 2 TABLETS BY MOUTH DAILY       Objective     There were no vitals taken for this visit. (Vital signs not performed during visit due to limitations of telemedicine format along with patient's availability to needed equipment)    Physical Exam  Constitutional:       General: She is not in acute distress. Appearance: Normal appearance. She is well-developed and well-groomed. She is not ill-appearing. HENT:      Head: Normocephalic and atraumatic. Right Ear: External ear normal.      Left Ear: External ear normal.      Mouth/Throat:      Lips: Pink. Mouth: Mucous membranes are moist.      Pharynx: Oropharynx is clear. Eyes:      General: Lids are normal.      Extraocular Movements: Extraocular movements intact. Conjunctiva/sclera: Conjunctivae normal.      Pupils: Pupils are equal, round, and reactive to light. Neck:      Trachea: Trachea normal.   Cardiovascular:      Rate and Rhythm: Normal rate and regular rhythm. Pulmonary:      Effort: Pulmonary effort is normal.   Neurological:      Mental Status: She is alert and oriented to person, place, and time. Psychiatric:         Mood and Affect: Mood normal.         Behavior: Behavior normal. Behavior is cooperative. I spent 20 minutes with the patient during this visit.     EYAL Miller       VIRTUAL VISIT Rui Connie verbally agrees to participate in [employer]. Pt is aware that [employer] could be limited without vital signs or the ability to perform a full hands-on physical Kamini Guzmán understands she or the provider may request at any time to terminate the video visit and request the patient to seek care or treatment in person.

## 2023-08-15 NOTE — PROGRESS NOTES
Virtual TCM Visit:    Verification of patient location:    Patient is located at {Amb Virtual Patient Location:[address]} in the following state in which I hold an active license { amb virtual patient location:[address]}    Assessment/Plan:        Problem List Items Addressed This Visit    None  Visit Diagnoses     Alteration in cognition    -  Primary    Relevant Orders    Ambulatory Referral to Senior Care    Anxiety        Relevant Medications    ALPRAZolam (XANAX) 0.25 mg tablet           Reason for visit is ***    Encounter provider EYAL Aj     Provider located at [address] [place] [id number]    Recent Visits  Date Type Provider Dept   08/14/23 1300 [address], [facility]   08/10/23 Telephone [place] recent visits within past 7 days and meeting all other requirements  Future Appointments  No visits were found meeting these conditions. Showing future appointments within next 150 days and meeting all other requirements       After connecting through [employer], the patient was identified by name and date of birth. Adelita Ruano was informed that this is a telemedicine visit and that the visit is being conducted through {AMB VIRTUAL VISIT [facility] [place]06980}. {Telemedicine confidentiality :51260} {Telemedicine participants:12312}  She acknowledged consent and understanding of privacy and security of the video platform. The patient has agreed to participate and understands they can discontinue the visit at any time. Patient is aware this is a billable service. Transitional Care Management Review:  Adelita Ruano is a 69 y.o. female here for TCM follow up.      During the TCM phone call patient stated:    TCM Call     Date and time call was made  8/7/2023  2:36 PM    Hospital care reviewed  Discussed with Inpatient Physician    Patient was hospitialized at  [facility]    Date of Admission  08/04/23    Date of discharge  08/06/23    Diagnosis  Ambulatory dysfunction    Disposition  Home    Were the patients medications reviewed and updated  Yes    Current Symptoms  Cough    Cough Severity  Moderate      TCM Call     Post hospital issues  Reduced activity    Should patient be enrolled in anticoag monitoring? No    Scheduled for follow up? Yes    Did you obtain your prescribed medications  Yes    Do you need help managing your prescriptions or medications  No    Is transportation to your appointment needed  No    I have advised the patient to call PCP with any new or worsening symptoms  [place] CAR Patel        Subjective:     Patient ID: Ida Martinez is a 69 y.o. female. HPI  Review of Systems      Objective: There were no vitals filed for this visit. Physical Exam    { Medications have NOT been reviewed by provider in current encounter. Once medications have been reviewed, please refresh your progress note so that you can sign the visit }    I spent *** minutes with the patient during this visit. EYAL Augustin      VIRTUAL VISIT [facility] verbally agrees to participate in [facility]. Pt is aware that [facility] could be limited without vital signs or the ability to perform a full hands-on physical Julietgisela Multani understands she or the provider may request at any time to terminate the video visit and request the patient to seek care or treatment in person.

## 2023-08-22 ENCOUNTER — TELEPHONE (OUTPATIENT)
Dept: PSYCHIATRY | Facility: CLINIC | Age: 78
End: 2023-08-22

## 2023-09-01 ENCOUNTER — OFFICE VISIT (OUTPATIENT)
Dept: NEUROSURGERY | Facility: CLINIC | Age: 78
End: 2023-09-01
Payer: COMMERCIAL

## 2023-09-01 ENCOUNTER — EVALUATION (OUTPATIENT)
Dept: PHYSICAL THERAPY | Facility: CLINIC | Age: 78
End: 2023-09-01
Payer: COMMERCIAL

## 2023-09-01 ENCOUNTER — HOSPITAL ENCOUNTER (OUTPATIENT)
Dept: RADIOLOGY | Facility: HOSPITAL | Age: 78
Discharge: HOME | End: 2023-09-01
Payer: COMMERCIAL

## 2023-09-01 VITALS
HEIGHT: 69 IN | HEART RATE: 75 BPM | DIASTOLIC BLOOD PRESSURE: 78 MMHG | OXYGEN SATURATION: 99 % | BODY MASS INDEX: 25.03 KG/M2 | TEMPERATURE: 97.4 F | SYSTOLIC BLOOD PRESSURE: 130 MMHG | WEIGHT: 169 LBS | RESPIRATION RATE: 18 BRPM

## 2023-09-01 VITALS
HEIGHT: 69 IN | SYSTOLIC BLOOD PRESSURE: 127 MMHG | WEIGHT: 169 LBS | DIASTOLIC BLOOD PRESSURE: 68 MMHG | BODY MASS INDEX: 25.03 KG/M2 | OXYGEN SATURATION: 95 % | RESPIRATION RATE: 18 BRPM | TEMPERATURE: 97.3 F | HEART RATE: 78 BPM

## 2023-09-01 DIAGNOSIS — R26.2 AMBULATORY DYSFUNCTION: Primary | ICD-10-CM

## 2023-09-01 DIAGNOSIS — G91.2 NPH (NORMAL PRESSURE HYDROCEPHALUS) (HCC): Primary | ICD-10-CM

## 2023-09-01 DIAGNOSIS — R26.9 GAIT DISTURBANCE: ICD-10-CM

## 2023-09-01 DIAGNOSIS — F09 MILD COGNITIVE DISORDER: ICD-10-CM

## 2023-09-01 LAB
APTT PPP: 28 SECONDS (ref 23–37)
INR PPP: 0.93 (ref 0.84–1.19)
PLATELET # BLD AUTO: 328 THOUSANDS/UL (ref 149–390)
PMV BLD AUTO: 9.1 FL (ref 8.9–12.7)
PROTHROMBIN TIME: 13.1 SECONDS (ref 11.6–14.5)

## 2023-09-01 PROCEDURE — 85730 THROMBOPLASTIN TIME PARTIAL: CPT | Performed by: RADIOLOGY

## 2023-09-01 PROCEDURE — 85610 PROTHROMBIN TIME: CPT | Performed by: RADIOLOGY

## 2023-09-01 PROCEDURE — 99214 OFFICE O/P EST MOD 30 MIN: CPT | Performed by: NEUROLOGICAL SURGERY

## 2023-09-01 PROCEDURE — 97161 PT EVAL LOW COMPLEX 20 MIN: CPT | Performed by: PHYSICAL THERAPIST

## 2023-09-01 PROCEDURE — 85049 AUTOMATED PLATELET COUNT: CPT | Performed by: RADIOLOGY

## 2023-09-01 PROCEDURE — 62329 THER SPI PNXR CSF FLUOR/CT: CPT

## 2023-09-01 RX ORDER — ACETAMINOPHEN 325 MG/1
650 TABLET ORAL EVERY 4 HOURS PRN
Status: DISCONTINUED | OUTPATIENT
Start: 2023-09-01 | End: 2023-09-02 | Stop reason: HOSPADM

## 2023-09-01 RX ADMIN — ACETAMINOPHEN 650 MG: 325 TABLET, FILM COATED ORAL at 10:12

## 2023-09-01 NOTE — PROGRESS NOTES
PT Evaluation     Today's date: 2023  Patient name: Annabelle Polo  :   MRN: [id number]  Referring provider: Alberto Butler PA-C  Dx:   Encounter Diagnosis     ICD-10-CM    1. NPH (normal pressure hydrocephalus) (HCC)  G91.2       2. Gait disturbance  R26.9 Ambulatory referral to Physical Therapy                     Assessment  Assessment details: Patient seen for gait evaluation s/p lumbar puncture. Please see details in evaluation for gait evaluation. Patient remains a fall risk.     Impairments: abnormal gait  Functional limitations: Gait dysfunctionUnderstanding of Dx/Px/POC: good   Prognosis: good    Plan  Treatment plan discussed with: patient        Subjective Evaluation    Pain  Current pain ratin  At best pain ratin  At worst pain ratin          Objective     Ambulation   Weight-Bearing Status   Weight-Bearing Status (Left): weight-bearing as tolerated   Weight-Bearing Status (Right): weight-bearing as tolerated    Assistive device used: single point cane    Additional Weight-Bearing Status Details  Wide RENEA - WNL  Outwardly rotated - present  Step height diminished - WNL    Ambulation: Stairs   Ascend stairs: independent  Pattern: non-reciprocal  Railings: two rails  Descend stairs: independent  Pattern: non-reciprocal  Railings: two rails    Comments   TUG - 15 s  FGA -                Precautions: Fall risk      Manuals                                                                 Neuro Re-Ed                                                                                                        Ther Ex                                                                                                                     Ther Activity                                       Gait Training                                       Modalities

## 2023-09-01 NOTE — ASSESSMENT & PLAN NOTE
Patient presents to the  nsgy office today for follow-up after NPH testing with LP and PT.   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   · Re-presented to the  nsgy office after referred by neurology for concern of NPH   · Has been struggling with worsening memory issues, gait problems and urinary incontinence for which she underwent MRI brain neuroquant which demonstrated slightly enlarged ventricles  · Last seen in the nsgy office on 8/4 where NPH workup was initiated   · Unfortunately, she was admitted to the hospital from 8/4-8/6 after this appt for generalized weakness and covid   · Medical workup at that time otherwise negative, CTH stable   · 8/4 pre-LP PT assessment:   · MOCA: 24/30   · NPH scale: 9/15 (see scanned document on that date)   · 8 m walk test: 0.625 m/s   · TUG 28 sec, FGA 7/30   · 9/1 - s/p high volume LP with removal of 40cc clear CSF   · 9/1 post-LP PT assessment:   · MOCA: 24/30   · NPH scale 10/15   · 10 m walk test: 0.52 m/s  · TUG 15 sec, FGA 17/30   · Today, pt reports a slight HA after LP, but otherwise denies any significant change. She states maybe she is a little more steady when standing up but overall her walking remains stable. Ambulates with a cane. · Pt states she continued to live alone and manage all her own ADLs, cleaning, medications and cooking independently. She attends exercise classes twice a weak in her senior living center. Pt's daughter does now manage the pt's finances. Reports continued incontinence, unchanged. Imaging:  • 8/4 CTH: No acute intracranial hemorrhage. Chronic microangiopathic changes  • MRI brain neuroquant wo contrast 5/28/2023: Normal hippocampal volume and enlarged ventricular system: Findings do not support hippocampal degeneration.  Possible expansion of ventricular system without medial temporal lobe focused ex-vacuo process. Plan:  • Patient encouraged to continue to closely monitor neurological symptoms and exam  • Patient and daughter educated on "red flag" signs and symptoms to monitor for that they should seek emergent care for should she experience. • Discussed at length the patient with her pre and post LP testing results. Listed above.  o Explained to patient and her daughter that after the high-volume LP, her physical therapy assessments and numbers did not have significant improvement. Her cognition assessment remains the same. Her gait overall the same. They report no changes to her incontinence.  o Explained to patient and daughter that given these results, it is unlikely she has true NPH and neurosurgical intervention with a shunt is not indicated at this time. o Patient and daughter were in agreement with this and understanding. They also state they are not interested in any neurosurgical intervention.   • Recommended continued follow-up as an outpatient with neurology  • Recommended continued physical therapy as an outpatient  o Referral placed at this time  • Will plan to follow-up with the patient on an as-needed basis or should she experience any worsening symptoms  Pt and daughter were agreeable to the above noted plan   All questions answered at this time   Pt encouraged to call the office with any further questions or concerns or should they experience any worsening sx

## 2023-09-01 NOTE — PROGRESS NOTES
Neurosurgery Office Note  Bobo Vazquez 69 y.o. female MRN: [id number]      Assessment/Plan   NPH (normal pressure hydrocephalus) [facility])  Patient presents to the [address]  nsgy office today for follow-up after NPH testing with LP and PT.   · Known to the [address] [facility] office w/ hx of [facility] 1/2016 thought to be due to possible aneurysm rupture which thrombosed  · Continued to follow through 2022 in regard to a 1mm R LIANET A1/A2 junction aneurysm   · Re-presented to the  nsgy office after referred by neurology for concern of NPH   · Has been struggling with worsening memory issues, gait problems and urinary incontinence for which she underwent MRI brain neuroquant which demonstrated slightly enlarged ventricles  · Last seen in the nsgy office on 8/4 where NPH workup was initiated   · Unfortunately, she was admitted to the hospital from 8/4-8/6 after this appt for generalized weakness and covid   · Medical workup at that time otherwise negative, CTH stable   · 8/4 pre-LP PT assessment:   · MOCA: 24/30   · NPH scale: 9/15 (see scanned document on that date)   · 8 m walk test: 0.625 m/s   · TUG 28 sec, FGA 7/30   · 9/1 - s/p high volume LP with removal of 40cc clear CSF   · 9/1 post-LP PT assessment:   · MOCA: 24/30   · NPH scale 10/15   · 10 m walk test: 0.52 m/s  · TUG 15 sec, FGA 17/30   · Today, pt reports a slight HA after LP, but otherwise denies any significant change. She states maybe she is a little more steady when standing up but overall her walking remains stable. Ambulates with a cane. · Pt states she continued to live alone and manage all her own ADLs, cleaning, medications and cooking independently. She attends exercise classes twice a weak in her senior living center. Pt's daughter does now manage the pt's finances. Reports continued incontinence, unchanged. Imaging:  • 8/4 CTH: No acute intracranial hemorrhage.   Chronic microangiopathic changes  • MRI brain neuroquant wo contrast 5/28/2023: Normal hippocampal volume and enlarged ventricular system: Findings do not support hippocampal degeneration. Possible expansion of ventricular system without medial temporal lobe focused ex-vacuo process. Plan:  • Patient encouraged to continue to closely monitor neurological symptoms and exam  • Patient and daughter educated on "red flag" signs and symptoms to monitor for that they should seek emergent care for should she experience. • Discussed at length the patient with her pre and post LP testing results. Listed above.  o Explained to patient and her daughter that after the high-volume LP, her physical therapy assessments and numbers did not have significant improvement. Her cognition assessment remains the same. Her gait overall the same. They report no changes to her incontinence.  o Explained to patient and daughter that given these results, it is unlikely she has true NPH and neurosurgical intervention with a shunt is not indicated at this time. o Patient and daughter were in agreement with this and understanding. They also state they are not interested in any neurosurgical intervention. • Recommended continued follow-up as an outpatient with neurology  • Recommended continued physical therapy as an outpatient  o Referral placed at this time  • Will plan to follow-up with the patient on an as-needed basis or should she experience any worsening symptoms  Pt and daughter were agreeable to the above noted plan   All questions answered at this time   Pt encouraged to call the office with any further questions or concerns or should they experience any worsening sx       Diagnoses and all orders for this visit:    Ambulatory dysfunction  -     Ambulatory Referral to Physical Therapy;  Future        I have spent a total time of 35 minutes on 09/01/23 in caring for this patient including Diagnostic results, Instructions for management, Patient and family education, Impressions, Counseling / Coordination of care, Documenting in the medical record, Reviewing / ordering tests, medicine, procedures  , Obtaining or reviewing history   and Communicating with other healthcare professionals . CHIEF COMPLAINT    Chief Complaint   Patient presents with   • Follow-up     Patient is to return to the office after LP and PT session with AP/MARCO     HISTORY  Pt is a 69y.o. 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 with past medical history significant for hydrocephalus, vertigo, mild cognitive disorder, osteopenia, arthritis, history of breast cancer, hyperlipidemia, depression, anxiety, and stress incontinence who presents for evaluation of possible NPH. Patient known to our service who unfortunately had a spontaneous subarachnoid hemorrhage on 1/3/2016. The possibility that her subarachnoid hemorrhage was the result of a ruptured aneurysm which may have thrombosed post rupture. Patient presents today for evaluation and to review results after LP and repeat physical therapy assessment in regard to NPH work-up. Overall, patient and daughter state no significant change after LP, may be a slight increase to her steadiness/balance but overall patient still needing cane or walker. Today she is here with her daughter. She states that since her subarachnoid hemorrhage she has had some mild cognitive issues as well as urinary incontinence however since January she feels like the symptoms have worsened alongside gait instability. She states that she used to wear pads for urinary incontinence but now is wearing depends and will go through 4 or 5 depends per day. She states that time she does not realize she has to go to the bathroom and once she takes her depends off will note that it is soaked through. Her daughter states that patient has always had some mild cognitive issues, but since January she has become more forgetful and repetitive with her speech. Daughter states she is now needing to handle her finances.   However, patient and daughter stated the patient does live completely independently and functions/completes all her own ADLs independently. Patient is able to administer her own medications appropriately, clean her house, cook, dress herself, etc. without issue. Patient continues to use a cane or a walker with ambulation. Denies any significant change to this. Patient states maybe after LP she is slightly steadier on her feet but overall no significant difference. Patient and daughter are here to review results but overall they are not interested in any neurosurgical procedure today. See Discussion    REVIEW OF SYSTEMS  Review of Systems   Constitutional: Negative. HENT: Negative. Eyes: Positive for visual disturbance (Wears glasses, may need new glasses, has upcoming appt). Respiratory: Negative. Cardiovascular: Negative. Gastrointestinal: Negative. Endocrine: Negative. Genitourinary: Positive for enuresis, frequency (nocturia x1) and urgency. Since brain bleed has to wear pad daily and all day due to leakage and has an alarm set for every 2 hours since she can't feel urge to urinate    Wearing depends    Musculoskeletal: Positive for gait problem (uses cane for assistance, no falls within the last 5 years except for 1 fall about 3-4 weeks ago while bending down to  an item while entering door of her home). Skin: Negative. Allergic/Immunologic: Negative. Neurological: Positive for speech difficulty (duaghter states forgets words) and weakness (bilateral legs, chronic ). Negative for dizziness, tremors, seizures, syncope, numbness and headaches.         CT HEAD wo contrast done 8/4/23  LP & PT sched 9/1/23  presented at ED on 8/4/23 for ambulatory dysfunction   NPH scale dated 9/1/23, 10/15.     PT gait assessment dated 9/1/23. 10 meter walking test .53 m/s (<1m/s predictive of falls), TUG 15 sec (<12 sec predictive of falls), FGA 17/30 (< 24 predictive of falls).     MoCA dated 9/1/23, 24/30 (<26 cognitive deficit present).     NPH scale dated 8/4/23, 9/15.     PT gait assessment dated 8/4/23. 10 meter walking test .625 m/s (<1m/s predictive of falls), TUG 28 sec (<12 sec predictive of falls), FGA 7/30 (< 24 predictive of falls).     MoCA dated 8/4/23, 24/30 (<26 cognitive deficit present). Hematological: Negative. Psychiatric/Behavioral: Positive for confusion (memory loss ). Worsening memory loss, short term memory    Recent PCP/Medicare Wellness visit, past cogenitive assessment    All other systems reviewed and are negative. ROS obtained by MA. Reviewed. See HPI. Meds/Allergies   Current Outpatient Medications   Medication Sig Dispense Refill   • acetaminophen (TYLENOL) 500 mg tablet Take 1,000 mg by mouth if needed for mild pain     • ALPRAZolam (XANAX) 0.25 mg tablet Take 1 tablet (0.25 mg total) by mouth daily as needed for anxiety 30 tablet 0   • Calcium-Vitamin D (CALTRATE 600 PLUS-VIT D PO) Take by mouth daily. • esomeprazole (NexIUM) 10 MG packet Take 10 mg by mouth every morning before breakfast     • ezetimibe (ZETIA) 10 mg tablet TAKE ONE TABLET BY MOUTH EVERY DAY 30 tablet 5   • fluticasone (FLONASE) 50 mcg/act nasal spray 1 spray into each nostril as needed     • LUMIGAN 0.01 % ophthalmic drops PLACE 1 DROP INTO IN [facility] EYE AT BEDTIME  11   • meclizine (ANTIVERT) 25 mg tablet Take 1 tablet (25 mg total) by mouth every 8 (eight) hours as needed for dizziness 30 tablet 0   • Multiple Vitamins-Minerals (MULTIVITAMIN ADULTS 50+) TABS Take by mouth daily     • NON FORMULARY CBD Gummies     • psyllium (METAMUCIL) 0.52 g capsule Take by mouth daily 2-3 caps daily     • sertraline (ZOLOFT) 100 mg tablet TAKE 2 TABLETS BY MOUTH DAILY 180 tablet 1     No current facility-administered medications for this visit.      Facility-Administered Medications Ordered in Other Visits   Medication Dose Route Frequency Provider Last Rate Last Admin   • acetaminophen (TYLENOL) tablet 650 mg  650 mg Oral Q4H DEBBIEN Giorgi Durham MD   650 mg at 09/01/23 1012     Allergies   Allergen Reactions   • Oxycodone Vomiting   • Atorvastatin Other (See Comments)     Leg/knee pain (joint pain)   • Bactrim [Sulfamethoxazole-Trimethoprim] Itching and Rash   • Keppra [Levetiracetam] Rash   • Livalo [Pitavastatin] Itching   • Penicillins Rash     Agitation        PAST HISTORY  Past Medical History:   Diagnosis Date   • Acute mastoiditis with intracranial complication 17/22/3608   • Anxiety disorder    • Brain hemangioma ([address])     2016   • BRCA1 negative    • BRCA2 negative    • Breast cancer ([address]) 2014    left breast   • Cancer ([address])    • Constipation    • Esophageal reflux    • History of bone density study 2011    [employer]   • History of radiation therapy 2014    left breast ca   • History of recurrent UTIs    • 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   • Hyperlipidemia    • Hypertension    • Malignant neoplasm of female breast ([address]) 04/22/2014   • Osteoarthritis of hip    • Osteopenia    • PONV (postoperative nausea and vomiting)    • Stroke, hemorrhagic ([address]) 12/2015   • Subarachnoid hemorrhage ([address])      Past Surgical History:   Procedure Laterality Date   • BREAST BIOPSY Left 2014   • BREAST LUMPECTOMY Left 2014   • COLONOSCOPY      Fiberoptic - 2009, 2015 5 year f/u   • ESOPHAGOGASTRODUODENOSCOPY  2009    Diag, Resolved - 2006 / 2009   • EXOSTECTOMY  2005    Simple Bunion ([facility] Procedure)   • FL LUMBAR PUNCTURE THERAPEUTIC  9/1/2023   • HYSTERECTOMY  1981   • JOINT REPLACEMENT Right 2012    Right Hip   • OOPHORECTOMY  1981    not sure which one   • REPLACEMENT TOTAL KNEE Right 2018   • VENTRICULOSTOMY       Social History     Tobacco Use   • Smoking status: Never     Passive exposure: Past   • Smokeless tobacco: Never   Vaping Use   • Vaping Use: Never used   Substance Use Topics   • Alcohol use: Yes     Comment: occasionally   • Drug use: No     Family History   Problem Relation Age of Onset   • Cancer Mother 71        bone   • Hypertension Mother    • Arthritis Mother    • Stroke Father         TIA   • Stroke Maternal Grandmother         CVA   • Deep vein thrombosis Daughter    • Heart attack Neg Hx    • Anuerysm Neg Hx    • Clotting disorder Neg Hx    • Arrhythmia Neg Hx    • Heart failure Neg Hx    • Coronary artery disease Neg Hx    • Hyperlipidemia Neg Hx    • Breast cancer Neg Hx    Above history personally reviewed. EXAM  Vitals:Blood pressure 130/78, pulse 75, temperature (!) 97.4 °F (36.3 °C), temperature source Temporal, resp. rate 18, height 5' 9" (1.753 m), weight 76.7 kg (169 lb), SpO2 99 %, not currently breastfeeding. ,Body mass index is 24.96 kg/m². Physical Exam  Constitutional:       General: She is not in acute distress. Appearance: Normal appearance. She is normal weight. She is not ill-appearing or toxic-appearing. Comments: Pleasant, thin, elderly female sitting comfortably in the chair. No acute distress. HENT:      Head: Normocephalic and atraumatic. Right Ear: External ear normal.      Left Ear: External ear normal.      Nose: Nose normal. No congestion or rhinorrhea. Mouth/Throat:      Mouth: Mucous membranes are moist.   Eyes:      General: No scleral icterus. Right eye: No discharge. Left eye: No discharge. Extraocular Movements: Extraocular movements intact. Conjunctiva/sclera: Conjunctivae normal.      Pupils: Pupils are equal, round, and reactive to light. Cardiovascular:      Rate and Rhythm: Normal rate. Pulmonary:      Effort: Pulmonary effort is normal. No respiratory distress. Comments: No respiratory distress on room air  Abdominal:      General: Abdomen is flat. Musculoskeletal:         General: No deformity or signs of injury. Normal range of motion. Cervical back: Normal range of motion. No rigidity or tenderness. Right lower leg: No edema.       Left lower leg: No edema.   Skin:     General: Skin is warm and dry. Capillary Refill: Capillary refill takes less than 2 seconds. Neurological:      General: No focal deficit present. Mental Status: She is alert and oriented to person, place, and time. Mental status is at baseline. Cranial Nerves: No cranial nerve deficit. Sensory: No sensory deficit. Motor: No weakness. Coordination: Coordination normal.      Gait: Gait abnormal.      Deep Tendon Reflexes: Reflexes normal.      Comments: GCS 15   A&Ox3   Appropriately answering questions and following commands   No dysarthria or aphasia   No appreciated CN deficits   Strength 5/5 throughout to lewis UE and lewis LE   Sensation intact to light touch to lewis UE and lewis LE   No drift noted bilaterally  Subtle ataxia noted to bilateral upper extremities on finger-to-nose testing  Ambulates with a cane at baseline, no significant shuffling noted when walking. Psychiatric:         Mood and Affect: Mood normal.         Behavior: Behavior normal.         Thought Content: Thought content normal.         Judgment: Judgment normal.         Neurologic Exam     Mental Status   Oriented to person, place, and time. Cranial Nerves     CN III, IV, VI   Pupils are equal, round, and reactive to light. MEDICAL DECISION MAKING  Imaging Studies:   FL lumbar puncture therapeutic    Result Date: 9/1/2023  Narrative: FLUOROSCOPICALLY GUIDED LUMBAR PUNCTURE INDICATION: Evaluation for normal pressure hydrocephalus. Dx: Mild cognitive disorder [F09 (ICD-10-CM)]; Gait disturbance [R26.9 (ICD-10-CM)]. memory impairment, Mild cognitive disorder, Gait disturbance. LP therapeutic. LP under fluoro for high volume LP (>40cc) with opening pressure for NPH [facility] at [facility]. No lab orders for CSF. FLUOROSCOPY TIME:  30  seconds IMAGES: 1 TECHNIQUE: The risks of the procedure were discussed in detail.  The risks discussed included, however were not limited to, infection, bleeding, injury to surrounding structures (nerves, spinal cord, and blood vessels), allergic reactions, arachnoiditis, encephalitis, and spinal headaches. The patient verbalized their understanding of the above risks and wished to proceed with the lumbar puncture. The patient was prepped and draped in the usual sterile fashion. 1% lidocaine was infiltrated at the puncture site. Utilizing Left paravertebral approach, a 20 gauge 3.5 inch spinal needle was advanced under fluoroscopic guidance into the subarachnoid space at the L3-L4 level, utilizing sterile technique. Once in position, opening pressure was 12 cm H2O. Approximately 40 cc of clear, colorless CSF were removed. After obtaining 40 mL's of clear colorless CSF, the CSF flow diminished and then quickly stopped. Several attempts were made to obtain CSF fluid including head of bed elevation and placing the patient in the left lateral decubitus position. Despite several additional position changes and maneuvers we were unable to obtain any additional CSF. The needle was removed. The patient tolerated the procedure well. The patient was discharged from the department with appropriate instructions. Impression: Successful fluoroscopically guided lumbar puncture with an opening pressure of 12 cm H2O. Approximately 40 mL of clear colorless CSF was removed. ATTESTATION The attending radiologist was available for the procedure and interpreted the images. The advanced practitioner, under supervision of the attending radiologist, was present for the critical and key portions of the procedure. Guidance images were reviewed by the attending radiologist who is in agreement with the findings on the report. Attending physician: Jerry. Advanced practitioner: Tuan Riley. Resident:  Antonio Giang. Resident: Kathya Rdz, the attending radiologist, have reviewed the images and agree with the final report above.  Workstation performed: [id number]     XR chest 1 view portable    Result Date: 8/5/2023  Narrative: CHEST INDICATION:   cough. COMPARISON: CXR 5/10/2019. EXAM PERFORMED/VIEWS:  XR CHEST PORTABLE. FINDINGS: Cardiomediastinal silhouette normal. Lungs clear. No effusion or pneumothorax. Clips in left anterior chest wall. Upper abdomen normal. Bones normal for age. Impression: No acute cardiopulmonary disease. Workstation performed: [id number]     CT head wo contrast    Result Date: 8/4/2023  Narrative: CT BRAIN - WITHOUT CONTRAST INDICATION:   concern for worsening hydrocephalus, BILAT LE weakness, BILAT LE hyperreflexivity, worsening confusion. COMPARISON: 5/10/2019 and 5/28/2023 TECHNIQUE:  CT examination of the brain was performed. Multiplanar 2D reformatted images were created from the source data. Radiation dose length product (DLP) for this visit:  1022 mGy-cm . This examination, like all CT scans performed in the [facility], was performed utilizing techniques to minimize radiation dose exposure, including the use of iterative reconstruction and automated exposure control. IMAGE QUALITY:  Diagnostic. FINDINGS: PARENCHYMA: Decreased attenuation is noted in periventricular and subcortical white matter demonstrating an appearance that is statistically most likely to represent moderate microangiopathic change; this appearance is similar when compared to most recent prior examination. No CT signs of acute infarction. No intracranial mass, mass effect or midline shift. No acute parenchymal hemorrhage. VENTRICLES AND EXTRA-AXIAL SPACES:  Ventricles and extra-axial CSF spaces are prominent commensurate with the degree of volume loss. Mild similar hydrocephalus. No acute extra-axial hemorrhage. VISUALIZED ORBITS: Enophthalmos PARANASAL SINUSES: Normal visualized paranasal sinuses. CALVARIUM AND EXTRACRANIAL SOFT TISSUES:  Normal.     Impression: No acute intracranial hemorrhage. Chronic microangiopathic changes.  Workstation performed: [id number]     I have personally reviewed pertinent reports.    and I have personally reviewed pertinent films in PACS

## 2023-09-03 DIAGNOSIS — F41.9 ANXIETY: ICD-10-CM

## 2023-09-05 RX ORDER — ALPRAZOLAM 0.25 MG/1
TABLET ORAL
Qty: 30 TABLET | Refills: 0 | Status: SHIPPED | OUTPATIENT
Start: 2023-09-05

## 2023-09-05 NOTE — TELEPHONE ENCOUNTER
1 [id number] 08/03/2023 08/03/2023 ALPRAZolam (Tablet) 30.0 30 0.25 MG NA [facility] #[id number] Medicare 0 / 0 [place]   1 [id number] 11/21/2022 11/21/2022 ALPRAZolam (Tablet) 30.0 29 0.25 MG [id number] Medicare 0 / 0 [place]

## 2023-09-13 ENCOUNTER — TELEPHONE (OUTPATIENT)
Age: 78
End: 2023-09-13

## 2023-09-13 ENCOUNTER — PATIENT OUTREACH (OUTPATIENT)
Dept: FAMILY MEDICINE CLINIC | Facility: CLINIC | Age: 78
End: 2023-09-13

## 2023-09-13 NOTE — PROGRESS NOTES
Spoke with [facility] they did receive referral but give patient 30 days to get back to their baseline if they were in the hospital or had surgery.  They will reach out to patient on Friday 9/15/23

## 2023-09-13 NOTE — TELEPHONE ENCOUNTER
Annalise Gill is assisting with this since both PCP and referring provider are both out of the office. She reached out to [facility].      She also left a message on daughter's vm stating that [facility] will be in touch very soon

## 2023-09-14 ENCOUNTER — TELEPHONE (OUTPATIENT)
Age: 78
End: 2023-09-14

## 2023-09-14 NOTE — TELEPHONE ENCOUNTER
Pierre Velásquez [address], [address] [place] [address], [facility]    [phone]    Telephone Intake: Geriatric Assessment     - Chart Review  1. Has this patient been seen by our department in the last 3 years? No  2. Please route to provider for chart review prior to scheduling and let the caller know that this phone intake will be reviewed IF -  • Pt was recently hospitalized  • Pt is prescribed medications for behavior management or has a history of psychiatric hospitalization  • Pt plans to attend alone    Referral source: PCP     Caller who is scheduling/relationship to pt: Renee Guillen   Caller's phone number: [phone]    Reason for referral: Patient concerns  and Family member concerns regarding memory concerns, behavior changes/concerns and home safety concerns. Stability. If there are behavioral concerns, is the pt prescribed medications to manage these? Xanax (as needed over the years), and sertraline (in 2016)    If so, how many? 2    Has the patient ever had an inpatient psychiatric hospitalization? No, N/A   What is the goal of the visit? Guidance, care plan. Help with stability. Has the patient been seen by a Neurologist or Geriatrician? No   If yes, is this appointment for a second opinion? No  Has the patient ever been diagnosed with dementia? No  Has the patient had an MRI NeuroQuant within the last 1 year? Yes  (If so, please route to provider to determine if assessment + conference are needed or if only assessment should be scheduled)      Preferred language? English   Highest education level? HS   Does the patient wear glasses? Yes   Does the patient use hearing aids? No     Is there a living will/healthcare POA in place/If so, who? Yes , Renee Guillen     Does the pt/caregiver have access for a virtual visit (computer/smart phone with audio/video)?  Yes     Caller was informed:   • Please make sure the pt is accompanied by someone who knows them well / caregiver / family member to participate in this appointment. Who will accompany the pt (name and relationship)? Zarina Molina   Phone number of person accompanying pt: [phone]  • Please make sure the pt attends all appointments, including the assessment, care conference, follow-up, whether in-person or virtual.  • For virtual visits, pt must be physically present in state [facility]. Office packet mailed out to: [address], [id number]   Added to wait list for sooner appointments/notified that calls can be short notice (same day/day prior)?  Yes

## 2023-10-20 ENCOUNTER — OFFICE VISIT (OUTPATIENT)
Age: 78
End: 2023-10-20
Payer: COMMERCIAL

## 2023-10-20 VITALS
WEIGHT: 162.6 LBS | TEMPERATURE: 97.6 F | HEART RATE: 67 BPM | DIASTOLIC BLOOD PRESSURE: 82 MMHG | BODY MASS INDEX: 24.08 KG/M2 | HEIGHT: 69 IN | SYSTOLIC BLOOD PRESSURE: 138 MMHG | OXYGEN SATURATION: 97 %

## 2023-10-20 DIAGNOSIS — M19.90 ARTHRITIS: ICD-10-CM

## 2023-10-20 DIAGNOSIS — K59.00 CONSTIPATION, UNSPECIFIED CONSTIPATION TYPE: ICD-10-CM

## 2023-10-20 DIAGNOSIS — R41.89 ALTERATION IN COGNITION: ICD-10-CM

## 2023-10-20 DIAGNOSIS — F41.8 DEPRESSION WITH ANXIETY: ICD-10-CM

## 2023-10-20 DIAGNOSIS — R26.2 AMBULATORY DYSFUNCTION: ICD-10-CM

## 2023-10-20 DIAGNOSIS — F09 MILD COGNITIVE DISORDER: Primary | ICD-10-CM

## 2023-10-20 PROCEDURE — 99205 OFFICE O/P NEW HI 60 MIN: CPT | Performed by: NURSE PRACTITIONER

## 2023-10-20 NOTE — PATIENT INSTRUCTIONS
Global Cognition Activity Apps:  30/30- time management, set up lists of tasks to accomplish and time needed to complete them  Awesome Memory- cognition and memory  Bejeweled- clear the gems by matching the same colors  Brain HQ- tests your brain in the area of memory, speed, and attention  Brain Workout- test your brain in the area of memory, focus, reaction, and accuracy   Classic Sebastián- tests memory and attention as you repeat the colors and sounds in the correct order  Drawesome- copy the image without picking up the pencil  Fit Brains Trainer- addresses memory and recognition, concentration, attention span, processing speed, problem-solving, hand-eye coordination, and reaction time  Lumosity Brain Trainer- personalized brain workouts for memory, attention, and brain performance (can also be accessed online)  Mind Games (Pro)- abstraction, attention, memory, executive functioning, etc.  Rush Hour-sliding block traffic jam puzzle  Skill Game [place]/Skills Game- sustain attention to complete trail-making with numbers   Skill Training- visual motor activity alvino requiring eye-to-hand coordination, foresight, and concentration  [employer]- four color memory game for cognition  Sudoku- puzzle game that exercises the brain, logical thinking, and memory  [facility] [address] [place] [address] challenging attention, memory, and executive functioning  Unblock Me- get to the red block by sliding other blocks out of the way  Word Association!- scanning to find associated words  Wordsworth Pro- word-making game  [employer]- anagram word game    Attention Activities:  Catch Me-FlashPad- fast-paced, light-up game (touch all of the red lights, skip the green lights)  Metronome Beats- practice divided attention by performing a task with metronome running    Memory Activities:  Memory Block- test your memory and reflexes  Memory Trainer- memory training and games for visualization, chunking, focus, and spatial memory, etc.  My Personal Memory Trainer- activities to address working memory      Please access your Riley Store (Apple devices) or [email or web address] (Android devices) to see if you can access these apps and if there is any cost associated with downloading the riley.

## 2023-10-20 NOTE — PROGRESS NOTES
Assessment & Plan:   1. Mild cognitive disorder  Assessment & Plan:  Pt independent with adl, independent  for some iadls,dependent for others. Memory loss:  STM over time  [facility] 23/30. Deficits in recall, orientation (5/6), language   MRI 5/2023:  1. Mild, chronic microangiopathy. 2. Superficial siderosis both sylvian fissures, correlating to history of prior subarachnoid hemorrhage. 3. No acute infarction, intracranial mass or mass effect. 4.  NeuroQuant analysis was performed: Normal hippocampal volume and enlarged ventricular system: Findings do not support hippocampal degeneration. Possible expansion of ventricular system without medial temporal lobe focused ex-vacuo process. Gerilabs:  TSH 1.266, folate >20, b12 1535. History, physical, and cognitive screening are most consistent with:  MCI  Contributing factors:  anxiety  Further eval warrants the following:  none  Cont supportive cares lives in [facility] [place], daughters supportive  Caregiver stress concerns:  none at present  SW needs this visit:  see intake notes  Discussed safe environment  Wandering:  No concerns at present. Home:  No concerns at present. Ensure pt has phone and reinforce safety measure when using stove or shower. Driving safety:  Does not drive, no concerns  Fall preventions:  No recent falls. Fall precautions  Medication management:  independent. Continue to engage in physical, cognitive, social activity. Cont doing games, puzzles, trivia, current event discussions  Cont daily walks or exercise video  Cont outings with friends and family. Avoid social isolation. Referral to cognitive therapy:  review at care conference  Optimize chronic and acute conditions  Cont to f/u with providers and ensure medication compliance  Vascular risk factors:  h/o cerbral artery aneurysm, hld, NPH  Medication review:  seems appropriate for current conditions. Not taking xanax per patient.   Ensure good diet (ex Mediterranean diet) and adequate hydration  Monitor for changes in behavior/mood- if noted, notify provider for eval  Encourage mindfulness and positive socialization for general wellness. Avoid medications that worsen cognition - check with provider or pharmacist before starting new or OTC meds  Do not overwhelm pt with info. Do one task at a time. Use slower pace. Keep to one conversation at a time. Do not multitask. Encourage independence as able. 2. Alteration in cognition  -     Ambulatory Referral to Senior Care    3. Depression with anxiety  Assessment & Plan:  Stable at present  GDS 3  Cont zoloft. Patient states she is no longer taking Xanax  Continue emotional support, relaxation techniques      4. Ambulatory dysfunction  Assessment & Plan:  Uses Cane, no recent falls  Fall precautions      5. Constipation, unspecified constipation type  Assessment & Plan:  No further constipation. No complaints      6. Arthritis  Assessment & Plan:  Hands worse - use prn tylenol  Cont prn tylenol/topical analgesics, nonpharmacological methods of pain control  Follow-up with PCP if change in pain or worsening of pain        HPI:  We had the pleasure of evaluating Andres Hoff who is a 69 y.o. female in Geriatric Consultation today. Previous MOCA:  24/30 in September by neurosurgery (sees for NPH). Comorbidities include mci, ambulatory dysfunction, depression w/anxiety. .   Ms. Agatha Easley is in the office with her daughter, Demetrio Dumont Concerns per family:   Pt had brain hemorrage 2016 (was spontaneous), did affect STM. Since Jan this year, has had significant memory loss. Thought process is a little off. Anxiety makes things even worse. Tells same things over and over. Lives alone in senior housing. Concerned a little by the stove. Monitoring medication management (she does her own pills containers). Started to look into [facility]. She may move down with daughter. She gave her couch away.   If she's out of norm, gets even more forgetful - forgets meds. She realizes memory loss and slowing down. Neighbors say she "talks". Hasn't taken xanax in a little bit. Dgt wakes up in am around 8am, then goes back to sleep sometimes. Sometimes goes to do bingo or exercise. Eats lunch in senior center. Memory concerns per patient:  She is doing ell. She is aware of STM. She is looking forward to moving down [address] [facility] with her daughter who is a peds NP. She feels she will be ok when she is home alone. Function Concerns:    She lives by herself  ADL independent, iADL dependent,   Medication management: independent   Do you drive: No  - hasn't driven since hemorhage     Do you handle your own financial affairs such as balancing your checkbook, paying bills, investments: No.  Dgt was doing - was over [employer] and giving to Lanyrd. Have you noticed any gait or balance disorder:  Yes. Uses cane and walker to assist with ambulation. No recent falls since LP (9/1)  Any urinary/stool incontinence:  yes- wearing overnight depends (urine)  Appetite: did loose 7lbs   Hearing issues:no Vision issues: no      Psychosocial concerns:  Have you or your family noted any change in your mood or personality:No - chronic anxiety, maybe more depression ( passed in 2016). She doesn't remember much of that situation. Are you currently or have you been treated in the past for depression or anxiety: Yes  Any hallucination or delusion: No  Sleep Issues: No  Pain:  no     Past Medical, surgical, social, medication and allergy history and patients previous records reviewed. She has some problems operating household appliance such as TV remote, kitchen appliances, computer. Has cell phone. Can call daughter. Patient requires repeat information or ask the same question repeatedly: Yes  Shehas difficulty finding the right word while speaking: No  Family Review of Behavior [facility]:    pacing. No    agressive/combative behavior. No    agitated. No - occ gets snappy  wandering. No   resistance to care. No   hoarding/hiding objects. No    suspicious  No  withdrawn No  rummaging/pillaging. No    misplacing/losing objects Yes  personal hygiene problems. No  forgetfulness of actions Yes    Family member with dementia and what type?no (mom passed at 71, dad passed 68 from stroke)  Stroke history Yes - SAH from cerebral artery aneurysm. H/o NPH  Have you had any head trauma No  Does patient have history of alcohol abuse No    ROS:  Review of Systems    Allergies: Allergies   Allergen Reactions    Oxycodone Vomiting    Atorvastatin Other (See Comments)     Leg/knee pain (joint pain)    Bactrim [Sulfamethoxazole-Trimethoprim] Itching and Rash    Keppra [Levetiracetam] Rash    Livalo [Pitavastatin] Itching    Penicillins Rash     Agitation        Medications:      Current Outpatient Medications:     acetaminophen (TYLENOL) 500 mg tablet, Take 1,000 mg by mouth if needed for mild pain, Disp: , Rfl:     ALPRAZolam (XANAX) 0.25 mg tablet, TAKE ONE TABLET BY MOUTH EVERY DAY AS NEEDED FOR ANXIETY TAKE 2 TABLETS 30 MINUTES PRIOR TO PROCEDURE, Disp: 30 tablet, Rfl: 0    Calcium-Vitamin D (CALTRATE 600 PLUS-VIT D PO), Take by mouth daily. , Disp: , Rfl:     esomeprazole (NexIUM) 10 MG packet, Take 10 mg by mouth every morning before breakfast, Disp: , Rfl:     ezetimibe (ZETIA) 10 mg tablet, TAKE ONE TABLET BY MOUTH EVERY DAY, Disp: 30 tablet, Rfl: 5    fluticasone (FLONASE) 50 mcg/act nasal spray, 1 spray into each nostril as needed, Disp: , Rfl:     LUMIGAN 0.01 % ophthalmic drops, PLACE 1 DROP INTO IN [facility] EYE AT BEDTIME, Disp: , Rfl: 11    meclizine (ANTIVERT) 25 mg tablet, Take 1 tablet (25 mg total) by mouth every 8 (eight) hours as needed for dizziness, Disp: 30 tablet, Rfl: 0    Multiple Vitamins-Minerals (MULTIVITAMIN ADULTS 50+) TABS, Take by mouth daily, Disp: , Rfl:     NON FORMULARY, CBD Gummies, Disp: , Rfl:     psyllium (METAMUCIL) 0.52 g capsule, Take by mouth daily 2-3 caps daily, Disp: , Rfl:     sertraline (ZOLOFT) 100 mg tablet, TAKE 2 TABLETS BY MOUTH DAILY, Disp: 180 tablet, Rfl: 1    Vitals:  Vitals:    10/20/23 1434   BP: 138/82   Pulse: 67   Temp: 97.6 °F (36.4 °C)   SpO2: 97%       History:  Past Medical History:   Diagnosis Date    Acute mastoiditis with intracranial complication 54/51/0323    Anxiety disorder     Brain hemangioma ([address])     2016    BRCA1 negative     BRCA2 negative     Breast cancer ([address]) 2014    left breast    Cancer ([address])     Constipation     Esophageal reflux     History of bone density study 2011    [employer] X-Ray Absorptiometry    History of radiation therapy 2014    left breast ca    History of recurrent UTIs     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    Hyperlipidemia     Hypertension     Malignant neoplasm of female breast ([address]) 04/22/2014    Osteoarthritis of hip     Osteopenia     PONV (postoperative nausea and vomiting)     Stroke, hemorrhagic ([address]) 12/2015    Subarachnoid hemorrhage ([address])      Past Surgical History:   Procedure Laterality Date    BREAST BIOPSY Left 2014    BREAST LUMPECTOMY Left 2014    COLONOSCOPY      Fiberoptic - 2009, 2015 5 year f/u    ESOPHAGOGASTRODUODENOSCOPY  2009    Diag, Resolved - 2006 / 2009    EXOSTECTOMY  2005    Simple Bunion ([facility] Procedure)    FL LUMBAR PUNCTURE THERAPEUTIC  9/1/2023    HYSTERECTOMY  1981    JOINT REPLACEMENT Right 2012    Right Hip    OOPHORECTOMY  1981    not sure which one    REPLACEMENT TOTAL KNEE Right 2018    VENTRICULOSTOMY       Family History   Problem Relation Age of Onset    Cancer Mother 71        bone    Hypertension Mother     Arthritis Mother     Stroke Father         TIA    Stroke Maternal Grandmother         CVA    Deep vein thrombosis Daughter     Heart attack Neg Hx     Anuerysm Neg Hx     Clotting disorder Neg Hx     Arrhythmia Neg Hx     Heart failure Neg Hx     Coronary artery disease Neg Hx     Hyperlipidemia Neg Hx     Breast cancer Neg Hx      Social History     Socioeconomic History    Marital status:      Spouse name: Not on file    Number of children: Not on file    Years of education: Not on file    Highest education level: Not on file   Occupational History    Not on file   Tobacco Use    Smoking status: Never     Passive exposure: Past    Smokeless tobacco: Never   Vaping Use    Vaping Use: Never used   Substance and Sexual Activity    Alcohol use: Yes     Comment: occasionally    Drug use: No    Sexual activity: Not Currently   Other Topics Concern    Not on file   Social History Narrative    Caffeine use    Marital Status -  per [employer]      Social Determinants of Health     Financial Resource Strain: Not on file   Food Insecurity: No Food Insecurity (8/5/2023)    Hunger Vital Sign     Worried About Running Out of Food in the Last Year: Never true     Ran Out of Food in the Last Year: Never true   Transportation Needs: No Transportation Needs (8/5/2023)    PRAPARE - Transportation     Lack of Transportation (Medical): No     Lack of Transportation (Non-Medical):  No   Physical Activity: Not on file   Stress: Not on file   Social Connections: Not on file   Intimate Partner Violence: Not on file   Housing Stability: Unknown (8/5/2023)    Housing Stability Vital Sign     Unable to Pay for Housing in the Last Year: No     Number of Places Lived in the Last Year: Not on file     Unstable Housing in the Last Year: No       Past Surgical History:   Procedure Laterality Date    BREAST BIOPSY Left 2014    BREAST LUMPECTOMY Left 2014    COLONOSCOPY      Fiberoptic - 2009, 2015 5 year f/u    ESOPHAGOGASTRODUODENOSCOPY  2009    Diag, Resolved - 2006 / 2009    EXOSTECTOMY  2005    Simple Bunion ([facility] Procedure)    FL LUMBAR PUNCTURE THERAPEUTIC  9/1/2023    HYSTERECTOMY  1981    JOINT REPLACEMENT Right 2012    Right Hip    OOPHORECTOMY  1981    not sure which one    REPLACEMENT TOTAL KNEE Right 2018    VENTRICULOSTOMY Physical Exam  Observed Ambulation:  cane, stable, slower  Physical Exam  Vitals and nursing note reviewed. Constitutional:       General: She is not in acute distress. Appearance: Normal appearance. She is well-developed. She is not diaphoretic. HENT:      Head: Normocephalic. Cardiovascular:      Rate and Rhythm: Normal rate. Heart sounds: No murmur heard. No friction rub. No gallop. Pulmonary:      Effort: Pulmonary effort is normal. No respiratory distress. Breath sounds: Normal breath sounds. No wheezing or rales. Abdominal:      General: Bowel sounds are normal. There is no distension. Palpations: Abdomen is soft. Tenderness: There is no abdominal tenderness. There is no rebound. Musculoskeletal:         General: Normal range of motion. Skin:     General: Skin is warm and dry. Neurological:      General: No focal deficit present. Mental Status: She is alert. Mental status is at baseline.    Psychiatric:         Mood and Affect: Mood normal.         Behavior: Behavior normal.

## 2023-10-20 NOTE — ASSESSMENT & PLAN NOTE
Hands worse - use prn tylenol  Cont prn tylenol/topical analgesics, nonpharmacological methods of pain control  Follow-up with PCP if change in pain or worsening of pain

## 2023-10-20 NOTE — ASSESSMENT & PLAN NOTE
Stable at present  GDS 3  Cont zoloft.   Patient states she is no longer taking Xanax  Continue emotional support, relaxation techniques

## 2023-10-20 NOTE — PROGRESS NOTES
[facility]  [address] [facility] [facility], [address] [facility], [address]  [phone]    Social Work [address]  presents today for a geriatric assessment, accompanied by her daughter Louann Bass. Social/Family History   Marital status:     Does patient have children? Yes How Many? 2  Louann Bass and Mykel Dyer both live locally, Mykel Dyer is moving to [facility] in December    Family members assisting patient at home: not on a daily basis, daughters do pt's grocery shopping and help her with other tasks as needed    Are any relationships causing problems right now: No   Who is available to provide care in case of illness or crisis (please specify relation to patient / level of care able to provide)? Pt's daughter Louann Bass         Employment and Education   Education: Highest Level of Education: some college courses    Currently Employed: No    Retired: Yes                        Date of USP? At age [age]    Type of employment: , then worked in [employer] side at [employer] (started this in her 35s, after  her )   : Yes      Last served? [id number]    Currently receiving benefits? [facility] [address] [place] [address] (was in [facility] 1950 - April 1951)   [address] [facility] (if applicable): trying to apply for VA benefits for pt        Lifestyle/Community [employer] and Organizations: none, goes to bingo/dinner/lunch in her [facility] building   Major life events in past two years (ex: USP, death in family, major illness etc.): several of pt's friends from her building have passed, but pt has made new friends, was in hospital around beginning of September for leg weakness     Have you ever used a home care agency, meal delivery service, or respite care?: no      Financial   Total Monthly income: $5276     Source of income: Social Security    Meet current needs? Yes with Yonis Arreaga pt's finances   Funds available for home care?  No no savings  Funds available for nursing home? No, pt does not have long-term care insurance   Do you rent or own your home? Rent       End-Of-Life Checklist   Have wishes or desires for end-of-life care been discussed? Pt's daughter Cyndi Cox is pt's POA, no living will, should probably be updated per Cyndi Cox    Advanced directives: has NO advanced directive  - information provided to family       For all patients, we encourage seeing a  to establish a Financial Power of , a [address], and an Advanced Directive (living will). Particularly for patients experiencing memory concerns, we strongly recommend that this is completed as soon as possible. Safety Assessment   Is the patient still driving? No    Is the patient taking medications as prescribed? Yes     Is there a system in place for medication management? Pt uses a pillbox  Are firearms or weapons in the home? no  Recommendations per [employer]: removing them from the home, keep ammunition stored separately, encourage patient to consider "gifting" them, if necessary, ask local law enforcement for assistance in removing the firearms from the home. The family may receive compensation from a gun buy-back program.    Does the patient live alone? yes  What is the layout of the home? On 4th floor in [facility], has working elevator, wish she would move down lower but pt likes the floor she is on   Do you feel comfortable leaving the person home alone? Yes    Have you noticed any burned pans or other signs of issues with the stove or other appliances? One isolated incident, concerned with her using the oven, pt will walk away from kitchen while she's cooking    Do you have any concerns about the person’s cooking or eating habits? Yes  lost 7 lbs since September, does not eat until noon/1 o clock sometimes  Are there working smoke detectors and fire extinguishers in the home?  Yes    Have you thought about when it will no longer be safe for the patient to live alone? Have looked into home she could go to with VA assistance, pt knows she may end up at [facility] because that may be all she is able to afford    Cesar Cognitive Assessment (MoCA) Version 8.2  Education: some college courses    Points Earned POSSIBLE Points   Visuospatial/Executive   Alternating [place] Making 1 1   Visuoconstructional skills 1 1   Visuoconstructional skills (clock) 3 3   Naming   Naming Animals 3 3   Attention   Digit Span 2 2   Vigilance (letters) 1 1   Serial 7 subtraction 3 3   Language   Sentence Repetition 2 2   Verbal fluency 0 1   Abstraction   Abstraction (word pairings) 2 2   Delayed recall   Delayed recall 0 5   Memory index score: 7/15   Orientation   Orientation 5 6   TOTAL SCORE: 23/30  (Normal ?26/30)   Additional notes:      Geriatric Depression Scale (GDS) completed today, 3/15. LSW provided the following resources in office:     Community Resources  - Humble Bundle List  - Meals on [employer] and [address] [place] [address] Program brochure     Legal  - Advance Care Planning: Tips from the [employer] on Aging  - blue Take Me to [address]. Ramón's freezer pack (Five Wishes included)     LSW to remain available as needed.

## 2023-10-24 NOTE — ASSESSMENT & PLAN NOTE
Pt independent with adl, independent  for some iadls,dependent for others. Memory loss:  STM over time  [facility] 23/30. Deficits in recall, orientation (5/6), language   MRI 5/2023:  1. Mild, chronic microangiopathy. 2. Superficial siderosis both sylvian fissures, correlating to history of prior subarachnoid hemorrhage. 3. No acute infarction, intracranial mass or mass effect. 4.  NeuroQuant analysis was performed: Normal hippocampal volume and enlarged ventricular system: Findings do not support hippocampal degeneration. Possible expansion of ventricular system without medial temporal lobe focused ex-vacuo process. Gerilabs:  TSH 1.266, folate >20, b12 1535. History, physical, and cognitive screening are most consistent with:  MCI  Contributing factors:  anxiety  Further eval warrants the following:  none  Cont supportive cares lives in [facility], daughters supportive  Caregiver stress concerns:  none at present  SW needs this visit:  see intake notes  Discussed safe environment  Wandering:  No concerns at present. Home:  No concerns at present. Ensure pt has phone and reinforce safety measure when using stove or shower. Driving safety:  Does not drive, no concerns  Fall preventions:  No recent falls. Fall precautions  Medication management:  independent. Continue to engage in physical, cognitive, social activity. Cont doing games, puzzles, trivia, current event discussions  Cont daily walks or exercise video  Cont outings with friends and family. Avoid social isolation. Referral to cognitive therapy:  review at care conference  Optimize chronic and acute conditions  Cont to f/u with providers and ensure medication compliance  Vascular risk factors:  h/o cerbral artery aneurysm, hld, NPH  Medication review:  seems appropriate for current conditions. Not taking xanax per patient.   Ensure good diet (ex Mediterranean diet) and adequate hydration  Monitor for changes in behavior/mood- if noted, notify provider for eval  Encourage mindfulness and positive socialization for general wellness. Avoid medications that worsen cognition - check with provider or pharmacist before starting new or OTC meds  Do not overwhelm pt with info. Do one task at a time. Use slower pace. Keep to one conversation at a time. Do not multitask. Encourage independence as able.

## 2023-10-31 ENCOUNTER — HOSPITAL ENCOUNTER (EMERGENCY)
Facility: HOSPITAL | Age: 78
Discharge: HOME | End: 2023-10-31
Attending: EMERGENCY MEDICINE
Payer: COMMERCIAL

## 2023-10-31 VITALS
SYSTOLIC BLOOD PRESSURE: 141 MMHG | BODY MASS INDEX: 23.7 KG/M2 | TEMPERATURE: 97.6 F | OXYGEN SATURATION: 98 % | RESPIRATION RATE: 18 BRPM | HEIGHT: 69 IN | WEIGHT: 160 LBS | HEART RATE: 87 BPM | DIASTOLIC BLOOD PRESSURE: 85 MMHG

## 2023-10-31 DIAGNOSIS — N39.0 UTI (URINARY TRACT INFECTION): Primary | ICD-10-CM

## 2023-10-31 DIAGNOSIS — R53.1 WEAKNESS: ICD-10-CM

## 2023-10-31 LAB
2HR DELTA HS TROPONIN: 1 NG/L
ALBUMIN SERPL BCP-MCNC: 4.1 G/DL (ref 3.5–5)
ALP SERPL-CCNC: 82 U/L (ref 34–104)
ALT SERPL W P-5'-P-CCNC: 14 U/L (ref 7–52)
ANION GAP SERPL CALCULATED.3IONS-SCNC: 6 MMOL/L
AST SERPL W P-5'-P-CCNC: 19 U/L (ref 13–39)
BACTERIA UR QL AUTO: ABNORMAL /HPF
BASOPHILS # BLD AUTO: 0.05 THOUSANDS/ÂΜL (ref 0–0.1)
BASOPHILS NFR BLD AUTO: 1 % (ref 0–1)
BILIRUB SERPL-MCNC: 0.36 MG/DL (ref 0.2–1)
BILIRUB UR QL STRIP: NEGATIVE
BUN SERPL-MCNC: 14 MG/DL (ref 5–25)
CALCIUM SERPL-MCNC: 9.3 MG/DL (ref 8.4–10.2)
CARDIAC TROPONIN I PNL SERPL HS: 3 NG/L
CARDIAC TROPONIN I PNL SERPL HS: 4 NG/L
CHLORIDE SERPL-SCNC: 106 MMOL/L (ref 96–108)
CLARITY UR: CLEAR
CO2 SERPL-SCNC: 31 MMOL/L (ref 21–32)
COLOR UR: ABNORMAL
CREAT SERPL-MCNC: 0.73 MG/DL (ref 0.6–1.3)
EOSINOPHIL # BLD AUTO: 0.21 THOUSAND/ÂΜL (ref 0–0.61)
EOSINOPHIL NFR BLD AUTO: 3 % (ref 0–6)
ERYTHROCYTE [DISTWIDTH] IN BLOOD BY AUTOMATED COUNT: 14 % (ref 11.6–15.1)
GFR SERPL CREATININE-BSD FRML MDRD: 79 ML/MIN/1.73SQ M
GLUCOSE SERPL-MCNC: 103 MG/DL (ref 65–140)
GLUCOSE SERPL-MCNC: 96 MG/DL (ref 65–140)
GLUCOSE UR STRIP-MCNC: NEGATIVE MG/DL
HCT VFR BLD AUTO: 41.1 % (ref 34.8–46.1)
HGB BLD-MCNC: 12.9 G/DL (ref 11.5–15.4)
HGB UR QL STRIP.AUTO: NEGATIVE
IMM GRANULOCYTES # BLD AUTO: 0.02 THOUSAND/UL (ref 0–0.2)
IMM GRANULOCYTES NFR BLD AUTO: 0 % (ref 0–2)
KETONES UR STRIP-MCNC: NEGATIVE MG/DL
LEUKOCYTE ESTERASE UR QL STRIP: ABNORMAL
LYMPHOCYTES # BLD AUTO: 1.65 THOUSANDS/ÂΜL (ref 0.6–4.47)
LYMPHOCYTES NFR BLD AUTO: 22 % (ref 14–44)
MCH RBC QN AUTO: 27.7 PG (ref 26.8–34.3)
MCHC RBC AUTO-ENTMCNC: 31.4 G/DL (ref 31.4–37.4)
MCV RBC AUTO: 88 FL (ref 82–98)
MONOCYTES # BLD AUTO: 0.69 THOUSAND/ÂΜL (ref 0.17–1.22)
MONOCYTES NFR BLD AUTO: 9 % (ref 4–12)
NEUTROPHILS # BLD AUTO: 4.83 THOUSANDS/ÂΜL (ref 1.85–7.62)
NEUTS SEG NFR BLD AUTO: 65 % (ref 43–75)
NITRITE UR QL STRIP: POSITIVE
NON-SQ EPI CELLS URNS QL MICRO: ABNORMAL /HPF
NRBC BLD AUTO-RTO: 0 /100 WBCS
PH UR STRIP.AUTO: 6.5 [PH]
PLATELET # BLD AUTO: 324 THOUSANDS/UL (ref 149–390)
PMV BLD AUTO: 9.1 FL (ref 8.9–12.7)
POTASSIUM SERPL-SCNC: 3.7 MMOL/L (ref 3.5–5.3)
PROT SERPL-MCNC: 6.7 G/DL (ref 6.4–8.4)
PROT UR STRIP-MCNC: NEGATIVE MG/DL
RBC # BLD AUTO: 4.66 MILLION/UL (ref 3.81–5.12)
RBC #/AREA URNS AUTO: ABNORMAL /HPF
SODIUM SERPL-SCNC: 143 MMOL/L (ref 135–147)
SP GR UR STRIP.AUTO: 1.01 (ref 1–1.03)
UROBILINOGEN UR STRIP-ACNC: <2 MG/DL
WBC # BLD AUTO: 7.45 THOUSAND/UL (ref 4.31–10.16)
WBC #/AREA URNS AUTO: ABNORMAL /HPF

## 2023-10-31 PROCEDURE — 93005 ELECTROCARDIOGRAM TRACING: CPT

## 2023-10-31 PROCEDURE — 96360 HYDRATION IV INFUSION INIT: CPT

## 2023-10-31 PROCEDURE — 84484 ASSAY OF TROPONIN QUANT: CPT | Performed by: EMERGENCY MEDICINE

## 2023-10-31 PROCEDURE — 82948 REAGENT STRIP/BLOOD GLUCOSE: CPT

## 2023-10-31 PROCEDURE — 80053 COMPREHEN METABOLIC PANEL: CPT | Performed by: EMERGENCY MEDICINE

## 2023-10-31 PROCEDURE — 85025 COMPLETE CBC W/AUTO DIFF WBC: CPT | Performed by: EMERGENCY MEDICINE

## 2023-10-31 PROCEDURE — 36415 COLL VENOUS BLD VENIPUNCTURE: CPT | Performed by: EMERGENCY MEDICINE

## 2023-10-31 PROCEDURE — 99284 EMERGENCY DEPT VISIT MOD MDM: CPT | Performed by: EMERGENCY MEDICINE

## 2023-10-31 PROCEDURE — 87086 URINE CULTURE/COLONY COUNT: CPT | Performed by: EMERGENCY MEDICINE

## 2023-10-31 PROCEDURE — 99284 EMERGENCY DEPT VISIT MOD MDM: CPT

## 2023-10-31 PROCEDURE — 87186 SC STD MICRODIL/AGAR DIL: CPT | Performed by: EMERGENCY MEDICINE

## 2023-10-31 PROCEDURE — 81001 URINALYSIS AUTO W/SCOPE: CPT | Performed by: EMERGENCY MEDICINE

## 2023-10-31 PROCEDURE — 87077 CULTURE AEROBIC IDENTIFY: CPT | Performed by: EMERGENCY MEDICINE

## 2023-10-31 RX ORDER — CEPHALEXIN 250 MG/1
500 CAPSULE ORAL ONCE
Status: COMPLETED | OUTPATIENT
Start: 2023-10-31 | End: 2023-10-31

## 2023-10-31 RX ORDER — CEPHALEXIN 500 MG/1
500 CAPSULE ORAL 2 TIMES DAILY
Qty: 14 CAPSULE | Refills: 0 | Status: SHIPPED | OUTPATIENT
Start: 2023-10-31 | End: 2023-11-07

## 2023-10-31 RX ADMIN — SODIUM CHLORIDE 500 ML: 0.9 INJECTION, SOLUTION INTRAVENOUS at 14:20

## 2023-10-31 RX ADMIN — CEPHALEXIN 500 MG: 250 CAPSULE ORAL at 15:49

## 2023-10-31 NOTE — ED PROVIDER NOTES
History  Chief Complaint   Patient presents with    Medical Problem     Pt states she attempted to lift leg this AM and noted she was shaking and unable to ambulate. Anxiety hx. EMS states once pt was placed on stretcher, she began to calm down and returned back to baseline- no shakiness noted. Pt concerned and wanted to be evaluated. Hx brain bleed Hx spinal tap last month      Patient is a 61-year-old female who presents to the emergency department with her daughter. She explains that upon going down to the [employer] today for lunch she had shaking in her right leg. She notes that she does have an artificial right hip and knee (having undergone replacement remotely) and questions whether this could be contributing. She had difficulty ambulating as well as transferring to a chair which concerned her friends with her who contacted her daughter. Daughter notes that over the last week her mother has seemed weaker than typical in her legs. On Sunday specifically (2 days ago) this was prominent. Daughter expresses concern patient may not be consuming enough water. Patient relates that she did consume the majority of 2 cups of water today although had not anything to eat yet. She slept a bit later than typical and did not have her usual cereal.  When questioning what prompted her to sleep late she notes that she was up late looking at text messages and emails. She denies having felt particularly anxious. She has had intermittent episodes of shaking in the past which primarily affect the right leg. No recent injury. She did have COVID infection a couple of months ago. No recent fevers, chills, chest discomfort, dyspnea, nasal congestion, sore throat, nausea, vomiting, abdominal discomfort or bowel abnormality. Since experiencing subarachnoid hemorrhage in 2016 she has had difficulty with short-term memory as well as urinary incontinence.   Over the last year she has had increased frequency/seeming volume of urinary incontinence. She feels she has been voinding more frequently over the past week. She has not appreciated any hematuria or dysuria. Prior to Admission Medications   Prescriptions Last Dose Informant Patient Reported? Taking? ALPRAZolam (XANAX) 0.25 mg tablet   No No   Sig: TAKE ONE TABLET BY MOUTH EVERY DAY AS NEEDED FOR ANXIETY TAKE 2 TABLETS 30 MINUTES PRIOR TO PROCEDURE   Calcium-Vitamin D (CALTRATE 600 PLUS-VIT D PO)  Self, Child Yes No   Sig: Take by mouth daily.    LUMIGAN 0.01 % ophthalmic drops  Self, Child Yes No   Sig: PLACE 1 DROP INTO IN [facility] EYE AT BEDTIME   Multiple Vitamins-Minerals (MULTIVITAMIN ADULTS 50+) TABS  Self, Child Yes No   Sig: Take by mouth daily   NON FORMULARY  Self, Child Yes No   Sig: CBD Gummies   acetaminophen (TYLENOL) 500 mg tablet  Self, Child Yes No   Sig: Take 1,000 mg by mouth if needed for mild pain   esomeprazole (NexIUM) 10 MG packet  Self, Child Yes No   Sig: Take 10 mg by mouth every morning before breakfast   ezetimibe (ZETIA) 10 mg tablet   No No   Sig: TAKE ONE TABLET BY MOUTH EVERY DAY   fluticasone (FLONASE) 50 mcg/act nasal spray  Self, Child Yes No   Si spray into each nostril as needed   meclizine (ANTIVERT) 25 mg tablet  Self, Child No No   Sig: Take 1 tablet (25 mg total) by mouth every 8 (eight) hours as needed for dizziness   psyllium (METAMUCIL) 0.52 g capsule  Self, Child Yes No   Sig: Take by mouth daily 2-3 caps daily   sertraline (ZOLOFT) 100 mg tablet   No No   Sig: TAKE 2 TABLETS BY MOUTH DAILY      Facility-Administered Medications: None       Past Medical History:   Diagnosis Date    Acute mastoiditis with intracranial complication 8183    Anxiety disorder     Brain hemangioma ([address])     2016    BRCA1 negative     BRCA2 negative     Breast cancer ([address]) 2014    left breast    Cancer (HCC)     Constipation     Esophageal reflux     History of bone density study     [employer] X-Ray Absorptiometry    History of radiation therapy 2014    left breast ca    History of recurrent UTIs     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    Hyperlipidemia     Hypertension     Malignant neoplasm of female breast ([address]) 04/22/2014    Osteoarthritis of hip     Osteopenia     PONV (postoperative nausea and vomiting)     Stroke, hemorrhagic ([address]) 12/2015    Subarachnoid hemorrhage ([address])        Past Surgical History:   Procedure Laterality Date    BREAST BIOPSY Left 2014    BREAST LUMPECTOMY Left 2014    COLONOSCOPY      Fiberoptic - 2009, 2015 5 year f/u    ESOPHAGOGASTRODUODENOSCOPY  2009    Diag, Resolved - 2006 / 2009    EXOSTECTOMY  2005    Simple Bunion ([facility])    FL LUMBAR PUNCTURE THERAPEUTIC  9/1/2023    HYSTERECTOMY  1981    JOINT REPLACEMENT Right 2012    Right Hip    OOPHORECTOMY  1981    not sure which one    REPLACEMENT TOTAL KNEE Right 2018    VENTRICULOSTOMY         Family History   Problem Relation Age of Onset    Cancer Mother 71        bone    Hypertension Mother     Arthritis Mother     Stroke Father         TIA    Stroke Maternal Grandmother         CVA    Deep vein thrombosis Daughter     Heart attack Neg Hx     Anuerysm Neg Hx     Clotting disorder Neg Hx     Arrhythmia Neg Hx     Heart failure Neg Hx     Coronary artery disease Neg Hx     Hyperlipidemia Neg Hx     Breast cancer Neg Hx      I have reviewed and agree with the history as documented. E-Cigarette/Vaping    E-Cigarette Use Never User      E-Cigarette/Vaping Substances    Nicotine No     THC No     CBD No     Flavoring No     Other No     Unknown No      Social History     Tobacco Use    Smoking status: Never     Passive exposure: Past    Smokeless tobacco: Never   Vaping Use    Vaping Use: Never used   Substance Use Topics    Alcohol use: Yes     Comment: occasionally    Drug use: No       Review of Systems   Constitutional:  Negative for appetite change. HENT:  Negative for sore throat and trouble swallowing. Respiratory:  Negative for chest tightness. Cardiovascular:  Negative for chest pain and leg swelling. Gastrointestinal:  Negative for abdominal pain, constipation and diarrhea. 2-3 stool passages daily, no blood presence in these or need to strain to pass these   Genitourinary:  Negative for dysuria. All other systems reviewed and are negative. Physical Exam  Physical Exam  Vitals and nursing note reviewed. Constitutional:       Appearance: Normal appearance. HENT:      Head: Normocephalic. Nose: Nose normal.      Mouth/Throat:      Mouth: Mucous membranes are moist.   Eyes:      General: No scleral icterus. Extraocular Movements: Extraocular movements intact. Conjunctiva/sclera: Conjunctivae normal.      Pupils: Pupils are equal, round, and reactive to light. Cardiovascular:      Rate and Rhythm: Normal rate and regular rhythm. Heart sounds: Normal heart sounds. Pulmonary:      Effort: Pulmonary effort is normal.      Breath sounds: Normal breath sounds. Abdominal:      General: Bowel sounds are normal.      Palpations: Abdomen is soft. Musculoskeletal:         General: No tenderness. Normal range of motion. Right lower leg: No edema. Left lower leg: No edema. Skin:     General: Skin is warm and dry. Neurological:      General: No focal deficit present. Mental Status: She is alert. Comments: Clear speech. No facial asymmetry/ deficit appreciated. Pupils briskly reactive to light. EOMI. No nystagmus. Strong eye closure & symmetric brow raise. Ability to insufflate cheeks, protrude tongue midline & range this laterally. Symmetric/ intact sensation in the upper, mid & lower portions of the face. 5/5 strength on head turn, shoulder shrug, bicep, tricep & deltoid movement against resistance b/l.  5/5 strength on b/l hand , pincer grasp, finger abduction, dorsi & volar flexion, hip flexion, dorsi & plantar flexion.  Symmetric grossly intact sensation in the UE & LE.  Gait deferred. No dysmetria. Vital Signs  ED Triage Vitals   Temperature Pulse Respirations Blood Pressure SpO2   10/31/23 1401 10/31/23 1255 10/31/23 1255 10/31/23 1255 10/31/23 1255   97.6 °F (36.4 °C) 72 18 (!) 176/76 97 %      Temp Source Heart Rate Source Patient Position - Orthostatic VS BP Location FiO2 (%)   10/31/23 1401 10/31/23 1255 10/31/23 1255 10/31/23 1255 --   Oral Monitor Sitting Right arm       Pain Score       10/31/23 1255       No Pain           Vitals:    10/31/23 1255 10/31/23 1519   BP: (!) 176/76 141/85   Pulse: 72 87   Patient Position - Orthostatic VS: Sitting Lying         Visual Acuity      ED Medications  Medications   sodium chloride 0.9 % bolus 500 mL (0 mL Intravenous Stopped 10/31/23 1520)   cephalexin (KEFLEX) capsule 500 mg (500 mg Oral Given 10/31/23 1549)       Diagnostic Studies  Results Reviewed       Procedure Component Value Units Date/Time    HS Troponin I 2hr [[id number]]  (Normal) Collected: 10/31/23 1552    Lab Status: Final result Specimen: Blood from Arm, Right Updated: 10/31/23 1624     hs TnI 2hr 4 ng/L      Delta 2hr hsTnI 1 ng/L     Urine culture [[id number]] Collected: 10/31/23 1459    Lab Status:  In process Specimen: Urine, Clean Catch Updated: 10/31/23 1528    Urine Microscopic [[id number]]  (Abnormal) Collected: 10/31/23 1459    Lab Status: Final result Specimen: Urine, Clean Catch Updated: 10/31/23 1527     RBC, UA None Seen /hpf      WBC, UA 2-4 /hpf      Epithelial Cells Occasional /hpf      Bacteria, UA Occasional /hpf     UA (URINE) with reflex to Scope [[id number]]  (Abnormal) Collected: 10/31/23 1459    Lab Status: Final result Specimen: Urine, Clean Catch Updated: 10/31/23 1512     Color, UA Light Yellow     Clarity, UA Clear     Specific Gravity, UA 1.009     pH, UA 6.5     Leukocytes, UA Trace     Nitrite, UA Positive     Protein, UA Negative mg/dl      Glucose, UA Negative mg/dl      Ketones, UA Negative mg/dl Urobilinogen, UA <2.0 mg/dl      Bilirubin, UA Negative     Occult Blood, UA Negative    HS Troponin 0hr (reflex protocol) [[id number]]  (Normal) Collected: 10/31/23 1350    Lab Status: Final result Specimen: Blood from Arm, Right Updated: 10/31/23 1431     hs TnI 0hr 3 ng/L     Comprehensive metabolic panel [[id number]] Collected: 10/31/23 1350    Lab Status: Final result Specimen: Blood from Arm, Right Updated: 10/31/23 1424     Sodium 143 mmol/L      Potassium 3.7 mmol/L      Chloride 106 mmol/L      CO2 31 mmol/L      ANION GAP 6 mmol/L      BUN 14 mg/dL      Creatinine 0.73 mg/dL      Glucose 96 mg/dL      Calcium 9.3 mg/dL      AST 19 U/L      ALT 14 U/L      Alkaline Phosphatase 82 U/L      Total Protein 6.7 g/dL      Albumin 4.1 g/dL      Total Bilirubin 0.36 mg/dL      eGFR 79 ml/min/1.73sq m     Narrative:      [facility] guidelines for Chronic Kidney Disease (CKD):     Stage 1 with normal or high GFR (GFR > 90 mL/min/1.73 square meters)    Stage 2 Mild CKD (GFR = 60-89 mL/min/1.73 square meters)    Stage 3A Moderate CKD (GFR = 45-59 mL/min/1.73 square meters)    Stage 3B Moderate CKD (GFR = 30-44 mL/min/1.73 square meters)    Stage 4 Severe CKD (GFR = 15-29 mL/min/1.73 square meters)    Stage 5 End Stage CKD (GFR <15 mL/min/1.73 square meters)  Note: GFR calculation is accurate only with a steady state creatinine    CBC and differential [[id number]] Collected: 10/31/23 1350    Lab Status: Final result Specimen: Blood from Arm, Right Updated: 10/31/23 1402     WBC 7.45 Thousand/uL      RBC 4.66 Million/uL      Hemoglobin 12.9 g/dL      Hematocrit 41.1 %      MCV 88 fL      MCH 27.7 pg      MCHC 31.4 g/dL      RDW 14.0 %      MPV 9.1 fL      Platelets 046 Thousands/uL      nRBC 0 /100 WBCs      Neutrophils Relative 65 %      Immat GRANS % 0 %      Lymphocytes Relative 22 %      Monocytes Relative 9 %      Eosinophils Relative 3 %      Basophils Relative 1 %      Neutrophils Absolute 4.83 Thousands/µL      Immature Grans Absolute 0.02 Thousand/uL      Lymphocytes Absolute 1.65 Thousands/µL      Monocytes Absolute 0.69 Thousand/µL      Eosinophils Absolute 0.21 Thousand/µL      Basophils Absolute 0.05 Thousands/µL     Fingerstick Glucose (POCT) [[id number]]  (Normal) Collected: 10/31/23 1348    Lab Status: Final result Updated: 10/31/23 1352     POC Glucose 103 mg/dl                    No orders to display              Procedures  ECG 12 Lead Documentation Only    Date/Time: 10/31/2023 2:12 PM    Performed by: Diane Iabnez MD  Authorized by: Diane Ibanez MD    ECG reviewed by me, the ED Provider: yes    Patient location:  ED  Rate:     ECG rate:  66    ECG rate assessment: normal    Rhythm:     Rhythm: sinus rhythm    Ectopy:     Ectopy: PAC    QRS:     QRS axis:  Normal    QRS intervals:  Normal  Conduction:     Conduction: normal    ST segments:     ST segments:  Normal  T waves:     T waves: normal             ED Course  ED Course as of 11/01/23 0047   Tue Oct 31, 2023   1410 Differential diagnosis includes but is not limited to generalized weakness secondary to dehydration, electrolyte abnormality, viral or bacterial infectious process. Doubt CVA or seizure is responsible for symptoms. Hydrating at this time and obtaining basic labs. With concern for possible abnl urinary retention will check post void residual.   1455 Feeling well at this time and moving legs with good strength. She has provided urine specimen which will be sent for testing. Labs unremarkable thus far. I updated her on this. Will attempt ambulation shortly. 1515 UA results starting to return. Trace leukocyte esterase is present and nitrites are positive. This is in contrast to prior urine results-concerning for possible UTI.   1607 I spoke with patient following urine results.   She notes that she has had a number of UTIs in the past and relates that over the last week as she was using the restroom crossed her mind that she might develop one (around time of wiping). She was able to ambulate steadily with a walker here and notes that she did not have any shaking or seeming weakness in her leg beyond normal.  Bladder scan revealed presence of 243 mL. This is a bit greater than normal though not clearly more than her baseline. She is HD stable & voiding regularly. [place] presence/ care would be challenging for pt. At this time will [place] UTI. To return prn worsening. SBIRT 20yo+      Flowsheet Row Most Recent Value   Initial Alcohol Screen: US AUDIT-C     1. How often do you have a drink containing alcohol? 1 Filed at: 10/31/2023 1255   2. How many drinks containing alcohol do you have on a typical day you are drinking? 0 Filed at: 10/31/2023 1255   3a. Male UNDER 65: How often do you have five or more drinks on one occasion? 2 Filed at: 10/31/2023 1255   Audit-C Score 3 Filed at: 10/31/2023 1255   [place]: How many times in the past year have you. .. Used an illegal drug or used a prescription medication for non-medical reasons? Never Filed at: 10/31/2023 1255                      Medical Decision Making  Problems Addressed:  UTI (urinary tract infection): acute illness or injury  Weakness: chronic illness or injury with exacerbation, progression, or side effects of treatment    Amount and/or Complexity of Data Reviewed  Independent Historian:      Details: daughter  Labs: ordered. Decision-making details documented in ED Course. Risk  Prescription drug management.              Disposition  Final diagnoses:   UTI (urinary tract infection)   Weakness     Time reflects when diagnosis was documented in both MDM as applicable and the Disposition within this note       Time User Action Codes Description Comment    10/31/2023  4:01 PM Markus CLEVELAND Add [N39.0] UTI (urinary tract infection)     10/31/2023  4:01 PM Gifty Garrison Add [R53.1] Weakness           ED Disposition       ED Disposition   Discharge    Condition   Stable    Date/Time   Tue Oct 31, 2023 [address] discharge to home/self care. Follow-up Information       Follow up With Specialties Details Why Contact Info Additional Information    Columba Santana MD Family Medicine Schedule an appointment as soon as possible for a visit   [address]  [facility]  [address] [facility] [phone] [facility] Emergency Medicine Go to  As needed, If symptoms worsen [address] [facility], [place], [place], [address]            Discharge Medication List as of 10/31/2023  4:03 PM        START taking these medications    Details   cephalexin (KEFLEX) 500 mg capsule Take 1 capsule (500 mg total) by mouth 2 (two) times a day for 7 days, Starting Tue 10/31/2023, Until Tue 11/7/2023, Normal           CONTINUE these medications which have NOT CHANGED    Details   acetaminophen (TYLENOL) 500 mg tablet Take 1,000 mg by mouth if needed for mild pain, Historical Med      ALPRAZolam (XANAX) 0.25 mg tablet TAKE ONE TABLET BY MOUTH EVERY DAY AS NEEDED FOR ANXIETY TAKE 2 TABLETS 30 MINUTES PRIOR TO PROCEDURE, Normal      Calcium-Vitamin D (CALTRATE 600 PLUS-VIT D PO) Take by mouth daily. , Historical Med      esomeprazole (NexIUM) 10 MG packet Take 10 mg by mouth every morning before breakfast, Historical Med      ezetimibe (ZETIA) 10 mg tablet TAKE ONE TABLET BY MOUTH EVERY DAY, Normal      fluticasone (FLONASE) 50 mcg/act nasal spray 1 spray into each nostril as needed, Historical Med      LUMIGAN 0.01 % ophthalmic drops PLACE 1 DROP INTO IN [facility] EYE AT BEDTIME, Historical Med      meclizine (ANTIVERT) 25 mg tablet Take 1 tablet (25 mg total) by mouth every 8 (eight) hours as needed for dizziness, Starting Tue 7/30/2019, Normal      Multiple Vitamins-Minerals (MULTIVITAMIN ADULTS 50+) TABS Take by mouth daily, Historical Med      NON FORMULARY CBD Gummies, Historical Med      psyllium (METAMUCIL) 0.52 g capsule Take by mouth daily 2-3 caps daily, Historical Med      sertraline (ZOLOFT) 100 mg tablet TAKE 2 TABLETS BY MOUTH DAILY, Normal             No discharge procedures on file.     PDMP Review         Value Time User    PDMP Reviewed  Yes 9/5/2023  5:17 PM Corrina Bradshaw, [address] [place] [address]            ED Provider  Electronically Signed by             Poppy Norman MD  11/01/23 6993

## 2023-10-31 NOTE — DISCHARGE INSTRUCTIONS
Cephalexin has been ordered to treat urinary tract infection. Take this 2 times daily for the next 1 week. Increase water intake to stay well-hydrated and increased urination. Continue drinking cranberry juice daily to help prevent future UTIs.

## 2023-11-01 ENCOUNTER — VBI (OUTPATIENT)
Dept: ADMINISTRATIVE | Facility: OTHER | Age: 78
End: 2023-11-01

## 2023-11-01 LAB
ATRIAL RATE: 66 BPM
P AXIS: 58 DEGREES
PR INTERVAL: 150 MS
QRS AXIS: 39 DEGREES
QRSD INTERVAL: 82 MS
QT INTERVAL: 414 MS
QTC INTERVAL: 434 MS
T WAVE AXIS: 33 DEGREES
VENTRICULAR RATE: 66 BPM

## 2023-11-01 PROCEDURE — 93010 ELECTROCARDIOGRAM REPORT: CPT | Performed by: INTERNAL MEDICINE

## 2023-11-01 NOTE — RESULT ENCOUNTER NOTE
Urine culture results were reviewed, greater than 100,000 colony counts of gram-negative rods.   Patient is on Keflex, awaiting sensitivities

## 2023-11-01 NOTE — TELEPHONE ENCOUNTER
11/01/23 1:32 PM    Patient contacted post ED visit, [facility] department spoke with patient/caregiver and outreach was successful. Thank you.   Bettie Bacon  [facility]

## 2023-11-02 LAB — BACTERIA UR CULT: ABNORMAL

## 2023-12-03 DIAGNOSIS — E78.00 HYPERCHOLESTEROLEMIA: ICD-10-CM

## 2023-12-04 RX ORDER — EZETIMIBE 10 MG/1
TABLET ORAL
Qty: 30 TABLET | Refills: 5 | Status: SHIPPED | OUTPATIENT
Start: 2023-12-04

## 2024-01-08 ENCOUNTER — APPOINTMENT (EMERGENCY)
Dept: CT IMAGING | Facility: HOSPITAL | Age: 79
End: 2024-01-08
Payer: COMMERCIAL

## 2024-01-08 ENCOUNTER — HOSPITAL ENCOUNTER (EMERGENCY)
Facility: HOSPITAL | Age: 79
Discharge: HOME | End: 2024-01-08
Attending: EMERGENCY MEDICINE
Payer: COMMERCIAL

## 2024-01-08 VITALS
OXYGEN SATURATION: 97 % | DIASTOLIC BLOOD PRESSURE: 65 MMHG | RESPIRATION RATE: 18 BRPM | TEMPERATURE: 98.2 F | HEART RATE: 72 BPM | SYSTOLIC BLOOD PRESSURE: 137 MMHG

## 2024-01-08 DIAGNOSIS — R03.0 ELEVATED BLOOD PRESSURE READING: ICD-10-CM

## 2024-01-08 DIAGNOSIS — R91.1 PULMONARY NODULE: ICD-10-CM
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
## 2024-01-08 DIAGNOSIS — I49.1 PAC (PREMATURE ATRIAL CONTRACTION): ICD-10-CM

## 2024-01-08 DIAGNOSIS — R07.89 ACUTE CHEST WALL PAIN: Primary | ICD-10-CM

## 2024-01-08 PROCEDURE — 99284 EMERGENCY DEPT VISIT MOD MDM: CPT | Performed by: EMERGENCY MEDICINE

## 2024-01-08 PROCEDURE — 71250 CT THORAX DX C-: CPT

## 2024-01-08 PROCEDURE — 93005 ELECTROCARDIOGRAM TRACING: CPT

## 2024-01-08 PROCEDURE — G1004 CDSM NDSC: HCPCS

## 2024-01-08 PROCEDURE — 99284 EMERGENCY DEPT VISIT MOD MDM: CPT

## 2024-01-08 RX ORDER — LIDOCAINE 50 MG/G
1 PATCH TOPICAL ONCE
Status: DISCONTINUED | OUTPATIENT
Start: 2024-01-08 | End: 2024-01-08 | Stop reason: HOSPADM

## 2024-01-08 RX ORDER — LIDOCAINE 50 MG/G
1 PATCH TOPICAL DAILY
Qty: 5 PATCH | Refills: 0 | Status: SHIPPED | OUTPATIENT
Start: 2024-01-08 | End: 2024-01-12

## 2024-01-08 RX ORDER — ACETAMINOPHEN 325 MG/1
650 TABLET ORAL ONCE
Status: COMPLETED | OUTPATIENT
Start: 2024-01-08 | End: 2024-01-08

## 2024-01-08 RX ADMIN — ACETAMINOPHEN 650 MG: 325 TABLET, FILM COATED ORAL at 18:13

## 2024-01-08 RX ADMIN — DICLOFENAC SODIUM TOPICAL GEL, 1% 2 G: 10 GEL TOPICAL at 18:16

## 2024-01-08 RX ADMIN — LIDOCAINE 5% 1 PATCH: 700 PATCH TOPICAL at 18:14

## 2024-01-08 NOTE — ED PROVIDER NOTES
History  Chief Complaint   Patient presents with    Fall     Pt to ED via EMS after a fall, tripped while cleaning. Denies head strike. Complains of right sided rib pain. Denies thinners / asa     Patient is a 78-year-old female, with a history significant for osteopenia, who presents to the ED today, via EMS, due to sudden onset traumatic right sided chest wall pain.  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  She denies head strike, antithrombotic medication use, weakness, numbness, vision change, dysphagia, seizure-like activity, vomiting, rash, chest pain, dyspnea, abdominal pain.  Patient states she was in her usual state of health before the fall.  Initially, she reported minimal pain but, as time is gone on, it has become severe.  Movement/breathing exacerbate her discomfort.  Patient has not taken anything to remit her symptoms.  Patient is without other concerns at this time.        Prior to Admission Medications   Prescriptions Last Dose Informant Patient Reported? Taking?   ALPRAZolam (XANAX) 0.25 mg tablet   No No   Sig: TAKE ONE TABLET BY MOUTH EVERY DAY AS NEEDED FOR ANXIETY TAKE 2 TABLETS 30 MINUTES PRIOR TO PROCEDURE   Calcium-Vitamin D (CALTRATE 600 PLUS-VIT D PO)  Self, Child Yes No   Sig: Take by mouth daily.   LUMIGAN 0.01 % ophthalmic drops  Self, Child Yes No   Sig: PLACE 1 DROP INTO IN EACH EYE AT BEDTIME   Multiple Vitamins-Minerals (MULTIVITAMIN ADULTS 50+) TABS  Self, Child Yes No   Sig: Take by mouth daily   NON FORMULARY  Self, Child Yes No   Sig: CBD Gummies   acetaminophen (TYLENOL) 500 mg tablet  Self, Child Yes No   Sig: Take 1,000 mg by mouth if needed for mild pain   esomeprazole (NexIUM) 10 MG packet  Self, Child Yes No   Sig: Take 10 mg by mouth every morning before breakfast   ezetimibe (ZETIA)  10 mg tablet   No No   Sig: TAKE ONE TABLET BY MOUTH EVERY DAY   fluticasone (FLONASE) 50 mcg/act nasal spray  Self, Child Yes No   Si spray into each nostril as needed   meclizine (ANTIVERT) 25 mg tablet  Self, Child No No   Sig: Take 1 tablet (25 mg total) by mouth every 8 (eight) hours as needed for dizziness   psyllium (METAMUCIL) 0.52 g capsule  Self, Child Yes No   Sig: Take by mouth daily 2-3 caps daily   sertraline (ZOLOFT) 100 mg tablet   No No   Sig: TAKE 2 TABLETS BY MOUTH DAILY      Facility-Administered Medications: None       Past Medical History:   Diagnosis Date    Acute mastoiditis with intracranial complication 2022    Anxiety disorder     Brain hemangioma (HCC)         BRCA1 negative     BRCA2 negative     Breast cancer (HCC)     left breast    Cancer (HCC)     Constipation     Esophageal reflux     History of bone density study     [employer] X-Ray Absorptiometry    History of radiation therapy 2014    left breast ca    History of recurrent UTIs     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    Hyperlipidemia     Hypertension     Malignant neoplasm of female breast (HCC) 2014    Osteoarthritis of hip     Osteopenia     PONV (postoperative nausea and vomiting)     Stroke, hemorrhagic (HCC) 2015    Subarachnoid hemorrhage (HCC)        Past Surgical History:   Procedure Laterality Date    BREAST BIOPSY Left 2014    BREAST LUMPECTOMY Left 2014    COLONOSCOPY      Fiberoptic - ,  5 year f/u    ESOPHAGOGASTRODUODENOSCOPY  2009    Diag, Resolved - 2006    EXOSTECTOMY  2005    Simple Bunion (Silver Procedure)    FL LUMBAR PUNCTURE THERAPEUTIC  2023    HYSTERECTOMY  1981    JOINT REPLACEMENT Right 2012    Right Hip    OOPHORECTOMY  1981    not sure which one    REPLACEMENT TOTAL KNEE Right 2018    VENTRICULOSTOMY         Family History   Problem Relation Age of Onset    Cancer Mother 69        bone    Hypertension Mother     Arthritis  Mother     Stroke Father         TIA    Stroke Maternal Grandmother         CVA    Deep vein thrombosis Daughter     Heart attack Neg Hx     Anuerysm Neg Hx     Clotting disorder Neg Hx     Arrhythmia Neg Hx     Heart failure Neg Hx     Coronary artery disease Neg Hx     Hyperlipidemia Neg Hx     Breast cancer Neg Hx      I have reviewed and agree with the history as documented.    E-Cigarette/Vaping    E-Cigarette Use Never User      E-Cigarette/Vaping Substances    Nicotine No     THC No     CBD No     Flavoring No     Other No     Unknown No      Social History     Tobacco Use    Smoking status: Never     Passive exposure: Past    Smokeless tobacco: Never   Vaping Use    Vaping status: Never Used   Substance Use Topics    Alcohol use: Yes     Comment: occasionally    Drug use: No       Review of Systems   Constitutional:  Negative for fever.   HENT:  Negative for trouble swallowing.    Eyes:  Negative for visual disturbance.   Respiratory:  Negative for shortness of breath.    Cardiovascular:  Negative for chest pain.   Gastrointestinal:  Negative for abdominal pain and vomiting.   Endocrine: Negative for polyuria.   Genitourinary:  Negative for dysuria.   Musculoskeletal:  Negative for gait problem.   Skin:  Negative for rash.   Allergic/Immunologic: Positive for environmental allergies.   Neurological:  Negative for seizures, weakness, numbness and headaches.   Hematological:  Negative for adenopathy.   Psychiatric/Behavioral:  Negative for confusion.    All other systems reviewed and are negative.      Physical Exam  Physical Exam  Vitals and nursing note reviewed.   Constitutional:       General: She is not in acute distress.     Appearance: She is not toxic-appearing or diaphoretic.   HENT:      Head: Normocephalic and atraumatic.      Comments: No Mi sign, no hemotympanum, no raccoon's eyes    No facial instability, jaw malocclusion.  No pain to palpation of the bony orbits     Right Ear: Tympanic  membrane, ear canal and external ear normal. There is no impacted cerumen.      Left Ear: Tympanic membrane, ear canal and external ear normal. There is no impacted cerumen.      Nose: Nose normal. No rhinorrhea.      Mouth/Throat:      Mouth: Mucous membranes are moist.      Pharynx: Oropharynx is clear. No oropharyngeal exudate or posterior oropharyngeal erythema.      Comments: Uvula midline. No oropharyngeal or submandibular mass/swelling  Eyes:      General: No scleral icterus.        Right eye: No discharge.         Left eye: No discharge.      Extraocular Movements: Extraocular movements intact.      Conjunctiva/sclera: Conjunctivae normal.      Pupils: Pupils are equal, round, and reactive to light.   Neck:      Comments: Patient is spontaneously rotating their neck to the left and right during the history and physical exam interaction without difficulty or apparent discomfort    Cardiovascular:      Rate and Rhythm: Normal rate and regular rhythm.      Pulses: Normal pulses.      Heart sounds: Normal heart sounds. No murmur heard.     No friction rub. No gallop.      Comments: 2+ Radial  Pulmonary:      Effort: Pulmonary effort is normal. No respiratory distress.      Breath sounds: Normal breath sounds. No stridor. No wheezing, rhonchi or rales.   Abdominal:      General: Abdomen is flat. There is no distension.      Palpations: Abdomen is soft.      Tenderness: There is no abdominal tenderness. There is no right CVA tenderness, left CVA tenderness, guarding or rebound.   Musculoskeletal:         General: Tenderness present. No deformity.      Cervical back: Neck supple. No tenderness. No muscular tenderness.      Right lower leg: No edema.      Left lower leg: No edema.      Comments: No tenderness to palpation of the bilateral shoulders, elbows, arms, thighs, knees, legs. No pelvic tenderness to palpation     No midline C, T, L spine tenderness to palpation      Right chest wall tenderness to palpation  over multiple rib spaces, middle ribs.  Significant pain with light palpation over this area   Lymphadenopathy:      Cervical: No cervical adenopathy.   Skin:     General: Skin is warm and dry.      Capillary Refill: Capillary refill takes less than 2 seconds.   Neurological:      Mental Status: She is alert.      Comments: Cranial nerves 2-12 intact.  5/5 strength in all four extremities including finger extension against resistance.  Sensation to light touch subjectively intact/equal in all four extremities and the face.  Patient able to ambulate without difficulty.    Patient is speaking clearly in complete sentences.  Patient is answering appropriately and able to follow commands.    Psychiatric:         Mood and Affect: Mood normal.         Behavior: Behavior normal.         Vital Signs  ED Triage Vitals   Temperature Pulse Respirations Blood Pressure SpO2   01/08/24 1735 01/08/24 1735 01/08/24 1735 01/08/24 1735 01/08/24 1735   98.2 °F (36.8 °C) 74 18 158/77 99 %      Temp src Heart Rate Source Patient Position - Orthostatic VS BP Location FiO2 (%)   -- -- -- -- --             Pain Score       01/08/24 1736       10 - Worst Possible Pain           Vitals:    01/08/24 1735   BP: 158/77   Pulse: 74         Visual Acuity      ED Medications  Medications   lidocaine (LIDODERM) 5 % patch 1 patch (1 patch Topical Medication Applied 1/8/24 1814)   Diclofenac Sodium (VOLTAREN) 1 % topical gel 2 g (2 g Topical Given 1/8/24 1816)   acetaminophen (TYLENOL) tablet 650 mg (650 mg Oral Given 1/8/24 1813)       Diagnostic Studies  Results Reviewed       None                   CT chest wo contrast    (Results Pending)              Procedures  Procedures         ED Course  ED Course as of 01/08/24 1904 Mon Jan 08, 2024 1826 ECG per my independent interpretation: Normal rate, regular rhythm, PAC present, normal axis, no ST elevations or depressions     1904 Patient signed out prior to final disposition                                              Medical Decision Making  Patient is a 78-year-old female, with a history significant for osteopenia, who presents to the ED today, via EMS, due to sudden onset traumatic right sided chest wall pain.  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  She denies head strike, antithrombotic medication use, weakness, numbness, vision change, dysphagia, seizure-like activity, vomiting, rash, chest pain, dyspnea, abdominal pain.  Patient states she was in her usual state of health before the fall.  Initially, she reported minimal pain but, as time is gone on, it has become severe.  Movement/breathing exacerbate her discomfort.  Patient has not taken anything to remit her symptoms.  Patient is currently afebrile and hemodynamically stable.  Her physical exam is notable for no focal neurodeficit, clear heart and lungs auscultation, soft nontender abdomen, significant tenderness to palpation with light touch over the lateral right chest wall over multiple rib spaces.  No crepitus palpated.  This presentation is concerning for: Rib fracture, pulmonary contusion, rib contusion.  I also considered pneumothorax.  No reason to suspect intracranial hemorrhage, liver laceration at this time based on history physical exam.  Will investigate with CT chest.  Will manage with multimodal analgesia, incentive spirometry, further based upon workup/response.    Amount and/or Complexity of Data Reviewed  Radiology: ordered.    Risk  OTC drugs.  Prescription drug management.             Disposition  Final diagnoses:   Acute chest wall pain   Fall, initial encounter   Elevated blood pressure reading   PAC (premature atrial contraction)     Time reflects when diagnosis was documented in both MDM as applicable and the  Disposition within this note       Time User Action Codes Description Comment    1/8/2024  6:46 PM Lance Varela Add [R07.89] Acute chest wall pain     1/8/2024  6:46 PM Lance Varela Add [W19.XXXA] Fall, initial encounter     1/8/2024  6:46 PM Lance Varela Add [R03.0] Elevated blood pressure reading     1/8/2024  7:04 PM Lance Varela Add [I49.1] PAC (premature atrial contraction)           ED Disposition       None          Follow-up Information       Follow up With Specialties Details Why Contact Info    Art Crowell MD Family Medicine Schedule an appointment as soon as possible for a visit   [address] [place], [place] [id number] [phone]              Current Discharge Medication List        CONTINUE these medications which have NOT CHANGED    Details   acetaminophen (TYLENOL) 500 mg tablet Take 1,000 mg by mouth if needed for mild pain      ALPRAZolam (XANAX) 0.25 mg tablet TAKE ONE TABLET BY MOUTH EVERY DAY AS NEEDED FOR ANXIETY TAKE 2 TABLETS 30 MINUTES PRIOR TO PROCEDURE  Qty: 30 tablet, Refills: 0    Associated Diagnoses: Anxiety      Calcium-Vitamin D (CALTRATE 600 PLUS-VIT D PO) Take by mouth daily.      esomeprazole (NexIUM) 10 MG packet Take 10 mg by mouth every morning before breakfast      ezetimibe (ZETIA) 10 mg tablet TAKE ONE TABLET BY MOUTH EVERY DAY  Qty: 30 tablet, Refills: 5    Associated Diagnoses: Hypercholesterolemia      fluticasone (FLONASE) 50 mcg/act nasal spray 1 spray into each nostril as needed      LUMIGAN 0.01 % ophthalmic drops PLACE 1 DROP INTO IN EACH EYE AT BEDTIME  Refills: 11      meclizine (ANTIVERT) 25 mg tablet Take 1 tablet (25 mg total) by mouth every 8 (eight) hours as needed for dizziness  Qty: 30 tablet, Refills: 0    Associated Diagnoses: Vertigo      Multiple Vitamins-Minerals (MULTIVITAMIN ADULTS 50+) TABS Take by mouth daily      NON FORMULARY CBD Gummies      psyllium (METAMUCIL) 0.52 g capsule Take by mouth  daily 2-3 caps daily      sertraline (ZOLOFT) 100 mg tablet TAKE 2 TABLETS BY MOUTH DAILY  Qty: 180 tablet, Refills: 1    Associated Diagnoses: Depression with anxiety             No discharge procedures on file.    PDMP Review         Value Time User    PDMP Reviewed  Yes 9/5/2023  5:17 PM EYAL Perez            ED Provider  Electronically Signed by             Lance Varela MD  01/08/24 0649

## 2024-01-08 NOTE — DISCHARGE INSTRUCTIONS
You should follow-up with your primary care provider, as soon as possible, for re-evaluation.  If you do not have a primary care provider, I have referred you to [facility]'s [facility]. You will be contacted about scheduling an appointment. Their phone number is also included on this paperwork.     You may take 650mg of tylenol every four to six hours, not exceeding 3,000mg daily, for the management of your discomfort.    Your workup revealed no emergent features at this time; however, many disease processes are dynamic:    Please, return to the emergency department if you experience new or worsening symptoms, fever, chest pain, shortness of breath, difficulty breathing, dizziness, abdominal pain, persistent nausea/vomiting, syncope or passing out, blood in your urine or stool, coughing up blood, leg swelling/pain, urinary retention, bowel or bladder incontinence, numbness between your legs.    Additionally, your blood pressure was measured to be high. This is something that you should discuss with your primary care provider and have re-checked within one week.

## 2024-01-09 NOTE — ED CARE HANDOFF
Emergency Department Sign Out Note        Sign out and transfer of care from Dr. Varela. See Separate Emergency Department note.     The patient, Samson Watts, was evaluated by the previous provider for traumatic rib pain.    Workup Completed:  Eval. Pain meds    ED Course / Workup Pending (followup):  Waiting for ct scan      Discussed results with patient.  She understands importance following up.  Will return for any new or worsening symptoms.                               Procedures  Medical Decision Making  Amount and/or Complexity of Data Reviewed  Radiology: ordered.    Risk  OTC drugs.  Prescription drug management.            Disposition  Final diagnoses:   Acute chest wall pain   Fall, initial encounter   Elevated blood pressure reading   PAC (premature atrial contraction)   Pulmonary nodule     Time reflects when diagnosis was documented in both MDM as applicable and the Disposition within this note       Time User Action Codes Description Comment    1/8/2024  6:46 PM Lance Varela Add [R07.89] Acute chest wall pain     1/8/2024  6:46 PM Lance Varela Add [W19.XXXA] Fall, initial encounter     1/8/2024  6:46 PM Lance Varela Add [R03.0] Elevated blood pressure reading     1/8/2024  7:04 PM Lance Varela Add [I49.1] PAC (premature atrial contraction)     1/8/2024  8:46 PM Isadora Garcia Add [R91.1] Pulmonary nodule           ED Disposition       ED Disposition   Discharge    Condition   Stable    Date/Time   Mon Jan 8, 2024  8:47 PM    Comment   Samson Watts discharge to home/self care.                   Follow-up Information       Follow up With Specialties Details Why Contact Info Additional Information    Art Crowell MD Family Medicine Schedule an appointment as soon as possible for a visit   [address] [place] [id number]  [phone]       [facility] Emergency Medicine  As needed, If symptoms worsen  [address] [facility] [address]  [phone] Replaced by [facility], [address] [facility], [place], [place], [address]          Discharge Medication List as of 1/8/2024  8:47 PM        START taking these medications    Details   lidocaine (Lidoderm) 5 % Apply 1 patch topically over 12 hours daily for 5 days Remove & Discard patch within 12 hours or as directed by MD, Starting Mon 1/8/2024, Until Sat 1/13/2024, Normal           CONTINUE these medications which have NOT CHANGED    Details   acetaminophen (TYLENOL) 500 mg tablet Take 1,000 mg by mouth if needed for mild pain, Historical Med      ALPRAZolam (XANAX) 0.25 mg tablet TAKE ONE TABLET BY MOUTH EVERY DAY AS NEEDED FOR ANXIETY TAKE 2 TABLETS 30 MINUTES PRIOR TO PROCEDURE, Normal      Calcium-Vitamin D (CALTRATE 600 PLUS-VIT D PO) Take by mouth daily., Historical Med      esomeprazole (NexIUM) 10 MG packet Take 10 mg by mouth every morning before breakfast, Historical Med      ezetimibe (ZETIA) 10 mg tablet TAKE ONE TABLET BY MOUTH EVERY DAY, Normal      fluticasone (FLONASE) 50 mcg/act nasal spray 1 spray into each nostril as needed, Historical Med      LUMIGAN 0.01 % ophthalmic drops PLACE 1 DROP INTO IN EACH EYE AT BEDTIME, Historical Med      meclizine (ANTIVERT) 25 mg tablet Take 1 tablet (25 mg total) by mouth every 8 (eight) hours as needed for dizziness, Starting Tue 7/30/2019, Normal      Multiple Vitamins-Minerals (MULTIVITAMIN ADULTS 50+) TABS Take by mouth daily, Historical Med      NON FORMULARY CBD Gummies, Historical Med      psyllium (METAMUCIL) 0.52 g capsule Take by mouth daily 2-3 caps daily, Historical Med      sertraline (ZOLOFT) 100 mg tablet TAKE 2 TABLETS BY MOUTH DAILY, Normal           No discharge procedures on file.       ED Provider  Electronically Signed by     Isadora Garcia MD  01/10/24 3201

## 2024-01-10 ENCOUNTER — RA CDI HCC (OUTPATIENT)
Dept: OTHER | Facility: HOSPITAL | Age: 79
End: 2024-01-10

## 2024-01-10 ENCOUNTER — OFFICE VISIT (OUTPATIENT)
Dept: CARDIOLOGY CLINIC | Facility: MEDICAL CENTER | Age: 79
End: 2024-01-10
Payer: COMMERCIAL

## 2024-01-10 VITALS
HEART RATE: 81 BPM | SYSTOLIC BLOOD PRESSURE: 118 MMHG | HEIGHT: 69 IN | OXYGEN SATURATION: 97 % | WEIGHT: 159 LBS | BODY MASS INDEX: 23.55 KG/M2 | DIASTOLIC BLOOD PRESSURE: 70 MMHG

## 2024-01-10 DIAGNOSIS — E78.00 PURE HYPERCHOLESTEROLEMIA: Primary | ICD-10-CM

## 2024-01-10 DIAGNOSIS — I77.1 CELIAC ARTERY STENOSIS (HCC): ICD-10-CM

## 2024-01-10 LAB
ATRIAL RATE: 70 BPM
P AXIS: 53 DEGREES
PR INTERVAL: 148 MS
QRS AXIS: 38 DEGREES
QRSD INTERVAL: 80 MS
QT INTERVAL: 410 MS
QTC INTERVAL: 442 MS
T WAVE AXIS: 27 DEGREES
VENTRICULAR RATE: 70 BPM

## 2024-01-10 PROCEDURE — 99213 OFFICE O/P EST LOW 20 MIN: CPT | Performed by: INTERNAL MEDICINE

## 2024-01-10 NOTE — PROGRESS NOTES
Follow-up - Cardiology   Samson Watts 78 y.o. female MRN: [id number]        Problems    1. Pure hypercholesterolemia  Lipid panel      2. Celiac artery stenosis (HCC)            Impression    I had the pleasure of having Samson return to see me at the  [place] office    Hypertension  Blood pressure is normal    Hyperlipidemia  She has intolerance to red yeast rice, statins and most recently fenofibrate  She takes Zetia with good tolerance  LDL last assessed 11/22 was 130  She has no known significant cardiovascular disease, except for celiac artery stenosis    Celiac artery stenosis  70% by ultrasound in 2016  She has no complaints of abdominal postprandial symptoms    PLAN:    Doing well from my standpoint  No changes  Repeat lipid panel recommended        HPI: Samson Watts is a 78 y.o. 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 with longstanding severe hypercholesterolemia, situational hypertension, celiac artery stenosis, and a history of subarachnoid hemorrhage presents to the office for a follow-up visit.    She had a fall recently, mechanical, bent down to get crumbs off the floor, fell forward and fractured some ribs on the right-hand side.  She has been given an inspiratory spirometer, but has been using it incorrectly, and was shown how to use it appropriately today.  She has no fevers, no chills, no cough, no sputum  Lipids have not been recently assessed, last in 2022, LDL runs 1/31/1939, on Zetia, statin intolerant, but no known atherosclerotic cardiovascular disease.  She has no cardiovascular symptoms, denies shortness of breath, chest pain, palpitations, lightheadedness or syncope.    Review of Systems   Constitutional:  Negative for appetite change, diaphoresis, fatigue and fever.   Respiratory:  Negative for chest tightness, shortness of breath and wheezing.    Cardiovascular:  Negative for chest pain, palpitations and leg swelling.   Gastrointestinal:  Negative for abdominal pain and blood in stool.   Musculoskeletal:   Positive for arthralgias. Negative for joint swelling.   Skin:  Negative for rash.   Neurological:  Negative for dizziness, syncope and light-headedness.         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   • Hyperlipidemia    • Hypertension    • Malignant neoplasm of female breast (HCC) 04/22/2014   • Osteoarthritis of hip    • Osteopenia    • PONV (postoperative nausea and vomiting)    • Stroke, hemorrhagic (HCC) 12/2015   • Subarachnoid hemorrhage (HCC)      Social History     Substance and Sexual Activity   Alcohol Use Yes    Comment: occasionally     Social History     Substance and Sexual Activity   Drug Use No     Social History     Tobacco Use   Smoking Status Never   • Passive exposure: Past   Smokeless Tobacco Never       Allergies:  Allergies   Allergen Reactions   • Oxycodone Vomiting   • Atorvastatin Other (See Comments)     Leg/knee pain (joint pain)   • Bactrim [Sulfamethoxazole-Trimethoprim] Itching and Rash   • Keppra [Levetiracetam] Rash   • Livalo [Pitavastatin] Itching   • Penicillins Rash     Agitation        Medications:     Current Outpatient Medications:   •  acetaminophen (TYLENOL) 500 mg tablet, Take 1,000 mg by mouth if needed for mild pain, Disp: , Rfl:   •  ALPRAZolam (XANAX) 0.25 mg tablet, TAKE ONE TABLET BY MOUTH EVERY DAY AS NEEDED FOR ANXIETY TAKE 2 TABLETS 30 MINUTES PRIOR TO PROCEDURE, Disp: 30 tablet, Rfl: 0  •  Calcium-Vitamin D (CALTRATE 600 PLUS-VIT D PO), Take by mouth daily., Disp: , Rfl:   •   esomeprazole (NexIUM) 10 MG packet, Take 10 mg by mouth every morning before breakfast, Disp: , Rfl:   •  ezetimibe (ZETIA) 10 mg tablet, TAKE ONE TABLET BY MOUTH EVERY DAY, Disp: 30 tablet, Rfl: 5  •  fluticasone (FLONASE) 50 mcg/act nasal spray, 1 spray into each nostril as needed, Disp: , Rfl:   •  lidocaine (Lidoderm) 5 %, Apply 1 patch topically over 12 hours daily for 5 days Remove & Discard patch within 12 hours or as directed by MD, Disp: 5 patch, Rfl: 0  •  LUMIGAN 0.01 % ophthalmic drops, PLACE 1 DROP INTO IN EACH EYE AT BEDTIME, Disp: , Rfl: 11  •  meclizine (ANTIVERT) 25 mg tablet, Take 1 tablet (25 mg total) by mouth every 8 (eight) hours as needed for dizziness, Disp: 30 tablet, Rfl: 0  •  Multiple Vitamins-Minerals (MULTIVITAMIN ADULTS 50+) TABS, Take by mouth daily, Disp: , Rfl:   •  NON FORMULARY, CBD Gummies, Disp: , Rfl:   •  psyllium (METAMUCIL) 0.52 g capsule, Take by mouth daily 2-3 caps daily, Disp: , Rfl:   •  sertraline (ZOLOFT) 100 mg tablet, TAKE 2 TABLETS BY MOUTH DAILY, Disp: 180 tablet, Rfl: 1      Vitals:    01/10/24 1358   BP: 118/70   Pulse: 81   SpO2: 97%     Weight (last 2 days)     Date/Time Weight    01/10/24 1358 72.1 (159)        Physical Exam  Constitutional:       General: She is not in acute distress.     Appearance: Normal appearance. She is not diaphoretic.   HENT:      Head: Normocephalic and atraumatic.   Eyes:      General: No scleral icterus.     Conjunctiva/sclera: Conjunctivae normal.   Neck:      Vascular: No JVD.   Cardiovascular:      Rate and Rhythm: Normal rate and regular rhythm.      Heart sounds: Normal heart sounds. No murmur heard.  Pulmonary:      Effort: Pulmonary effort is normal. No respiratory distress.      Breath sounds: Normal breath sounds. No wheezing, rhonchi or rales.   Musculoskeletal:         General: No tenderness.      Right lower leg: Normal. No edema.      Left lower leg: Normal. No edema.   Skin:     General: Skin is warm and dry.  "  Neurological:      Mental Status: She is alert. Mental status is at baseline.           Laboratory Studies:  CMP:  Lab Results   Component Value Date    CREATININE 0.73 10/31/2023    CREATININE 0.68 01/08/2016    BUN 14 10/31/2023    BUN 12 01/08/2016     (L) 01/08/2016    K 3.7 10/31/2023    K 3.7 01/08/2016     10/31/2023     01/08/2016    CO2 31 10/31/2023    CO2 27 01/08/2016    GLUCOSE 99 01/08/2016    PROT 7.0 01/03/2016    ALKPHOS 82 10/31/2023    ALKPHOS 104 01/03/2016    ALT 14 10/31/2023    ALT 43 01/03/2016    AST 19 10/31/2023    AST 35 01/03/2016    BILIDIR 0.12 11/03/2016     Lipid Profile:   Lab Results   Component Value Date    CHOL 250 07/29/2015    CHOL 238 07/24/2014     Lab Results   Component Value Date    HDL 77 11/23/2022    HDL 77 11/11/2022    HDL 70 11/12/2021     Lab Results   Component Value Date    LDLCALC 130 (H) 11/23/2022    LDLCALC 114 (H) 11/11/2022    LDLCALC 135 (H) 11/12/2021     Lab Results   Component Value Date    TRIG 103 11/23/2022    TRIG 82 11/11/2022    TRIG 100 11/12/2021     EKG personally reviewed            Michele Robles MD    Portions of the record may have been created with voice recognition software.  Occasional wrong word or \"sound a like\" substitutions may have occurred due to the inherent limitations of voice recognition software.  Read the chart carefully and recognize, using context, where substitutions have occurred.  "

## 2024-01-12 ENCOUNTER — OFFICE VISIT (OUTPATIENT)
Dept: FAMILY MEDICINE CLINIC | Facility: CLINIC | Age: 79
End: 2024-01-12

## 2024-01-12 VITALS
OXYGEN SATURATION: 97 % | BODY MASS INDEX: 23.7 KG/M2 | WEIGHT: 160 LBS | DIASTOLIC BLOOD PRESSURE: 78 MMHG | HEIGHT: 69 IN | SYSTOLIC BLOOD PRESSURE: 120 MMHG | TEMPERATURE: 98.2 F | HEART RATE: 82 BPM

## 2024-01-12 DIAGNOSIS — G91.2 NPH (NORMAL PRESSURE HYDROCEPHALUS) (HCC): ICD-10-CM

## 2024-01-12 DIAGNOSIS — R26.2 AMBULATORY DYSFUNCTION: ICD-10-CM

## 2024-01-12 DIAGNOSIS — Z00.00 MEDICARE ANNUAL WELLNESS VISIT, SUBSEQUENT: Primary | ICD-10-CM

## 2024-01-12 DIAGNOSIS — E78.00 PURE HYPERCHOLESTEROLEMIA: ICD-10-CM

## 2024-01-12 DIAGNOSIS — R91.1 LUNG NODULE: ICD-10-CM

## 2024-01-12 DIAGNOSIS — R63.4 UNINTENTIONAL WEIGHT LOSS: ICD-10-CM

## 2024-01-12 DIAGNOSIS — I77.1 CELIAC ARTERY STENOSIS (HCC): ICD-10-CM

## 2024-01-12 DIAGNOSIS — F32.0 CURRENT MILD EPISODE OF MAJOR DEPRESSIVE DISORDER WITHOUT PRIOR EPISODE (HCC): ICD-10-CM

## 2024-01-12 NOTE — PATIENT INSTRUCTIONS
Medicare Preventive Visit Patient Instructions  Thank you for completing your Welcome to Medicare Visit or Medicare Annual Wellness Visit today. Your next wellness visit will be due in one year (1/12/2025).  The screening/preventive services that you may require over the next 5-10 years are detailed below. Some tests may not apply to you based off risk factors and/or age. Screening tests ordered at today's visit but not completed yet may show as past due. Also, please note that scanned in results may not display below.  Preventive Screenings:  Service Recommendations Previous Testing/Comments   Colorectal Cancer Screening  * Colonoscopy    * Fecal Occult Blood Test (FOBT)/Fecal Immunochemical Test (FIT)  * Fecal DNA/Cologuard Test  * Flexible Sigmoidoscopy Age: 45-75 years old   Colonoscopy: every 10 years (may be performed more frequently if at higher risk)  OR  FOBT/FIT: every 1 year  OR  Cologuard: every 3 years  OR  Sigmoidoscopy: every 5 years  Screening may be recommended earlier than age 45 if at higher risk for colorectal cancer. Also, an individualized decision between you and your healthcare provider will decide whether screening between the ages of 76-85 would be appropriate. Colonoscopy: 08/05/2015  FOBT/FIT: Not on file  Cologuard: Not on file  Sigmoidoscopy: Not on file          Breast Cancer Screening Age: 40+ years old  Frequency: every 1-2 years  Not required if history of left and right mastectomy Mammogram: 12/17/2021        Cervical Cancer Screening Between the ages of 21-29, pap smear recommended once every 3 years.   Between the ages of 30-65, can perform pap smear with HPV co-testing every 5 years.   Recommendations may differ for women with a history of total hysterectomy, cervical cancer, or abnormal pap smears in past. Pap Smear: Not on file        Hepatitis C Screening Once for adults born between 1945 and 1965  More frequently in patients at high risk for Hepatitis C Hep C Antibody:  11/23/2022        Diabetes Screening 1-2 times per year if you're at risk for diabetes or have pre-diabetes Fasting glucose: 95 mg/dL (11/23/2022)  A1C: 5.6 % (5/11/2019)      Cholesterol Screening Once every 5 years if you don't have a lipid disorder. May order more often based on risk factors. Lipid panel: 11/23/2022          Other Preventive Screenings Covered by Medicare:  Abdominal Aortic Aneurysm (AAA) Screening: covered once if your at risk. You're considered to be at risk if you have a family history of AAA.  Lung Cancer Screening: covers low dose CT scan once per year if you meet all of the following conditions: (1) Age 55-77; (2) No signs or symptoms of lung cancer; (3) Current smoker or have quit smoking within the last 15 years; (4) You have a tobacco smoking history of at least 20 pack years (packs per day multiplied by number of years you smoked); (5) You get a written order from a healthcare provider.  Glaucoma Screening: covered annually if you're considered high risk: (1) You have diabetes OR (2) Family history of glaucoma OR (3)  aged 50 and older OR (4)  American aged 65 and older  Osteoporosis Screening: covered every 2 years if you meet one of the following conditions: (1) You're estrogen deficient and at risk for osteoporosis based off medical history and other findings; (2) Have a vertebral abnormality; (3) On glucocorticoid therapy for more than 3 months; (4) Have primary hyperparathyroidism; (5) On osteoporosis medications and need to assess response to drug therapy.   Last bone density test (DXA Scan): 01/19/2022.  HIV Screening: covered annually if you're between the age of 15-65. Also covered annually if you are younger than 15 and older than 65 with risk factors for HIV infection. For pregnant patients, it is covered up to 3 times per pregnancy.    Immunizations:  Immunization Recommendations   Influenza Vaccine Annual influenza vaccination during flu season is  recommended for all persons aged >= 6 months who do not have contraindications   Pneumococcal Vaccine   * Pneumococcal conjugate vaccine = PCV13 (Prevnar 13), PCV15 (Vaxneuvance), PCV20 (Prevnar 20)  * Pneumococcal polysaccharide vaccine = PPSV23 (Pneumovax) Adults 19-65 yo with certain risk factors or if 65+ yo  If never received any pneumonia vaccine: recommend Prevnar 20 (PCV20)  Give PCV20 if previously received 1 dose of PCV13 or PPSV23   Hepatitis B Vaccine 3 dose series if at intermediate or high risk (ex: diabetes, end stage renal disease, liver disease)   Respiratory syncytial virus (RSV) Vaccine - COVERED BY MEDICARE PART D  * RSVPreF3 (Arexvy) CDC recommends that adults 60 years of age and older may receive a single dose of RSV vaccine using shared clinical decision-making (SCDM)   Tetanus (Td) Vaccine - COST NOT COVERED BY MEDICARE PART B Following completion of primary series, a booster dose should be given every 10 years to maintain immunity against tetanus. Td may also be given as tetanus wound prophylaxis.   Tdap Vaccine - COST NOT COVERED BY MEDICARE PART B Recommended at least once for all adults. For pregnant patients, recommended with each pregnancy.   Shingles Vaccine (Shingrix) - COST NOT COVERED BY MEDICARE PART B  2 shot series recommended in those 19 years and older who have or will have weakened immune systems or those 50 years and older     Health Maintenance Due:      Topic Date Due   • Breast Cancer Screening: Mammogram  12/17/2023   • DXA SCAN  01/19/2027   • Hepatitis C Screening  Completed   • Colorectal Cancer Screening  Discontinued     Immunizations Due:      Topic Date Due   • COVID-19 Vaccine (1) Never done   • Influenza Vaccine (1) 09/01/2023     Advance Directives   What are advance directives?  Advance directives are legal documents that state your wishes and plans for medical care. These plans are made ahead of time in case you lose your ability to make decisions for  yourself. Advance directives can apply to any medical decision, such as the treatments you want, and if you want to donate organs.   What are the types of advance directives?  There are many types of advance directives, and each state has rules about how to use them. You may choose a combination of any of the following:  Living will:  This is a written record of the treatment you want. You can also choose which treatments you do not want, which to limit, and which to stop at a certain time. This includes surgery, medicine, IV fluid, and tube feedings.   Durable power of  for healthcare (DPAHC):  This is a written record that states who you want to make healthcare choices for you when you are unable to make them for yourself. This person, called a proxy, is usually a family member or a friend. You may choose more than 1 proxy.  Do not resuscitate (DNR) order:  A DNR order is used in case your heart stops beating or you stop breathing. It is a request not to have certain forms of treatment, such as CPR. A DNR order may be included in other types of advance directives.  Medical directive:  This covers the care that you want if you are in a coma, near death, or unable to make decisions for yourself. You can list the treatments you want for each condition. Treatment may include pain medicine, surgery, blood transfusions, dialysis, IV or tube feedings, and a ventilator (breathing machine).  Values history:  This document has questions about your views, beliefs, and how you feel and think about life. This information can help others choose the care that you would choose.  Why are advance directives important?  An advance directive helps you control your care. Although spoken wishes may be used, it is better to have your wishes written down. Spoken wishes can be misunderstood, or not followed. Treatments may be given even if you do not want them. An advance directive may make it easier for your family to make  difficult choices about your care.   Fall Prevention    Fall prevention  includes ways to make your home and other areas safer. It also includes ways you can move more carefully to prevent a fall. Health conditions that cause changes in your blood pressure, vision, or muscle strength and coordination may increase your risk for falls. Medicines may also increase your risk for falls if they make you dizzy, weak, or sleepy.   Fall prevention tips:   Stand or sit up slowly.    Use assistive devices as directed.    Wear shoes that fit well and have soles that .    Wear a personal alarm.    Stay active.    Manage your medical conditions.    Home Safety Tips:  Add items to prevent falls in the bathroom.    Keep paths clear.    Install bright lights in your home.    Keep items you use often on shelves within reach.    Paint or place reflective tape on the edges of your stairs.    Urinary Incontinence   Urinary incontinence (UI)  is when you lose control of your bladder. UI develops because your bladder cannot store or empty urine properly. The 3 most common types of UI are stress incontinence, urge incontinence, or both.  Medicines:   May be given to help strengthen your bladder control. Report any side effects of medication to your healthcare provider.  Do pelvic muscle exercises often:  Your pelvic muscles help you stop urinating. Squeeze these muscles tight for 5 seconds, then relax for 5 seconds. Gradually work up to squeezing for 10 seconds. Do 3 sets of 15 repetitions a day, or as directed. This will help strengthen your pelvic muscles and improve bladder control.  Train your bladder:  Go to the bathroom at set times, such as every 2 hours, even if you do not feel the urge to go. You can also try to hold your urine when you feel the urge to go. For example, hold your urine for 5 minutes when you feel the urge to go. As that becomes easier, hold your urine for 10 minutes.   Self-care:   Keep a UI record.  Write  down how often you leak urine and how much you leak. Make a note of what you were doing when you leaked urine.  Drink liquids as directed. You may need to limit the amount of liquid you drink to help control your urine leakage. Do not drink any liquid right before you go to bed. Limit or do not have drinks that contain caffeine or alcohol.   Prevent constipation.  Eat a variety of high-fiber foods. Good examples are high-fiber cereals, beans, vegetables, and whole-grain breads. Walking is the best way to trigger your intestines to have a bowel movement.  Exercise regularly and maintain a healthy weight.  Weight loss and exercise will decrease pressure on your bladder and help you control your leakage.   Use a catheter as directed  to help empty your bladder. A catheter is a tiny, plastic tube that is put into your bladder to drain your urine.   Go to behavior therapy as directed.  Behavior therapy may be used to help you learn to control your urge to urinate.     © Copyright [employer] 2018 Information is for End User's use only and may not be sold, redistributed or otherwise used for commercial purposes. All illustrations and images included in CareNotes® are the copyrighted property of [employer] or [employer]

## 2024-01-12 NOTE — PROGRESS NOTES
so Assessment and Plan:     Problem List Items Addressed This Visit        Cardiovascular and Mediastinum    Celiac artery stenosis (HCC)    Relevant Orders    Lipid Panel with Direct LDL reflex    CBC    Comprehensive metabolic panel    TSH, 3rd generation    Ambulatory Referral to Social Work Care Management Program       Nervous and Auditory    NPH (normal pressure hydrocephalus) (HCC)       Other    Hyperlipidemia    Relevant Orders    Lipid Panel with Direct LDL reflex    CBC    Comprehensive metabolic panel    TSH, 3rd generation    Ambulatory Referral to Social Work Care Management Program    Major depressive disorder, single episode, unspecified    Relevant Orders    Lipid Panel with Direct LDL reflex    CBC    Comprehensive metabolic panel    TSH, 3rd generation    Ambulatory Referral to Social Work Care Management Program    Ambulatory dysfunction    Relevant Orders    Ambulatory Referral to Social Work Care Management Program   Other Visit Diagnoses     Medicare annual wellness visit, subsequent    -  Primary    Lung nodule        Relevant Orders    CT lung nodule follow-up    Unintentional weight loss        Relevant Orders    Ambulatory Referral to Social Work Care Management Program        SW Referral to assist with setting up MOW, consider in home support or assisted living transition for more support. Patient is forgetful/purposefully doesn't use assistive devices like cane or walker. I've asked her to use a walker at all times outside of her apartment. She is unsteady on feet partially due to deconditioning and also the NPH. Poor nutrition - Gisella doesn't cook at all and eats mostly frozen foods. Also will forget to eat at times.       Depression Screening and Follow-up Plan: Patient's depression screening was positive with a PHQ-9 score of 9. Patient assessed for underlying major depression. Brief counseling provided and recommend additional follow-up/re-evaluation next office visit.     Falls Plan  of Care: balance, strength, and gait training instructions were provided. Recommended assistive device to help with gait and balance. Home safety education provided.     Urinary Incontinence Plan of Care: counseling topics discussed: practice Kegel (pelvic floor strengthening) exercises, limiting fluid intake 3-4 hours before bed and preventing constipation.       Preventive health issues were discussed with patient, and age appropriate screening tests were ordered as noted in patient's After Visit Summary.  Personalized health advice and appropriate referrals for health education or preventive services given if needed, as noted in patient's After Visit Summary.     History of Present Illness:     Patient presents for a Medicare Wellness Visit    HPI   Patient Care Team:  Art Crowell MD as PCP - General (Family Medicine)  MD Michele Stokes MD Kathleen McFarland, PA-C Frank Jeremy Tamarkin, MD     Review of Systems:     Review of Systems   Constitutional:  Negative for activity change, chills and fever.   HENT:  Negative for congestion, rhinorrhea and sore throat.    Eyes:  Negative for visual disturbance.   Respiratory:  Negative for cough, shortness of breath and wheezing.    Cardiovascular:  Negative for chest pain and palpitations.   Gastrointestinal:  Negative for abdominal pain, blood in stool, constipation, diarrhea, nausea and vomiting.   Genitourinary:  Negative for dysuria.        UI   Musculoskeletal:  Negative for arthralgias and myalgias.   Skin:  Negative for rash.   Neurological:  Negative for dizziness, weakness and headaches.        Poor balance   Psychiatric/Behavioral:  Positive for dysphoric mood.    All other systems reviewed and are negative.       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)   • Ambulatory dysfunction   • NPH (normal pressure hydrocephalus) (HCC)      Past Medical and Surgical History:     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   • Hyperlipidemia    • Hypertension    • Malignant neoplasm of female breast (HCC) 04/22/2014   • Osteoarthritis of hip    • Osteopenia    • PONV (postoperative nausea and vomiting)    • Stroke, hemorrhagic (HCC) 12/2015   • Subarachnoid hemorrhage (HCC)      Past Surgical History:   Procedure Laterality Date   • BREAST BIOPSY Left 2014   • BREAST LUMPECTOMY Left 2014   • COLONOSCOPY      Fiberoptic - 2009, 2015 5 year f/u   • ESOPHAGOGASTRODUODENOSCOPY  2009    Diag, Resolved - 2006 / 2009   • EXOSTECTOMY  2005    Simple Bunion (Silver Procedure)   • FL LUMBAR PUNCTURE THERAPEUTIC  9/1/2023   • HYSTERECTOMY  1981   • JOINT REPLACEMENT Right 2012    Right Hip   • OOPHORECTOMY  1981    not sure which one   • REPLACEMENT TOTAL KNEE Right 2018   • VENTRICULOSTOMY        Family History:     Family History   Problem Relation Age of Onset   • Cancer Mother 69        bone   •  Hypertension Mother    • Arthritis Mother    • Stroke Father         TIA   • Stroke Maternal Grandmother         CVA   • Deep vein thrombosis Daughter    • Heart attack Neg Hx    • Anuerysm Neg Hx    • Clotting disorder Neg Hx    • Arrhythmia Neg Hx    • Heart failure Neg Hx    • Coronary artery disease Neg Hx    • Hyperlipidemia Neg Hx    • Breast cancer Neg Hx       Social History:     Social History     Socioeconomic History   • Marital status:      Spouse name: None   • Number of children: None   • Years of education: None   • Highest education level: None   Occupational History   • None   Tobacco Use   • Smoking status: Never     Passive exposure: Past   • Smokeless tobacco: Never   Vaping Use   • Vaping status: Never Used   Substance and Sexual Activity   • Alcohol use: Yes     Comment: occasionally   • Drug use: No   • Sexual activity: Not Currently   Other Topics Concern   • None   Social History Narrative    Caffeine use    [facility]      Social Determinants of Health     Financial Resource Strain: Low Risk  (1/12/2024)    Overall Financial Resource Strain (CARDIA)    • Difficulty of Paying Living Expenses: Not hard at all   Food Insecurity: No Food Insecurity (8/5/2023)    Hunger Vital Sign    • Worried About Running Out of Food in the Last Year: Never true    • Ran Out of Food in the Last Year: Never true   Transportation Needs: Unmet Transportation Needs (1/12/2024)    PRAPARE - Transportation    • Lack of Transportation (Medical): Yes    • Lack of Transportation (Non-Medical): No   Physical Activity: Not on file   Stress: Not on file   Social Connections: Not on file   Intimate Partner Violence: Not on file   Housing Stability: Unknown (8/5/2023)    Housing Stability Vital Sign    • Unable to Pay for Housing in the Last Year: No    • Number of Places Lived in the Last Year: Not on file    • Unstable Housing in the Last Year: No      Medications and Allergies:      Current Outpatient Medications   Medication Sig Dispense Refill   • acetaminophen (TYLENOL) 500 mg tablet Take 1,000 mg by mouth if needed for mild pain     • ALPRAZolam (XANAX) 0.25 mg tablet TAKE ONE TABLET BY MOUTH EVERY DAY AS NEEDED FOR ANXIETY TAKE 2 TABLETS 30 MINUTES PRIOR TO PROCEDURE 30 tablet 0   • Calcium-Vitamin D (CALTRATE 600 PLUS-VIT D PO) Take by mouth daily.     • esomeprazole (NexIUM) 10 MG packet Take 10 mg by mouth every morning before breakfast     • ezetimibe (ZETIA) 10 mg tablet TAKE ONE TABLET BY MOUTH EVERY DAY 30 tablet 5   • fluticasone (FLONASE) 50 mcg/act nasal spray 1 spray into each nostril as needed     • LUMIGAN 0.01 % ophthalmic drops PLACE 1 DROP INTO IN EACH EYE AT BEDTIME  11   • Multiple Vitamins-Minerals (MULTIVITAMIN ADULTS 50+) TABS Take by mouth daily     • NON FORMULARY CBD Gummies     • psyllium (METAMUCIL) 0.52 g capsule Take by mouth daily 2-3 caps daily     • sertraline (ZOLOFT) 100 mg tablet TAKE 2 TABLETS BY MOUTH DAILY 180 tablet 1   • meclizine (ANTIVERT) 25 mg tablet Take 1 tablet (25 mg total) by mouth every 8 (eight) hours as needed for dizziness (Patient not taking: Reported on 1/12/2024) 30 tablet 0     No current facility-administered medications for this visit.     Allergies   Allergen Reactions   • Oxycodone Vomiting   • Atorvastatin Other (See Comments)     Leg/knee pain (joint pain)   • Bactrim [Sulfamethoxazole-Trimethoprim] Itching and Rash   • Keppra [Levetiracetam] Rash   • Livalo [Pitavastatin] Itching   • Penicillins Rash     Agitation       Immunizations:     Immunization History   Administered Date(s) Administered   • INFLUENZA 11/01/2015, 10/05/2016, 09/28/2018, 10/18/2019, 10/12/2020, 10/02/2021   • Influenza Quadrivalent, 6-35 Months IM 10/05/2016   • Influenza Split High Dose Preservative Free IM 10/15/2022   • Influenza, seasonal, injectable 09/21/2012, 11/01/2015   • Pneumococcal Conjugate 13-Valent 12/01/2015   • Pneumococcal  Polysaccharide PPV23 10/19/2011   • Tdap 08/04/2009   • Zoster 01/01/2011   • Zoster Vaccine Recombinant 11/25/2019, 08/19/2020      Health Maintenance:         Topic Date Due   • Breast Cancer Screening: Mammogram  12/17/2023   • DXA SCAN  01/19/2027   • Hepatitis C Screening  Completed   • Colorectal Cancer Screening  Discontinued         Topic Date Due   • COVID-19 Vaccine (1) Never done   • Influenza Vaccine (1) 09/01/2023      Medicare Screening Tests and Risk Assessments:     Samson is here for her Subsequent Wellness visit.     Health Risk Assessment:   Patient rates overall health as good. Patient feels that their physical health rating is slightly worse. Patient is satisfied with their life. Eyesight was rated as same. Hearing was rated as same. Patient feels that their emotional and mental health rating is slightly worse. Patients states they are never, rarely angry. Patient states they are sometimes unusually tired/fatigued. Pain experienced in the last 7 days has been some. Patient's pain rating has been 5/10. Patient states that she has experienced weight loss or gain in last 6 months.     Depression Screening:   PHQ-9 Score: 9      Fall Risk Screening:   In the past year, patient has experienced: history of falling in past year    Number of falls: 2 or more  Injured during fall?: Yes    Feels unsteady when standing or walking?: Yes    Worried about falling?: Yes      Urinary Incontinence Screening:   Patient has leaked urine accidently in the last six months.     Home Safety:  Patient has trouble with stairs inside or outside of their home. Patient has working smoke alarms and has no working carbon monoxide detector. Home safety hazards include: none.     Nutrition:   Current diet is Regular.     Medications:   Patient is currently taking over-the-counter supplements. OTC medications include: see medication list. Patient is able to manage medications.     Activities of Daily Living (ADLs)/Instrumental  Activities of Daily Living (IADLs):   Walk and transfer into and out of bed and chair?: Yes  Dress and groom yourself?: Yes    Bathe or shower yourself?: Yes    Feed yourself? Yes  Do your laundry/housekeeping?: Yes  Manage your money, pay your bills and track your expenses?: No  Make your own meals?: Yes    Do your own shopping?: No    Previous Hospitalizations:   Any hospitalizations or ED visits within the last 12 months?: Yes    How many hospitalizations have you had in the last year?: 1-2    Advance Care Planning:   Living will: Yes    Durable POA for healthcare: Yes    Advanced directive: Yes      Cognitive Screening:   Provider or family/friend/caregiver concerned regarding cognition?: Yes    PREVENTIVE SCREENINGS      Cardiovascular Screening:    General: Screening Not Indicated and History Lipid Disorder      Diabetes Screening:     General: Screening Current      Colorectal Cancer Screening:     General: Screening Current      Breast Cancer Screening:     General: History Breast Cancer      Cervical Cancer Screening:    General: Screening Not Indicated      Osteoporosis Screening:    General: Screening Current      Abdominal Aortic Aneurysm (AAA) Screening:        General: Screening Not Indicated      Lung Cancer Screening:     General: Screening Not Indicated      Hepatitis C Screening:    General: Screening Current    Screening, Brief Intervention, and Referral to Treatment (SBIRT)    Screening  Typical number of drinks in a day: 0  Typical number of drinks in a week: 0  Interpretation: Low risk drinking behavior.    AUDIT-C Screenin) How often did you have a drink containing alcohol in the past year? monthly or less  2) How many drinks did you have on a typical day when you were drinking in the past year? 1 to 2  3) How often did you have 6 or more drinks on one occasion in the past year? never    AUDIT-C Score: 1  Interpretation: Score 0-2 (female): Negative screen for alcohol misuse    Single  "Item Drug Screening:  How often have you used an illegal drug (including marijuana) or a prescription medication for non-medical reasons in the past year? never    Single Item Drug Screen Score: 0  Interpretation: Negative screen for possible drug use disorder    Brief Intervention  Alcohol & drug use screenings were reviewed. No concerns regarding substance use disorder identified.     No results found.     Physical Exam:     /78   Pulse 82   Temp 98.2 °F (36.8 °C)   Ht 5' 8.5\" (1.74 m)   Wt 72.6 kg (160 lb)   SpO2 97%   BMI 23.97 kg/m²     Physical Exam  Vitals and nursing note reviewed.   Constitutional:       General: She is not in acute distress.     Appearance: She is well-developed. She is not ill-appearing.   HENT:      Head: Normocephalic and atraumatic.      Right Ear: Tympanic membrane, ear canal and external ear normal. No middle ear effusion.      Left Ear: Tympanic membrane, ear canal and external ear normal.  No middle ear effusion.      Nose: Nose normal. No congestion or rhinorrhea.      Mouth/Throat:      Lips: Pink.      Mouth: Mucous membranes are moist.      Pharynx: Oropharynx is clear. Uvula midline. No oropharyngeal exudate.      Tonsils: No tonsillar exudate.   Eyes:      General: Lids are normal.      Extraocular Movements: Extraocular movements intact.      Conjunctiva/sclera: Conjunctivae normal.      Pupils: Pupils are equal, round, and reactive to light.   Neck:      Thyroid: No thyromegaly.      Trachea: No tracheal deviation.   Cardiovascular:      Rate and Rhythm: Normal rate and regular rhythm.      Pulses: Normal pulses.      Heart sounds: Normal heart sounds, S1 normal and S2 normal. No murmur heard.  Pulmonary:      Effort: Pulmonary effort is normal. No respiratory distress.      Breath sounds: Normal breath sounds. No decreased breath sounds, wheezing, rhonchi or rales.   Abdominal:      General: Bowel sounds are normal. There is no distension.      Palpations: " Abdomen is soft.      Tenderness: There is no abdominal tenderness.   Musculoskeletal:      Right lower leg: No edema.      Left lower leg: No edema.   Lymphadenopathy:      Cervical: No cervical adenopathy.   Skin:     General: Skin is warm and dry.      Capillary Refill: Capillary refill takes less than 2 seconds.   Neurological:      Mental Status: She is alert and oriented to person, place, and time.      Deep Tendon Reflexes: Reflexes normal.      Reflex Scores:       Patellar reflexes are 2+ on the right side and 2+ on the left side.  Psychiatric:         Attention and Perception: Attention normal.         Mood and Affect: Mood normal.         Thought Content: Thought content does not include suicidal ideation.          Art Crowell MD

## 2024-01-15 ENCOUNTER — TELEPHONE (OUTPATIENT)
Dept: PSYCHIATRY | Facility: CLINIC | Age: 79
End: 2024-01-15

## 2024-01-15 NOTE — PROGRESS NOTES
so Assessment and Plan:     Problem List Items Addressed This Visit          Cardiovascular and Mediastinum    Celiac artery stenosis (HCC)    Relevant Orders    Lipid Panel with Direct LDL reflex    CBC    Comprehensive metabolic panel    TSH, 3rd generation    Ambulatory Referral to Social Work Care Management Program       Nervous and Auditory    NPH (normal pressure hydrocephalus) (HCC)       Other    Hyperlipidemia    Relevant Orders    Lipid Panel with Direct LDL reflex    CBC    Comprehensive metabolic panel    TSH, 3rd generation    Ambulatory Referral to Social Work Care Management Program    Major depressive disorder, single episode, unspecified    Relevant Orders    Lipid Panel with Direct LDL reflex    CBC    Comprehensive metabolic panel    TSH, 3rd generation    Ambulatory Referral to Social Work Care Management Program    Ambulatory dysfunction    Relevant Orders    Ambulatory Referral to Social Work Care Management Program     Other Visit Diagnoses       Medicare annual wellness visit, subsequent    -  Primary    Lung nodule        Relevant Orders    CT lung nodule follow-up    Unintentional weight loss        Relevant Orders    Ambulatory Referral to Social Work Care Management Program          SW Referral to assist with setting up MOW, consider in home support or assisted living transition for more support. Patient is forgetful/purposefully doesn't use assistive devices like cane or walker. I've asked her to use a walker at all times outside of her apartment. She is unsteady on feet partially due to deconditioning and also the NPH. Poor nutrition - Gisella doesn't cook at all and eats mostly frozen foods. Also will forget to eat at times. Please set up for mobile lab draw.       Depression Screening and Follow-up Plan: Patient's depression screening was positive with a PHQ-9 score of 9. Patient assessed for underlying major depression. Brief counseling provided and recommend additional  follow-up/re-evaluation next office visit.     Falls Plan of Care: balance, strength, and gait training instructions were provided. Recommended assistive device to help with gait and balance. Home safety education provided.     Urinary Incontinence Plan of Care: counseling topics discussed: practice Kegel (pelvic floor strengthening) exercises, limiting fluid intake 3-4 hours before bed and preventing constipation.       Preventive health issues were discussed with patient, and age appropriate screening tests were ordered as noted in patient's After Visit Summary.  Personalized health advice and appropriate referrals for health education or preventive services given if needed, as noted in patient's After Visit Summary.     History of Present Illness:     Patient presents for a Medicare Wellness Visit    Fall  Pertinent negatives include no abdominal pain, fever, headaches, nausea or vomiting.   Dizziness  Associated symptoms include chest pain. Pertinent negatives include no abdominal pain, arthralgias, chills, congestion, coughing, fever, headaches, myalgias, nausea, rash, sore throat, vomiting or weakness.   Chest Pain   Associated symptoms include dizziness. Pertinent negatives include no abdominal pain, cough, fever, headaches, nausea, palpitations, shortness of breath, vomiting or weakness.      Patient Care Team:  Art Crowell MD as PCP - General (Family Medicine)  MD Michele Stokes MD Kathleen McFarland, PA-C Frank Jeremy Tamarkin, MD     Review of Systems:     Review of Systems   Constitutional:  Negative for activity change, chills and fever.   HENT:  Negative for congestion, rhinorrhea and sore throat.    Eyes:  Negative for visual disturbance.   Respiratory:  Negative for cough, shortness of breath and wheezing.    Cardiovascular:  Positive for chest pain. Negative for palpitations.   Gastrointestinal:  Negative for abdominal pain, blood in stool, constipation, diarrhea, nausea  and vomiting.   Genitourinary:  Negative for dysuria.        UI   Musculoskeletal:  Negative for arthralgias and myalgias.   Skin:  Negative for rash.   Neurological:  Positive for dizziness. Negative for weakness and headaches.        Poor balance   Psychiatric/Behavioral:  Positive for dysphoric mood.    All other systems reviewed and are negative.       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)    Ambulatory dysfunction    NPH (normal pressure hydrocephalus) (HCC)      Past Medical and Surgical History:     Past Medical History:   Diagnosis Date    Acute mastoiditis with intracranial complication 11/16/2022    Anxiety disorder     Brain hemangioma (HCC)     2016    BRCA1 negative     BRCA2 negative     Breast cancer (HCC) 2014    left breast    Cancer (HCC)     Constipation     Esophageal reflux     History of bone density study 2011    D[employer] X-Ray Absorptiometry    History of radiation therapy 2014    left breast ca    History of recurrent UTIs     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    Hyperlipidemia     Hypertension     Malignant neoplasm of female breast (HCC) 04/22/2014    Osteoarthritis of hip     Osteopenia     PONV (postoperative nausea and vomiting)     Stroke, hemorrhagic (HCC) 12/2015    Subarachnoid hemorrhage (HCC)      Past Surgical History:   Procedure Laterality Date    BREAST BIOPSY Left 2014    BREAST  LUMPECTOMY Left 2014    COLONOSCOPY      Fiberoptic - 2009, 2015 5 year f/u    ESOPHAGOGASTRODUODENOSCOPY  2009    Diag, Resolved - 2006 / 2009    EXOSTECTOMY  2005    Simple Bunion (Silver Procedure)    FL LUMBAR PUNCTURE THERAPEUTIC  9/1/2023    HYSTERECTOMY  1981    JOINT REPLACEMENT Right 2012    Right Hip    OOPHORECTOMY  1981    not sure which one    REPLACEMENT TOTAL KNEE Right 2018    VENTRICULOSTOMY        Family History:     Family History   Problem Relation Age of Onset    Cancer Mother 69        bone    Hypertension Mother     Arthritis Mother     Stroke Father         TIA    Stroke Maternal Grandmother         CVA    Deep vein thrombosis Daughter     Heart attack Neg Hx     Anuerysm Neg Hx     Clotting disorder Neg Hx     Arrhythmia Neg Hx     Heart failure Neg Hx     Coronary artery disease Neg Hx     Hyperlipidemia Neg Hx     Breast cancer Neg Hx       Social History:     Social History     Socioeconomic History    Marital status:      Spouse name: None    Number of children: None    Years of education: None    Highest education level: None   Occupational History    None   Tobacco Use    Smoking status: Never     Passive exposure: Past    Smokeless tobacco: Never   Vaping Use    Vaping status: Never Used   Substance and Sexual Activity    Alcohol use: Yes     Comment: occasionally    Drug use: No    Sexual activity: Not Currently   Other Topics Concern    None   Social History Narrative    Caffeine use    Marital Status -  per [employer]      Social Determinants of Health     Financial Resource Strain: Low Risk  (1/12/2024)    Overall Financial Resource Strain (CARDIA)     Difficulty of Paying Living Expenses: Not hard at all   Food Insecurity: No Food Insecurity (8/5/2023)    Hunger Vital Sign     Worried About Running Out of Food in the Last Year: Never true     Ran Out of Food in the Last Year: Never true   Transportation Needs: Unmet Transportation Needs (1/12/2024)    PRAPARE -  Transportation     Lack of Transportation (Medical): Yes     Lack of Transportation (Non-Medical): No   Physical Activity: Not on file   Stress: Not on file   Social Connections: Not on file   Intimate Partner Violence: Not on file   Housing Stability: Unknown (8/5/2023)    Housing Stability Vital Sign     Unable to Pay for Housing in the Last Year: No     Number of Places Lived in the Last Year: Not on file     Unstable Housing in the Last Year: No      Medications and Allergies:     Current Outpatient Medications   Medication Sig Dispense Refill    acetaminophen (TYLENOL) 500 mg tablet Take 1,000 mg by mouth if needed for mild pain      ALPRAZolam (XANAX) 0.25 mg tablet TAKE ONE TABLET BY MOUTH EVERY DAY AS NEEDED FOR ANXIETY TAKE 2 TABLETS 30 MINUTES PRIOR TO PROCEDURE 30 tablet 0    Calcium-Vitamin D (CALTRATE 600 PLUS-VIT D PO) Take by mouth daily.      esomeprazole (NexIUM) 10 MG packet Take 10 mg by mouth every morning before breakfast      ezetimibe (ZETIA) 10 mg tablet TAKE ONE TABLET BY MOUTH EVERY DAY 30 tablet 5    fluticasone (FLONASE) 50 mcg/act nasal spray 1 spray into each nostril as needed      LUMIGAN 0.01 % ophthalmic drops PLACE 1 DROP INTO IN EACH EYE AT BEDTIME  11    Multiple Vitamins-Minerals (MULTIVITAMIN ADULTS 50+) TABS Take by mouth daily      NON FORMULARY CBD Gummies      psyllium (METAMUCIL) 0.52 g capsule Take by mouth daily 2-3 caps daily      sertraline (ZOLOFT) 100 mg tablet TAKE 2 TABLETS BY MOUTH DAILY 180 tablet 1    meclizine (ANTIVERT) 25 mg tablet Take 1 tablet (25 mg total) by mouth every 8 (eight) hours as needed for dizziness (Patient not taking: Reported on 1/12/2024) 30 tablet 0     No current facility-administered medications for this visit.     Allergies   Allergen Reactions    Oxycodone Vomiting    Atorvastatin Other (See Comments)     Leg/knee pain (joint pain)    Bactrim [Sulfamethoxazole-Trimethoprim] Itching and Rash    Keppra [Levetiracetam] Rash    Livalo  [Pitavastatin] Itching    Penicillins Rash     Agitation       Immunizations:     Immunization History   Administered Date(s) Administered    INFLUENZA 11/01/2015, 10/05/2016, 09/28/2018, 10/18/2019, 10/12/2020, 10/02/2021    Influenza Quadrivalent, 6-35 Months IM 10/05/2016    Influenza Split High Dose Preservative Free IM 10/15/2022    Influenza, seasonal, injectable 09/21/2012, 11/01/2015    Pneumococcal Conjugate 13-Valent 12/01/2015    Pneumococcal Polysaccharide PPV23 10/19/2011    Tdap 08/04/2009    Zoster 01/01/2011    Zoster Vaccine Recombinant 11/25/2019, 08/19/2020      Health Maintenance:         Topic Date Due    Breast Cancer Screening: Mammogram  12/17/2023    DXA SCAN  01/19/2027    Hepatitis C Screening  Completed    Colorectal Cancer Screening  Discontinued         Topic Date Due    COVID-19 Vaccine (1) Never done    Influenza Vaccine (1) 09/01/2023      Medicare Screening Tests and Risk Assessments:     Samson is here for her Subsequent Wellness visit.     Health Risk Assessment:   Patient rates overall health as good. Patient feels that their physical health rating is slightly worse. Patient is satisfied with their life. Eyesight was rated as same. Hearing was rated as same. Patient feels that their emotional and mental health rating is slightly worse. Patients states they are never, rarely angry. Patient states they are sometimes unusually tired/fatigued. Pain experienced in the last 7 days has been some. Patient's pain rating has been 5/10. Patient states that she has experienced weight loss or gain in last 6 months.     Depression Screening:   PHQ-9 Score: 9      Fall Risk Screening:   In the past year, patient has experienced: history of falling in past year    Number of falls: 2 or more  Injured during fall?: Yes    Feels unsteady when standing or walking?: Yes    Worried about falling?: Yes      Urinary Incontinence Screening:   Patient has leaked urine accidently in the last six months.      Home Safety:  Patient has trouble with stairs inside or outside of their home. Patient has working smoke alarms and has no working carbon monoxide detector. Home safety hazards include: none.     Nutrition:   Current diet is Regular.     Medications:   Patient is currently taking over-the-counter supplements. OTC medications include: see medication list. Patient is able to manage medications.     Activities of Daily Living (ADLs)/Instrumental Activities of Daily Living (IADLs):   Walk and transfer into and out of bed and chair?: Yes  Dress and groom yourself?: Yes    Bathe or shower yourself?: Yes    Feed yourself? Yes  Do your laundry/housekeeping?: Yes  Manage your money, pay your bills and track your expenses?: No  Make your own meals?: Yes    Do your own shopping?: No    Previous Hospitalizations:   Any hospitalizations or ED visits within the last 12 months?: Yes    How many hospitalizations have you had in the last year?: 1-2    Advance Care Planning:   Living will: Yes    Durable POA for healthcare: Yes    Advanced directive: Yes      Cognitive Screening:   Provider or family/friend/caregiver concerned regarding cognition?: Yes    PREVENTIVE SCREENINGS      Cardiovascular Screening:    General: Screening Not Indicated and History Lipid Disorder      Diabetes Screening:     General: Screening Current      Colorectal Cancer Screening:     General: Screening Current      Breast Cancer Screening:     General: History Breast Cancer      Cervical Cancer Screening:    General: Screening Not Indicated      Osteoporosis Screening:    General: Screening Current      Abdominal Aortic Aneurysm (AAA) Screening:        General: Screening Not Indicated      Lung Cancer Screening:     General: Screening Not Indicated      Hepatitis C Screening:    General: Screening Current    Screening, Brief Intervention, and Referral to Treatment (SBIRT)    Screening  Typical number of drinks in a day: 0  Typical number of drinks in a  "week: 0  Interpretation: Low risk drinking behavior.    AUDIT-C Screenin) How often did you have a drink containing alcohol in the past year? monthly or less  2) How many drinks did you have on a typical day when you were drinking in the past year? 1 to 2  3) How often did you have 6 or more drinks on one occasion in the past year? never    AUDIT-C Score: 1  Interpretation: Score 0-2 (female): Negative screen for alcohol misuse    Single Item Drug Screening:  How often have you used an illegal drug (including marijuana) or a prescription medication for non-medical reasons in the past year? never    Single Item Drug Screen Score: 0  Interpretation: Negative screen for possible drug use disorder    Brief Intervention  Alcohol & drug use screenings were reviewed. No concerns regarding substance use disorder identified.     No results found.     Physical Exam:     /78   Pulse 82   Temp 98.2 °F (36.8 °C)   Ht 5' 8.5\" (1.74 m)   Wt 72.6 kg (160 lb)   SpO2 97%   BMI 23.97 kg/m²     Physical Exam  Vitals and nursing note reviewed.   Constitutional:       General: She is not in acute distress.     Appearance: She is well-developed. She is not ill-appearing.   HENT:      Head: Normocephalic and atraumatic.      Right Ear: Tympanic membrane, ear canal and external ear normal. No middle ear effusion.      Left Ear: Tympanic membrane, ear canal and external ear normal.  No middle ear effusion.      Nose: Nose normal. No congestion or rhinorrhea.      Mouth/Throat:      Lips: Pink.      Mouth: Mucous membranes are moist.      Pharynx: Oropharynx is clear. Uvula midline. No oropharyngeal exudate.      Tonsils: No tonsillar exudate.   Eyes:      General: Lids are normal.      Extraocular Movements: Extraocular movements intact.      Conjunctiva/sclera: Conjunctivae normal.      Pupils: Pupils are equal, round, and reactive to light.   Neck:      Thyroid: No thyromegaly.      Trachea: No tracheal deviation. "   Cardiovascular:      Rate and Rhythm: Normal rate and regular rhythm.      Pulses: Normal pulses.      Heart sounds: Normal heart sounds, S1 normal and S2 normal. No murmur heard.  Pulmonary:      Effort: Pulmonary effort is normal. No respiratory distress.      Breath sounds: Normal breath sounds. No decreased breath sounds, wheezing, rhonchi or rales.   Abdominal:      General: Bowel sounds are normal. There is no distension.      Palpations: Abdomen is soft.      Tenderness: There is no abdominal tenderness.   Musculoskeletal:      Right lower leg: No edema.      Left lower leg: No edema.   Lymphadenopathy:      Cervical: No cervical adenopathy.   Skin:     General: Skin is warm and dry.      Capillary Refill: Capillary refill takes less than 2 seconds.   Neurological:      Mental Status: She is alert and oriented to person, place, and time.      Deep Tendon Reflexes: Reflexes normal.      Reflex Scores:       Patellar reflexes are 2+ on the right side and 2+ on the left side.  Psychiatric:         Attention and Perception: Attention normal.         Mood and Affect: Mood normal.         Thought Content: Thought content does not include suicidal ideation.          Art Crowell MD

## 2024-01-15 NOTE — TELEPHONE ENCOUNTER
Reached out to pt regarding Neuropsychology referral and scheduling evaluation.   Pt's daughter answered phone asked for a call back, writer informed will follow-up accordingly.

## 2024-01-23 ENCOUNTER — PATIENT OUTREACH (OUTPATIENT)
Dept: FAMILY MEDICINE CLINIC | Facility: CLINIC | Age: 79
End: 2024-01-23

## 2024-01-23 ENCOUNTER — TELEPHONE (OUTPATIENT)
Dept: LAB | Facility: HOSPITAL | Age: 79
End: 2024-01-23

## 2024-01-23 NOTE — PROGRESS NOTES
Exercise Session Detail Referral received from PCP for Outpatient Social Work Care Manager (OP SWCM) to outreach patient and assist with setting up Meals on Wheels (patient does not cook and eats mostly frozen food or forgets to eat), discussing in-home support vs NEAL transition, assessing safety in the home (PCP notes patient forgets to use or purposefully does not use walker/cane), and setting up mobile lab draw.    Per chart review, patient established with [facility].  First appt was on 10/20/2023 and next appt is scheduled for 4/26/2024 as 6 month follow-up.    Call placed to patient to further assess needs and discuss above information.  Spoke with patient's dtr Letty (on consent).  Letty states she helps patient coordinate needs.  Patient lives alone and has difficulty time making meals per Letty.  Patient lost 10 pounds in the last few months as she is only eating twice per day.  Discussed Meals on Wheels vs Mom's Meals and cost pending [facility] funding.  Letty states patient is a picky eater.  She requested to review both meal services to see which may be a better fit for patient.  Information sent via email for Letty to review.    Letty states patient is unsteady on her feet and does not always use DME.  Letty states mobile lab would be helpful for patient as she is not always available to take her for lab work (Letty travels for work).  OP SWCM called mobile lab.  They will outreach Letty to schedule.  Discussed home therapy related to patient's unsteadiness.  Letty states patient participated in PT in the past but does not remember to continue exercises on her own at home.  Discussed [facility] for in-home therapy services.  Letty agreed for OP SWCM to outreach [facility] to verify insurance coverage.  Letty states patient cannot afford copay for PT.  Secure email sent to Deanna Molina with [facility] requesting insurance be run.  Awaiting outcome.    Letty thinking about changing patient's insurance to dual Medicare/Medicaid coverage.   Provided information for [place] as health insurance counselor can complete plan comparison for Letty before she changes.  Discussed [place] eligibility as well.    Letty feels patient would benefit from help in the home and feels patient would agreed to this.  Patient's  was a [place] and Letty states she qualifies for VA benefits but has not completed VA Paperwork yet.  Encouraged Letty to assist patient with this to start process of obtaining in-home services through VA Benefits.  Discussed [employer]et[employer]ist Program as well as Waiver (medical & financial criteria).  All information emailed to Letty to review).  Will follow-up at later date to further discuss.

## 2024-01-29 ENCOUNTER — TELEPHONE (OUTPATIENT)
Dept: LAB | Facility: HOSPITAL | Age: 79
End: 2024-01-29

## 2024-01-30 ENCOUNTER — TELEPHONE (OUTPATIENT)
Dept: LAB | Facility: HOSPITAL | Age: 79
End: 2024-01-30

## 2024-02-07 ENCOUNTER — PATIENT OUTREACH (OUTPATIENT)
Dept: FAMILY MEDICINE CLINIC | Facility: CLINIC | Age: 79
End: 2024-02-07

## 2024-02-07 NOTE — PROGRESS NOTES
Update received from Deanna with [facility] that patient would have $35 copay for PT/OT visits.    Per chart review, patient scheduled for mobile lab on 2/9.    Call placed to patient's dtr Letty (on consent) to follow-up on [facility] and meal services (MOW vs Mom's Meals).  Will also follow-up on VA benefits and Vet Assist Program.  Letty understanding of copay for [facility].  She would like to hold off at this time as patient would not be able to afford this and states she is considering placement for patient as she has declined.  States her memory has declined as well.  They looked at [facility] but patient did not like this choice.  Letty unsure of patient's level of care needs and what she can afford with NEAL/Personal Care/SNF.  Letty agreed for OP SWCM to place referral to [employer] for assistance with placement options.    Patient was referred on [employer] to [employer] (program) for assistance in discussing transition to facility.  Encouraged Letty to continue paperwork for VA benefits as patient is surviving spouse of [place].  Will hold off on meal services at this time per Letty's request.  Will follow.

## 2024-02-09 ENCOUNTER — APPOINTMENT (OUTPATIENT)
Dept: LAB | Facility: HOSPITAL | Age: 79
End: 2024-02-09
Payer: COMMERCIAL

## 2024-02-09 DIAGNOSIS — F32.0 CURRENT MILD EPISODE OF MAJOR DEPRESSIVE DISORDER WITHOUT PRIOR EPISODE (HCC): ICD-10-CM

## 2024-02-09 DIAGNOSIS — E78.00 PURE HYPERCHOLESTEROLEMIA: ICD-10-CM

## 2024-02-09 DIAGNOSIS — I77.1 CELIAC ARTERY STENOSIS (HCC): ICD-10-CM

## 2024-02-09 LAB
ALBUMIN SERPL BCP-MCNC: 4.3 G/DL (ref 3.5–5)
ALP SERPL-CCNC: 84 U/L (ref 34–104)
ALT SERPL W P-5'-P-CCNC: 17 U/L (ref 7–52)
ANION GAP SERPL CALCULATED.3IONS-SCNC: 11 MMOL/L
AST SERPL W P-5'-P-CCNC: 23 U/L (ref 13–39)
BILIRUB SERPL-MCNC: 0.39 MG/DL (ref 0.2–1)
BUN SERPL-MCNC: 13 MG/DL (ref 5–25)
CALCIUM SERPL-MCNC: 9.8 MG/DL (ref 8.4–10.2)
CHLORIDE SERPL-SCNC: 102 MMOL/L (ref 96–108)
CHOLEST SERPL-MCNC: 249 MG/DL
CO2 SERPL-SCNC: 27 MMOL/L (ref 21–32)
CREAT SERPL-MCNC: 0.77 MG/DL (ref 0.6–1.3)
ERYTHROCYTE [DISTWIDTH] IN BLOOD BY AUTOMATED COUNT: 13.5 % (ref 11.6–15.1)
GFR SERPL CREATININE-BSD FRML MDRD: 74 ML/MIN/1.73SQ M
GLUCOSE P FAST SERPL-MCNC: 87 MG/DL (ref 65–99)
HCT VFR BLD AUTO: 42.6 % (ref 34.8–46.1)
HDLC SERPL-MCNC: 81 MG/DL
HGB BLD-MCNC: 13.8 G/DL (ref 11.5–15.4)
LDLC SERPL CALC-MCNC: 150 MG/DL (ref 0–100)
MCH RBC QN AUTO: 28.5 PG (ref 26.8–34.3)
MCHC RBC AUTO-ENTMCNC: 32.4 G/DL (ref 31.4–37.4)
MCV RBC AUTO: 88 FL (ref 82–98)
PLATELET # BLD AUTO: 387 THOUSANDS/UL (ref 149–390)
PMV BLD AUTO: 10 FL (ref 8.9–12.7)
POTASSIUM SERPL-SCNC: 4.2 MMOL/L (ref 3.5–5.3)
PROT SERPL-MCNC: 7.1 G/DL (ref 6.4–8.4)
RBC # BLD AUTO: 4.85 MILLION/UL (ref 3.81–5.12)
SODIUM SERPL-SCNC: 140 MMOL/L (ref 135–147)
TRIGL SERPL-MCNC: 90 MG/DL
TSH SERPL DL<=0.05 MIU/L-ACNC: 3.54 UIU/ML (ref 0.45–4.5)
WBC # BLD AUTO: 7.41 THOUSAND/UL (ref 4.31–10.16)

## 2024-02-09 PROCEDURE — 80061 LIPID PANEL: CPT

## 2024-02-09 PROCEDURE — 80053 COMPREHEN METABOLIC PANEL: CPT

## 2024-02-09 PROCEDURE — 85027 COMPLETE CBC AUTOMATED: CPT

## 2024-02-09 PROCEDURE — 36415 COLL VENOUS BLD VENIPUNCTURE: CPT

## 2024-02-09 PROCEDURE — 84443 ASSAY THYROID STIM HORMONE: CPT

## 2024-02-16 ENCOUNTER — PATIENT OUTREACH (OUTPATIENT)
Dept: FAMILY MEDICINE CLINIC | Facility: CLINIC | Age: 79
End: 2024-02-16

## 2024-02-16 NOTE — PROGRESS NOTES
Spoke with patient's dtr Letty who states she received call from [facility] but forgot to call back and discuss patient's needs.  She will return call to [facility] to begin discussion of possible transition to NEAL/Personal Care.  Will follow-up with Letty at later date.

## 2024-03-01 ENCOUNTER — PATIENT OUTREACH (OUTPATIENT)
Dept: FAMILY MEDICINE CLINIC | Facility: CLINIC | Age: 79
End: 2024-03-01

## 2024-03-01 NOTE — PROGRESS NOTES
Call placed to patient's dtr Letty to check status of speaking with [employer] regarding custodial/Personal Care.  Letty states she spoke with [employer] who confirmed patient's income would not allow this transition.  [employer] recommended [employer] discuss Waiver Program with patient.    Letty discussed with patient who is unsure; Letty states patient does not want to give up her independence.  OP SWCM discussed Waiver vs Options Program and that [employer] can complete home visit to further discuss these programs.  Letty agreed for OP SWCM to outreach [employer] to request  call her and schedule time to complete home visit & assess eligibility for in-home services.    Call placed to [facility].  Intake rep stated they are unable to place referral specifically for waiver but can refer to the Options Program.   can assess eligibility for this.  If needs are greater than what Options Program can offer,  can refer to Waiver and assist with paperwork.  Referral placed.    Detailed voicemail left for Letty with this information.

## 2024-03-15 ENCOUNTER — PATIENT OUTREACH (OUTPATIENT)
Dept: FAMILY MEDICINE CLINIC | Facility: CLINIC | Age: 79
End: 2024-03-15

## 2024-03-15 NOTE — PROGRESS NOTES
Call placed to patient's dtr Letty to check status of patient and ask if she received call from [facility] to discuss Options Program vs Waiver services.  No answer, voicemail left, and awaiting return call.

## 2024-03-25 ENCOUNTER — TELEPHONE (OUTPATIENT)
Dept: GERIATRICS | Facility: CLINIC | Age: 79
End: 2024-03-25

## 2024-03-25 NOTE — TELEPHONE ENCOUNTER
Patient's daughter confirm that they cenceled the appt for Codie Esteban they are choosing to no followup any longer, they are only going to see her GP.

## 2024-03-25 NOTE — TELEPHONE ENCOUNTER
Patient returned call from [facility], she just wanted to make us aware that they have decided to not continue care with us any longer. They are only following up with their GP at this time. Will call us if anything changes.

## 2024-03-26 ENCOUNTER — PATIENT OUTREACH (OUTPATIENT)
Dept: FAMILY MEDICINE CLINIC | Facility: CLINIC | Age: 79
End: 2024-03-26

## 2024-03-26 NOTE — LETTER
[address]  [place] [address]    Re: Unable to Reach   3/26/2024       Dear Ralph,    I am a [facility]  and wanted to be certain you had information to contact me should you desire assistance with or have questions about non-medical aspects of your care such as [but not limited to] medical insurance, housing, transportation, material needs, or emergency needs, as I was unable to reach you to follow-up on community resources.  If I do not have an answer I will assist you in finding the appropriate agency or individual who can help.      Please feel free to contact me at [phone]. Thank You.    Sincerely,         Luna Lambert

## 2024-03-26 NOTE — PROGRESS NOTES
Chart reviewed.  Patient and patient's dtr cancelled [facility] appt and informed [facility] that they will continue to follow with patient's PCP.    Additional call placed to patient's dtr Letty to check status of patient and ask if she received call from [facility] to discuss Options Program vs Waiver services.  No answer, voicemail left, and awaiting return call.    Will send Unable to Reach letter to patient via [employer] and close case at this time.

## 2024-04-10 ENCOUNTER — TELEPHONE (OUTPATIENT)
Age: 79
End: 2024-04-10

## 2024-04-10 NOTE — TELEPHONE ENCOUNTER
Writer rec a c/b from pts daughter  regarding message that was left. Patients daughter stated that pt is not in need of Neuro services.

## 2024-04-10 NOTE — TELEPHONE ENCOUNTER
Writer called and LVM in regards to the wait list that patient is on. And if they can return our call to let us know if they wish to be on the wait list or want to be removed from wait list. Writers #[phone]

## 2024-06-02 DIAGNOSIS — E78.00 HYPERCHOLESTEROLEMIA: ICD-10-CM

## 2024-06-02 RX ORDER — EZETIMIBE 10 MG/1
TABLET ORAL
Qty: 30 TABLET | Refills: 5 | Status: SHIPPED | OUTPATIENT
Start: 2024-06-02

## 2024-06-07 ENCOUNTER — HOSPITAL ENCOUNTER (EMERGENCY)
Facility: HOSPITAL | Age: 79
Discharge: HOME | End: 2024-06-07
Attending: EMERGENCY MEDICINE
Payer: COMMERCIAL

## 2024-06-07 VITALS
DIASTOLIC BLOOD PRESSURE: 83 MMHG | HEART RATE: 70 BPM | OXYGEN SATURATION: 96 % | RESPIRATION RATE: 18 BRPM | SYSTOLIC BLOOD PRESSURE: 169 MMHG | TEMPERATURE: 98 F
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
## 2024-06-07 PROCEDURE — 99283 EMERGENCY DEPT VISIT LOW MDM: CPT | Performed by: EMERGENCY MEDICINE

## 2024-06-07 PROCEDURE — 99284 EMERGENCY DEPT VISIT MOD MDM: CPT

## 2024-06-07 NOTE — ED PROVIDER NOTES
History  Chief Complaint   Patient presents with    Fall     Patient presents via EMS, patient rolled out of bed due to her bed being a twin and not being used to it. -HS -thinners -LOC.      Patient is a 79-year-old female with a history of previous subarachnoid hemorrhage, hypertension, hyperlipidemia who presents after a fall.  Patient states that she has had 2 recent falls out of bed, which she suspects is due to changing from a full-size bed to a twin size bed.  Around 4 AM, she rolled out of bed.  She denies head injury or loss of consciousness.  She states that her legs have been weak since her hemorrhagic stroke in 2017.  She was unable to get herself up and fell asleep on the floor for about 3 hours.  When she woke up, she was able to make her way to a phone and called for help.  She states that she was unable to get herself back into bed and called for a lift assist.  She ultimately agreed to be transported to the emergency department for evaluation.  She has no complaints at this time.  She is adamant that her lower extremity weakness is baseline and unchanged.  She denies headache, neck pain, vomiting, focal weakness, numbness, tingling, other concerns. She is not on anticoagulants for antiplatelets.      History provided by:  Patient  Fall  Mechanism of injury: fall    Fall:     Fall occurred:  From a bed    Impact surface:  Carpet  Associated symptoms: no abdominal pain, no back pain, no chest pain, no headaches, no nausea, no neck pain, no seizures and no vomiting        Prior to Admission Medications   Prescriptions Last Dose Informant Patient Reported? Taking?   ALPRAZolam (XANAX) 0.25 mg tablet  Self, Child No No   Sig: TAKE ONE TABLET BY MOUTH EVERY DAY AS NEEDED FOR ANXIETY TAKE 2 TABLETS 30 MINUTES PRIOR TO PROCEDURE   Calcium-Vitamin D (CALTRATE 600 PLUS-VIT D PO)  Self, Child Yes No   Sig: Take by mouth daily.   LUMIGAN 0.01 % ophthalmic drops  Self, Child Yes No   Sig: PLACE 1 DROP INTO IN  EACH EYE AT BEDTIME   Multiple Vitamins-Minerals (MULTIVITAMIN ADULTS 50+) TABS  Self, Child Yes No   Sig: Take by mouth daily   NON FORMULARY  Self, Child Yes No   Sig: CBD Gummies   acetaminophen (TYLENOL) 500 mg tablet  Self, Child Yes No   Sig: Take 1,000 mg by mouth if needed for mild pain   esomeprazole (NexIUM) 10 MG packet  Self, Child Yes No   Sig: Take 10 mg by mouth every morning before breakfast   ezetimibe (ZETIA) 10 mg tablet   No No   Sig: TAKE ONE TABLET BY MOUTH EVERY DAY   fluticasone (FLONASE) 50 mcg/act nasal spray  Self, Child Yes No   Si spray into each nostril as needed   meclizine (ANTIVERT) 25 mg tablet  Self, Child No No   Sig: Take 1 tablet (25 mg total) by mouth every 8 (eight) hours as needed for dizziness   Patient not taking: Reported on 2024   psyllium (METAMUCIL) 0.52 g capsule  Self, Child Yes No   Sig: Take by mouth daily 2-3 caps daily   sertraline (ZOLOFT) 100 mg tablet  Self, Child No No   Sig: TAKE 2 TABLETS BY MOUTH DAILY      Facility-Administered Medications: None       Past Medical History:   Diagnosis Date    Acute mastoiditis with intracranial complication 2022    Anxiety disorder     Brain hemangioma (HCC)         BRCA1 negative     BRCA2 negative     Breast cancer (HCC)     left breast    Cancer (HCC)     Constipation     Esophageal reflux     History of bone density study     Dual [employer] X-Ray Absorptiometry    History of radiation therapy     left breast ca    History of recurrent UTIs     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    Hyperlipidemia     Hypertension     Malignant neoplasm of female breast (HCC) 2014    Osteoarthritis of hip     Osteopenia     PONV (postoperative nausea and vomiting)     Stroke, hemorrhagic (HCC) 2015    Subarachnoid hemorrhage (HCC)        Past Surgical History:   Procedure Laterality Date    BREAST BIOPSY Left     BREAST LUMPECTOMY Left     COLONOSCOPY      Fiberoptic  - 2009, 2015 5 year f/u    ESOPHAGOGASTRODUODENOSCOPY  2009    Diag, Resolved - 2006 / 2009    EXOSTECTOMY  2005    Simple Bunion (Silver Procedure)    FL LUMBAR PUNCTURE THERAPEUTIC  9/1/2023    HYSTERECTOMY  1981    JOINT REPLACEMENT Right 2012    Right Hip    OOPHORECTOMY  1981    not sure which one    REPLACEMENT TOTAL KNEE Right 2018    VENTRICULOSTOMY         Family History   Problem Relation Age of Onset    Cancer Mother 69        bone    Hypertension Mother     Arthritis Mother     Stroke Father         TIA    Stroke Maternal Grandmother         CVA    Deep vein thrombosis Daughter     Heart attack Neg Hx     Anuerysm Neg Hx     Clotting disorder Neg Hx     Arrhythmia Neg Hx     Heart failure Neg Hx     Coronary artery disease Neg Hx     Hyperlipidemia Neg Hx     Breast cancer Neg Hx      I have reviewed and agree with the history as documented.    E-Cigarette/Vaping    E-Cigarette Use Never User      E-Cigarette/Vaping Substances    Nicotine No     THC No     CBD No     Flavoring No     Other No     Unknown No      Social History     Tobacco Use    Smoking status: Never     Passive exposure: Past    Smokeless tobacco: Never   Vaping Use    Vaping status: Never Used   Substance Use Topics    Alcohol use: Yes     Comment: occasionally    Drug use: No       Review of Systems   Constitutional:  Negative for chills, diaphoresis and fever.   HENT:  Negative for nosebleeds, sore throat and trouble swallowing.    Eyes:  Negative for photophobia, pain and visual disturbance.   Respiratory:  Negative for cough, chest tightness and shortness of breath.    Cardiovascular:  Negative for chest pain, palpitations and leg swelling.   Gastrointestinal:  Negative for abdominal pain, constipation, diarrhea, nausea and vomiting.   Endocrine: Negative for polydipsia and polyuria.   Genitourinary:  Negative for difficulty urinating, dysuria, hematuria, pelvic pain, vaginal bleeding and vaginal discharge.   Musculoskeletal:   Negative for back pain, neck pain and neck stiffness.   Skin:  Negative for pallor and rash.   Neurological:  Positive for weakness (Baseline weakness in bilateral lower extremities). Negative for dizziness, seizures, light-headedness and headaches.   All other systems reviewed and are negative.      Physical Exam  Physical Exam  Vitals and nursing note reviewed.   Constitutional:       General: She is not in acute distress.     Appearance: She is well-developed.   HENT:      Head: Normocephalic and atraumatic.      Comments: No external signs of head trauma     Mouth/Throat:      Mouth: Oropharynx is clear and moist and mucous membranes are normal.   Eyes:      Extraocular Movements: EOM normal.      Pupils: Pupils are equal, round, and reactive to light.   Cardiovascular:      Rate and Rhythm: Normal rate and regular rhythm.      Pulses: Normal pulses.      Heart sounds: Normal heart sounds.   Pulmonary:      Effort: Pulmonary effort is normal. No respiratory distress.      Breath sounds: Normal breath sounds.   Abdominal:      General: There is no distension.      Palpations: Abdomen is soft. Abdomen is not rigid.      Tenderness: There is no abdominal tenderness. There is no guarding or rebound.   Musculoskeletal:         General: No tenderness or edema. Normal range of motion.      Cervical back: Normal range of motion and neck supple. No spinous process tenderness.   Lymphadenopathy:      Cervical: No cervical adenopathy.   Skin:     General: Skin is warm and dry.      Capillary Refill: Capillary refill takes less than 2 seconds.   Neurological:      Mental Status: She is alert and oriented to person, place, and time.      Cranial Nerves: No cranial nerve deficit.      Sensory: No sensory deficit.      Motor: Motor function is intact. Weakness: 5/5 strength in bilateral UE, LE.      Coordination: Coordination is intact.      Comments: Speech fluent.  Visual fields intact.  Cerebellar exam normal.  Patient  "ambulated with a walker and did very well according to patient and nurse.  No ataxic gait   Psychiatric:         Mood and Affect: Mood and affect normal.         Vital Signs  ED Triage Vitals   Temperature Pulse Respirations Blood Pressure SpO2   06/07/24 0742 06/07/24 0735 06/07/24 0735 06/07/24 0738 06/07/24 0735   98 °F (36.7 °C) 70 18 169/83 96 %      Temp Source Heart Rate Source Patient Position - Orthostatic VS BP Location FiO2 (%)   06/07/24 0742 06/07/24 0735 06/07/24 0735 06/07/24 0735 --   Oral Monitor Lying Right arm       Pain Score       06/07/24 0735       No Pain           Vitals:    06/07/24 0735 06/07/24 0738   BP:  169/83   Pulse: 70    Patient Position - Orthostatic VS: Lying Lying         Visual Acuity  Visual Acuity      Flowsheet Row Most Recent Value   L Pupil Size (mm) 3   R Pupil Size (mm) 3            ED Medications  Medications - No data to display    Diagnostic Studies  Results Reviewed       None                   No orders to display              Procedures  Procedures         ED Course  ED Course as of 06/07/24 0921 Fri Jun 07, 2024 0807 Patient ambulated and did well.  Patient states that she is \"ready to go home\".  Continues to deny any complaints.                                             Medical Decision Making  Patient presents with fall out of bed this morning.  She laid on the ground for approximately 3 hours due to baseline lower extremity weakness.  She has no complaints at this time.  She denies head injury and has no external signs of head trauma on exam.  No midline tenderness in the cervical, thoracic or lumbar region.  Patient ambulated in the emergency department with her walker and did well.  She feels at baseline in terms of her generalized weakness and ambulatory dysfunction.  She has no pain in her lower extremities on exam and I do not suspect pelvic or hip fracture.  She denies any chest pain, shortness of breath, lightheadedness, palpitations and I do not " "believe that she requires a cardiac workup.  Patient has had 2 falls out of bed,  by 6 months.  We discussed obtaining a rail or placing a pillow/blankets along the side so she cannot rule out.  She states that she has not had falls otherwise.  She feels comfortable with discharge and outpatient follow-up.  Patient is very well-appearing and stable at discharge.    Portions of the above record have been created with voice recognition software.  Occasional wrong word or \"sound alike\" substitutions may have occurred due to the inherent limitations of voice recognition software.  Read the chart carefully and recognize, using context, where substitutions may have occurred.      Problems Addressed:  Fall, initial encounter:     Details: No signs of traumatic injury on physical exam.  Patient has no complaints. She ambulated at baseline.  Vital signs are within normal limits.  Do not feel that workup in the ED is indicated at this time.  She is appropriate for discharge and outpatient follow-up    Amount and/or Complexity of Data Reviewed  External Data Reviewed: notes.    Risk  OTC drugs.             Disposition  Final diagnoses:   Fall, initial encounter     Time reflects when diagnosis was documented in both MDM as applicable and the Disposition within this note       Time User Action Codes Description Comment    6/7/2024  7:59 AM Mynor Mar Add [W19.XXXA] Fall, initial encounter           ED Disposition       ED Disposition   Discharge    Condition   Stable    Date/Time   Fri Jun 7, 2024  7:59 AM    Comment   Samson Watts discharge to home/self care.                   MD Documentation      Flowsheet Row Most Recent Value   Transported by (Company and Unit #) [facility] [employer]          RN Documentation      Flowsheet Row Most Recent Value   Transported by (Company and Unit #) [employer] [facility] [employer]          Follow-up Information       Follow up With Specialties Details Why " Contact Info    Art Crowell MD Family Medicine Schedule an appointment as soon as possible for a visit  Return to ED sooner if recurrent falls, headache, vomiting, other concerns [address] [facility]  [address]  [place] [id number] [phone]              Discharge Medication List as of 6/7/2024  8:00 AM        CONTINUE these medications which have NOT CHANGED    Details   acetaminophen (TYLENOL) 500 mg tablet Take 1,000 mg by mouth if needed for mild pain, Historical Med      ALPRAZolam (XANAX) 0.25 mg tablet TAKE ONE TABLET BY MOUTH EVERY DAY AS NEEDED FOR ANXIETY TAKE 2 TABLETS 30 MINUTES PRIOR TO PROCEDURE, Normal      Calcium-Vitamin D (CALTRATE 600 PLUS-VIT D PO) Take by mouth daily., Historical Med      esomeprazole (NexIUM) 10 MG packet Take 10 mg by mouth every morning before breakfast, Historical Med      ezetimibe (ZETIA) 10 mg tablet TAKE ONE TABLET BY MOUTH EVERY DAY, Normal      fluticasone (FLONASE) 50 mcg/act nasal spray 1 spray into each nostril as needed, Historical Med      LUMIGAN 0.01 % ophthalmic drops PLACE 1 DROP INTO IN EACH EYE AT BEDTIME, Historical Med      meclizine (ANTIVERT) 25 mg tablet Take 1 tablet (25 mg total) by mouth every 8 (eight) hours as needed for dizziness, Starting Tue 7/30/2019, Normal      Multiple Vitamins-Minerals (MULTIVITAMIN ADULTS 50+) TABS Take by mouth daily, Historical Med      NON FORMULARY CBD Gummies, Historical Med      psyllium (METAMUCIL) 0.52 g capsule Take by mouth daily 2-3 caps daily, Historical Med      sertraline (ZOLOFT) 100 mg tablet TAKE 2 TABLETS BY MOUTH DAILY, Normal             No discharge procedures on file.    PDMP Review         Value Time User    PDMP Reviewed  Yes 9/5/2023  5:17 PM EYAL Perez            ED Provider  Electronically Signed by             Mynor Mar DO  06/07/24 0905

## 2024-06-07 NOTE — ED NOTES
Patient ambulated the hallway with a steady gait, no signs of distress or complaints. MD made aware.       Annabel Huerta RN  06/07/24 0759

## 2024-06-10 ENCOUNTER — TELEPHONE (OUTPATIENT)
Dept: FAMILY MEDICINE CLINIC | Facility: CLINIC | Age: 79
End: 2024-06-10

## 2024-06-10 ENCOUNTER — VBI (OUTPATIENT)
Dept: FAMILY MEDICINE CLINIC | Facility: CLINIC | Age: 79
End: 2024-06-10

## 2024-06-10 NOTE — TELEPHONE ENCOUNTER
06/10/24 1:08 PM    Patient contacted post ED visit, [facility] department spoke with patient/caregiver and outreach was successful.    Thank you.  Rosa Aleman  [facility]

## 2024-06-17 ENCOUNTER — TELEPHONE (OUTPATIENT)
Dept: FAMILY MEDICINE CLINIC | Facility: CLINIC | Age: 79
End: 2024-06-17

## 2024-06-17 NOTE — TELEPHONE ENCOUNTER
Pt's daughter is requesting her mom's ED f/u be rescheduled sooner rather than later.  She reports her mom's stability has drastically plummeted and she has fallen again since her hospital encounter.    Pt's daughter stated she will not be available from 6/24-6/28. Please advise where to schedule pt's ED f/u

## 2024-06-21 ENCOUNTER — OFFICE VISIT (OUTPATIENT)
Dept: FAMILY MEDICINE CLINIC | Facility: CLINIC | Age: 79
End: 2024-06-21
Payer: COMMERCIAL

## 2024-06-21 VITALS
DIASTOLIC BLOOD PRESSURE: 78 MMHG | SYSTOLIC BLOOD PRESSURE: 124 MMHG | WEIGHT: 160 LBS | HEART RATE: 67 BPM | BODY MASS INDEX: 23.7 KG/M2 | OXYGEN SATURATION: 98 % | TEMPERATURE: 97.3 F | HEIGHT: 69 IN

## 2024-06-21 DIAGNOSIS — Z02.2 ENCOUNTER FOR EXAMINATION FOR ADMISSION TO ASSISTED LIVING FACILITY: ICD-10-CM

## 2024-06-21 DIAGNOSIS — E78.00 PURE HYPERCHOLESTEROLEMIA: Primary | ICD-10-CM

## 2024-06-21 PROCEDURE — G2211 COMPLEX E/M VISIT ADD ON: HCPCS | Performed by: FAMILY MEDICINE

## 2024-06-21 PROCEDURE — 99214 OFFICE O/P EST MOD 30 MIN: CPT | Performed by: FAMILY MEDICINE

## 2024-06-21 NOTE — PROGRESS NOTES
Ambulatory Visit  Name: Samson Watts      : 1945      MRN: [id number]  Encounter Provider: Art Crowell MD  Encounter Date: 2024   Encounter department: [facility]    Assessment & Plan   1. Pure hypercholesterolemia  -     Lipid Panel with Direct LDL reflex; Future  -     CBC; Future; Expected date: 2024  -     Comprehensive metabolic panel; Future  2. Encounter for examination for admission to assisted living facility  -     Ambulatory Referral to Social Work Care Management Program; Future     Obtain labs prior to next visit, send mobile lab 1 week prior to visit in 6 months  SW consult to assist with looking into placement assisted living vs HH assistance. She has some VA benefits daughter is investigating.   Recommend LifeAlert or similar to help with falls and not being able to get up. They also have automated ones should she not be aware enough to use the button herself.   Counseled for 30 minutes     History of Present Illness     HPI Patient had another fall. She's fallen out of bed several times. Luckily has not been injured. Patient and daughter note her memory is getting worse. Patient's daughter worries about her safety at home, and if she's taking her medications as she should. Patient feels confident about her medications -she lays them out each week and then takes them daily. She is eating twice daily and writes it down to keep track she's eating regularly. She is maintaining her weight.     Review of Systems   Constitutional:  Negative for chills and fever.   HENT:  Negative for congestion.    Respiratory:  Negative for cough.    Gastrointestinal:  Negative for nausea.   Musculoskeletal:  Positive for arthralgias (knee pain resolved s/p falls). Negative for myalgias.   Skin:  Negative for rash.   Neurological:  Positive for weakness.   Psychiatric/Behavioral:  Positive for confusion. Negative for dysphoric mood and sleep disturbance.    All  "other systems reviewed and are negative.    Medical History Reviewed by provider this encounter:  Tobacco  Allergies  Meds  Problems  Med Hx  Surg Hx  Fam Hx       Objective     /78 (BP Location: Left arm, Patient Position: Sitting, Cuff Size: Adult)   Pulse 67   Temp (!) 97.3 °F (36.3 °C)   Ht 5' 8.5\" (1.74 m)   Wt 72.6 kg (160 lb)   SpO2 98%   BMI 23.97 kg/m²     Physical Exam  Vitals and nursing note reviewed.   Constitutional:       General: She is not in acute distress.     Appearance: She is well-developed.      Comments: Walks with a cane   HENT:      Head: Normocephalic and atraumatic.      Right Ear: External ear normal.      Left Ear: External ear normal.      Nose: Nose normal.   Eyes:      Conjunctiva/sclera: Conjunctivae normal.   Neck:      Trachea: No tracheal deviation.   Pulmonary:      Effort: Pulmonary effort is normal.   Abdominal:      Tenderness: There is no abdominal tenderness.   Musculoskeletal:      Comments: Slight bruising at the left knee without pain   Skin:     General: Skin is warm and dry.      Capillary Refill: Capillary refill takes less than 2 seconds.      Findings: No rash.   Neurological:      Mental Status: She is alert.      Cranial Nerves: No cranial nerve deficit.       Administrative Statements           "

## 2024-06-27 ENCOUNTER — PATIENT OUTREACH (OUTPATIENT)
Dept: FAMILY MEDICINE CLINIC | Facility: CLINIC | Age: 79
End: 2024-06-27

## 2024-06-27 NOTE — PROGRESS NOTES
Chart review indicates that an order was placed on 6/21 to assist pt with admission to AL. At OV pt requesting assistance on AL placement vs home health aides. Pt has some VA benefits daughter is investigating. Recommended LifeAlert to help with falls. She has fallen out of bed several times, luckily not injured. Daughter worries memory is worsening and if she is taking her medications as she should. Pt feels confident she is taking her medications correctly. Eats regularly and maintaining weight. Review indicates that [facility] did speak with pt daughter 2/24 and a referral was made to [employer] for assistance in transition to a facility. Daughter was working on possible VA benefits. Pt did have assessment with [employer] and her income did not allow a transition to AL and Waiver was recommended. [facility] did refer to [employer] to go out to home and assess for OPTIONS services, if she did not qualify would be referred for Waiver Services. Daughter did cancel appt with [facility] as decision to follow up with PCP. [facility] attempted to reach daughter again for outcome of AAA assessment, but was unable to reach her and case was closed. [facility] did outreach pt daughter again today to discuss above, she did not , VM left. [facility] to follow.

## 2024-07-05 ENCOUNTER — PATIENT OUTREACH (OUTPATIENT)
Dept: CASE MANAGEMENT | Facility: HOSPITAL | Age: 79
End: 2024-07-05

## 2024-07-05 NOTE — PROGRESS NOTES
Covering OP CM called to pts dtr Letty to follow up with pts dtr.   Went over income limits and dtr states pt financially qualifies.  Pt does try to bathe and dress herself but it is with difficulty.  Pt resides in an apartment on 4th floor with an elevator.  Pt does use a walker.  Pts dtr drives her to appts.  Per dtr Aging never came to home because at that point pt was able to dress herself but now she has great difficulty and cognitive issues.  Pt also has been falling out of bed.  Dtr is working on getting pt Lifeline.  Dtr states that she is also aware of [employer] but prefers waiver application.  Pt refuses Meals on Wheels.  Dtr states senior center is in her building and they provide meals as well.      Per dtr pt will not answer her phone and she has periods of confusion.  Referral placed to [facility] and please reach out to dtr to discuss waiver.

## 2024-07-08 ENCOUNTER — PATIENT OUTREACH (OUTPATIENT)
Dept: FAMILY MEDICINE CLINIC | Facility: CLINIC | Age: 79
End: 2024-07-08

## 2024-07-08 DIAGNOSIS — Z74.2 ASSISTANCE NEEDED AT HOME: Primary | ICD-10-CM

## 2024-07-08 NOTE — PROGRESS NOTES
Update received from OP [facility] Denita Almodovar who spoke with patient's dtr to discuss need for caregiver assistance.    Patient's dtr interested in waiver application.  Referral placed for [facility].  Patient's dtr also working on getting patient LifeLine system as medical alert device.  Will follow-up at later date.

## 2024-07-10 ENCOUNTER — PATIENT OUTREACH (OUTPATIENT)
Dept: FAMILY MEDICINE CLINIC | Facility: CLINIC | Age: 79
End: 2024-07-10

## 2024-07-10 NOTE — PROGRESS NOTES
got referral from megan and sent MARBELLA Carrasco an IB to be added to the care plan.     [facility] called patients daughter and explained [place] Waiver process and that [facility] will email referral to [facility].     Patients daughter understood and was agreeable to plan.     /Next Outreach 7/15

## 2024-07-16 ENCOUNTER — PATIENT OUTREACH (OUTPATIENT)
Dept: FAMILY MEDICINE CLINIC | Facility: CLINIC | Age: 79
End: 2024-07-16

## 2024-07-16 NOTE — PROGRESS NOTES
called patient to check in on the process of the waiver application and to see if PAIESTEPHAN[facility] has made contact for first home assessment.     CMOC left a VM for patients daughter.     Awaiting return call.     Next Outreach 7/18

## 2024-07-17 ENCOUNTER — PATIENT OUTREACH (OUTPATIENT)
Dept: FAMILY MEDICINE CLINIC | Facility: CLINIC | Age: 79
End: 2024-07-17

## 2024-07-17 NOTE — PROGRESS NOTES
received incoming call back from patients daughter that they had set up the assessment with [facility] but needed the phone number due to needing to reschedule.     [facility]OC could tell that she was driving and asked if she would like a text message. Patients daughter agreed and CMOC texted phone number [phone] and what prompts to follow.     Patients daughter sent CMOC a text back that [facility] assessment is set up for Friday 8/2.     CMOC advised will outreach on 8/5.

## 2024-07-19 ENCOUNTER — PATIENT OUTREACH (OUTPATIENT)
Dept: FAMILY MEDICINE CLINIC | Facility: CLINIC | Age: 79
End: 2024-07-19

## 2024-07-19 NOTE — PROGRESS NOTES
Update received from [facility] that patient's dtr rescheduled [facility] assessment for Friday 8/2.  Will follow.

## 2024-07-27 DIAGNOSIS — F41.8 DEPRESSION WITH ANXIETY: ICD-10-CM

## 2024-07-28 RX ORDER — SERTRALINE HYDROCHLORIDE 100 MG/1
200 TABLET, FILM COATED ORAL DAILY
Qty: 180 TABLET | Refills: 1 | Status: SHIPPED | OUTPATIENT
Start: 2024-07-28

## 2024-08-05 ENCOUNTER — PATIENT OUTREACH (OUTPATIENT)
Dept: CASE MANAGEMENT | Facility: OTHER | Age: 79
End: 2024-08-05

## 2024-08-05 NOTE — PROGRESS NOTES
called patients daughter to check in on how home assessment went for [place] Waiver.     Letty advised has a list of financials documents that needs to go back to [facility]. Once she has all of the documents, CMOC advised I can  and drop off at [facility].     Letty advised she will contact CMOC at that point.     Next Outreach 8/26

## 2024-08-06 ENCOUNTER — PATIENT OUTREACH (OUTPATIENT)
Dept: CASE MANAGEMENT | Facility: OTHER | Age: 79
End: 2024-08-06

## 2024-08-06 NOTE — PROGRESS NOTES
Update received from [facility] that patient's dtr has list of financial documents that need to be prepped and provided to [employer] for review, regarding waiver application process.  [facility] offered to assist in getting documents to [employer] once they are ready.  Patient's dtr Letty will outreach [facility] once she has needed documents.    Call placed to patient's dtr Letty to check status of patient and follow-up on Letty's plan to set up medical alert device system (Lifeline).  No answer, voicemail left, and awaiting return call.

## 2024-08-19 ENCOUNTER — PATIENT OUTREACH (OUTPATIENT)
Dept: CASE MANAGEMENT | Facility: OTHER | Age: 79
End: 2024-08-19

## 2024-08-19 NOTE — PROGRESS NOTES
Additional call placed to patient's dtr Letty to check status of patient, follow-up on Letty's plan to set up medical alert device system ([employer]), and check status of gathering needed documents for CLINE to review for waiver application process.    Spoke with Letty who confirmed she is gathering needed documents but needs to make a copy of POA paperwork.  Encouraged her to bring to PCP office to see if copy can be made.  Once all documents are ready, Letty will inform CMOC who can assist in getting documents to CLINE for review (for waiver).  Routed to clerical staff at PCP office to inform Letty plans to stop by office tomorrow 8/20.    Letty is still needing to look into medical alert device systems for patient.  Offered to email information on various medical alert device companies to review.  Letty agreed.  Email sent to [email or web address].  Will follow.

## 2024-08-26 ENCOUNTER — PATIENT OUTREACH (OUTPATIENT)
Dept: CASE MANAGEMENT | Facility: OTHER | Age: 79
End: 2024-08-26

## 2024-08-26 NOTE — PROGRESS NOTES
received incoming call from patients daughter that went to .     Daughter has paperwork ready to go back to [facility].     CMOC called Letty back and left a VM that I can  tomorrow.     Awaiting a return call.     Next Outreach 8/27

## 2024-08-27 ENCOUNTER — PATIENT OUTREACH (OUTPATIENT)
Dept: CASE MANAGEMENT | Facility: OTHER | Age: 79
End: 2024-08-27

## 2024-08-27 NOTE — PROGRESS NOTES
had incoming call from patients daughter that went to . CMOC called patients daughter back and will  all paperwork that needs to be sent into [facility]. CMOC will drop off at [facility].     Next Outreach 8/30

## 2024-08-30 ENCOUNTER — PATIENT OUTREACH (OUTPATIENT)
Dept: CASE MANAGEMENT | Facility: OTHER | Age: 79
End: 2024-08-30

## 2024-08-30 NOTE — PROGRESS NOTES
met with patients daughter to  financial documents needed by [facility] CLINE for the medical waiver application.     CMOC dropped off financials in the drop box outside of [facility].     CMOC advised patients daughter that I will follow along the process. CMOC also advised if CLINE needs anything additional they will send a letter in the mail. If CMOC is needed to again to drop off to contact me.     Next Outreach 9/13

## 2024-09-09 ENCOUNTER — PATIENT OUTREACH (OUTPATIENT)
Dept: CASE MANAGEMENT | Facility: OTHER | Age: 79
End: 2024-09-09

## 2024-09-09 NOTE — PROGRESS NOTES
received incoming call from patients daughter that she received a call that the financial information was not received by 9/4 and waiver case was closed.     CMOC called [facility] and spoke agent Carlene and advised financials were dropped off at [facility] 8/30. Carlene put CMOC on hold and advised that financials were scanned in on 9/4. 159 pages of tong statements, SS benefits statement, POA papers. Carlene advised that [facility] closed case 9/4 after financials were uploaded. Carlene advised CMOC that I needed to call [facility] and ask for a reconsideration.     CMOC called [facility] and could not get through.     CMOC emailed county at [email or web address] with case ID number [id number] and explained a reconsideration needed to be done. CMOC did receive an email from [facility] which is a confirmation that my email was received.     CMOC called patients daughter back and explained the above. CMOC explained I will keep calling until I get through.     Next Outreach 9/13

## 2024-09-10 ENCOUNTER — OFFICE VISIT (OUTPATIENT)
Dept: FAMILY MEDICINE CLINIC | Facility: CLINIC | Age: 79
End: 2024-09-10
Payer: COMMERCIAL

## 2024-09-10 ENCOUNTER — APPOINTMENT (OUTPATIENT)
Dept: LAB | Facility: CLINIC | Age: 79
End: 2024-09-10
Payer: COMMERCIAL

## 2024-09-10 ENCOUNTER — APPOINTMENT (OUTPATIENT)
Dept: LAB | Facility: MEDICAL CENTER | Age: 79
End: 2024-09-10

## 2024-09-10 VITALS
WEIGHT: 157 LBS | OXYGEN SATURATION: 99 % | DIASTOLIC BLOOD PRESSURE: 76 MMHG | HEIGHT: 69 IN | BODY MASS INDEX: 23.25 KG/M2 | HEART RATE: 72 BPM | TEMPERATURE: 97.8 F | SYSTOLIC BLOOD PRESSURE: 124 MMHG

## 2024-09-10 DIAGNOSIS — E78.00 PURE HYPERCHOLESTEROLEMIA: ICD-10-CM

## 2024-09-10 DIAGNOSIS — R73.01 IFG (IMPAIRED FASTING GLUCOSE): ICD-10-CM

## 2024-09-10 DIAGNOSIS — R39.9 URINARY SYMPTOM OR SIGN: ICD-10-CM

## 2024-09-10 DIAGNOSIS — G91.2 NPH (NORMAL PRESSURE HYDROCEPHALUS) (HCC): ICD-10-CM

## 2024-09-10 DIAGNOSIS — R44.3 HALLUCINATION: Primary | ICD-10-CM

## 2024-09-10 DIAGNOSIS — F41.8 DEPRESSION WITH ANXIETY: ICD-10-CM

## 2024-09-10 DIAGNOSIS — G31.84 MCI (MILD COGNITIVE IMPAIRMENT): ICD-10-CM

## 2024-09-10 DIAGNOSIS — Z74.2 ASSISTANCE NEEDED AT HOME: ICD-10-CM

## 2024-09-10 LAB
ALBUMIN SERPL BCG-MCNC: 4.2 G/DL (ref 3.5–5)
ALP SERPL-CCNC: 72 U/L (ref 34–104)
ALT SERPL W P-5'-P-CCNC: 15 U/L (ref 7–52)
ANION GAP SERPL CALCULATED.3IONS-SCNC: 9 MMOL/L (ref 4–13)
AST SERPL W P-5'-P-CCNC: 21 U/L (ref 13–39)
BACTERIA UR QL AUTO: ABNORMAL /HPF
BILIRUB SERPL-MCNC: 0.46 MG/DL (ref 0.2–1)
BILIRUB UR QL STRIP: NEGATIVE
BUN SERPL-MCNC: 11 MG/DL (ref 5–25)
CALCIUM SERPL-MCNC: 9.8 MG/DL (ref 8.4–10.2)
CAOX CRY URNS QL MICRO: ABNORMAL /HPF
CHLORIDE SERPL-SCNC: 104 MMOL/L (ref 96–108)
CHOLEST SERPL-MCNC: 245 MG/DL
CLARITY UR: ABNORMAL
CO2 SERPL-SCNC: 30 MMOL/L (ref 21–32)
COLOR UR: YELLOW
CREAT SERPL-MCNC: 0.83 MG/DL (ref 0.6–1.3)
ERYTHROCYTE [DISTWIDTH] IN BLOOD BY AUTOMATED COUNT: 13.6 % (ref 11.6–15.1)
GFR SERPL CREATININE-BSD FRML MDRD: 67 ML/MIN/1.73SQ M
GLUCOSE SERPL-MCNC: 88 MG/DL (ref 65–140)
GLUCOSE UR STRIP-MCNC: NEGATIVE MG/DL
HCT VFR BLD AUTO: 42.4 % (ref 34.8–46.1)
HDLC SERPL-MCNC: 73 MG/DL
HGB BLD-MCNC: 13.3 G/DL (ref 11.5–15.4)
HGB UR QL STRIP.AUTO: NEGATIVE
KETONES UR STRIP-MCNC: NEGATIVE MG/DL
LDLC SERPL CALC-MCNC: 150 MG/DL (ref 0–100)
LEUKOCYTE ESTERASE UR QL STRIP: ABNORMAL
MCH RBC QN AUTO: 28.1 PG (ref 26.8–34.3)
MCHC RBC AUTO-ENTMCNC: 31.4 G/DL (ref 31.4–37.4)
MCV RBC AUTO: 90 FL (ref 82–98)
MUCOUS THREADS UR QL AUTO: ABNORMAL
NITRITE UR QL STRIP: POSITIVE
NON-SQ EPI CELLS URNS QL MICRO: ABNORMAL /HPF
PH UR STRIP.AUTO: 5.5 [PH]
PLATELET # BLD AUTO: 323 THOUSANDS/UL (ref 149–390)
PMV BLD AUTO: 9.6 FL (ref 8.9–12.7)
POTASSIUM SERPL-SCNC: 4.4 MMOL/L (ref 3.5–5.3)
PROT SERPL-MCNC: 7.1 G/DL (ref 6.4–8.4)
PROT UR STRIP-MCNC: ABNORMAL MG/DL
RBC # BLD AUTO: 4.73 MILLION/UL (ref 3.81–5.12)
RBC #/AREA URNS AUTO: ABNORMAL /HPF
SODIUM SERPL-SCNC: 143 MMOL/L (ref 135–147)
SP GR UR STRIP.AUTO: 1.02 (ref 1–1.03)
TRIGL SERPL-MCNC: 111 MG/DL
UROBILINOGEN UR STRIP-ACNC: <2 MG/DL
WBC # BLD AUTO: 5.93 THOUSAND/UL (ref 4.31–10.16)
WBC #/AREA URNS AUTO: ABNORMAL /HPF

## 2024-09-10 PROCEDURE — 36415 COLL VENOUS BLD VENIPUNCTURE: CPT

## 2024-09-10 PROCEDURE — 87086 URINE CULTURE/COLONY COUNT: CPT

## 2024-09-10 PROCEDURE — 83036 HEMOGLOBIN GLYCOSYLATED A1C: CPT

## 2024-09-10 PROCEDURE — 81001 URINALYSIS AUTO W/SCOPE: CPT

## 2024-09-10 PROCEDURE — 80053 COMPREHEN METABOLIC PANEL: CPT

## 2024-09-10 PROCEDURE — 99213 OFFICE O/P EST LOW 20 MIN: CPT | Performed by: FAMILY MEDICINE

## 2024-09-10 PROCEDURE — 80061 LIPID PANEL: CPT

## 2024-09-10 PROCEDURE — 87077 CULTURE AEROBIC IDENTIFY: CPT

## 2024-09-10 PROCEDURE — 87186 SC STD MICRODIL/AGAR DIL: CPT

## 2024-09-10 PROCEDURE — 85027 COMPLETE CBC AUTOMATED: CPT

## 2024-09-10 PROCEDURE — G2211 COMPLEX E/M VISIT ADD ON: HCPCS | Performed by: FAMILY MEDICINE

## 2024-09-10 NOTE — PROGRESS NOTES
Ambulatory Visit  Name: Samson Watts      : 1945      MRN: [id number]  Encounter Provider: Art Crowell MD  Encounter Date: 9/10/2024   Encounter department: [facility]    Assessment & Plan  Hallucination    Orders:    Ambulatory Referral to Senior Care; Future  New - recommend re-establish with senior care. New referral placed  Patient's daughter is looking at [facility] for placement, given patient's overall decline  Depression with anxiety    Orders:    Ambulatory Referral to Senior Care; Future  chronic, stable  Continue current care   NPH (normal pressure hydrocephalus) (HCC)    Orders:    Ambulatory Referral to Senior Care; Future    Assistance needed at home    Orders:    Ambulatory Referral to Senior Care; Future    MCI (mild cognitive impairment)    Orders:    Ambulatory Referral to Senior Care; Future    UA w Reflex to Microscopic w Reflex to Culture; Future    Hemoglobin A1C; Future  Concern for progression of cognitive decline now with hallucinations/sundowning  Concern for safety - agree with moving to [facility]   Call me if subsequent episodes occur, would consider medication      She ate this morning-  go for labs now despite this just to ascertain status.      History of Present Illness     HPI Patient's daughter reports they were on the phone with her last night and Korina was very upset because she thought her friend had . She was telling them there was a man there in a boat and saying other odd things.   Daughter notes since our last visit there has been more decline - unsure how much she's eating. They are looking for in home care but understand she may need higher level care. Mobility is worsening.   Ordered CBD gummies and ordered twice    History obtained from : patient and patient's daughter  Review of Systems   Constitutional:  Positive for unexpected weight change. Negative for chills and fever.   HENT:  Negative for congestion and sore  "throat.    Eyes:  Negative for pain and visual disturbance.   Respiratory:  Negative for cough and shortness of breath.    Cardiovascular:  Negative for chest pain and palpitations.   Gastrointestinal:  Negative for abdominal pain and nausea.   Genitourinary:  Negative for dysuria.   Musculoskeletal:  Negative for arthralgias and myalgias.   Skin:  Negative for rash and wound.   Neurological:  Negative for dizziness and headaches.   Psychiatric/Behavioral:  Positive for confusion and hallucinations.    All other systems reviewed and are negative.    Medical History Reviewed by provider this encounter:           Objective     /76   Pulse 72   Temp 97.8 °F (36.6 °C)   Ht 5' 8.5\" (1.74 m)   Wt 71.2 kg (157 lb)   SpO2 99%   BMI 23.52 kg/m²     Physical Exam  Vitals and nursing note reviewed.   Constitutional:       General: She is not in acute distress.     Appearance: She is well-developed.   HENT:      Head: Normocephalic and atraumatic.      Right Ear: External ear normal.      Left Ear: External ear normal.      Nose: Nose normal.   Eyes:      Conjunctiva/sclera: Conjunctivae normal.   Neck:      Trachea: No tracheal deviation.   Pulmonary:      Effort: Pulmonary effort is normal.   Abdominal:      Tenderness: There is no abdominal tenderness.   Skin:     General: Skin is warm and dry.      Capillary Refill: Capillary refill takes less than 2 seconds.      Findings: No rash.   Neurological:      Mental Status: She is alert.      Cranial Nerves: No cranial nerve deficit.         "

## 2024-09-11 ENCOUNTER — PATIENT MESSAGE (OUTPATIENT)
Dept: FAMILY MEDICINE CLINIC | Facility: CLINIC | Age: 79
End: 2024-09-11

## 2024-09-11 ENCOUNTER — TELEPHONE (OUTPATIENT)
Dept: FAMILY MEDICINE CLINIC | Facility: CLINIC | Age: 79
End: 2024-09-11

## 2024-09-11 ENCOUNTER — PATIENT OUTREACH (OUTPATIENT)
Dept: CASE MANAGEMENT | Facility: OTHER | Age: 79
End: 2024-09-11

## 2024-09-11 DIAGNOSIS — R44.3 HALLUCINATIONS: Primary | ICD-10-CM

## 2024-09-11 DIAGNOSIS — F03.A2 MILD DEMENTIA WITH PSYCHOTIC DISTURBANCE, UNSPECIFIED DEMENTIA TYPE (HCC): ICD-10-CM

## 2024-09-11 DIAGNOSIS — N30.00 ACUTE CYSTITIS WITHOUT HEMATURIA: Primary | ICD-10-CM

## 2024-09-11 DIAGNOSIS — R39.9 URINARY SYMPTOM OR SIGN: Primary | ICD-10-CM

## 2024-09-11 LAB
EST. AVERAGE GLUCOSE BLD GHB EST-MCNC: 108 MG/DL
HBA1C MFR BLD: 5.4 %

## 2024-09-11 RX ORDER — RISPERIDONE 0.5 MG/1
0.5 TABLET ORAL
Qty: 30 TABLET | Refills: 1 | Status: SHIPPED | OUTPATIENT
Start: 2024-09-11

## 2024-09-11 NOTE — PROGRESS NOTES
Update received from [facility] stating patient's waiver application was closed as CLINE did not fully process financial documents by date requested.  Patient's documents were submitted by requested date.  [facility] emailed SON requesting reconsideration and will follow-up on 9/13.    Spoke with patient's dtr Letty who confirmed above information.  Letty states patient's cognition is worsening, especially at night.  She is experiencing hallucinations.  PCP is aware.  Letty is interested in long-term placement for patient as she is concerned that in-home services through waiver will not be enough support in the home for patient with change in cognition.    Letty outreached [facility] and has appt scheduled with Admissions Team on 9/24 (Letty will be out of town 9/18-9/20).  She will work on completing [facility]'s online application.  OP SWCM offered to place referral to [facility] via AIDIN and begin completing MA-51 & PASRR forms.  Informed Letty of need for these forms, which will then be sent to [facility] for Functional Eligibility Determination (FED) to be completed.  Letty understanding and will visit PCP office on Friday 9/13 to sign needed forms.    Encouraged Letty to have patient evaluated at hospital if hallucinations continue/worsen and if patient's safety becomes a concern or if family cannot manage patient's daily needs.  Letty understanding.    OP SWCM will prep MA-51 & PASRR forms and present to PCP office for signature before faxing to [facility] for FED assessment.

## 2024-09-11 NOTE — TELEPHONE ENCOUNTER
----- Message from Art Crowell MD sent at 9/11/2024 11:34 AM EDT -----  Please call lab and add on urine culture

## 2024-09-12 ENCOUNTER — PATIENT OUTREACH (OUTPATIENT)
Dept: CASE MANAGEMENT | Facility: OTHER | Age: 79
End: 2024-09-12

## 2024-09-12 RX ORDER — CEPHALEXIN 500 MG/1
500 CAPSULE ORAL EVERY 8 HOURS SCHEDULED
Qty: 15 CAPSULE | Refills: 0 | Status: SHIPPED | OUTPATIENT
Start: 2024-09-12 | End: 2024-09-17

## 2024-09-12 NOTE — PROGRESS NOTES
MA-51 and PASRR forms prepped.  PASRR uploaded via AIDIN referral to [facility].  MA-51 form to be completed by PCP office and signed by patient's dtr tomorrow 9/13.  Once completed, MA-51 and PASRR forms will need to be faxed to [facility] for FED assessment completion.  Will follow.

## 2024-09-13 ENCOUNTER — NURSE TRIAGE (OUTPATIENT)
Dept: OTHER | Facility: OTHER | Age: 79
End: 2024-09-13

## 2024-09-13 ENCOUNTER — PATIENT OUTREACH (OUTPATIENT)
Dept: CASE MANAGEMENT | Facility: OTHER | Age: 79
End: 2024-09-13

## 2024-09-13 DIAGNOSIS — N30.00 ACUTE CYSTITIS WITHOUT HEMATURIA: Primary | ICD-10-CM

## 2024-09-13 LAB — BACTERIA UR CULT: ABNORMAL

## 2024-09-13 RX ORDER — DOXYCYCLINE 100 MG/1
100 CAPSULE ORAL EVERY 12 HOURS SCHEDULED
Qty: 14 CAPSULE | Refills: 0 | Status: SHIPPED | OUTPATIENT
Start: 2024-09-13 | End: 2024-09-20

## 2024-09-13 NOTE — TELEPHONE ENCOUNTER
Spoke to patient's daughter,     Per Sharon, patient is to stop the keflex. Take benadryl and wait 24 hrs before starting new medication doxycycline.

## 2024-09-13 NOTE — TELEPHONE ENCOUNTER
Dr. Kemp responded. Patient's daughter already spoke to the office, stated that she is agreeable with Dr. Manjarrez advice to stop Keflex, to take Benadryl today, to start Doxycycline tomorrow.

## 2024-09-13 NOTE — TELEPHONE ENCOUNTER
"Reason for Disposition  • Taking new prescription antibiotic  (Exception: Finished taking new prescription antibiotic.)    Answer Assessment - Initial Assessment Questions  1. APPEARANCE of RASH: \"Describe the rash.\" (e.g., spots, blisters, raised areas, skin peeling, scaly)      Redness on a left side of the arm   3. LOCATION: \"Where is the rash located?\"      Left arm  4. COLOR: \"What color is the rash?\" (Note: It is difficult to assess rash color in people with darker-colored skin. When this situation occurs, simply ask the caller to describe what they see.)      Red   5. ONSET: \"When did the rash begin?\"      Today   6. FEVER: \"Do you have a fever?\" If Yes, ask: \"What is your temperature, how was it measured, and when did it start?\"      No   7. ITCHING: \"Does the rash itch?\" If Yes, ask: \"How bad is the itch?\" (Scale 1-10; or mild, moderate, severe)      Moderate 5 out of 10.   8. CAUSE: \"What do you think is causing the rash?\"      Patient started taking keflex yesterday.   9. NEW MEDICINES: \"What new medicines are you taking?\" (e.g., name of antibiotic) \"When did you start taking this medication?\".      Keflex   10. OTHER SYMPTOMS: \"Do you have any other symptoms?\" (e.g., sore throat, fever, joint pain)        No   11. PREGNANCY: \"Is there any chance you are pregnant?\" \"When was your last menstrual period?\"        N/A    Protocols used: Rash - Widespread On Drugs-Adult-AH    "

## 2024-09-13 NOTE — PROGRESS NOTES
CMOC called patients daughter, Letty to advise that this writer spoke to CLINE  Yenifer Mcmillan and patient was approved for Home & Community based waiver.     CMOC advised that she should be hearing from a  to advise how many hours patient was approved for.     CMOC advised that this writer understands that they are in the process of getting an assessment for [place], but home waiver could be used in the mean time.     CMOC advised Letty that I will outreach in 1 week to make sure contact has been made by a .     Next Outreach 9/20

## 2024-09-13 NOTE — TELEPHONE ENCOUNTER
Patient developed itchy rash on her left arm after the 3d dose of Keflex. Patient stated that right now the itchiness and redness faded. Please follow up.

## 2024-09-16 ENCOUNTER — PATIENT OUTREACH (OUTPATIENT)
Dept: CASE MANAGEMENT | Facility: OTHER | Age: 79
End: 2024-09-16

## 2024-09-16 PROBLEM — F01.B2 MODERATE VASCULAR DEMENTIA WITH PSYCHOTIC DISTURBANCE (HCC): Status: ACTIVE | Noted: 2024-09-16

## 2024-09-16 NOTE — PROGRESS NOTES
Outreached PCP office staff to check status of MA-51 form.    Update received from CAR Patricia, stating patient's dtr Letty did not visit office to sign MA-51.  PCP to complete form.    Spoke with Letty who will now stop by PCP office tomorrow, 9/17, to sign MA-51.  Routed update to [place] and clerical staff.  Will follow.

## 2024-09-17 ENCOUNTER — PATIENT OUTREACH (OUTPATIENT)
Dept: CASE MANAGEMENT | Facility: OTHER | Age: 79
End: 2024-09-17

## 2024-09-17 NOTE — PROGRESS NOTES
Update received from PCP office.  MA-51 form was completed and signed by patient's dtr.  Both MA-51 and PASRR forms were faxed to [facility] to request FED Assessment for level of care determination.    OP SWCM also uploaded MA-51 to [facility] via AIDIN and extended deadline of referral.  [facility] admissions plans to meet with patient's dtr on 9/24.  Will follow.

## 2024-09-18 ENCOUNTER — PATIENT OUTREACH (OUTPATIENT)
Dept: CASE MANAGEMENT | Facility: OTHER | Age: 79
End: 2024-09-18

## 2024-09-18 NOTE — PROGRESS NOTES
Call received from Jovita at [facility].  PASRICH and MA-51 received.  Previous FED assessment was completed for patient in August.  Patient is Nursing Facility Clinically Eligible.  This assessment is good for 1 year.  Shirley will send determination to [facility] and OP [facility].  Will upload to AIDIN once received.

## 2024-09-19 ENCOUNTER — PATIENT OUTREACH (OUTPATIENT)
Dept: CASE MANAGEMENT | Facility: OTHER | Age: 79
End: 2024-09-19

## 2024-09-19 NOTE — PROGRESS NOTES
Level of Care assessment was received from Shirley at [facility].  LOC uploaded to [facility] referral via [employer].  Shirley planned to send the LOC assessment to [facility] directly as well.    [facility] Admissions to meet with patient's dtr on 9/24.  Will follow.

## 2024-09-20 ENCOUNTER — PATIENT OUTREACH (OUTPATIENT)
Dept: CASE MANAGEMENT | Facility: OTHER | Age: 79
End: 2024-09-20

## 2024-09-20 NOTE — PROGRESS NOTES
spoke with patients daughter Letty. Letty advised that the  for home waiver met with patient Wednesday.     Letty advised since admission date to [facility] is TBD she wanted to have the assistance at home until admission.     CMOC has completed the assigned tasks and will close case.

## 2024-09-25 ENCOUNTER — PATIENT OUTREACH (OUTPATIENT)
Dept: CASE MANAGEMENT | Facility: OTHER | Age: 79
End: 2024-09-25

## 2024-10-08 ENCOUNTER — PATIENT OUTREACH (OUTPATIENT)
Dept: CASE MANAGEMENT | Facility: OTHER | Age: 79
End: 2024-10-08

## 2024-10-08 NOTE — PROGRESS NOTES
[facility] referral extended via AIDIN.  Message sent to [facility] to ask if update has been received from patient's dtr Letty.    Call placed to Letty to check status of patient and follow-up on plan to proceed with admission to [facility] vs [facility].  [facility]  She is interested in possible [facility] rather than SNF admission at this time.  Until paperwork is completed and decision is made, patient will start receiving assistance through waiver [facility]HA Monday thru Friday from 9AM-5PM (40 hrs per week).  This will start on Thursday 10/10.  Patient received medical alert device through waiver.    Letty states patient's cognition is getting worse and she is forgetful.  She has not had recent hallucinations but Letty will bring patient to ED if hallucinations begin again.  Patient continues to live alone in apartment.    Will follow and route update to PCP.

## 2024-10-10 ENCOUNTER — HOSPITAL ENCOUNTER (INPATIENT)
Facility: HOSPITAL | Age: 79
LOS: 4 days | Discharge: NURSING, REHAB OR LONG-TERM CARE | DRG: 149 | End: 2024-10-15
Attending: EMERGENCY MEDICINE | Admitting: INTERNAL MEDICINE
Payer: COMMERCIAL

## 2024-10-10 ENCOUNTER — APPOINTMENT (EMERGENCY)
Dept: RADIOLOGY | Facility: HOSPITAL | Age: 79
DRG: 149 | End: 2024-10-10
Payer: COMMERCIAL

## 2024-10-10 ENCOUNTER — APPOINTMENT (EMERGENCY)
Dept: CT IMAGING | Facility: HOSPITAL | Age: 79
DRG: 149 | End: 2024-10-10
Payer: COMMERCIAL

## 2024-10-10 DIAGNOSIS — R32 URINARY INCONTINENCE: ICD-10-CM

## 2024-10-10 DIAGNOSIS — I10 HYPERTENSION: ICD-10-CM

## 2024-10-10 DIAGNOSIS — R42 VERTIGO: Primary | ICD-10-CM

## 2024-10-10 LAB
2HR DELTA HS TROPONIN: 0 NG/L
ALBUMIN SERPL BCG-MCNC: 4.1 G/DL (ref 3.5–5)
ALP SERPL-CCNC: 71 U/L (ref 34–104)
ALT SERPL W P-5'-P-CCNC: 13 U/L (ref 7–52)
ANION GAP SERPL CALCULATED.3IONS-SCNC: 6 MMOL/L (ref 4–13)
AST SERPL W P-5'-P-CCNC: 18 U/L (ref 13–39)
ATRIAL RATE: 66 BPM
BASOPHILS # BLD AUTO: 0.03 THOUSANDS/ΜL (ref 0–0.1)
BASOPHILS NFR BLD AUTO: 0 % (ref 0–1)
BILIRUB SERPL-MCNC: 0.42 MG/DL (ref 0.2–1)
BUN SERPL-MCNC: 15 MG/DL (ref 5–25)
CALCIUM SERPL-MCNC: 9.4 MG/DL (ref 8.4–10.2)
CARDIAC TROPONIN I PNL SERPL HS: 3 NG/L
CARDIAC TROPONIN I PNL SERPL HS: 3 NG/L
CHLORIDE SERPL-SCNC: 102 MMOL/L (ref 96–108)
CO2 SERPL-SCNC: 30 MMOL/L (ref 21–32)
CREAT SERPL-MCNC: 0.75 MG/DL (ref 0.6–1.3)
EOSINOPHIL # BLD AUTO: 0.09 THOUSAND/ΜL (ref 0–0.61)
EOSINOPHIL NFR BLD AUTO: 1 % (ref 0–6)
ERYTHROCYTE [DISTWIDTH] IN BLOOD BY AUTOMATED COUNT: 13.3 % (ref 11.6–15.1)
GFR SERPL CREATININE-BSD FRML MDRD: 76 ML/MIN/1.73SQ M
GLUCOSE SERPL-MCNC: 103 MG/DL (ref 65–140)
HCT VFR BLD AUTO: 40.5 % (ref 34.8–46.1)
HGB BLD-MCNC: 12.9 G/DL (ref 11.5–15.4)
IMM GRANULOCYTES # BLD AUTO: 0.03 THOUSAND/UL (ref 0–0.2)
IMM GRANULOCYTES NFR BLD AUTO: 0 % (ref 0–2)
LYMPHOCYTES # BLD AUTO: 1.13 THOUSANDS/ΜL (ref 0.6–4.47)
LYMPHOCYTES NFR BLD AUTO: 16 % (ref 14–44)
MCH RBC QN AUTO: 28.2 PG (ref 26.8–34.3)
MCHC RBC AUTO-ENTMCNC: 31.9 G/DL (ref 31.4–37.4)
MCV RBC AUTO: 89 FL (ref 82–98)
MONOCYTES # BLD AUTO: 0.5 THOUSAND/ΜL (ref 0.17–1.22)
MONOCYTES NFR BLD AUTO: 7 % (ref 4–12)
NEUTROPHILS # BLD AUTO: 5.17 THOUSANDS/ΜL (ref 1.85–7.62)
NEUTS SEG NFR BLD AUTO: 76 % (ref 43–75)
NRBC BLD AUTO-RTO: 0 /100 WBCS
P AXIS: 50 DEGREES
PLATELET # BLD AUTO: 278 THOUSANDS/UL (ref 149–390)
PMV BLD AUTO: 9 FL (ref 8.9–12.7)
POTASSIUM SERPL-SCNC: 3.7 MMOL/L (ref 3.5–5.3)
PR INTERVAL: 150 MS
PROT SERPL-MCNC: 7 G/DL (ref 6.4–8.4)
QRS AXIS: 22 DEGREES
QRSD INTERVAL: 84 MS
QT INTERVAL: 424 MS
QTC INTERVAL: 444 MS
RBC # BLD AUTO: 4.57 MILLION/UL (ref 3.81–5.12)
SODIUM SERPL-SCNC: 138 MMOL/L (ref 135–147)
T WAVE AXIS: 17 DEGREES
VENTRICULAR RATE: 66 BPM
WBC # BLD AUTO: 6.95 THOUSAND/UL (ref 4.31–10.16)

## 2024-10-10 PROCEDURE — 82607 VITAMIN B-12: CPT | Performed by: INTERNAL MEDICINE

## 2024-10-10 PROCEDURE — 93010 ELECTROCARDIOGRAM REPORT: CPT | Performed by: INTERNAL MEDICINE

## 2024-10-10 PROCEDURE — 80053 COMPREHEN METABOLIC PANEL: CPT | Performed by: EMERGENCY MEDICINE

## 2024-10-10 PROCEDURE — 99285 EMERGENCY DEPT VISIT HI MDM: CPT

## 2024-10-10 PROCEDURE — 99222 1ST HOSP IP/OBS MODERATE 55: CPT | Performed by: INTERNAL MEDICINE

## 2024-10-10 PROCEDURE — 70496 CT ANGIOGRAPHY HEAD: CPT

## 2024-10-10 PROCEDURE — 99285 EMERGENCY DEPT VISIT HI MDM: CPT | Performed by: PHYSICIAN ASSISTANT

## 2024-10-10 PROCEDURE — 71045 X-RAY EXAM CHEST 1 VIEW: CPT

## 2024-10-10 PROCEDURE — 36415 COLL VENOUS BLD VENIPUNCTURE: CPT

## 2024-10-10 PROCEDURE — 84484 ASSAY OF TROPONIN QUANT: CPT | Performed by: EMERGENCY MEDICINE

## 2024-10-10 PROCEDURE — 93005 ELECTROCARDIOGRAM TRACING: CPT

## 2024-10-10 PROCEDURE — 70498 CT ANGIOGRAPHY NECK: CPT

## 2024-10-10 PROCEDURE — 85025 COMPLETE CBC W/AUTO DIFF WBC: CPT | Performed by: EMERGENCY MEDICINE

## 2024-10-10 RX ORDER — MECLIZINE HCL 12.5 MG 12.5 MG/1
25 TABLET ORAL EVERY 8 HOURS PRN
Status: DISCONTINUED | OUTPATIENT
Start: 2024-10-10 | End: 2024-10-15 | Stop reason: HOSPADM

## 2024-10-10 RX ORDER — PANTOPRAZOLE SODIUM 20 MG/1
20 TABLET, DELAYED RELEASE ORAL
Status: DISCONTINUED | OUTPATIENT
Start: 2024-10-11 | End: 2024-10-15 | Stop reason: HOSPADM

## 2024-10-10 RX ORDER — SERTRALINE HYDROCHLORIDE 100 MG/1
200 TABLET, FILM COATED ORAL
Status: DISCONTINUED | OUTPATIENT
Start: 2024-10-10 | End: 2024-10-15 | Stop reason: HOSPADM

## 2024-10-10 RX ORDER — BIMATOPROST 0.3 MG/ML
1 SOLUTION/ DROPS OPHTHALMIC DAILY
Status: DISCONTINUED | OUTPATIENT
Start: 2024-10-10 | End: 2024-10-15 | Stop reason: HOSPADM

## 2024-10-10 RX ORDER — AMLODIPINE BESYLATE 5 MG/1
5 TABLET ORAL ONCE
Status: DISCONTINUED | OUTPATIENT
Start: 2024-10-10 | End: 2024-10-10

## 2024-10-10 RX ORDER — HYDROCHLOROTHIAZIDE 25 MG/1
25 TABLET ORAL DAILY
Status: DISCONTINUED | OUTPATIENT
Start: 2024-10-10 | End: 2024-10-10

## 2024-10-10 RX ORDER — ENOXAPARIN SODIUM 100 MG/ML
40 INJECTION SUBCUTANEOUS DAILY
Status: DISCONTINUED | OUTPATIENT
Start: 2024-10-11 | End: 2024-10-15 | Stop reason: HOSPADM

## 2024-10-10 RX ORDER — EZETIMIBE 10 MG/1
10 TABLET ORAL
Status: DISCONTINUED | OUTPATIENT
Start: 2024-10-10 | End: 2024-10-15 | Stop reason: HOSPADM

## 2024-10-10 RX ORDER — HYDRALAZINE HYDROCHLORIDE 20 MG/ML
10 INJECTION INTRAMUSCULAR; INTRAVENOUS EVERY 6 HOURS PRN
Status: DISCONTINUED | OUTPATIENT
Start: 2024-10-10 | End: 2024-10-15 | Stop reason: HOSPADM

## 2024-10-10 RX ORDER — MECLIZINE HYDROCHLORIDE 25 MG/1
25 TABLET ORAL 3 TIMES DAILY PRN
Qty: 30 TABLET | Refills: 0 | Status: SHIPPED | OUTPATIENT
Start: 2024-10-10 | End: 2024-10-10

## 2024-10-10 RX ORDER — MECLIZINE HCL 12.5 MG 12.5 MG/1
25 TABLET ORAL ONCE
Status: COMPLETED | OUTPATIENT
Start: 2024-10-10 | End: 2024-10-10

## 2024-10-10 RX ADMIN — MECLIZINE HYDROCHLORIDE 25 MG: 12.5 TABLET ORAL at 14:13

## 2024-10-10 RX ADMIN — EZETIMIBE 10 MG: 10 TABLET ORAL at 21:14

## 2024-10-10 RX ADMIN — BIMATOPROST 1 DROP: 0.3 SOLUTION/ DROPS OPHTHALMIC at 21:14

## 2024-10-10 RX ADMIN — IOHEXOL 85 ML: 350 INJECTION, SOLUTION INTRAVENOUS at 16:29

## 2024-10-10 RX ADMIN — SERTRALINE 200 MG: 100 TABLET, FILM COATED ORAL at 21:14

## 2024-10-10 NOTE — ASSESSMENT & PLAN NOTE
Family reports worsening cognitive function and memory, patient currently lives by herself but does have aides who come to her home and daughter also helps her perform activities of daily living  OT cognitive evaluation, patient would benefit from geriatrics outpatient eval, will check B12 and TSH

## 2024-10-10 NOTE — ASSESSMENT & PLAN NOTE
Patient presenting with dizziness starting this morning once she got up and moved her head,  she denies hearing changes or ringing in her ears  However during my evaluation does report some dizziness when she was lying down  We will check MRI brain to rule out central etiology  Also noted history of NPH, she does endorse urinary incontinence  Overall impression is peripheral vertigo, however patient is an overall poor historian so we will obtain MRI brain

## 2024-10-10 NOTE — ED PROVIDER NOTES
Time reflects when diagnosis was documented in both MDM as applicable and the Disposition within this note       Time User Action Codes Description Comment    10/10/2024  3:38 PM Lakshmi Martinez Add [R42] Vertigo     10/10/2024  6:18 PM Lakshmi Martinez Add [I10] Hypertension     10/10/2024  6:22 PM Lakshmi Martinez Add [R32] Urinary incontinence           ED Disposition       ED Disposition   Admit    Condition   Stable    Date/Time   u Oct 10, 2024  6:18 PM    Comment   Case was discussed with Salomon and the patient's admission status was agreed to be Admission Status: observation status to the service of Dr. Latif.               Assessment & Plan       Medical Decision Making  Differential diagnosis includes but is not limited to: Vertigo, labyrinthitis, hypertension, arrhythmia, ACS, electrolyte imbalance, vertebral artery dissection, posterior stroke, UTI    Amount and/or Complexity of Data Reviewed  Independent Historian:      Details: Both daughters, concerned for recurrent UTI   Labs: ordered.     Details: Results reviewed  Radiology: ordered.    Risk  Prescription drug management.  Decision regarding hospitalization.        ED Course as of 10/10/24 1825   Thu Oct 10, 2024   1525 Patient is feeling better laying down, she ate lunch, will ambulate prior to dispo   1539 Patient ambulated well with walker, however when she stopped to lay back down she was very dizzy and unsteady again, will further evaluate with CTA, treat htn, and discuss admission   1631 BP meds held as blood pressure is coming down naturally     1807 SLIM paged for admission       Medications   amLODIPine (NORVASC) tablet 5 mg (0 mg Oral Hold 10/10/24 1639)   meclizine (ANTIVERT) tablet 25 mg (25 mg Oral Given 10/10/24 1413)   iohexol (OMNIPAQUE) 350 MG/ML injection (SINGLE-DOSE) 85 mL (85 mL Intravenous Given 10/10/24 1629)       ED Risk Strat Scores   HEART Risk Score      Flowsheet Row Most Recent Value   Heart Score Risk Calculator    History 0  Filed at: 10/10/2024 1823   ECG 0 Filed at: 10/10/2024 1823   Age 2 Filed at: 10/10/2024 1823   Risk Factors 0 Filed at: 10/10/2024 1823   Troponin 0 Filed at: 10/10/2024 1823   HEART Score 2 Filed at: 10/10/2024 1823                               SBIRT 20yo+      Flowsheet Row Most Recent Value   Initial Alcohol Screen: US AUDIT-C     1. How often do you have a drink containing alcohol? 0 Filed at: 10/10/2024 1220   2. How many drinks containing alcohol do you have on a typical day you are drinking?  0 Filed at: 10/10/2024 1220   3a. Male UNDER 65: How often do you have five or more drinks on one occasion? 0 Filed at: 10/10/2024 1220   3b. FEMALE Any Age, or MALE 65+: How often do you have 4 or more drinks on one occassion? 0 Filed at: 10/10/2024 1220   Audit-C Score 0 Filed at: 10/10/2024 1220   CLIFFORD: How many times in the past year have you...    Used an illegal drug or used a prescription medication for non-medical reasons? Never Filed at: 10/10/2024 1220                            History of Present Illness       Chief Complaint   Patient presents with    Dizziness     Pt here by emd from home pt states woke up this morning and felt very dizzy and shaky        Past Medical History:   Diagnosis Date    Acute mastoiditis with intracranial complication 11/16/2022    Anxiety disorder     Brain hemangioma (HCC)     2016    BRCA1 negative     BRCA2 negative     Breast cancer (HCC) 2014    left breast    Cancer (HCC)     Constipation     Esophageal reflux     History of bone density study 2011    [employer] X-Ray Absorptiometry    History of radiation therapy 2014    left breast ca    History of recurrent UTIs     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    Hyperlipidemia     Hypertension     Malignant neoplasm of female breast (HCC) 04/22/2014    Osteoarthritis of hip     Osteopenia     PONV (postoperative nausea and vomiting)     Stroke, hemorrhagic (HCC) 12/2015    Subarachnoid hemorrhage (HCC)        Past Surgical History:   Procedure Laterality Date    BREAST BIOPSY Left 2014    BREAST LUMPECTOMY Left 2014    COLONOSCOPY      Fiberoptic - 2009, 2015 5 year f/u    ESOPHAGOGASTRODUODENOSCOPY  2009    Diag, Resolved - 2006 / 2009    EXOSTECTOMY  2005    Simple Bunion (Silver Procedure)    FL LUMBAR PUNCTURE THERAPEUTIC  9/1/2023    HYSTERECTOMY  1981    JOINT REPLACEMENT Right 2012    Right Hip    OOPHORECTOMY  1981    not sure which one    REPLACEMENT TOTAL KNEE Right 2018    VENTRICULOSTOMY        Family History   Problem Relation Age of Onset    Cancer Mother 69        bone    Hypertension Mother     Arthritis Mother     Stroke Father         TIA    Stroke Maternal Grandmother         CVA    Deep vein thrombosis Daughter     Heart attack Neg Hx     Anuerysm Neg Hx     Clotting disorder Neg Hx     Arrhythmia Neg Hx     Heart failure Neg Hx     Coronary artery disease Neg Hx     Hyperlipidemia Neg Hx     Breast cancer Neg Hx       Social History     Tobacco Use    Smoking status: Never     Passive exposure: Past    Smokeless tobacco: Never   Vaping Use    Vaping status: Never Used   Substance Use Topics    Alcohol use: Yes     Comment: occasionally    Drug use: No      E-Cigarette/Vaping    E-Cigarette Use Never User       E-Cigarette/Vaping Substances    Nicotine No     THC No     CBD No     Flavoring No     Other No     Unknown No       I have reviewed and agree with the history as documented.     79-year-old female presents to emergency room for evaluation of dizziness.  Onset this morning after she woke up.  Patient states she felt herself spinning and was off balance.  States she feels better now laying down.  Denies fall.  Denies change in vision.  Denies headache.  Denies neck pain.  Denies weakness or paresthesia.  Denies chest pain or palpitations.  Denies shortness of breath.  About 3 years ago she was on meclizine for vertigo.  However it resolved and she was taken off of it.      History  provided by:  Patient  Dizziness  Associated symptoms: no chest pain, no headaches, no nausea, no shortness of breath, no vomiting and no weakness        Review of Systems   Constitutional:  Negative for chills and fever.   Eyes:  Negative for visual disturbance.   Respiratory:  Negative for shortness of breath.    Cardiovascular:  Negative for chest pain.   Gastrointestinal:  Negative for nausea and vomiting.   Musculoskeletal:  Negative for neck pain.   Skin:  Negative for rash.   Neurological:  Positive for dizziness. Negative for weakness, numbness and headaches.           Objective       ED Triage Vitals   Temperature Pulse Blood Pressure Respirations SpO2 Patient Position - Orthostatic VS   10/10/24 1217 10/10/24 1213 10/10/24 1215 10/10/24 1213 10/10/24 1213 10/10/24 1415   (!) 97.4 °F (36.3 °C) 74 (!) 179/82 18 98 % Lying      Temp src Heart Rate Source BP Location FiO2 (%) Pain Score    -- 10/10/24 1415 10/10/24 1415 -- 10/10/24 1213     Monitor Right arm  No Pain      Vitals      Date and Time Temp Pulse SpO2 Resp BP Pain Score FACES Pain Rating User   10/10/24 1615 -- 68 97 % -- 134/64 -- --    10/10/24 1612 -- -- -- -- 134/64 -- --    10/10/24 1545 -- -- -- -- 167/93 -- --    10/10/24 1415 -- 77 98 % 16 153/76 -- --    10/10/24 1217 97.4 °F (36.3 °C) -- -- -- -- -- -- AP   10/10/24 1215 -- -- -- -- 179/82 -- -- AP   10/10/24 1213 -- 74 98 % 18 -- No Pain -- AP            Physical Exam  Vitals and nursing note reviewed.   Constitutional:       Appearance: Normal appearance. She is well-developed.   HENT:      Head: Atraumatic.      Right Ear: Tympanic membrane and external ear normal.      Left Ear: Tympanic membrane and external ear normal.      Nose: Nose normal.      Mouth/Throat:      Mouth: Mucous membranes are moist.      Pharynx: No posterior oropharyngeal erythema.   Eyes:      Conjunctiva/sclera: Conjunctivae normal.      Pupils: Pupils are equal, round, and reactive to light.    Cardiovascular:      Rate and Rhythm: Normal rate and regular rhythm.      Heart sounds: Normal heart sounds. No murmur heard.  Pulmonary:      Effort: Pulmonary effort is normal. No respiratory distress.      Breath sounds: Normal breath sounds.   Abdominal:      General: There is no distension.   Musculoskeletal:         General: Normal range of motion.      Cervical back: Normal range of motion and neck supple.   Lymphadenopathy:      Cervical: No cervical adenopathy.   Skin:     General: Skin is warm and dry.      Coloration: Skin is not pale.   Neurological:      Mental Status: She is alert and oriented to person, place, and time.      Cranial Nerves: No cranial nerve deficit.      Motor: No abnormal muscle tone.      Coordination: Finger-Nose-Finger Test and Heel to Shin Test normal.      Comments: Brief horizontal nystagmus noted to the right when patient stood up and became dizzy   Psychiatric:         Mood and Affect: Mood normal.         Results Reviewed       Procedure Component Value Units Date/Time    UA w Reflex to Microscopic w Reflex to Culture [[id number]]     Lab Status: No result Specimen: Urine     HS Troponin I 2hr [[id number]]  (Normal) Collected: 10/10/24 1420    Lab Status: Final result Specimen: Blood from Arm, Right Updated: 10/10/24 1457     hs TnI 2hr 3 ng/L      Delta 2hr hsTnI 0 ng/L     HS Troponin 0hr (reflex protocol) [[id number]]  (Normal) Collected: 10/10/24 1223    Lab Status: Final result Specimen: Blood from Arm, Right Updated: 10/10/24 1257     hs TnI 0hr 3 ng/L     Comprehensive metabolic panel [[id number]] Collected: 10/10/24 1223    Lab Status: Final result Specimen: Blood from Arm, Right Updated: 10/10/24 1249     Sodium 138 mmol/L      Potassium 3.7 mmol/L      Chloride 102 mmol/L      CO2 30 mmol/L      ANION GAP 6 mmol/L      BUN 15 mg/dL      Creatinine 0.75 mg/dL      Glucose 103 mg/dL      Calcium 9.4 mg/dL      AST 18 U/L      ALT 13 U/L      Alkaline Phosphatase 71  U/L      Total Protein 7.0 g/dL      Albumin 4.1 g/dL      Total Bilirubin 0.42 mg/dL      eGFR 76 ml/min/1.73sq m     Narrative:      [employer] Kid[employer] guidelines for Chronic Kidney Disease (CKD):     Stage 1 with normal or high GFR (GFR > 90 mL/min/1.73 square meters)    Stage 2 Mild CKD (GFR = 60-89 mL/min/1.73 square meters)    Stage 3A Moderate CKD (GFR = 45-59 mL/min/1.73 square meters)    Stage 3B Moderate CKD (GFR = 30-44 mL/min/1.73 square meters)    Stage 4 Severe CKD (GFR = 15-29 mL/min/1.73 square meters)    Stage 5 End Stage CKD (GFR <15 mL/min/1.73 square meters)  Note: GFR calculation is accurate only with a steady state creatinine    CBC and differential [[id number]]  (Abnormal) Collected: 10/10/24 1223    Lab Status: Final result Specimen: Blood from Arm, Right Updated: 10/10/24 1236     WBC 6.95 Thousand/uL      RBC 4.57 Million/uL      Hemoglobin 12.9 g/dL      Hematocrit 40.5 %      MCV 89 fL      MCH 28.2 pg      MCHC 31.9 g/dL      RDW 13.3 %      MPV 9.0 fL      Platelets 278 Thousands/uL      nRBC 0 /100 WBCs      Segmented % 76 %      Immature Grans % 0 %      Lymphocytes % 16 %      Monocytes % 7 %      Eosinophils Relative 1 %      Basophils Relative 0 %      Absolute Neutrophils 5.17 Thousands/µL      Absolute Immature Grans 0.03 Thousand/uL      Absolute Lymphocytes 1.13 Thousands/µL      Absolute Monocytes 0.50 Thousand/µL      Eosinophils Absolute 0.09 Thousand/µL      Basophils Absolute 0.03 Thousands/µL             CTA head and neck with and without contrast   Final Interpretation by Robbi Castro MD (10/10 4847)      CT Brain:   No acute intracranial abnormality.      Stable mild chronic microangiopathic changes.      Ventricular enlargement out of proportion to sulcation, similar to prior exams.      CT Angiography:   No large vessel occlusion in the head or neck.      Stable tiny vascular outpouching at the junction of the right A1 and A2 segments which may be  due to vessel tortuosity or tiny aneurysm. This is unchanged dating back to 2019.                  Workstation performed: [id number]         XR chest 1 view portable   Final Interpretation by Dl Mcclain MD (10/10 1336)      No acute cardiopulmonary disease.            Workstation performed: [id number]             ECG 12 Lead Documentation Only    Date/Time: 10/10/2024 1:43 PM    Performed by: Lakshmi Martinez PA-C  Authorized by: Lakshmi Martinez PA-C    Indications / Diagnosis:  Dizziness  ECG reviewed by me, the ED Provider: yes    Patient location:  ED  Previous ECG:     Previous ECG:  Compared to current    Similarity:  No change  Interpretation:     Interpretation: abnormal    Rate:     ECG rate:  66    ECG rate assessment: normal    Rhythm:     Rhythm: sinus rhythm    Ectopy:     Ectopy: PAC    QRS:     QRS axis:  Normal  Conduction:     Conduction: normal    ST segments:     ST segments:  Normal  T waves:     T waves: normal        ED Medication and Procedure Management   Prior to Admission Medications   Prescriptions Last Dose Informant Patient Reported? Taking?   ALPRAZolam (XANAX) 0.25 mg tablet  Self, Child No No   Sig: TAKE ONE TABLET BY MOUTH EVERY DAY AS NEEDED FOR ANXIETY TAKE 2 TABLETS 30 MINUTES PRIOR TO PROCEDURE   Calcium-Vitamin D (CALTRATE 600 PLUS-VIT D PO)  Self, Child Yes No   Sig: Take by mouth daily.   LUMIGAN 0.01 % ophthalmic drops  Self, Child Yes No   Sig: PLACE 1 DROP INTO IN EACH EYE AT BEDTIME   Multiple Vitamins-Minerals (MULTIVITAMIN ADULTS 50+) TABS  Self, Child Yes No   Sig: Take by mouth daily   NON FORMULARY  Self, Child Yes No   Sig: CBD Gummies   acetaminophen (TYLENOL) 500 mg tablet  Self, Child Yes No   Sig: Take 1,000 mg by mouth if needed for mild pain   esomeprazole (NexIUM) 10 MG packet  Self, Child Yes No   Sig: Take 10 mg by mouth every morning before breakfast   ezetimibe (ZETIA) 10 mg tablet  Self, Child No No   Sig: TAKE ONE TABLET BY MOUTH EVERY DAY    fluticasone (FLONASE) 50 mcg/act nasal spray  Self, Child Yes No   Si spray into each nostril as needed   meclizine (ANTIVERT) 25 mg tablet  Self, Child No No   Sig: Take 1 tablet (25 mg total) by mouth every 8 (eight) hours as needed for dizziness   Patient not taking: Reported on 2024   psyllium (METAMUCIL) 0.52 g capsule  Self, Child Yes No   Sig: Take by mouth daily 2-3 caps daily   Patient not taking: Reported on 2024   risperiDONE (RisperDAL) 0.5 mg tablet   No No   Sig: Take 1 tablet (0.5 mg total) by mouth daily after dinner   sertraline (ZOLOFT) 100 mg tablet  Self, Child No No   Sig: TAKE TWO TABLETS BY MOUTH EVERY DAY      Facility-Administered Medications: None     Current Discharge Medication List        CONTINUE these medications which have NOT CHANGED    Details   acetaminophen (TYLENOL) 500 mg tablet Take 1,000 mg by mouth if needed for mild pain      ALPRAZolam (XANAX) 0.25 mg tablet TAKE ONE TABLET BY MOUTH EVERY DAY AS NEEDED FOR ANXIETY TAKE 2 TABLETS 30 MINUTES PRIOR TO PROCEDURE  Qty: 30 tablet, Refills: 0    Associated Diagnoses: Anxiety      Calcium-Vitamin D (CALTRATE 600 PLUS-VIT D PO) Take by mouth daily.      esomeprazole (NexIUM) 10 MG packet Take 10 mg by mouth every morning before breakfast      ezetimibe (ZETIA) 10 mg tablet TAKE ONE TABLET BY MOUTH EVERY DAY  Qty: 30 tablet, Refills: 5    Associated Diagnoses: Hypercholesterolemia      fluticasone (FLONASE) 50 mcg/act nasal spray 1 spray into each nostril as needed      LUMIGAN 0.01 % ophthalmic drops PLACE 1 DROP INTO IN EACH EYE AT BEDTIME  Refills: 11      meclizine (ANTIVERT) 25 mg tablet Take 1 tablet (25 mg total) by mouth every 8 (eight) hours as needed for dizziness  Qty: 30 tablet, Refills: 0    Associated Diagnoses: Vertigo      Multiple Vitamins-Minerals (MULTIVITAMIN ADULTS 50+) TABS Take by mouth daily      NON FORMULARY CBD Gummies      psyllium (METAMUCIL) 0.52 g capsule Take by mouth daily 2-3 caps  daily      risperiDONE (RisperDAL) 0.5 mg tablet Take 1 tablet (0.5 mg total) by mouth daily after dinner  Qty: 30 tablet, Refills: 1    Associated Diagnoses: Hallucinations; Mild dementia with psychotic disturbance, unspecified dementia type (HCC)      sertraline (ZOLOFT) 100 mg tablet TAKE TWO TABLETS BY MOUTH EVERY DAY  Qty: 180 tablet, Refills: 1    Associated Diagnoses: Depression with anxiety           No discharge procedures on file.  ED SEPSIS DOCUMENTATION   Time reflects when diagnosis was documented in both MDM as applicable and the Disposition within this note       Time User Action Codes Description Comment    10/10/2024  3:38 PM Lakshmi Martinez [R42] Vertigo     10/10/2024  6:18 PM Lakshmi Martinez [I10] Hypertension     10/10/2024  6:22 PM Lakshmi Martinez [R32] Urinary incontinence                  Lakshmi Martinez PA-C  10/10/24 1825

## 2024-10-10 NOTE — H&P
H&P - Hospitalist   Name: Samson Watts 79 y.o. female I MRN: [id number]  Unit/Bed#: [facility] I Date of Admission: 10/10/2024   Date of Service: 10/10/2024 I Hospital Day: 0     Assessment & Plan  Vertigo  Patient presenting with dizziness starting this morning once she got up and moved her head,  she denies hearing changes or ringing in her ears  However during my evaluation does report some dizziness when she was lying down  We will check MRI brain to rule out central etiology  Also noted history of NPH, she does endorse urinary incontinence  Overall impression is peripheral vertigo, however patient is an overall poor historian so we will obtain MRI brain    NPH (normal pressure hydrocephalus) (HCC)    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  Code Status: Level 1 - Full Code   Discussion with family: Updated  (daughter) at bedside.    Anticipated Length of Stay: Patient will be admitted on an observation basis with an anticipated length of stay of less than 2 midnights secondary to  .    History of Present Illness   Chief Complaint: dizziness    Samson Watts is a 79 y.o. female with a PMH of breast cancer, hydrocephalus secondary to subarachnoid hemorrhage, hypertension hyperlipidemia vertigo who presents with dizziness.  Patient reported waking up feeling dizzy this morning, she reports she felt dizzy after standing up and ambulating as well as turning her head.  She denies any other symptoms including headache vision changes numbness or tingling her extremities  chest pain palpitation shortness of breath.  Daughter at bedside reports she is having worsening cognitive deficits over the past several months.  Patient will be admitted for MRI to rule out central cause of her dizziness.     Review of Systems   Constitutional:  Negative for chills, fatigue and fever.   HENT:  Negative for ear pain and sore throat.    Eyes:  Negative for pain and visual disturbance.   Respiratory:  Negative for cough, shortness of breath and wheezing.    Cardiovascular:  Negative for chest pain, palpitations and leg swelling.   Gastrointestinal:  Negative for abdominal distention, abdominal pain, diarrhea, nausea and vomiting.   Endocrine: Negative for polyuria.   Genitourinary:  Negative for dysuria, frequency and hematuria.   Musculoskeletal:  Negative for arthralgias and back pain.   Skin:  Negative for color change and rash.   Neurological:  Positive for dizziness. Negative for seizures and syncope.   All other systems reviewed and are negative.      Historical Information   Past Medical History:   Diagnosis Date    Acute mastoiditis with intracranial complication 11/16/2022    Anxiety disorder     Brain hemangioma (HCC)     2016    BRCA1 negative     BRCA2 negative     Breast cancer (HCC) 2014    left breast    Cancer (HCC)     Constipation     Esophageal reflux     History of bone density study 2011    Dual Energy X-Ray Absorptiometry    History of radiation therapy 2014    left breast ca    History of recurrent UTIs     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    Hyperlipidemia     Hypertension     Malignant neoplasm of female breast (HCC) 04/22/2014    Osteoarthritis of hip     Osteopenia     PONV (postoperative nausea and vomiting)     Stroke, hemorrhagic (HCC) 12/2015    Subarachnoid hemorrhage (HCC)      Past Surgical History:   Procedure Laterality Date    BREAST BIOPSY Left 2014    BREAST LUMPECTOMY Left 2014    COLONOSCOPY      Fiberoptic - 2009, 2015 5 year f/u     ESOPHAGOGASTRODUODENOSCOPY  2009    Diag, Resolved - 2006 / 2009    EXOSTECTOMY  2005    Simple Bunion (Silver Procedure)    FL LUMBAR PUNCTURE THERAPEUTIC  9/1/2023    HYSTERECTOMY  1981    JOINT REPLACEMENT Right 2012    Right Hip    OOPHORECTOMY  1981    not sure which one    REPLACEMENT TOTAL KNEE Right 2018    VENTRICULOSTOMY       Social History     Tobacco Use    Smoking status: Never     Passive exposure: Past    Smokeless tobacco: Never   Vaping Use    Vaping status: Never Used   Substance and Sexual Activity    Alcohol use: Yes     Comment: occasionally    Drug use: No    Sexual activity: Not Currently     E-Cigarette/Vaping    E-Cigarette Use Never User      E-Cigarette/Vaping Substances    Nicotine No     THC No     CBD No     Flavoring No     Other No     Unknown No      Family History   Problem Relation Age of Onset    Cancer Mother 69        bone    Hypertension Mother     Arthritis Mother     Stroke Father         TIA    Stroke Maternal Grandmother         CVA    Deep vein thrombosis Daughter     Heart attack Neg Hx     Anuerysm Neg Hx     Clotting disorder Neg Hx     Arrhythmia Neg Hx     Heart failure Neg Hx     Coronary artery disease Neg Hx     Hyperlipidemia Neg Hx     Breast cancer Neg Hx      Social History:  Marital Status:    Occupation:   Patient Pre-hospital Living Situation: Home  Patient Pre-hospital Level of Mobility: walks with walker  Patient Pre-hospital Diet Restrictions:     Meds/Allergies   (Not in a hospital admission)    Allergies   Allergen Reactions    Oxycodone Vomiting    Atorvastatin Other (See Comments)     Leg/knee pain (joint pain)    Bactrim [Sulfamethoxazole-Trimethoprim] Itching and Rash    Keppra [Levetiracetam] Rash    Livalo [Pitavastatin] Itching    Penicillins Rash     Agitation        Objective :  Temp:  [97.4 °F (36.3 °C)] 97.4 °F (36.3 °C)  HR:  [68-77] 68  BP: (134-179)/(64-93) 134/64  Resp:  [16-18] 16  SpO2:  [97 %-98 %] 97 %  O2 Device: None  (Room air)    Physical Exam  Vitals and nursing note reviewed.   Constitutional:       General: She is not in acute distress.     Appearance: She is well-developed. She is not toxic-appearing or diaphoretic.   HENT:      Head: Normocephalic and atraumatic.   Eyes:      General: No scleral icterus.     Conjunctiva/sclera: Conjunctivae normal.   Cardiovascular:      Rate and Rhythm: Normal rate and regular rhythm.      Heart sounds: No murmur heard.     No friction rub. No gallop.   Pulmonary:      Effort: Pulmonary effort is normal. No respiratory distress.      Breath sounds: Normal breath sounds. No stridor. No wheezing, rhonchi or rales.   Chest:      Chest wall: No tenderness.   Abdominal:      General: There is no distension.      Palpations: Abdomen is soft. There is no mass.      Tenderness: There is no abdominal tenderness. There is no guarding or rebound.      Hernia: No hernia is present.   Musculoskeletal:         General: No swelling or tenderness.      Cervical back: Neck supple.   Skin:     General: Skin is warm and dry.      Capillary Refill: Capillary refill takes less than 2 seconds.   Neurological:      Mental Status: She is alert and oriented to person, place, and time.   Psychiatric:         Mood and Affect: Mood normal.         Lines/Drains:            Lab Results: I have reviewed the following results:  Results from last 7 days   Lab Units 10/10/24  1223   WBC Thousand/uL 6.95   HEMOGLOBIN g/dL 12.9   HEMATOCRIT % 40.5   PLATELETS Thousands/uL 278   SEGS PCT % 76*   LYMPHO PCT % 16   MONO PCT % 7   EOS PCT % 1     Results from last 7 days   Lab Units 10/10/24  1223   SODIUM mmol/L 138   POTASSIUM mmol/L 3.7   CHLORIDE mmol/L 102   CO2 mmol/L 30   BUN mg/dL 15   CREATININE mg/dL 0.75   ANION GAP mmol/L 6   CALCIUM mg/dL 9.4   ALBUMIN g/dL 4.1   TOTAL BILIRUBIN mg/dL 0.42   ALK PHOS U/L 71   ALT U/L 13   AST U/L 18   GLUCOSE RANDOM mg/dL 103             Lab Results   Component Value Date    HGBA1C  5.4 09/10/2024    BA1C 5.6 05/11/2019    BA1C 5.3 01/05/2016           Imaging Results Review: I personally reviewed the following image studies/reports in PACS and discussed pertinent findings with Radiology: CT head. My interpretation of the radiology images/reports is:  .      Administrative Statements     ** Please Note: This note has been constructed using a voice recognition system. **

## 2024-10-11 ENCOUNTER — APPOINTMENT (OUTPATIENT)
Dept: MRI IMAGING | Facility: HOSPITAL | Age: 79
DRG: 149 | End: 2024-10-11
Payer: COMMERCIAL

## 2024-10-11 LAB
ANION GAP SERPL CALCULATED.3IONS-SCNC: 7 MMOL/L (ref 4–13)
BASOPHILS # BLD AUTO: 0.02 THOUSANDS/ΜL (ref 0–0.1)
BASOPHILS NFR BLD AUTO: 0 % (ref 0–1)
BUN SERPL-MCNC: 12 MG/DL (ref 5–25)
CALCIUM SERPL-MCNC: 9.1 MG/DL (ref 8.4–10.2)
CHLORIDE SERPL-SCNC: 106 MMOL/L (ref 96–108)
CO2 SERPL-SCNC: 28 MMOL/L (ref 21–32)
CREAT SERPL-MCNC: 0.71 MG/DL (ref 0.6–1.3)
EOSINOPHIL # BLD AUTO: 0.25 THOUSAND/ΜL (ref 0–0.61)
EOSINOPHIL NFR BLD AUTO: 4 % (ref 0–6)
ERYTHROCYTE [DISTWIDTH] IN BLOOD BY AUTOMATED COUNT: 13.5 % (ref 11.6–15.1)
GFR SERPL CREATININE-BSD FRML MDRD: 81 ML/MIN/1.73SQ M
GLUCOSE P FAST SERPL-MCNC: 88 MG/DL (ref 65–99)
GLUCOSE SERPL-MCNC: 88 MG/DL (ref 65–140)
HCT VFR BLD AUTO: 41.8 % (ref 34.8–46.1)
HGB BLD-MCNC: 13.5 G/DL (ref 11.5–15.4)
IMM GRANULOCYTES # BLD AUTO: 0.02 THOUSAND/UL (ref 0–0.2)
IMM GRANULOCYTES NFR BLD AUTO: 0 % (ref 0–2)
LYMPHOCYTES # BLD AUTO: 1.97 THOUSANDS/ΜL (ref 0.6–4.47)
LYMPHOCYTES NFR BLD AUTO: 30 % (ref 14–44)
MCH RBC QN AUTO: 28.2 PG (ref 26.8–34.3)
MCHC RBC AUTO-ENTMCNC: 32.3 G/DL (ref 31.4–37.4)
MCV RBC AUTO: 87 FL (ref 82–98)
MONOCYTES # BLD AUTO: 0.76 THOUSAND/ΜL (ref 0.17–1.22)
MONOCYTES NFR BLD AUTO: 11 % (ref 4–12)
NEUTROPHILS # BLD AUTO: 3.65 THOUSANDS/ΜL (ref 1.85–7.62)
NEUTS SEG NFR BLD AUTO: 55 % (ref 43–75)
NRBC BLD AUTO-RTO: 0 /100 WBCS
PLATELET # BLD AUTO: 269 THOUSANDS/UL (ref 149–390)
PMV BLD AUTO: 8.9 FL (ref 8.9–12.7)
POTASSIUM SERPL-SCNC: 3.7 MMOL/L (ref 3.5–5.3)
RBC # BLD AUTO: 4.79 MILLION/UL (ref 3.81–5.12)
SODIUM SERPL-SCNC: 141 MMOL/L (ref 135–147)
TSH SERPL DL<=0.05 MIU/L-ACNC: 3.61 UIU/ML (ref 0.45–4.5)
VIT B12 SERPL-MCNC: 626 PG/ML (ref 180–914)
WBC # BLD AUTO: 6.67 THOUSAND/UL (ref 4.31–10.16)

## 2024-10-11 PROCEDURE — 84443 ASSAY THYROID STIM HORMONE: CPT | Performed by: INTERNAL MEDICINE

## 2024-10-11 PROCEDURE — G0008 ADMIN INFLUENZA VIRUS VAC: HCPCS | Performed by: INTERNAL MEDICINE

## 2024-10-11 PROCEDURE — 90662 IIV NO PRSV INCREASED AG IM: CPT | Performed by: INTERNAL MEDICINE

## 2024-10-11 PROCEDURE — 97163 PT EVAL HIGH COMPLEX 45 MIN: CPT

## 2024-10-11 PROCEDURE — 70551 MRI BRAIN STEM W/O DYE: CPT

## 2024-10-11 PROCEDURE — 80048 BASIC METABOLIC PNL TOTAL CA: CPT | Performed by: INTERNAL MEDICINE

## 2024-10-11 PROCEDURE — 97116 GAIT TRAINING THERAPY: CPT

## 2024-10-11 PROCEDURE — 85025 COMPLETE CBC W/AUTO DIFF WBC: CPT | Performed by: INTERNAL MEDICINE

## 2024-10-11 PROCEDURE — 97130 THER IVNTJ EA ADDL 15 MIN: CPT

## 2024-10-11 PROCEDURE — 97129 THER IVNTJ 1ST 15 MIN: CPT

## 2024-10-11 PROCEDURE — 97167 OT EVAL HIGH COMPLEX 60 MIN: CPT

## 2024-10-11 PROCEDURE — 99232 SBSQ HOSP IP/OBS MODERATE 35: CPT | Performed by: INTERNAL MEDICINE

## 2024-10-11 RX ORDER — LORAZEPAM 2 MG/ML
0.5 INJECTION INTRAMUSCULAR ONCE AS NEEDED
Status: COMPLETED | OUTPATIENT
Start: 2024-10-11 | End: 2024-10-11

## 2024-10-11 RX ADMIN — ENOXAPARIN SODIUM 40 MG: 40 INJECTION SUBCUTANEOUS at 09:56

## 2024-10-11 RX ADMIN — EZETIMIBE 10 MG: 10 TABLET ORAL at 21:03

## 2024-10-11 RX ADMIN — LORAZEPAM 0.5 MG: 2 INJECTION INTRAMUSCULAR; INTRAVENOUS at 06:40

## 2024-10-11 RX ADMIN — PANTOPRAZOLE SODIUM 20 MG: 20 TABLET, DELAYED RELEASE ORAL at 06:12

## 2024-10-11 RX ADMIN — INFLUENZA A VIRUS A/VICTORIA/4897/2022 IVR-238 (H1N1) ANTIGEN (FORMALDEHYDE INACTIVATED), INFLUENZA A VIRUS A/CALIFORNIA/122/2022 SAN-022 (H3N2) ANTIGEN (FORMALDEHYDE INACTIVATED), AND INFLUENZA B VIRUS B/MICHIGAN/01/2021 ANTIGEN (FORMALDEHYDE INACTIVATED) 0.5 ML: 60; 60; 60 INJECTION, SUSPENSION INTRAMUSCULAR at 06:12

## 2024-10-11 RX ADMIN — MECLIZINE 25 MG: 12.5 TABLET ORAL at 09:56

## 2024-10-11 RX ADMIN — BIMATOPROST 1 DROP: 0.3 SOLUTION/ DROPS OPHTHALMIC at 21:03

## 2024-10-11 RX ADMIN — SERTRALINE 200 MG: 100 TABLET, FILM COATED ORAL at 21:03

## 2024-10-11 NOTE — ASSESSMENT & PLAN NOTE
Patient presenting with dizziness starting this morning once she got up and moved her head,  she denies hearing changes or ringing in her ears  However during my evaluation does report some dizziness when she was lying down  MRI negative for acute intracranial process  Consider BPPV,  Check orthostatics  Resolved

## 2024-10-11 NOTE — PLAN OF CARE
Problem: Prexisting or High Potential for Compromised Skin Integrity  Goal: Skin integrity is maintained or improved  Description: INTERVENTIONS:  - Identify patients at risk for skin breakdown  - Assess and monitor skin integrity  - Assess and monitor nutrition and hydration status  - Monitor labs   - Assess for incontinence   - Turn and reposition patient  - Assist with mobility/ambulation  - Relieve pressure over bony prominences  - Avoid friction and shearing  - Provide appropriate hygiene as needed including keeping skin clean and dry  - Evaluate need for skin moisturizer/barrier cream  - Collaborate with interdisciplinary team   - Patient/family teaching  - Consider wound care consult   Outcome: Progressing     Problem: NEUROSENSORY - ADULT  Goal: Achieves stable or improved neurological status  Description: INTERVENTIONS  - Monitor and report changes in neurological status  - Monitor vital signs such as temperature, blood pressure, glucose, and any other labs ordered   - Initiate measures to prevent increased intracranial pressure  - Monitor for seizure activity and implement precautions if appropriate      Outcome: Progressing  Goal: Remains free of injury related to seizures activity  Description: INTERVENTIONS  - Maintain airway, patient safety  and administer oxygen as ordered  - Monitor patient for seizure activity, document and report duration and description of seizure to physician/advanced practitioner  - If seizure occurs,  ensure patient safety during seizure  - Reorient patient post seizure  - Seizure pads on all 4 side rails  - Instruct patient/family to notify RN of any seizure activity including if an aura is experienced  - Instruct patient/family to call for assistance with activity based on nursing assessment  - Administer anti-seizure medications if ordered    Outcome: Progressing  Goal: Achieves maximal functionality and self care  Description: INTERVENTIONS  - Monitor swallowing and  airway patency with patient fatigue and changes in neurological status  - Encourage and assist patient to increase activity and self care.   - Encourage visually impaired, hearing impaired and aphasic patients to use assistive/communication devices  Outcome: Progressing     Problem: SAFETY ADULT  Goal: Patient will remain free of falls  Description: INTERVENTIONS:  - Educate patient/family on patient safety including physical limitations  - Instruct patient to call for assistance with activity   - Consult OT/PT to assist with strengthening/mobility   - Keep Call bell within reach  - Keep bed low and locked with side rails adjusted as appropriate  - Keep care items and personal belongings within reach  - Initiate and maintain comfort rounds  - Make Fall Risk Sign visible to staff  - Offer Toileting every 2 Hours, in advance of need  - Initiate/Maintain bed/chair alarm  - Obtain necessary fall risk management equipment  - Apply yellow socks and bracelet for high fall risk patients  - Consider moving patient to room near nurses station  Outcome: Progressing

## 2024-10-11 NOTE — ASSESSMENT & PLAN NOTE
Family reports worsening cognitive function and memory, patient currently lives by herself but does have aides who come to her home and daughter also helps her perform activities of daily living  OT cognitive evaluation, patient would benefit from geriatrics outpatient eval, will check B12 and TSH  PT/OT recommending rehab. CM following

## 2024-10-11 NOTE — PLAN OF CARE
Problem: OCCUPATIONAL THERAPY ADULT  Goal: Performs self-care activities at highest level of function for planned discharge setting.  See evaluation for individualized goals.  Description: Treatment Interventions: ADL retraining, Functional transfer training, Endurance training, Cognitive reorientation, Patient/family training, Equipment evaluation/education, Compensatory technique education, Energy conservation, Activityengagement          See flowsheet documentation for full assessment, interventions and recommendations.   Note: Limitation: Decreased ADL status, Decreased UE strength, Decreased Safe judgement during ADL, Decreased cognition, Decreased endurance, Decreased self-care trans, Decreased high-level ADLs (impaired balance, fxnl mobility, act theo, fxnl reach, standing theo, strength, attention to task, direction following, safety awareness, insight, pacing, problem solving, learning new tasks, termination of conversation)  Prognosis: Good  Assessment: Pt is a 79 y.o. female seen for OT evaluation s/p admission to [facility] on 10/10/2024 due to dizziness. Diagnosed with Vertigo. Personal and env factors supporting pt at time of IE include (I) PLOF, accessible home environment, and FFSU. Personal and env factors inhibiting engagement in occupations include limited social support and difficulty completing IADLs. Performance deficits that affect the pt’s occupational performance can be seen above. Due to pt's current functional limitations and medical complications pt is functioning below baseline. Pt would benefit from continued skilled OT treatment in order to maximize safety, independence and overall performance with ADLs, functional mobility, functional transfers, and cognition in order to achieve highest level of function.  Recommendation: (S) Geriatric Consult  Rehab Resource Intensity Level, OT: (S) II (Moderate Resource Intensity) (s/p level 2 resources recommend d/c to environment with 24/7 care as per  ACLS recommendations)

## 2024-10-11 NOTE — UTILIZATION REVIEW
Initial Clinical Review  Observation on 10/10 @ 1822 upgraded to Inpatient on 10/11 @ 1253. Pt requiring continued stay d/t continued vertigo w/u and IP rehab placement.    Admission: Date/Time/Statement:   Admission Orders (From admission, onward)       Ordered        10/11/24 1253  INPATIENT ADMISSION  Once            10/10/24 1822  Place in Observation  Once                          Orders Placed This Encounter   Procedures    INPATIENT ADMISSION     Standing Status:   Standing     Number of Occurrences:   1     Order Specific Question:   Level of Care     Answer:   Med Surg [16]     Order Specific Question:   Estimated length of stay     Answer:   More than 2 Midnights     Order Specific Question:   Certification     Answer:   I certify that inpatient services are medically necessary for this patient for a duration of greater than two midnights. See H&P and MD Progress Notes for additional information about the patient's course of treatment.     ED Arrival Information       Expected   -    Arrival   10/10/2024 12:10    Acuity   Urgent              Means of arrival   Ambulance    Escorted by   [facility]    Service   Hospitalist    Admission type   Emergency              Arrival complaint   EMS             Chief Complaint   Patient presents with    Dizziness     Pt here by emd from home pt states woke up this morning and felt very dizzy and shaky        Initial Presentation: 79 y.o. female with a PMH of breast cancer, hydrocephalus secondary to subarachnoid hemorrhage, hypertension hyperlipidemia, vertigo presented to the ED from home via EMS w/ dizziness.  Pt reports  waking up feeling dizzy this morning, she reports she felt dizzy after standing up and ambulating as well as turning her head. Per daughter, she has been having orsening cognitive deficits over the past several months.   In the ED, /82. On exam, aaox3, pulmonary effort normal.  Given po meclizine.    Admit as observation level of care for  vertigo.  Plan: check MRI brain to rule out central etiology. OT cognitive evaluation. check B12 and TSH     Date:  10/11  Day 2:   Certification Statement: The patient will continue to require additional inpatient hospital stay due to Vertigo, rehab.    IM Notes: Pt reports dizziness improved. MRI negative for acute intracranial process. Consider BPPV, Check orthostatics.     PT/OT recommending level 2, moderate resource intensity rehab.    ED Treatment-Medication Administration from 10/10/2024 1210 to 10/10/2024 1904         Date/Time Order Dose Route Action     10/10/2024 1413 meclizine (ANTIVERT) tablet 25 mg 25 mg Oral Given     10/10/2024 1629 iohexol (OMNIPAQUE) 350 MG/ML injection (SINGLE-DOSE) 85 mL 85 mL Intravenous Given            Scheduled Medications:  bimatoprost, 1 drop, Both Eyes, Daily  enoxaparin, 40 mg, Subcutaneous, Daily  ezetimibe, 10 mg, Oral, HS  pantoprazole, 20 mg, Oral, Early Morning  sertraline, 200 mg, Oral, HS      Continuous IV Infusions: none     PRN Meds:  hydrALAZINE, 10 mg, Intravenous, Q6H PRN  meclizine, 25 mg, Oral, Q8H PRN 10/11 x 1      ED Triage Vitals   Temperature Pulse Respirations Blood Pressure SpO2 Pain Score   10/10/24 1217 10/10/24 1213 10/10/24 1213 10/10/24 1215 10/10/24 1213 10/10/24 1213   (!) 97.4 °F (36.3 °C) 74 18 (!) 179/82 98 % No Pain     Weight (last 2 days)       Date/Time Weight    10/11/24 0550 68.9 (151.9)    10/10/24 19:11:20 69.5 (153.22)    10/10/24 1215 74.6 (164.46)            Vital Signs (last 3 days)       Date/Time Temp Pulse Resp BP MAP (mmHg) SpO2 O2 Device Patient Position - Orthostatic VS Collin Coma Scale Score Pain    10/11/24 15:00:41 98 °F (36.7 °C) -- 16 123/60 81 -- -- -- -- --    10/11/24 1104 -- -- -- -- -- -- -- -- -- No Pain    10/11/24 1023 -- -- -- -- -- -- -- -- -- No Pain    10/11/24 0900 -- -- -- -- -- -- -- -- 15 No Pain    10/10/24 21:45:23 98.1 °F (36.7 °C) 67 -- 154/70 98 100 % -- -- -- --    10/10/24 2100 -- -- -- -- --  -- None (Room air) -- 15 --    10/10/24 1952 -- -- -- -- -- -- -- -- -- No Pain    10/10/24 19:11:20 97.6 °F (36.4 °C) 74 16 164/89 114 99 % None (Room air) Lying -- --    10/10/24 19:10:25 97.6 °F (36.4 °C) -- -- 164/89 114 -- -- -- -- --    10/10/24 1615 -- 68 -- 134/64 92 97 % -- -- -- --    10/10/24 1612 -- -- -- 134/64 -- -- -- -- -- --    10/10/24 1545 -- -- -- 167/93 122 -- -- -- -- --    10/10/24 1415 -- 77 16 153/76 104 98 % None (Room air) Lying -- --    10/10/24 1239 -- -- -- -- -- -- -- -- 15 --    10/10/24 1217 97.4 °F (36.3 °C) -- -- -- -- -- -- -- -- --    10/10/24 1215 -- -- -- 179/82 118 -- -- -- -- --    10/10/24 1213 -- 74 18 -- -- 98 % -- -- -- No Pain              Pertinent Labs/Diagnostic Test Results:   Radiology:  MRI brain wo contrast   Final Interpretation by E. Alec Schoenberger, MD (10/11 0820)      No acute intracranial pathology. Chronic microangiopathy.   Stable mild ventricular prominence out of proportion to degree of cortical volume loss that may represent NPH in the appropriate clinical setting.      Workstation performed: [id number]         CTA head and neck with and without contrast   Final Interpretation by Robbi Castro MD (10/10 7537)      CT Brain:   No acute intracranial abnormality.      Stable mild chronic microangiopathic changes.      Ventricular enlargement out of proportion to sulcation, similar to prior exams.      CT Angiography:   No large vessel occlusion in the head or neck.      Stable tiny vascular outpouching at the junction of the right A1 and A2 segments which may be due to vessel tortuosity or tiny aneurysm. This is unchanged dating back to 2019.                  Workstation performed: [id number]         XR chest 1 view portable   Final Interpretation by Dl Mcclain MD (10/10 1336)      No acute cardiopulmonary disease.            Workstation performed: [id number]           Cardiology:  No orders to display     GI:  No orders to display            Results from last 7 days   Lab Units 10/11/24  0547 10/10/24  1223   WBC Thousand/uL 6.67 6.95   HEMOGLOBIN g/dL 13.5 12.9   HEMATOCRIT % 41.8 40.5   PLATELETS Thousands/uL 269 278   TOTAL NEUT ABS Thousands/µL 3.65 5.17         Results from last 7 days   Lab Units 10/11/24  0547 10/10/24  1223   SODIUM mmol/L 141 138   POTASSIUM mmol/L 3.7 3.7   CHLORIDE mmol/L 106 102   CO2 mmol/L 28 30   ANION GAP mmol/L 7 6   BUN mg/dL 12 15   CREATININE mg/dL 0.71 0.75   EGFR ml/min/1.73sq m 81 76   CALCIUM mg/dL 9.1 9.4     Results from last 7 days   Lab Units 10/10/24  1223   AST U/L 18   ALT U/L 13   ALK PHOS U/L 71   TOTAL PROTEIN g/dL 7.0   ALBUMIN g/dL 4.1   TOTAL BILIRUBIN mg/dL 0.42         Results from last 7 days   Lab Units 10/11/24  0547 10/10/24  1223   GLUCOSE RANDOM mg/dL 88 103             Results from last 7 days   Lab Units 10/10/24  1420 10/10/24  1223   HS TNI 0HR ng/L  --  3   HS TNI 2HR ng/L 3  --    HSTNI D2 ng/L 0  --              Results from last 7 days   Lab Units 10/11/24  0547   TSH 3RD GENERATON uIU/mL 3.610           Past Medical History:   Diagnosis Date    Acute mastoiditis with intracranial complication 11/16/2022    Anxiety disorder     Brain hemangioma (HCC)     2016    BRCA1 negative     BRCA2 negative     Breast cancer (HCC) 2014    left breast    Cancer (HCC)     Constipation     Esophageal reflux     History of bone density study 2011    Dual Energy X-Ray Absorptiometry    History of radiation therapy 2014    left breast ca    History of recurrent UTIs     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    Hyperlipidemia     Hypertension     Malignant neoplasm of female breast (HCC) 04/22/2014    Osteoarthritis of hip     Osteopenia     PONV (postoperative nausea and vomiting)     Stroke, hemorrhagic (HCC) 12/2015    Subarachnoid hemorrhage (HCC)      Present on Admission:   NPH (normal pressure hydrocephalus) (HCC)   Moderate vascular dementia with psychotic disturbance  (HCC)   Vertigo      Admitting Diagnosis: Dizziness [R42]  Vertigo [R42]  Urinary incontinence [R32]  Hypertension [I10]  Age/Sex: 79 y.o. female    Network Utilization Review Department  ATTENTION: Please call with any questions or concerns to [phone] and carefully listen to the prompts so that you are directed to the right person. All voicemails are confidential.   For Discharge needs, contact Care Management DC Support Team at [phone] opt. [phone]  Send all requests for admission clinical reviews, approved or denied determinations and any other requests to dedicated fax number below belonging to the campus where the patient is receiving treatment. List of dedicated fax numbers for the Facilities:  FACILITY NAME UR FAX NUMBER   ADMISSION DENIALS (Administrative/Medical Necessity) [phone]   DISCHARGE SUPPORT TEAM (NETWORK) [phone]   PARENT CHILD HEALTH (Maternity/NICU/Pediatrics) [phone]   [facility] [phone]   [facility] [phone]   [facility] [phone]   [facility] [phone]   [facility] [phone]   [facility] [phone]   [facility] [phone]   [facility] [phone]   [facility] [phone]   [facility] [phone]   [facility] [phone]   [facility] [phone]

## 2024-10-11 NOTE — PHYSICAL THERAPY NOTE
PHYSICAL THERAPY EVALUATION  NAME: Samson Watts  AGE:   79 y.o.  MRN:  [id number]  ADMIT DX: Dizziness [R42]  Vertigo [R42]  Urinary incontinence [R32]  Hypertension [I10]    PMH:   Past Medical History:   Diagnosis Date    Acute mastoiditis with intracranial complication 11/16/2022    Anxiety disorder     Brain hemangioma (HCC)     2016    BRCA1 negative     BRCA2 negative     Breast cancer (HCC) 2014    left breast    Cancer (HCC)     Constipation     Esophageal reflux     History of bone density study 2011    [employer] X-Ray Absorptiometry    History of radiation therapy 2014    left breast ca    History of recurrent UTIs     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    Hyperlipidemia     Hypertension     Malignant neoplasm of female breast (HCC) 04/22/2014    Osteoarthritis of hip     Osteopenia     PONV (postoperative nausea and vomiting)     Stroke, hemorrhagic (HCC) 12/2015    Subarachnoid hemorrhage (HCC)      LENGTH OF STAY: 0        10/11/24 1023   PT Last Visit   PT Visit Date 10/11/24   Note Type   Note type Evaluation   Pain Assessment   Pain Assessment Tool 0-10   Pain Score No Pain   Restrictions/Precautions   Weight Bearing Precautions Per Order No   Other Precautions Cognitive;Chair Alarm;Bed Alarm;Fall Risk   Home Living   Type of Home Apartment  ([facility])   Home Layout One level;Elevator   Bathroom Shower/Tub Tub/shower unit  (cut out tub with step)   Bathroom Toilet Standard   Bathroom Equipment Grab bars in shower   Home Equipment Walker;Cane   Additional Comments Ambulates with mod I and RW inside of apartment.  Uses RW or SPC as needed outside of home.   Prior Function   Level of [place] Independent with ADLs;Independent with functional mobility   Lives With Alone   Receives Help From Home health;Family  (daughter stops by everyday)   IADLs Independent with medication management;Independent with meal prep;Family/Friend/Other provides  "transportation  (daughter drives)   Falls in the last 6 months 1 to 4  (1 recent fall)   Vocational Other (Comment)  (likes to watch [employer])   General   Family/Caregiver Present No   Cognition   Overall Cognitive Status Impaired   Arousal/Participation Cooperative   Orientation Level Disoriented to time   Memory Decreased short term memory;Decreased recall of precautions;Decreased recall of recent events   Following Commands Follows one step commands with increased time or repetition   Comments Pt identified by name and .   Subjective   Subjective Agrees to PT and is pleasant and cooperative throughout session. \"I'm peeing again, it just runs right out of me.\"   RLE Assessment   RLE Assessment X   Strength RLE   RLE Overall Strength 4-/5  (functionally)   LLE Assessment   LLE Assessment X   Strength LLE   LLE Overall Strength 4-/5  (functionally)   Bed Mobility   Supine to Sit 4  Minimal assistance   Additional items Increased time required;Verbal cues;LE management;HOB elevated;Bedrails;Assist x 1   Sit to Supine Unable to assess  (left OOB in chair pre/post session with alarm intact)   Transfers   Sit to Stand 3  Moderate assistance   Additional items Assist x 1;Increased time required;Verbal cues   Stand to Sit 3  Moderate assistance   Additional items Assist x 1;Increased time required;Verbal cues   Ambulation/Elevation   Gait pattern Improper Weight shift;Narrow RENEA;Decreased foot clearance;Short stride;Excessively slow   Gait Assistance 4  Minimal assist   Additional items Assist x 1;Verbal cues   Assistive Device None  (HHA)   Distance ~20` x1   Balance   Static Sitting Fair -  (posterior leaning on EOB)   Dynamic Sitting Fair -   Static Standing Poor +   Dynamic Standing Poor +   Ambulatory Poor +   Endurance Deficit   Endurance Deficit Yes   Endurance Deficit Description limited ambulation distance, fatigue   Activity Tolerance   Activity Tolerance Patient limited by fatigue   Medical Staff Made Aware " MAYE Machado   Nurse Made Aware Per RN, pt appropriate to evaluate; updated on status   Assessment   Prognosis Fair   Problem List Decreased strength;Decreased endurance;Impaired balance;Decreased mobility;Decreased cognition;Impaired judgement;Decreased safety awareness   Assessment Pt is a 79 y.o. female seen for PT evaluation s/p admit to [facility] on 8/18/2022 w/ Vertigo.  Order placed for PT. Comorbidities affecting pt's physical performance at time of assessment include: cancer history. Personal factors affecting pt at time of IE include: anxiety, depression, limited home support, advanced age, and limited insight into impairments. Prior to admission, pt was independent w/ all functional mobility w/ RW or SPC as needed, lived in one floor environment, and lived alone . Upon evaluation: Pt requires min A for bed mobility, mod A for sit to stand, and min A for ambulation without AD or with SPC.  Currently, pt presents with the following deficits: weakness, impaired balance, decreased endurance, gait deviations, decreased activity tolerance, decreased safety awareness, fall risk, and decreased cognition.  Pt's clinical presentation is currently unstable/unpredictable seen in pt's presentation of fall risk, poor insight into deficits, and significant decline in functional mobility compared to baseline.  Pt to benefit from continued skilled PT tx while in hospital and upon DC to address deficits as defined above and maximize level of functional mobility.  Recommend  progression of ambulation and initiation of HEP as appropriate .   Goals   Patient Goals to go home   STG Expiration Date 10/21/24   Short Term Goal #1 Pt will be able to: (1) perform bed mobility with supervision to promote OOB activity (2) perform sit to stand with supervision to decrease burden of care (3) ambulate at least 200` with supervision and least restrictive AD to increase activity tolerance (4) increase standing balance by 1 grade  to decrease risk of falls   PT Treatment Day 1   Plan   Treatment/Interventions LE strengthening/ROM;Functional transfer training;Therapeutic exercise;Endurance training;Equipment eval/education;Patient/family training;Bed mobility;Gait training;Cognitive reorientation   PT Frequency 3-5x/wk   Discharge Recommendation   Rehab Resource Intensity Level, PT II (Moderate Resource Intensity)   Equipment Recommended Walker  (pending progress)   Walker Package Recommended Wheeled walker   AM-PAC Basic Mobility Inpatient   Turning in Flat Bed Without Bedrails 3   Lying on Back to Sitting on Edge of Flat Bed Without Bedrails 3   Moving Bed to Chair 3   Standing Up From Chair Using Arms 2   Walk in Room 3   Climb 3-5 Stairs With Railing 2   Basic Mobility Inpatient Raw Score 16   Basic Mobility Standardized Score 38.32   [facility] Highest Level Of Mobility   -HL Goal 5: Stand one or more mins   -[facility] Achieved 7: Walk 25 feet or more   Additional Treatment Session   Start Time 1035   End Time 1045   Treatment Assessment Pt agrees to further mobility.  Able to ambulate an additional ~8` x1 with min A and use of SPC.  Pt continues to demonstrate a narrow RENEA and decreased foot clearance.  Able to perform multiple sit to stand transfers from recliner chair with mod A x1 and verbal cues for hand placement. Poor eccentric control on descent into chair.  Incontinence of urine during ambulation. Will continue to benefit from ongoing skilled PT to maximize her functional mobility and increase her level of independence.   Equipment Use SPC   Additional Treatment Day 1   End of Consult   Patient Position at End of Consult Bedside chair;Bed/Chair alarm activated;All needs within reach   Pt was seen for a co-eval with OT due to potential need for significant physical assist, poor pain control, impaired mental status, limiting behaviors, and/or poor adherence to precautions.     The patient's AM-PAC Basic Mobility Inpatient Short  Form Raw Score is 16, Standardized Score is 38.32.  A Raw Score of greater than or equal to 16 suggests the patient may benefit from discharge to home. However please refer to therapist recommendation for discharge planning given other factors that may influence destination.     Adapted from Cricket PATTON, Ranjan BLOOD, Jorge J, Mayito BLOOD. Association of [facility] “6-Clicks” Basic Mobility and Daily Activity Scores With Discharge Destination. Physical Therapy, 2021;101:1-9. DOI: [email or web address]      Doris Cota PT,DPT

## 2024-10-11 NOTE — PROGRESS NOTES
Progress Note - Hospitalist   Name: Samson Watts 79 y.o. 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 CM following    VTE Pharmacologic Prophylaxis:   ordered     Mobility:   Basic Mobility Inpatient Raw Score: 21  JH-HLM Goal: 6: Walk 10 steps or more  JH-HLM Goal achieved. Continue to encourage appropriate mobility.    Patient Centered Rounds: I performed bedside rounds with nursing staff today.   Discussions with Specialists or Other Care Team Provider: CM    Education and Discussions with Family / Patient: Updated  (daughter) via phone.    Current Length of Stay: 0 day(s)  Current Patient Status: Observation   Certification Statement: The patient will continue to require additional inpatient hospital stay due to Vertigo , rehab  Discharge Plan: Anticipate discharge in 24-48 hrs to rehab facility.    Code Status: Level 1 - Full Code    Subjective   Improved dizziness  No other complaints.    Objective :  Temp:  [97.6 °F (36.4 °C)-98.1 °F (36.7 °C)] 98.1 °F (36.7 °C)  HR:  [67-77]  67  BP: (134-167)/(64-93) 154/70  Resp:  [16] 16  SpO2:  [97 %-100 %] 100 %  O2 Device: None (Room air)    Body mass index is 23.79 kg/m².     Input and Output Summary (last 24 hours):     Intake/Output Summary (Last 24 hours) at 10/11/2024 1250  Last data filed at 10/11/2024 0700  Gross per 24 hour   Intake 460 ml   Output 0 ml   Net 460 ml       Physical Exam  Vitals and nursing note reviewed.   Constitutional:       General: She is not in acute distress.     Appearance: She is well-developed.   HENT:      Head: Normocephalic and atraumatic.   Eyes:      Conjunctiva/sclera: Conjunctivae normal.   Cardiovascular:      Rate and Rhythm: Normal rate and regular rhythm.      Heart sounds: No murmur heard.  Pulmonary:      Effort: Pulmonary effort is normal. No respiratory distress.      Breath sounds: Normal breath sounds.   Abdominal:      Palpations: Abdomen is soft.      Tenderness: There is no abdominal tenderness.   Musculoskeletal:         General: No swelling.      Cervical back: Neck supple.   Skin:     General: Skin is warm and dry.      Capillary Refill: Capillary refill takes less than 2 seconds.   Neurological:      Mental Status: She is alert.   Psychiatric:         Mood and Affect: Mood normal.           Lines/Drains:              Lab Results: I have reviewed the following results:   Results from last 7 days   Lab Units 10/11/24  0547   WBC Thousand/uL 6.67   HEMOGLOBIN g/dL 13.5   HEMATOCRIT % 41.8   PLATELETS Thousands/uL 269   SEGS PCT % 55   LYMPHO PCT % 30   MONO PCT % 11   EOS PCT % 4     Results from last 7 days   Lab Units 10/11/24  0547 10/10/24  1223   SODIUM mmol/L 141 138   POTASSIUM mmol/L 3.7 3.7   CHLORIDE mmol/L 106 102   CO2 mmol/L 28 30   BUN mg/dL 12 15   CREATININE mg/dL 0.71 0.75   ANION GAP mmol/L 7 6   CALCIUM mg/dL 9.1 9.4   ALBUMIN g/dL  --  4.1   TOTAL BILIRUBIN mg/dL  --  0.42   ALK PHOS U/L  --  71   ALT U/L  --  13   AST U/L  --  18   GLUCOSE RANDOM mg/dL 88 103                        Recent Cultures (last 7 days):         Imaging Results Review: I reviewed radiology reports from this admission including: MRI brain.  Other Study Results Review: EKG was reviewed.     Last 24 Hours Medication List:     Current Facility-Administered Medications:     bimatoprost (LUMIGAN) 0.03 % ophthalmic drops 1 drop, Daily    enoxaparin (LOVENOX) subcutaneous injection 40 mg, Daily    ezetimibe (ZETIA) tablet 10 mg, HS    hydrALAZINE (APRESOLINE) injection 10 mg, Q6H PRN    meclizine (ANTIVERT) tablet 25 mg, Q8H PRN    pantoprazole (PROTONIX) EC tablet 20 mg, Early Morning    sertraline (ZOLOFT) tablet 200 mg, HS    Administrative Statements   Today, Patient Was Seen By: Tom Simpson MD  I have spent a total time of 35 minutes in caring for this patient on the day of the visit/encounter including .    **Please Note: This note may have been constructed using a voice recognition system.**

## 2024-10-11 NOTE — OCCUPATIONAL THERAPY NOTE
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)    Past Medical History  Past Medical History:   Diagnosis Date    Acute mastoiditis with intracranial complication 11/16/2022    Anxiety disorder     Brain hemangioma (HCC)     2016    BRCA1 negative     BRCA2 negative     Breast cancer (HCC) 2014    left breast    Cancer (HCC)     Constipation     Esophageal reflux     History of bone density study 2011    Dual Energy X-Ray Absorptiometry    History of radiation therapy 2014    left breast ca    History of recurrent UTIs     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    Hyperlipidemia     Hypertension     Malignant neoplasm of female breast (HCC) 04/22/2014    Osteoarthritis of hip     Osteopenia     PONV (postoperative nausea and vomiting)     Stroke, hemorrhagic (HCC) 12/2015    Subarachnoid hemorrhage (HCC)      Past Surgical History  Past Surgical History:   Procedure Laterality Date    BREAST BIOPSY Left 2014    BREAST LUMPECTOMY Left 2014    COLONOSCOPY      Fiberoptic - 2009, 2015 5 year f/u    ESOPHAGOGASTRODUODENOSCOPY  2009    Diag, Resolved - 2006 / 2009    EXOSTECTOMY  2005    Simple Bunion (Silver Procedure)    FL LUMBAR PUNCTURE THERAPEUTIC  9/1/2023    HYSTERECTOMY  1981    JOINT REPLACEMENT Right 2012    Right Hip    OOPHORECTOMY  1981    not sure which one    REPLACEMENT TOTAL KNEE Right 2018    VENTRICULOSTOMY               10/11/24 1104   OT Last Visit   OT Visit Date 10/11/24   Note Type   Note type Evaluation  (+ Cognitive Evaluation)   Pain Assessment   Pain Assessment Tool 0-10   Pain Score No Pain   Restrictions/Precautions   Weight Bearing Precautions Per Order No   Other Precautions Cognitive;Chair Alarm;Bed Alarm;Fall Risk   Home  "Living   Type of Home Apartment  ([facility])   Home Layout One level;Elevator  (4th floor apartment)   Bathroom Shower/Tub Tub/shower unit  (with cut out tub)   Bathroom Toilet Standard   Bathroom Equipment Grab bars in shower   Bathroom Accessibility Accessible   Home Equipment Walker;Cane   Additional Comments use of RW in apartment   Prior Function   Level of [place] Independent with ADLs  (wears depends)   Lives With Alone   Receives Help From Home health;Family  (pt reports that daughter stops by daily - recently started with HHAs - pt states they were only there 1 day)   IADLs Independent with driving;Independent with meal prep;Family/Friend/Other provides transportation  (daughter drives, (I) with small meals - reports that she sometimes goes to [employer] for meals)   Falls in the last 6 months 1 to 4  (1)   Vocational Retired   Lifestyle   Autonomy PTA pt living alone in apartment, pt (I) with ADLs and IADLs, (+)fall, (-)drives, use of RW at baseline   Reciprocal Relationships supportive daughter + HHAs   Service to Others retired   Intrinsic Gratification enjoys watching the [employer] channel   General   Additional Pertinent History Admit due to dizziness. PMH: NPH, SAH, dementia, hx of breast cancer, HTN   Family/Caregiver Present No   Subjective   Subjective \"You know I can't really do anything because all I do is pee\"   ADL   Eating Assistance 5  Supervision/Setup   Grooming Assistance 5  Supervision/Setup   UB Bathing Assistance 4  Minimal Assistance   LB Bathing Assistance 3  Moderate Assistance   UB Dressing Assistance 4  Minimal Assistance   UB Dressing Deficit Increased time to complete;Thread RUE;Thread LUE   LB Dressing Assistance 3  Moderate Assistance   Toileting Assistance  3  Moderate Assistance   Toileting Deficit Perineal hygiene   Bed Mobility   Supine to Sit 4  Minimal assistance   Additional items Assist x 1;Increased time required;Verbal cues;LE management   Transfers   Sit " to Stand 3  Moderate assistance   Additional items Assist x 1;Increased time required;Verbal cues   Stand to Sit 3  Moderate assistance   Additional items Assist x 1;Increased time required;Verbal cues   Additional Comments use of RW   Functional Mobility   Functional Mobility 4  Minimal assistance   Additional Comments Ax1, around bed to recliner chair   Additional items Rolling walker   Balance   Static Sitting Fair -   Dynamic Sitting Poor +   Static Standing Poor +   Dynamic Standing Poor +   Ambulatory Poor +   Activity Tolerance   Activity Tolerance Patient limited by fatigue   Medical Staff Made Aware PT CAROLEE George   RUBENNY Assessment   RUE Assessment WFL   LUE Assessment   LUE Assessment WFL   Hand Function   Gross Motor Coordination Functional   Fine Motor Coordination Functional   Cognition   Overall Cognitive Status Impaired   Arousal/Participation Alert;Cooperative   Attention Attends with cues to redirect   Orientation Level Oriented to person;Oriented to place;Disoriented to time;Disoriented to situation  (intermittently oriented to situation)   Memory Decreased short term memory;Decreased recall of precautions;Decreased recall of recent events   Following Commands Follows one step commands with increased time or repetition   Comments Repetative conversation, POOR problem solving and safety awareness. Poor recall of PLOF   Assessment   Limitation Decreased ADL status;Decreased UE strength;Decreased Safe judgement during ADL;Decreased cognition;Decreased endurance;Decreased self-care trans;Decreased high-level ADLs  (impaired balance, fxnl mobility, act theo, fxnl reach, standing theo, strength, attention to task, direction following, safety awareness, insight, pacing, problem solving, learning new tasks, termination of conversation)   Prognosis Good   Assessment Pt is a 79 y.o. female seen for OT evaluation s/p admission to [facility] on 10/10/2024 due to dizziness. Diagnosed with Vertigo. Personal and env  factors supporting pt at time of IE include (I) PLOF, accessible home environment, and FFSU. Personal and env factors inhibiting engagement in occupations include limited social support and difficulty completing IADLs. Performance deficits that affect the pt’s occupational performance can be seen above. Due to pt's current functional limitations and medical complications pt is functioning below baseline. Pt would benefit from continued skilled OT treatment in order to maximize safety, independence and overall performance with ADLs, functional mobility, functional transfers, and cognition in order to achieve highest level of function.   Goals   Patient Goals to go home   LTG Time Frame 10-14   Long Term Goal see goals listed below   Plan   Treatment Interventions ADL retraining;Functional transfer training;Endurance training;Cognitive reorientation;Patient/family training;Equipment evaluation/education;Compensatory technique education;Energy conservation;Activityengagement   Goal Expiration Date 10/21/24   OT Treatment Day 0   OT Frequency 3-5x/wk   Discharge Recommendation   Recommendation Geriatric Consult   Rehab Resource Intensity Level, OT (S)  II (Moderate Resource Intensity)  (s/p level 2 resources recommend d/c to environment with 24/7 care as per ACLS recommendations)   AM-PAC Daily Activity Inpatient   Lower Body Dressing 2   Bathing 2   Toileting 2   Upper Body Dressing 3   Grooming 3   Eating 3   Daily Activity Raw Score 15   Daily Activity Standardized Score (Calc for Raw Score >=11) 34.69   AM-PAC Applied Cognition Inpatient   Following a Speech/Presentation 2   Understanding Ordinary Conversation 2   Taking Medications 1   Remembering Where Things Are Placed or Put Away 1   Remembering List of 4-5 Errands 1   Taking Care of Complicated Tasks 1   Applied Cognition Raw Score 8   Applied Cognition Standardized Score 19.32   [place] Cognitive Level   [place] Cognitive Level Score (S)  4.2   [place] Cognitive  Score Recommendation (S)  24 hour supervision   [place] Cognitive Level Comments (S)  4.2    Administered [place] Cognitive Level Screen (ACLS).  Pt scored 4.2/6.0 indicating 24 Hour supervision is recommended to remove dangerous objects outside the visual field and to solve problems due to minor changes in the environment.      Behavior:  Recognizes errors.  Identifies problems.  Asks for Day/Date.  Sequences one activity at a time.  Processing speed is slow and may take extra time to complete tasks.  Memorization will be very slow.  Requires long term repetitive training for new tasks.  Keep directions short and concise.  Grooming:  Initiates and completes with supplies from familiar accessible locations.  Stops and asks for help when an error is made.  Expect cuts with use of straight razor.  Check every few minutes if left alone.  Allow ample time for completion of tasks.  Dressing:  Selects clothing items based on striking features like color.  May choose to wear a favorite item all the time.  May argue with suggestions to change selections.  Bathing:  Recognizes need for a bath and may ask if time is appropriate.  Collects supplies from a visible location.  Follows routine.  May miss small hidden areas.  Recognizes problem like lack of soap and asks for help.  Remove hazards from proximity to bath (ie: electrical appliances).  Walking/exercising:  Ambulates to a familiar location ½ to 1 mile away.  May not vary route.  May fail to attend to activity or noises outside visual field.  May ask for help if lost.  May be able to learn a graded exercise program.  May become anxious in high stimulus environments (malls, airports, casinos).  May resist going to certain places.  Eating:  Initiates coming to table at routine times.  Uses utensils.  Recognizes errors and asks for help.  Attempts to comply with social standards.  May have trouble waiting for others.  Assist with handling hot foods/liquids.  Monitor compliance  with special diet.  Toileting:  Recognizes difficulties that interfere with toileting routine and asks for help.  May be able to report change in bowel or bladder habits.  May take much longer than average to complete toileting.  Medications:  Initiates taking familiar dose at regular time of day.  May not be able to open container and may have incorrect ideas of effects that lead to noncompliance.  May not seek assist for problems encountered.  Supervise to ensure compliance.   Use of adaptive equipment:  Needs help with more complex equipment.  May need assistance with fasteners that require fine motor skills.  Does not understand the potential hazards of incorrect use.  May forget to use equipment.  Housekeeping:   Initiates and completes a routine of housekeeping activities.  May be able to set priorities but may become fixated on a priority.  Cleans, polishes and sweeps one feature at a time.  Check for quality of cleaning results.  Food preparation:  Does not plan for long term food needs.  May go to store repeatedly whenever food is desired.  May search cabinet for particular food item and ask for help when items are not found.  May prepare a simple meal but may not recognize problems.  Store all undesirable equipment away from view.  Do not leave alone when using dangerous tools/equipment.  Spending money:  May be able to set a priority for an expense that is highly valued.  May become fixated on item.  May resist help.  Shopping:  May go to familiar shops but does not make a list or compare prices.  Looks in familiar locations for an item.  May ask for help when not found.  May insist on purchasing item that is unrealistic or impractical.  May resist help.  May be anxious in high stimulus environments.  Laundry:  May sort laundry by color or other simple striking cues.  May view as a priority and initiate regularly.  Recognizes errors like too much soap but cannot offer solutions.  Traveling:  May go  "through actions slowly.  Can sit unaccompanied up to 1 hour on a bus or train with landmarks as a guide when to get off.  Asks for assistance if lost, does not alter familiar routes.  May become anxious in high stimulus environments (bus, airport terminals).  Telephone:  Dials a new number written down by checking it 1 digit at a time.  Writes down information very slowly.  Does not consider a person’s schedule or time of day when calling.  Needs assistance to make calls from unfamiliar telephones.  May call emergency or familiar numbers excessively.  Driving:  Should NOT operate a motor vehicle.          Additional Treatment Session   Start Time 1035   End Time 1104   Treatment Assessment Pt seen for participation in ACLS, use of standard ACLS - pt reports no difficulty visualizing assessment. Pt able to complete running stitch with increased time - multiple cues to attend to task. Completing whipstitch with x2 demonstrations - spontaneously able to achieve whipstitches - requiring to remove entire stitch with removal of lace. POOR attention of therapist cross in back error error -  stating \"woah did I do that? How did I make that mess?\". Pt with poor attention to whip stitches attempting to use lace from cordovan stitch. Pt unable to achieve cordovan stitch despite x2 demonstrations - recognizes errors but does not attempt to correct. Pt educated on score with poor understanding, would highly benefit from continued cognitive evaluation   Additional Treatment Day 1   End of Consult   Patient Position at End of Consult Bedside chair;Bed/Chair alarm activated;All needs within reach        GOALS:      -Patient will perform grooming tasks standing at sink with overall Mod I in order to increase overall independence    -Patient will be Mod I with UB dressing using AE and AD as needed in order to increase (I) with ADLs    -Patient will be Mod I with UB bathing using AE and AD as needed in order to increase (I) with " ADLs    -Patient will be Min A  with LB dressing with use of AE and AD as needed in order to increase (I) with ADLs    -Patient will be Min A  with LB bathing with use of AE and AD as needed in order to increase (I) with ADLs    -Patient will complete toileting w/ Mod A  w/ G hygiene/thoroughness in order to reduce caregiver burden    -Patient will demonstrate Supervision with bed mobility for ability to manage own comfort and initiate OOB tasks.     -Patient will perform functional transfers with Supervision to/from all surfaces using DME as needed in order to increase (I) with functional tasks    -Patient will be Supervision with functional mobility to/from bathroom for increased independence with toileting tasks    -Patient will engage in ongoing cognitive assessment in order to assist with safe discharge planning/recommendations.    -Patient will follow multi-step instructions with no VC in order to safely complete functional tasks     -Patient will attend to functional task for 10 min without VC for attention/redirection         The patient's raw score on the -PAC Daily Activity Inpatient Short Form is 15. A raw score of less than 19 suggests the patient may benefit from discharge to post-acute rehabilitation services. HOWEVER please refer to the recommendation of the Occupational Therapist for safe discharge planning.    This session, pt required and most appropriately benefited from skilled OT/PT co-eval due to significant regression from functional baseline, decreased activity tolerance, and unpredictable medical and/or functional status. OT and PT goals were addressed separately as seen in documentation.     Jeannette Pope MS, OTR/L

## 2024-10-11 NOTE — PLAN OF CARE
Problem: PHYSICAL THERAPY ADULT  Goal: Performs mobility at highest level of function for planned discharge setting.  See evaluation for individualized goals.  Description: Treatment/Interventions: LE strengthening/ROM, Functional transfer training, Therapeutic exercise, Endurance training, Equipment eval/education, Patient/family training, Bed mobility, Gait training, Cognitive reorientation  Equipment Recommended: Walker (pending progress)       See flowsheet documentation for full assessment, interventions and recommendations.  Note: Prognosis: Fair  Problem List: Decreased strength, Decreased endurance, Impaired balance, Decreased mobility, Decreased cognition, Impaired judgement, Decreased safety awareness  Assessment: Pt is a 79 y.o. female seen for PT evaluation s/p admit to [facility] on 8/18/2022 w/ Vertigo.  Order placed for PT. Comorbidities affecting pt's physical performance at time of assessment include: cancer history. Personal factors affecting pt at time of IE include: anxiety, depression, limited home support, advanced age, and limited insight into impairments. Prior to admission, pt was independent w/ all functional mobility w/ RW or SPC as needed, lived in one floor environment, and lived alone . Upon evaluation: Pt requires min A for bed mobility, mod A for sit to stand, and min A for ambulation without AD or with SPC.  Currently, pt presents with the following deficits: weakness, impaired balance, decreased endurance, gait deviations, decreased activity tolerance, decreased safety awareness, fall risk, and decreased cognition.  Pt's clinical presentation is currently unstable/unpredictable seen in pt's presentation of fall risk, poor insight into deficits, and significant decline in functional mobility compared to baseline.  Pt to benefit from continued skilled PT tx while in hospital and upon DC to address deficits as defined above and maximize level of functional mobility.   Recommend  progression of ambulation and initiation of HEP as appropriate .        Rehab Resource Intensity Level, PT: II (Moderate Resource Intensity)    See flowsheet documentation for full assessment.

## 2024-10-11 NOTE — CASE MANAGEMENT
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     Benign paroxysmal vertigo, bilateral 2019     Major depressive disorder, single episode, unspecified 2019     Nontoxic single thyroid nodule 2019     Thyroid nodule 2019     Vertigo 05/10/2019     Osteopenia 2018     Constipation 2018     Hyperlipidemia 2018     Celiac artery stenosis (HCC) 2018     Aneurysm of anterior cerebral artery 10/03/2017     Stress incontinence, female 2017     Mild cognitive disorder 2016     Abnormal gait 2016     Depression 2016     Depression with anxiety 2016     Hx of breast cancer 2016     Osteoarthritis of hip 2012     Allergic rhinitis 2012     Arthritis 2012       LOS (days): 0  Geometric Mean LOS (GMLOS) (days):   Days to GMLOS:     OBJECTIVE:              Current admission status: Inpatient       Preferred Pharmacy:   [facility] [address] - [address]  [address] [address]  Phone: [phone] Fax: [phone]    Primary Care Provider: Art Crowell MD    Primary Insurance: [employer]  Secondary Insurance:     ASSESSMENT:  Active Health Care Proxies       Kalyan [facility] Representative - Daughter   Primary Phone: [phone] (Home)                           Readmission Root Cause  30 Day Readmission: No    Patient Information  Admitted from::  Home  Mental Status: Alert  During Assessment patient was accompanied by: Daughter  Assessment information provided by:: Patient, Daughter  Primary Caregiver: Family  Support Systems: Self, Daughter  County of Residence: [place]  What city do you live in?: [place]  Home entry access options. Select all that apply.: Elevator  Type of Current Residence: Facility ([facility])  Upon entering residence, is there a bedroom on the main floor (no further steps)?: Yes  Upon entering residence, is there a bathroom on the main floor (no further steps)?: Yes  Living Arrangements: Lives Alone  Is patient a ?: No    Activities of Daily Living Prior to Admission  Functional Status: Assistance  Completes ADLs independently?: Yes  Ambulates independently?: Yes  Does patient use assisted devices?: Yes  Assisted Devices (DME) used: Walker, Straight Cane  Does patient currently own DME?: Yes  What DME does the patient currently own?: Straight Cane, Walker  Does patient have a history of Outpatient Therapy (PT/OT)?: No  Does the patient have a history of Short-Term Rehab?: Yes (Unsure of which rehab)  Does patient have a history of HHC?: No  Does patient currently have HHC?: No         Patient Information Continued  Income Source: Pension/FPC  Does patient have prescription coverage?: Yes  Does patient receive dialysis treatments?: No  Does patient have a history of substance abuse?: No  Does patient have a history of Mental Health Diagnosis?: No         Means of Transportation  Means of Transport to Appts:: Family transport      Social Determinants of Health (SDOH)      Flowsheet Row Most Recent Value   Housing Stability    In the last 12 months, was there a time when you were not able to pay the mortgage or rent on time? N   In the past 12 months, how many times have you moved where you were living? 0   At any time in the past 12 months, were you homeless or living in a shelter (including now)? N    Transportation Needs    In the past 12 months, has lack of transportation kept you from medical appointments or from getting medications? no   In the past 12 months, has lack of transportation kept you from meetings, work, or from getting things needed for daily living? No   Food Insecurity    Within the past 12 months, you worried that your food would run out before you got the money to buy more. Never true   Within the past 12 months, the food you bought just didn't last and you didn't have money to get more. Never true   Utilities    In the past 12 months has the electric, gas, oil, or water company threatened to shut off services in your home? No            DISCHARGE DETAILS:    Discharge planning discussed with:: Ptient at bedside and her daughter via speaker phone  Freedom of Choice: Yes     CM contacted family/caregiver?: Yes  Were Treatment Team discharge recommendations reviewed with patient/caregiver?: Yes  Did patient/caregiver verbalize understanding of patient care needs?: Yes  Were patient/caregiver advised of the risks associated with not following Treatment Team discharge recommendations?: Yes    Contacts  Patient Contacts: Eltty- daughter  Relationship to Patient:: Family  Contact Method: Phone  Phone Number: [phone]  Reason/Outcome: Discharge Planning, Referral, Continuity of Care      Other Referral/Resources/Interventions Provided:  Referral Comments: Pt is rec'd for STR. Pt and daughter agreeable to blanket STR referrals.    Pt resides alone in [facility]. Pt states that she uses a walker or cane at baseline. Pt states that she is independent w/ dressing and bathing. Pts daughter provides transportation and grocery shops for the Pt.      Pt is rec'd for STR. Pt and daughter agreeable to blanket STR referrals, submitted in [facility].

## 2024-10-12 LAB
BILIRUB UR QL STRIP: NEGATIVE
CLARITY UR: CLEAR
COLOR UR: NORMAL
GLUCOSE UR STRIP-MCNC: NEGATIVE MG/DL
HGB UR QL STRIP.AUTO: NEGATIVE
KETONES UR STRIP-MCNC: NEGATIVE MG/DL
LEUKOCYTE ESTERASE UR QL STRIP: NEGATIVE
NITRITE UR QL STRIP: NEGATIVE
PH UR STRIP.AUTO: 5.5 [PH]
PROT UR STRIP-MCNC: NEGATIVE MG/DL
SP GR UR STRIP.AUTO: 1.01 (ref 1–1.03)
UROBILINOGEN UR STRIP-ACNC: <2 MG/DL

## 2024-10-12 PROCEDURE — 81003 URINALYSIS AUTO W/O SCOPE: CPT | Performed by: NURSE PRACTITIONER

## 2024-10-12 PROCEDURE — 99232 SBSQ HOSP IP/OBS MODERATE 35: CPT | Performed by: INTERNAL MEDICINE

## 2024-10-12 RX ADMIN — EZETIMIBE 10 MG: 10 TABLET ORAL at 21:16

## 2024-10-12 RX ADMIN — BIMATOPROST 1 DROP: 0.3 SOLUTION/ DROPS OPHTHALMIC at 21:16

## 2024-10-12 RX ADMIN — ENOXAPARIN SODIUM 40 MG: 40 INJECTION SUBCUTANEOUS at 08:29

## 2024-10-12 RX ADMIN — SERTRALINE 200 MG: 100 TABLET, FILM COATED ORAL at 21:16

## 2024-10-12 RX ADMIN — PANTOPRAZOLE SODIUM 20 MG: 20 TABLET, DELAYED RELEASE ORAL at 04:56

## 2024-10-12 NOTE — PLAN OF CARE
Problem: NEUROSENSORY - ADULT  Goal: Achieves stable or improved neurological status  Description: INTERVENTIONS  - Monitor and report changes in neurological status  - Monitor vital signs such as temperature, blood pressure, glucose, and any other labs ordered   - Initiate measures to prevent increased intracranial pressure  - Monitor for seizure activity and implement precautions if appropriate      Outcome: Progressing  Goal: Remains free of injury related to seizures activity  Description: INTERVENTIONS  - Maintain airway, patient safety  and administer oxygen as ordered  - Monitor patient for seizure activity, document and report duration and description of seizure to physician/advanced practitioner  - If seizure occurs,  ensure patient safety during seizure  - Reorient patient post seizure  - Seizure pads on all 4 side rails  - Instruct patient/family to notify RN of any seizure activity including if an aura is experienced  - Instruct patient/family to call for assistance with activity based on nursing assessment  - Administer anti-seizure medications if ordered    Outcome: Progressing  Goal: Achieves maximal functionality and self care  Description: INTERVENTIONS  - Monitor swallowing and airway patency with patient fatigue and changes in neurological status  - Encourage and assist patient to increase activity and self care.   - Encourage visually impaired, hearing impaired and aphasic patients to use assistive/communication devices  Outcome: Progressing     Problem: SAFETY ADULT  Goal: Patient will remain free of falls  Description: INTERVENTIONS:  - Educate patient/family on patient safety including physical limitations  - Instruct patient to call for assistance with activity   - Consult OT/PT to assist with strengthening/mobility   - Keep Call bell within reach  - Keep bed low and locked with side rails adjusted as appropriate  - Keep care items and personal belongings within reach  - Initiate and  maintain comfort rounds  - Make Fall Risk Sign visible to staff  - Offer Toileting every 2 Hours, in advance of need  - Initiate/Maintain bed alarm  - Obtain necessary fall risk management equipment: bed alarm  - Apply yellow socks and bracelet for high fall risk patients  - Consider moving patient to room near nurses station  Outcome: Progressing

## 2024-10-12 NOTE — ASSESSMENT & PLAN NOTE
Patient presenting with dizziness starting this morning once she got up and moved her head,  she denies hearing changes or ringing in her ears  However during my evaluation does report some dizziness when she was lying down.  MRI negative for acute intracranial process.   Possible BPPV.   Orthostatic  vitals negative.  Now  vertigo resolved.

## 2024-10-12 NOTE — UTILIZATION REVIEW
Continued Stay Review    SEE INITIAL REVIEW AT BOTTOM    Date: 10/12/24                          Current Patient Class: Inpatient  Current Level of Care: Med Surg    HPI:79 y.o. female initially admitted on 10/10     Assessment/Plan: MRI negative for acute intracranial process. Possible BPPV. Orthostatic vitals negative. Now vertigo resolved. OT cognitive evaluation, patient would benefit from geriatrics outpatient eval, will check B12 and TSH. PT/OT recommending rehab. CM following    Medications:   Scheduled Medications:  bimatoprost, 1 drop, Both Eyes, Daily  enoxaparin, 40 mg, Subcutaneous, Daily  ezetimibe, 10 mg, Oral, HS  pantoprazole, 20 mg, Oral, Early Morning  sertraline, 200 mg, Oral, HS      Continuous IV Infusions:     PRN Meds:  hydrALAZINE, 10 mg, Intravenous, Q6H PRN  meclizine, 25 mg, Oral, Q8H PRN      Discharge Plan: TBD    Vital Signs (last 3 days)       Date/Time Temp Pulse Resp BP MAP (mmHg) SpO2 O2 Device Patient Position - Orthostatic VS [place] Coma Scale Score Pain    10/12/24 13:28:18 -- 101 -- 99/71 80 95 % -- Standing for 3 minutes - Orthostatic VS -- --    10/12/24 13:26:04 -- -- -- 101/66 78 -- -- Standing - Orthostatic VS -- --    10/12/24 13:23:11 -- 86 -- 114/72 86 96 % -- Sitting - Orthostatic VS -- --    10/12/24 13:21:21 -- 85 -- 104/68 80 95 % -- Lying - Orthostatic VS -- --    10/12/24 0900 -- -- -- -- -- -- -- -- -- No Pain    10/12/24 07:20:41 97.5 °F (36.4 °C) 65 -- 128/71 90 95 % -- -- -- --    10/11/24 2315 -- -- -- -- -- -- None (Room air) -- 15 No Pain    10/11/24 21:23:03 98.2 °F (36.8 °C) -- 16 132/64 87 -- -- -- -- --    10/11/24 15:00:41 98 °F (36.7 °C) -- 16 123/60 81 -- -- -- -- --    10/11/24 1104 -- -- -- -- -- -- -- -- -- No Pain    10/11/24 1023 -- -- -- -- -- -- -- -- -- No Pain    10/11/24 0900 -- -- -- -- -- -- -- -- 15 No Pain    10/10/24 21:45:23 98.1 °F (36.7 °C) 67 -- 154/70 98 100 % -- -- -- --    10/10/24 2100 -- -- -- -- -- -- None (Room air) -- 15 --     10/10/24 1952 -- -- -- -- -- -- -- -- -- No Pain    10/10/24 19:11:20 97.6 °F (36.4 °C) 74 16 164/89 114 99 % None (Room air) Lying -- --    10/10/24 19:10:25 97.6 °F (36.4 °C) -- -- 164/89 114 -- -- -- -- --    10/10/24 1615 -- 68 -- 134/64 92 97 % -- -- -- --    10/10/24 1612 -- -- -- 134/64 -- -- -- -- -- --    10/10/24 1545 -- -- -- 167/93 122 -- -- -- -- --    10/10/24 1415 -- 77 16 153/76 104 98 % None (Room air) Lying -- --    10/10/24 1239 -- -- -- -- -- -- -- -- 15 --    10/10/24 1217 97.4 °F (36.3 °C) -- -- -- -- -- -- -- -- --    10/10/24 1215 -- -- -- 179/82 118 -- -- -- -- --    10/10/24 1213 -- 74 18 -- -- 98 % -- -- -- No Pain          Weight (last 2 days)       Date/Time Weight    10/12/24 0557 69.1 (152.34)    10/12/24 0300 69.1 (152.34)    10/11/24 0550 68.9 (151.9)    10/10/24 19:11:20 69.5 (153.22)    10/10/24 1215 74.6 (164.46)            Pertinent Labs/Diagnostic Results:   Radiology:  MRI brain wo contrast   Final Interpretation by E. Alec Schoenberger, MD (10/11 0820)      No acute intracranial pathology. Chronic microangiopathy.   Stable mild ventricular prominence out of proportion to degree of cortical volume loss that may represent NPH in the appropriate clinical setting.      Workstation performed: [id number]         CTA head and neck with and without contrast   Final Interpretation by Robbi Castro MD (10/10 1727)      CT Brain:   No acute intracranial abnormality.      Stable mild chronic microangiopathic changes.      Ventricular enlargement out of proportion to sulcation, similar to prior exams.      CT Angiography:   No large vessel occlusion in the head or neck.      Stable tiny vascular outpouching at the junction of the right A1 and A2 segments which may be due to vessel tortuosity or tiny aneurysm. This is unchanged dating back to 2019.                  Workstation performed: [id number]         XR chest 1 view portable   Final Interpretation by Dl Mcclain MD  (10/10 1336)      No acute cardiopulmonary disease.            Workstation performed: [id number]           Cardiology:  No orders to display     GI:  No orders to display           Results from last 7 days   Lab Units 10/11/24  0547 10/10/24  1223   WBC Thousand/uL 6.67 6.95   HEMOGLOBIN g/dL 13.5 12.9   HEMATOCRIT % 41.8 40.5   PLATELETS Thousands/uL 269 278   TOTAL NEUT ABS Thousands/µL 3.65 5.17         Results from last 7 days   Lab Units 10/11/24  0547 10/10/24  1223   SODIUM mmol/L 141 138   POTASSIUM mmol/L 3.7 3.7   CHLORIDE mmol/L 106 102   CO2 mmol/L 28 30   ANION GAP mmol/L 7 6   BUN mg/dL 12 15   CREATININE mg/dL 0.71 0.75   EGFR ml/min/1.73sq m 81 76   CALCIUM mg/dL 9.1 9.4     Results from last 7 days   Lab Units 10/10/24  1223   AST U/L 18   ALT U/L 13   ALK PHOS U/L 71   TOTAL PROTEIN g/dL 7.0   ALBUMIN g/dL 4.1   TOTAL BILIRUBIN mg/dL 0.42         Results from last 7 days   Lab Units 10/11/24  0547 10/10/24  1223   GLUCOSE RANDOM mg/dL 88 103             Results from last 7 days   Lab Units 10/10/24  1420 10/10/24  1223   HS TNI 0HR ng/L  --  3   HS TNI 2HR ng/L 3  --    HSTNI D2 ng/L 0  --              Results from last 7 days   Lab Units 10/11/24  0547   TSH 3RD GENERATON uIU/mL 3.610           Results from last 7 days   Lab Units 10/12/24  0458   CLARITY UA  Clear   COLOR UA  Light Yellow   SPEC GRAV UA  1.011   PH UA  5.5   GLUCOSE UA mg/dl Negative   KETONES UA mg/dl Negative   BLOOD UA  Negative   PROTEIN UA mg/dl Negative   NITRITE UA  Negative   BILIRUBIN UA  Negative   UROBILINOGEN UA (BE) mg/dl <2.0   LEUKOCYTES UA  Negative         Network Utilization Review Department  ATTENTION: Please call with any questions or concerns to [phone] and carefully listen to the prompts so that you are directed to the right person. All voicemails are confidential.   For Discharge needs, contact Care Management DC Support Team at [phone] opt. [phone]  Send all requests for admission clinical reviews,  approved or denied determinations and any other requests to dedicated fax number below belonging to the campus where the patient is receiving treatment. List of dedicated fax numbers for the Facilities:  FACILITY NAME UR FAX NUMBER   ADMISSION DENIALS (Administrative/Medical Necessity) [phone]   DISCHARGE SUPPORT TEAM (NETWORK) [phone]   PARENT CHILD HEALTH (Maternity/NICU/Pediatrics) [phone]   [facility] [phone]   [facility] [phone]   [facility] [phone]   [facility] [phone]   [facility] [phone]   [facility] [phone]   [facility] [phone]   [facility] [phone]   [facility] [phone]   [facility] [phone]   [facility] [phone]   [facility] [phone]

## 2024-10-12 NOTE — PROGRESS NOTES
Progress Note - Hospitalist   Name: Samson Watts 79 y.o. female I MRN: [id number]  Unit/Bed#: S -01 I Date of Admission: 10/10/2024   Date of Service: 10/12/2024 I Hospital Day: 1    Assessment & Plan  Vertigo  Patient presenting with dizziness starting this morning once she got up and moved her head,  she denies hearing changes or ringing in her ears  However during my evaluation does report some dizziness when she was lying down.  MRI negative for acute intracranial process.   Possible BPPV.   Orthostatic  vitals negative.  Now  vertigo resolved.  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 CM following        Mobility:     Basic Mobility Inpatient Raw Score: 16  JH-HLM Goal: 5: Stand one or more mins  JH-HLM Achieved: 7: Walk 25 feet or more  HLM Goal achieved. Continue to encourage appropriate mobility.    Pharmacologic VTE Prophylaxis: Yes Lovenox.   Mechanical VTE Prophylaxis in Place: No   Patient Centered Rounds: I have performed bedside rounds with the Nursing staff today.   Current Length of Stay: 1 day(s)  Current Patient Status: Inpatient   Code Status: Level 1 - Full Code  Time Spent for Care:  35 minutes.  More than 50% of total time spent on counseling and coordination of care as described above.  Discussions with Specialists or Other Care Team Provider: n/a   Education and Discussions with Family / Patient: Yes, Updated family member. Daughter over the phone.   Discharge Plan: Patient is medically ready to be discharged to the next level of care  which could be short-term rehab.    Case Discussed with  regarding updating plan of care and disposition planning.   Certification Statement: The patient will continue to require additional inpatient hospital stay due to Ambua tory Dysfunction, Vertigo.     Subjective:   I have seen and Examined the patient at the bedside. No CP or Sob. No fevers or chills, No nausea or vomiting. Overnight events reviewed with the RN. No Other complains. Pt feels better. No dizziness. Ambulated well.     Review of System:   Denies any CP or SOB  Denies any Cough or Cold  Denies any Fevers or chills.   Denies any focal tingling numbness or weakness extremities.   Denies any abdominal pain, Nausea or vomiting.     Objective:   Temp (24hrs), Av.9 °F (36.6 °C), Min:97.5 °F (36.4 °C), Max:98.2 °F (36.8 °C)    Temp:  [97.5 °F (36.4 °C)-98.2 °F (36.8 °C)] 97.5 °F (36.4 °C)  HR:  [] 101  Resp:  [16] 16  BP: ()/(60-72) 99/71  SpO2:  [95 %-96 %] 95 %  Body mass index is 23.86 kg/m².     Input and Output Summary (last 24 hours):     Intake/Output Summary (Last 24 hours) at 10/12/2024 1458  Last data filed at 10/12/2024 1255  Gross per 24 hour   Intake 420 ml   Output 324 ml   Net 96 ml     I/O         10/10 0701  10/11 0700 10/11 0701  10/12 0700 10/12 0701  10/13 0700    P.O. 460 0 420    Total Intake(mL/kg) 460 (6.7) 0 (0) 420 (6.1)    Urine (mL/kg/hr) 0 324 (0.2)     Total Output 0 324     Net +460 -324 +420           Unmeasured Urine Occurrence 3 x  1 x            Physical Exam:   General : Alert, Awake and oriented x 2, NAD.   Neck : Supple.   Eyes:  GEOFF, EOMI.   CVS : S1, S2, RRR.   R/S : Clear to auscultate anteriorly.   Abd: Soft, NT, ND. Bs+ve  Extremity: No pedal edema noted.   Skin: No acute Rash noted.   CNS: No acute FND.     Additional Data:     Labs, Culture & Imaging Data Reviewed:    Results from last 7 days   Lab Units 10/11/24  0547   WBC Thousand/uL 6.67   HEMOGLOBIN g/dL 13.5   HEMATOCRIT %  "41.8   PLATELETS Thousands/uL 269     Results from last 7 days   Lab Units 10/11/24  0547 10/10/24  1223   POTASSIUM mmol/L 3.7 3.7   CHLORIDE mmol/L 106 102   CO2 mmol/L 28 30   BUN mg/dL 12 15   CREATININE mg/dL 0.71 0.75   CALCIUM mg/dL 9.1 9.4   ALK PHOS U/L  --  71   ALT U/L  --  13   AST U/L  --  18         Lab Results   Component Value Date    HGBA1C 5.4 09/10/2024      MRI brain wo contrast   Final Result by E. Alec Schoenberger, MD (10/11 0820)      No acute intracranial pathology. Chronic microangiopathy.   Stable mild ventricular prominence out of proportion to degree of cortical volume loss that may represent NPH in the appropriate clinical setting.      Workstation performed: [id number]         CTA head and neck with and without contrast   Final Result by Robbi Castro MD (10/10 1727)      CT Brain:   No acute intracranial abnormality.      Stable mild chronic microangiopathic changes.      Ventricular enlargement out of proportion to sulcation, similar to prior exams.      CT Angiography:   No large vessel occlusion in the head or neck.      Stable tiny vascular outpouching at the junction of the right A1 and A2 segments which may be due to vessel tortuosity or tiny aneurysm. This is unchanged dating back to 2019.                  Workstation performed: [id number]         XR chest 1 view portable   Final Result by Dl Mcclain MD (10/10 3616)      No acute cardiopulmonary disease.            Workstation performed: [id number]             Cultures:   Blood Culture: No results found for: \"BLOODCX\"  Urine Culture:   Lab Results   Component Value Date    URINECX >100,000 cfu/ml Escherichia coli (A) 09/10/2024    URINECX >100,000 cfu/ml Escherichia coli (A) 10/31/2023     Sputum Culture: No components found for: \"SPUTUMCX\"  Wound Culture: No results found for: \"WOUNDCULT\"    Last 24 Hours Medication List:   Current Facility-Administered Medications   Medication Dose Route Frequency Provider Last " Rate    bimatoprost  1 drop Both Eyes Daily Spencer Banai, DO      enoxaparin  40 mg Subcutaneous Daily Spencer Banai, DO      ezetimibe  10 mg Oral HS Spencer Banai, DO      hydrALAZINE  10 mg Intravenous Q6H PRN Spencer Banai, DO      meclizine  25 mg Oral Q8H PRN Spencer Banai, DO      pantoprazole  20 mg Oral Early Morning Spencer Banai, DO      sertraline  200 mg Oral HS Spencer Banai, DO           Patient is at moderate risk for morbidity and mortality due to above mentioned illness and comorbidities.

## 2024-10-12 NOTE — CASE MANAGEMENT
Case Management Progress Note    Patient name Samson Watts  Location S /S -01 MRN [id number]  : 1945 Date 10/12/2024       LOS (days): 1  Geometric Mean LOS (GMLOS) (days): 1.9  Days to GMLOS:0.8        OBJECTIVE:        Current admission status: Inpatient  Preferred Pharmacy:   [id number]  [facility] [place] - [address]  [address] [place] [address]  Phone: [phone] Fax: [phone]    Primary Care Provider: Art Crowell MD    Primary Insurance: [employer] [facility]  Secondary Insurance:     PROGRESS NOTE:      Cm contacted patients daughter to review choices of facilities. Cameron emailed list to [email or web address]. Cm awaiting response of facility choice.

## 2024-10-12 NOTE — CASE MANAGEMENT
Case Management Progress Note    Patient name Samson Watts  Location S /S -01 MRN [id number]  : 1945 Date 10/12/2024       LOS (days): 1  Geometric Mean LOS (GMLOS) (days): 1.9  Days to GMLOS:0.9        OBJECTIVE:        Current admission status: Inpatient  Preferred Pharmacy:   [id number]  [facility] - [address]  [address] [place] [address]  Phone: [phone] Fax: [phone]    Primary Care Provider: Art Crowell MD    Primary Insurance: [employer] [facility]  Secondary Insurance:     PROGRESS NOTE:      Cm met with patient to review choices of STR. Patient daughter coming in around 2pm. CM provided list of choices and will see if any other facilities accept by the time daughter comes in.

## 2024-10-13 PROCEDURE — 99232 SBSQ HOSP IP/OBS MODERATE 35: CPT | Performed by: INTERNAL MEDICINE

## 2024-10-13 RX ADMIN — PANTOPRAZOLE SODIUM 20 MG: 20 TABLET, DELAYED RELEASE ORAL at 04:33

## 2024-10-13 RX ADMIN — SERTRALINE 200 MG: 100 TABLET, FILM COATED ORAL at 21:05

## 2024-10-13 RX ADMIN — BIMATOPROST 1 DROP: 0.3 SOLUTION/ DROPS OPHTHALMIC at 21:05

## 2024-10-13 RX ADMIN — EZETIMIBE 10 MG: 10 TABLET ORAL at 21:05

## 2024-10-13 RX ADMIN — ENOXAPARIN SODIUM 40 MG: 40 INJECTION SUBCUTANEOUS at 08:24

## 2024-10-13 NOTE — PLAN OF CARE
Problem: Prexisting or High Potential for Compromised Skin Integrity  Goal: Skin integrity is maintained or improved  Description: INTERVENTIONS:  - Identify patients at risk for skin breakdown  - Assess and monitor skin integrity  - Assess and monitor nutrition and hydration status  - Monitor labs   - Assess for incontinence   - Turn and reposition patient  - Assist with mobility/ambulation  - Relieve pressure over bony prominences  - Avoid friction and shearing  - Provide appropriate hygiene as needed including keeping skin clean and dry  - Evaluate need for skin moisturizer/barrier cream  - Collaborate with interdisciplinary team   - Patient/family teaching  - Consider wound care consult   Outcome: Progressing     Problem: NEUROSENSORY - ADULT  Goal: Achieves stable or improved neurological status  Description: INTERVENTIONS  - Monitor and report changes in neurological status  - Monitor vital signs such as temperature, blood pressure, glucose, and any other labs ordered   - Initiate measures to prevent increased intracranial pressure  - Monitor for seizure activity and implement precautions if appropriate      Outcome: Progressing  Goal: Remains free of injury related to seizures activity  Description: INTERVENTIONS  - Maintain airway, patient safety  and administer oxygen as ordered  - Monitor patient for seizure activity, document and report duration and description of seizure to physician/advanced practitioner  - If seizure occurs,  ensure patient safety during seizure  - Reorient patient post seizure  - Seizure pads on all 4 side rails  - Instruct patient/family to notify RN of any seizure activity including if an aura is experienced  - Instruct patient/family to call for assistance with activity based on nursing assessment  - Administer anti-seizure medications if ordered    Outcome: Progressing  Goal: Achieves maximal functionality and self care  Description: INTERVENTIONS  - Monitor swallowing and  airway patency with patient fatigue and changes in neurological status  - Encourage and assist patient to increase activity and self care.   - Encourage visually impaired, hearing impaired and aphasic patients to use assistive/communication devices  Outcome: Progressing     Problem: SAFETY ADULT  Goal: Patient will remain free of falls  Description: INTERVENTIONS:  - Educate patient/family on patient safety including physical limitations  - Instruct patient to call for assistance with activity   - Consult OT/PT to assist with strengthening/mobility   - Keep Call bell within reach  - Keep bed low and locked with side rails adjusted as appropriate  - Keep care items and personal belongings within reach  - Initiate and maintain comfort rounds  - Make Fall Risk Sign visible to staff  - Apply yellow socks and bracelet for high fall risk patients  - Consider moving patient to room near nurses station  Outcome: Progressing

## 2024-10-13 NOTE — CASE MANAGEMENT
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) 2022     Benign paroxysmal vertigo, bilateral 2019     Major depressive disorder, single episode, unspecified 2019     Nontoxic single thyroid nodule 2019     Thyroid nodule 2019     Vertigo 05/10/2019     Osteopenia 2018     Constipation 2018     Hyperlipidemia 2018     Celiac artery stenosis (HCC) 2018     Aneurysm of anterior cerebral artery 10/03/2017     Stress incontinence, female 2017     Mild cognitive disorder 2016     Abnormal gait 2016     Depression 2016     Depression with anxiety 2016     Hx of breast cancer 2016     Osteoarthritis of hip 2012     Allergic rhinitis 2012     Arthritis 2012       LOS (days): 2  Geometric Mean LOS (GMLOS) (days): 1.9  Days to GMLOS:-0.2     OBJECTIVE:  Risk of Unplanned Readmission Score: 11.55         Current admission status: Inpatient   Preferred Pharmacy:   [id number]  [facility] - [address]  [address] [address]  Phone: [phone] Fax: [phone]    Primary Care Provider: Art Crowell MD    Primary Insurance: [employer] [facility]  Secondary Insurance:     DISCHARGE DETAILS:    Discharge planning discussed with:: patient's daughterLetty  Freedom of Choice: Yes  Comments - Freedom of Choice: agreeable to SNF and is currently reviewing accepting facility options as considering  STR leading to LTC  CM contacted family/caregiver?: Yes  Were Treatment Team discharge recommendations reviewed with patient/caregiver?: Yes  Did patient/caregiver verbalize understanding of patient care needs?: N/A- going to facility  Were patient/caregiver advised of the risks associated with not following Treatment Team discharge recommendations?: Yes    Contacts  Patient Contacts: Letty Acosta  Relationship to Patient:: Family (daughter)  Contact Method: Phone  Phone Number: [phone]  Reason/Outcome: Continuity of Care, Discharge Planning    Requested Home Health Care         Is the patient interested in HHC at discharge?: No    DME Referral Provided  Referral made for DME?: No    Other Referral/Resources/Interventions Provided:  Interventions: SNF, Short Term Rehab         Treatment Team Recommendation: Short Term Rehab  Discharge Destination Plan:: Short Term Rehab, SNF  Transport at Discharge : Wheelchair van      CM called patient's daughterLetty to follow up on preferred SNF. Letty originally stated that she did not receive the SNF list via email, however, she was able to locate it in her junk mail. She asked appropriate questions so CM answered them all at this time. She said that she will review the list and do some research. She will let CM dept know choice facility. She is considering STR leading to LTC for patient. She said that patient is on [facility]'s LTC list. KIMBERLY explained the LTC process which includes patient qualifying for [place] insurance as secondary. She said that since she better understands the process, she can look into patient transitioning from STR into LTC at the facility that she chooses. CM dept will need to follow up with Letty on choice SNF.     Once there is an accepting SNF, insurance auth is required.

## 2024-10-13 NOTE — PROGRESS NOTES
Progress Note - Hospitalist   Name: Samson Watts 79 y.o. female I MRN: [id number]  Unit/Bed#: S -01 I Date of Admission: 10/10/2024   Date of Service: 10/13/2024 I Hospital Day: 2    Assessment & Plan  Vertigo  Patient presenting with dizziness starting this morning once she got up and moved her head,  she denies hearing changes or ringing in her ears  However during my evaluation does report some dizziness when she was lying down.  MRI negative for acute intracranial process.   Possible BPPV.   Orthostatic  vitals negative.  Now  vertigo resolved.  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 Folate wnl last checked.   PT/OT recommending rehab. CM following.           Mobility:     Basic Mobility Inpatient Raw Score: 18  JH-HLM Goal: 6: Walk 10 steps or more  JH-HLM Achieved: 6: Walk 10 steps or more  HLM Goal achieved. Continue to encourage appropriate mobility.    Pharmacologic VTE Prophylaxis: Yes   Mechanical VTE Prophylaxis in Place: No   Patient Centered Rounds: I have performed bedside rounds with the Nursing staff today.   Current Length of Stay: 2 day(s)  Current Patient Status: Inpatient   Code Status: Level 1 - Full Code  Time Spent for Care:  35 minutes.  More than 50% of total time spent on counseling and coordination of care as described above.  Discussions with Specialists or Other Care Team Provider: n/a  Education and Discussions with Family / Patient: No.   Discharge Plan: 24-48 hrs.   Case Discussed with  regarding updating plan of care and disposition planning.    Certification Statement: The patient will continue to require additional inpatient hospital stay due to Ambulatory Dysfunction.     Subjective:   I have seen and Examined the patient at the bedside. No CP or Sob. No fevers or chills, No nausea or vomiting. Overnight events reviewed with the RN. No Other complains. Tolerating diet well.     Review of System:   Denies any CP or SOB  Denies any Cough or Cold  Denies any Fevers or chills.   Denies any focal tingling numbness or weakness in any extremities.   Denies any abdominal pain, Nausea or vomiting.     Objective:   Temp (24hrs), Av.2 °F (36.8 °C), Min:98.1 °F (36.7 °C), Max:98.2 °F (36.8 °C)    Temp:  [98.1 °F (36.7 °C)-98.2 °F (36.8 °C)] 98.1 °F (36.7 °C)  HR:  [65-75] 75  BP: (106-121)/(65-67) 106/65  SpO2:  [94 %-95 %] 94 %  Body mass index is 23.79 kg/m².     Input and Output Summary (last 24 hours):     Intake/Output Summary (Last 24 hours) at 10/13/2024 1654  Last data filed at 10/13/2024 1246  Gross per 24 hour   Intake 660 ml   Output 682 ml   Net -22 ml     I/O         10/11 0701  10/12 0700 10/12 0701  10/13 0700 10/13 0701  10/14 0700    P.O. 0 900 180    Total Intake(mL/kg) 0 (0) 900 (13.1) 180 (2.6)    Urine (mL/kg/hr) 324 (0.2) 375 (0.2) 682 (1)    Stool  0 0    Total Output 324 375 682    Net -324 +525 -502           Unmeasured Urine Occurrence  2 x     Unmeasured Stool Occurrence  0 x 1 x            Physical Exam:   General : Alert, Awake and oriented x 1-2, NAD.   Neck : Supple.   Eyes:  GEOFF, EOMI.   CVS : S1, S2, RRR.   R/S : Clear to auscultate anteriorly.   Abd: Soft, NT, ND. Bs+ve  Extremity: Trace pedal edema noted.   Skin: No acute Rash noted.   CNS: No acute FND.     Additional Data:     Labs, Culture & Imaging Data Reviewed:    Results from last 7 days   Lab Units 10/11/24  0547   WBC Thousand/uL 6.67   HEMOGLOBIN g/dL 13.5   HEMATOCRIT % 41.8   PLATELETS Thousands/uL 269     Results from last 7 days   Lab Units 10/11/24  0547  "10/10/24  1223   POTASSIUM mmol/L 3.7 3.7   CHLORIDE mmol/L 106 102   CO2 mmol/L 28 30   BUN mg/dL 12 15   CREATININE mg/dL 0.71 0.75   CALCIUM mg/dL 9.1 9.4   ALK PHOS U/L  --  71   ALT U/L  --  13   AST U/L  --  18         Lab Results   Component Value Date    HGBA1C 5.4 09/10/2024      MRI brain wo contrast   Final Result by E. Alec Schoenberger, MD (10/11 0820)      No acute intracranial pathology. Chronic microangiopathy.   Stable mild ventricular prominence out of proportion to degree of cortical volume loss that may represent NPH in the appropriate clinical setting.      Workstation performed: [id number]         CTA head and neck with and without contrast   Final Result by Robbi Castro MD (10/10 1727)      CT Brain:   No acute intracranial abnormality.      Stable mild chronic microangiopathic changes.      Ventricular enlargement out of proportion to sulcation, similar to prior exams.      CT Angiography:   No large vessel occlusion in the head or neck.      Stable tiny vascular outpouching at the junction of the right A1 and A2 segments which may be due to vessel tortuosity or tiny aneurysm. This is unchanged dating back to 2019.                  Workstation performed: [id number]         XR chest 1 view portable   Final Result by Dl Mcclain MD (10/10 1296)      No acute cardiopulmonary disease.            Workstation performed: [id number]             Cultures:   Blood Culture: No results found for: \"BLOODCX\"  Urine Culture:   Lab Results   Component Value Date    URINECX >100,000 cfu/ml Escherichia coli (A) 09/10/2024    URINECX >100,000 cfu/ml Escherichia coli (A) 10/31/2023     Sputum Culture: No components found for: \"SPUTUMCX\"  Wound Culture: No results found for: \"WOUNDCULT\"    Last 24 Hours Medication List:   Current Facility-Administered Medications   Medication Dose Route Frequency Provider Last Rate    bimatoprost  1 drop Both Eyes Daily Spencer Latif, DO      enoxaparin  40 mg " Subcutaneous Daily Spencer Banai, DO      ezetimibe  10 mg Oral HS Spencer Banai, DO      hydrALAZINE  10 mg Intravenous Q6H PRN Spencer Banai, DO      meclizine  25 mg Oral Q8H PRN Spencer Banai, DO      pantoprazole  20 mg Oral Early Morning Spencer Banai, DO      sertraline  200 mg Oral HS Spencer Banai, DO           Patient is at moderate risk for morbidity and mortality due to above mentioned illness and comorbidities.

## 2024-10-13 NOTE — ASSESSMENT & PLAN NOTE
Family reports worsening cognitive function and memory, patient currently lives by herself but does have aides who come to her home and daughter also helps her perform activities of daily living  OT cognitive evaluation, patient would benefit from geriatrics outpatient eval.,   TSH Wnl. Folate wnl last checked.   PT/OT recommending rehab. CM following.

## 2024-10-14 PROCEDURE — 97535 SELF CARE MNGMENT TRAINING: CPT

## 2024-10-14 PROCEDURE — 99232 SBSQ HOSP IP/OBS MODERATE 35: CPT | Performed by: INTERNAL MEDICINE

## 2024-10-14 PROCEDURE — 97110 THERAPEUTIC EXERCISES: CPT

## 2024-10-14 PROCEDURE — 97116 GAIT TRAINING THERAPY: CPT

## 2024-10-14 RX ADMIN — MECLIZINE 25 MG: 12.5 TABLET ORAL at 16:21

## 2024-10-14 RX ADMIN — BIMATOPROST 1 DROP: 0.3 SOLUTION/ DROPS OPHTHALMIC at 21:19

## 2024-10-14 RX ADMIN — ENOXAPARIN SODIUM 40 MG: 40 INJECTION SUBCUTANEOUS at 09:30

## 2024-10-14 RX ADMIN — EZETIMIBE 10 MG: 10 TABLET ORAL at 21:18

## 2024-10-14 RX ADMIN — PANTOPRAZOLE SODIUM 20 MG: 20 TABLET, DELAYED RELEASE ORAL at 05:50

## 2024-10-14 RX ADMIN — SERTRALINE 200 MG: 100 TABLET, FILM COATED ORAL at 21:19

## 2024-10-14 RX ADMIN — MECLIZINE 25 MG: 12.5 TABLET ORAL at 21:19

## 2024-10-14 NOTE — PHYSICAL THERAPY NOTE
PHYSICAL THERAPY NOTE    Patient Name: Samson Watts  Today's Date: 10/14/2024       10/14/24 0940   PT Last Visit   PT Visit Date 10/14/24   Note Type   Note Type Treatment for insurance authorization   Pain Assessment   Pain Assessment Tool 0-10   Pain Score No Pain   Restrictions/Precautions   Weight Bearing Precautions Per Order No   Other Precautions Cognitive;Chair Alarm;Bed Alarm;Fall Risk   General   Chart Reviewed Yes   Response to Previous Treatment Patient with no complaints from previous session.   Family/Caregiver Present No   Cognition   Overall Cognitive Status Impaired   Arousal/Participation Alert;Cooperative   Attention Attends with cues to redirect   Orientation Level Oriented to person;Oriented to place;Disoriented to time;Disoriented to situation   Comments Pt identified by name and .   Subjective   Subjective Agrees to PT treatment and is pleasant and cooperative throughout session.   Bed Mobility   Supine to Sit Unable to assess  (OOB in bathroom upon approach, left OOB in chair post session with alarm intact)   Transfers   Sit to Stand 3  Moderate assistance   Additional items Assist x 1;Increased time required;Verbal cues;Armrests  (hand placement cues; varies from mod to min A throughout session)   Stand to Sit 4  Minimal assistance   Additional items Assist x 1;Increased time required;Verbal cues   Additional Comments Pt performed x7 sit to stand trials from recliner chair.   Ambulation/Elevation   Gait pattern Decreased foot clearance;Forward Flexion;Short stride;Excessively slow;Knees flexed   Gait Assistance 4  Minimal assist  (CGA)   Additional items Assist x 1;Verbal cues   Assistive Device Rolling walker   Distance ~240` x1   Balance   Static Sitting Fair   Dynamic Sitting Fair -   Static Standing Fair -   Dynamic Standing Fair -   Ambulatory Poor +   Endurance Deficit   Endurance Deficit Yes   Endurance Deficit Description limited ambulation distance, fatigue    Activity Tolerance   Activity Tolerance Patient limited by fatigue;Patient tolerated treatment well   Medical Staff Made Aware MAYE Machado   Nurse Made Aware Per RN, pt appropriate to treat   Exercises   Knee AROM Long Arc Quad Sitting;15 reps;AROM;Bilateral   Ankle Pumps Sitting;15 reps;AROM   Squat Standing;10 reps;AROM;Bilateral  (2 sets of 5)   Marching Sitting;15 reps;AROM;Bilateral   Assessment   Prognosis Fair   Problem List Decreased strength;Decreased endurance;Impaired balance;Decreased mobility;Decreased cognition;Impaired judgement;Decreased safety awareness   Assessment Pt agrees to PT treatment and is pleasant and cooperative throughout session. Progress noted as pt is able to ambulate increased distance with min A and use of RW.  Recommending continued use of RW as increased gait stability noted.  Requires verbal cues for hand placement during transfers and education provided for use and safety of RW.  Mild impulsivity noted throughout session.  Focused on sit to stand transfers from recliner chair.  Fluctuates between needing min to mod A for transfers.  Will continue to benefit from ongoing skilled PT to maximize her functional mobility and increase her level of independence.   Goals   Patient Goals to go home and do more exercises   STG Expiration Date 10/21/24   Short Term Goal #1 Pt will be able to: (1) perform bed mobility with supervision to promote OOB activity (2) perform sit to stand with supervision to decrease burden of care (3) ambulate at least 200` with supervision and least restrictive AD to increase activity tolerance (4) increase standing balance by 1 grade to decrease risk of falls   PT Treatment Day 2   Plan   Treatment/Interventions Functional transfer training;LE strengthening/ROM;Therapeutic exercise;Endurance training;Patient/family training;Equipment eval/education;Bed mobility;Gait training   Progress Slow progress, decreased activity tolerance   PT Frequency 3-5x/wk    Discharge Recommendation   Rehab Resource Intensity Level, PT II (Moderate Resource Intensity)   Equipment Recommended Walker   Walker Package Recommended Wheeled walker   [facility] Basic Mobility Inpatient   Turning in Flat Bed Without Bedrails 3   Lying on Back to Sitting on Edge of Flat Bed Without Bedrails 3   Moving Bed to Chair 3   Standing Up From Chair Using Arms 2   Walk in Room 3   Climb 3-5 Stairs With Railing 2   Basic Mobility Inpatient Raw Score 16   Basic Mobility Standardized Score 38.32   [facility] Highest Level Of Mobility   -[facility] Goal 5: Stand one or more mins   -[facility] Achieved 7: Walk 25 feet or more   Education   Education Provided Mobility training;Home exercise program;Assistive device   Patient Reinforcement needed;Explanation/teachback used   End of Consult   Patient Position at End of Consult Bedside chair;Bed/Chair alarm activated;All needs within reach   The patient's [facility] Basic Mobility Inpatient Short Form Raw Score is 16. A Raw score of less than or equal to 16 suggests the patient may benefit from discharge to post-acute rehabilitation services.     However please refer to therapist recommendation for discharge planning given other factors that may influence destination.     Adapted from Cricket PATTON, Ranjan J, Jorge J, Mayito BLOOD. Association of -[facility] “6-Clicks” Basic Mobility and Daily Activity Scores With Discharge Destination. Physical Therapy, 2021;101:1-9. DOI: [email or web address]      Doris Cota DPT

## 2024-10-14 NOTE — PLAN OF CARE
Problem: PHYSICAL THERAPY ADULT  Goal: Performs mobility at highest level of function for planned discharge setting.  See evaluation for individualized goals.  Description: Treatment/Interventions: LE strengthening/ROM, Functional transfer training, Therapeutic exercise, Endurance training, Equipment eval/education, Patient/family training, Bed mobility, Gait training, Cognitive reorientation  Equipment Recommended: Walker (pending progress)       See flowsheet documentation for full assessment, interventions and recommendations.  Outcome: Progressing  Note: Prognosis: Fair  Problem List: Decreased strength, Decreased endurance, Impaired balance, Decreased mobility, Decreased cognition, Impaired judgement, Decreased safety awareness  Assessment: Pt agrees to PT treatment and is pleasant and cooperative throughout session. Progress noted as pt is able to ambulate increased distance with min A and use of RW.  Recommending continued use of RW as increased gait stability noted.  Requires verbal cues for hand placement during transfers and education provided for use and safety of RW.  Mild impulsivity noted throughout session.  Focused on sit to stand transfers from recliner chair.  Fluctuates between needing min to mod A for transfers.  Will continue to benefit from ongoing skilled PT to maximize her functional mobility and increase her level of independence.        Rehab Resource Intensity Level, PT: II (Moderate Resource Intensity)    See flowsheet documentation for full assessment.

## 2024-10-14 NOTE — CASE MANAGEMENT
[facility] received request for authorization from Care Manager.  Authorization request submitted for: SNF  Facility Name:  [facility] NPI:[id number]  Facility MD: Gabe Muller    NPI: [id number]  Authorization initiated by contacting insurance:  [employer]  Via: H&CC Portal   Clinicals submitted via Portal attachment   Pending Reference #:[id number]     Care Manager notified: Sarita Hernandez    Updates to authorization status will be noted in chart. Please reach out to CM for updates on any clinical information.

## 2024-10-14 NOTE — CASE MANAGEMENT
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) 2022     Benign paroxysmal vertigo, bilateral 2019     Major depressive disorder, single episode, unspecified 2019     Nontoxic single thyroid nodule 2019     Thyroid nodule 2019     Vertigo 05/10/2019     Osteopenia 2018     Constipation 2018     Hyperlipidemia 2018     Celiac artery stenosis (HCC) 2018     Aneurysm of anterior cerebral artery 10/03/2017     Stress incontinence, female 2017     Mild cognitive disorder 2016     Abnormal gait 2016     Depression 2016     Depression with anxiety 2016     Hx of breast cancer 2016     Osteoarthritis of hip 2012     Allergic rhinitis 2012     Arthritis 2012       LOS (days): 3  Geometric Mean LOS (GMLOS) (days): 1.9  Days to GMLOS:-0.9     OBJECTIVE:  Risk of Unplanned Readmission Score: 11.7         Current admission status: Inpatient   Preferred Pharmacy:   [facility] - [address]  [address] [address]  Phone: [phone] Fax: [phone]    Primary Care Provider: Art Crowell MD    Primary Insurance: [employer]  Secondary Insurance:     DISCHARGE DETAILS:      Additional Comments: TC to Ps daughter, Letty. She and the Pt have selected [facility] for STR.     Insurance auth tasked to CM DC Support.

## 2024-10-14 NOTE — PLAN OF CARE
Problem: OCCUPATIONAL THERAPY ADULT  Goal: Performs self-care activities at highest level of function for planned discharge setting.  See evaluation for individualized goals.  Description: Treatment Interventions: ADL retraining, Functional transfer training, Endurance training, Cognitive reorientation, Patient/family training, Equipment evaluation/education, Compensatory technique education, Energy conservation, Activityengagement          See flowsheet documentation for full assessment, interventions and recommendations.   Outcome: Progressing  Note: Limitation: Decreased ADL status, Decreased UE strength, Decreased Safe judgement during ADL, Decreased cognition, Decreased endurance, Decreased self-care trans, Decreased high-level ADLs (impaired balance, fxnl mobility, act theo, fxnl reach, standing theo, strength, attention to task, direction following, safety awareness, insight, pacing, problem solving, learning new tasks, termination of conversation)  Prognosis: Good  Assessment: Pt seen on this date for skilled OT treatment session. At start of session pt supine in bed. Pt required increased time for bed mobility. Demonstrating consistent performance with sit >< stand from EOB, improved performance from toilet and recliner chair. Pt with improved standing tolerance at sink - decreased retropulsiveness. Pt requiring cuing for sequencing and pacing of grooming tasks - noted repeated task of washing face due to forgetfulness of completing 1st time. Pt highly tangential in conversation and limited attention to ADL tasks requiring frequent redirection. Pt would continue to benefit from skilled OT treatment sessions in order to address remaining deficits  Recommendation: (S) Geriatric Consult  Rehab Resource Intensity Level, OT: (S) II (Moderate Resource Intensity) (s/p level 2 resources recommend d/c to environment with 24/7 care as per ACLS recommendations)

## 2024-10-14 NOTE — OCCUPATIONAL THERAPY NOTE
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)            10/14/24 0913   OT Last Visit   OT Visit Date 10/14/24   Note Type   Note Type Treatment for insurance authorization   Pain Assessment   Pain Assessment Tool 0-10   Pain Score No Pain   Restrictions/Precautions   Weight Bearing Precautions Per Order No   Other Precautions Cognitive;Chair Alarm;Bed Alarm;Multiple lines;Fall Risk;Pain   Lifestyle   Autonomy PTA pt living alone in apartment, pt (I) with ADLs and IADLs, (+)fall, (-)drives, use of RW at baseline   Reciprocal Relationships supportive daughter + HHAs   Service to Others retired   Intrinsic Gratification enjoys watching the [employer] channel   ADL   Grooming Assistance 5  Supervision/Setup   Grooming Deficit Increased time to complete;Supervision/safety;Verbal cueing   Grooming Comments standing at sink, requiring increased time for grooming tasks   UB Bathing Assistance 5  Supervision/Setup   UB Bathing Deficit Increased time to complete;Verbal cueing;Supervision/safety;Setup   UB Bathing Comments cuing for thoroughness   UB Dressing Assistance 4  Minimal Assistance   UB Dressing Deficit Increased time to complete;Supervision/safety;Verbal cueing;Thread RUE;Thread LUE;Pull around back   LB Dressing Assistance 3  Moderate Assistance   LB Dressing Deficit Increased time to complete;Supervision/safety;Verbal cueing;Thread RLE into underwear;Thread LLE into underwear;Pull up over hips   LB Dressing Comments retropulsive upon standing requiring (A) for balance   Toileting Assistance  4  Minimal Assistance   Toileting Deficit Increased time to complete;Clothing management down;Perineal hygiene;Clothing management up   Bed Mobility   Supine to Sit 5  Supervision   Additional items Increased  "time required;Verbal cues   Transfers   Sit to Stand 3  Moderate assistance   Additional items Assist x 1;Increased time required;Verbal cues   Stand to Sit 4  Minimal assistance   Additional items Assist x 1;Increased time required;Verbal cues   Toilet transfer 4  Minimal assistance   Additional items Assist x 1;Increased time required;Verbal cues;Standard toilet   Additional Comments use of RW   Functional Mobility   Functional Mobility 4  Minimal assistance   Additional Comments Ax1, functional household distance   Additional items Rolling walker   Subjective   Subjective \"You know my dad was a dentist - so I got it good from him\" \"Did I ever tell you that my dad was a dentist?\"   Cognition   Overall Cognitive Status Impaired   Arousal/Participation Alert;Cooperative   Attention Attends with cues to redirect   Orientation Level Oriented to person;Oriented to place;Disoriented to time;Disoriented to situation   Memory Decreased short term memory;Decreased recall of recent events;Decreased recall of precautions   Following Commands Follows one step commands with increased time or repetition   Comments pleasantly confused, repetative story telling   Activity Tolerance   Activity Tolerance Other (Comment)  (limited by cognition)   Medical Staff Made Aware CAROLEE Lennon, spoke to PT Doris   Assessment   Assessment Pt seen on this date for skilled OT treatment session. At start of session pt supine in bed. Pt required increased time for bed mobility. Demonstrating consistent performance with sit >< stand from EOB, improved performance from toilet and recliner chair. Pt with improved standing tolerance at sink - decreased retropulsiveness. Pt requiring cuing for sequencing and pacing of grooming tasks - noted repeated task of washing face due to forgetfulness of completing 1st time. Pt highly tangential in conversation and limited attention to ADL tasks requiring frequent redirection. Pt would continue to benefit from " skilled OT treatment sessions in order to address remaining deficits   Plan   Goal Expiration Date 10/21/24   OT Treatment Day 1   OT Frequency 3-5x/wk   Discharge Recommendation   Recommendation (S)  Geriatric Consult   Rehab Resource Intensity Level, OT (S)  II (Moderate Resource Intensity)  (s/p level 2 resources recommend d/c to environment with 24/7 care as per ACLS recommendations)   AM-PAC Daily Activity Inpatient   Lower Body Dressing 3   Bathing 3   Toileting 3   Upper Body Dressing 3   Grooming 3   Eating 4   Daily Activity Raw Score 19   Daily Activity Standardized Score (Calc for Raw Score >=11) 40.22   AM-PAC Applied Cognition Inpatient   Following a Speech/Presentation 2   Understanding Ordinary Conversation 2   Taking Medications 1   Remembering Where Things Are Placed or Put Away 1   Remembering List of 4-5 Errands 1   Taking Care of Complicated Tasks 1   Applied Cognition Raw Score 8   Applied Cognition Standardized Score 19.32   End of Consult   Patient Position at End of Consult Bedside chair;Bed/Chair alarm activated;All needs within reach       The patient's raw score on the AM-PAC Daily Activity Inpatient Short Form is 19. A raw score of greater than or equal to 19 suggests the patient may benefit from discharge to home. HOWEVER please refer to the recommendation of the Occupational Therapist for safe discharge planning.      Jeannette Pope MS, OTR/L

## 2024-10-14 NOTE — PROGRESS NOTES
Progress Note - Hospitalist   Name: Samson Watts 79 y.o. female I MRN: [id number]  Unit/Bed#: S -01 I Date of Admission: 10/10/2024   Date of Service: 10/14/2024 I Hospital Day: 3    Assessment & Plan  Vertigo  Patient presenting with dizziness starting this morning once she got up and moved her head,  she denies hearing changes or ringing in her ears  However during my evaluation does report some dizziness when she was lying down.  MRI negative for acute intracranial process.   Possible BPPV.   Orthostatic  vitals negative.  Now  vertigo resolved.  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 Folate wnl last checked.   PT/OT recommending rehab. CM following.           Mobility:     Basic Mobility Inpatient Raw Score: 16  JH-HLM Goal: 5: Stand one or more mins  JH-HLM Achieved: 7: Walk 25 feet or more  HLM Goal achieved. Continue to encourage appropriate mobility.    Pharmacologic VTE Prophylaxis: Yes Lovenox  Mechanical VTE Prophylaxis in Place: No   Patient Centered Rounds: I have performed bedside rounds with the Nursing staff today.   Current Length of Stay: 3 day(s)  Current Patient Status: Inpatient   Code Status: Level 1 - Full Code  Time Spent for Care:  35 minutes.  More than 50% of total time spent on counseling and coordination of care as described above.  Discussions with Specialists or Other Care Team Provider: n/a   Education and Discussions with Family / Patient: Yes, Updated family member. Daughter over the phone.   Discharge Plan: Patient is medically stable to be dcd.   Case  Discussed with  regarding updating plan of care and disposition planning.   Certification Statement: The patient will continue to require additional inpatient hospital stay due to Ambulatory dysfunction.     Subjective:   I have seen and Examined the patient at the bedside. No CP or Sob. No fevers or chills, No nausea or vomiting. Overnight events reviewed with the RN. No Other complains.     Review of System:   Denies any CP or SOB  Denies any Cough or Cold  Denies any Fevers or chills.   Denies any focal tingling numbness or weakness in any extremities.   Denies any abdominal pain, Nausea or vomiting.     Objective:   Temp (24hrs), Av.1 °F (36.7 °C), Min:98.1 °F (36.7 °C), Max:98.1 °F (36.7 °C)    Temp:  [98.1 °F (36.7 °C)] 98.1 °F (36.7 °C)  HR:  [65-81] 65  Resp:  [16-17] 17  BP: (106-130)/(65-79) 130/72  SpO2:  [92 %-94 %] 93 %  Body mass index is 23.65 kg/m².     Input and Output Summary (last 24 hours):     Intake/Output Summary (Last 24 hours) at 10/14/2024 1251  Last data filed at 10/14/2024 0900  Gross per 24 hour   Intake 600 ml   Output 1068 ml   Net -468 ml     I/O         10/12 0701  10/13 0700 10/13 0701  10/14 0700 10/14 0701  10/15 0700    P.O. 900 780 180    Total Intake(mL/kg) 900 (13.1) 780 (11.4) 180 (2.6)    Urine (mL/kg/hr) 375 (0.2) 1750 (1.1)     Stool 0 0     Total Output 375 1750     Net +525 -970 +180           Unmeasured Urine Occurrence 2 x 1 x     Unmeasured Stool Occurrence 0 x 1 x             Physical Exam:   General : Alert, Awake and oriented x 2-3, NAD.   Neck : Supple.   Eyes:  GEOFF, EOMI.   CVS : S1, S2, RRR.   R/S : Clear to auscultate anteriorly.   Abd: Soft, NT, ND. Bs+ve  Extremity: No pedal edema noted.   Skin: No acute Rash noted.   CNS: No acute FND.     Additional Data:     Labs, Culture & Imaging Data Reviewed:    Results from last 7 days   Lab Units 10/11/24  0547   WBC Thousand/uL 6.67   HEMOGLOBIN g/dL 13.5   HEMATOCRIT % 41.8   PLATELETS  "Thousands/uL 269     Results from last 7 days   Lab Units 10/11/24  0547 10/10/24  1223   POTASSIUM mmol/L 3.7 3.7   CHLORIDE mmol/L 106 102   CO2 mmol/L 28 30   BUN mg/dL 12 15   CREATININE mg/dL 0.71 0.75   CALCIUM mg/dL 9.1 9.4   ALK PHOS U/L  --  71   ALT U/L  --  13   AST U/L  --  18         Lab Results   Component Value Date    HGBA1C 5.4 09/10/2024      MRI brain wo contrast   Final Result by E. Alec Schoenberger, MD (10/11 0820)      No acute intracranial pathology. Chronic microangiopathy.   Stable mild ventricular prominence out of proportion to degree of cortical volume loss that may represent NPH in the appropriate clinical setting.      Workstation performed: [id number]         CTA head and neck with and without contrast   Final Result by Robbi Castro MD (10/10 3337)      CT Brain:   No acute intracranial abnormality.      Stable mild chronic microangiopathic changes.      Ventricular enlargement out of proportion to sulcation, similar to prior exams.      CT Angiography:   No large vessel occlusion in the head or neck.      Stable tiny vascular outpouching at the junction of the right A1 and A2 segments which may be due to vessel tortuosity or tiny aneurysm. This is unchanged dating back to 2019.                  Workstation performed: [id number]         XR chest 1 view portable   Final Result by Dl Mcclain MD (10/10 9166)      No acute cardiopulmonary disease.            Workstation performed: [id number]             Cultures:   Blood Culture: No results found for: \"BLOODCX\"  Urine Culture:   Lab Results   Component Value Date    URINECX >100,000 cfu/ml Escherichia coli (A) 09/10/2024    URINECX >100,000 cfu/ml Escherichia coli (A) 10/31/2023     Sputum Culture: No components found for: \"SPUTUMCX\"  Wound Culture: No results found for: \"WOUNDCULT\"    Last 24 Hours Medication List:   Current Facility-Administered Medications   Medication Dose Route Frequency Provider Last Rate    " bimatoprost  1 drop Both Eyes Daily Spencer Banai, DO      enoxaparin  40 mg Subcutaneous Daily Spencer Banai, DO      ezetimibe  10 mg Oral HS Spencer Banai, DO      hydrALAZINE  10 mg Intravenous Q6H PRN Spencer Banai, DO      meclizine  25 mg Oral Q8H PRN Spencer Banai, DO      pantoprazole  20 mg Oral Early Morning Spencer Banai, DO      sertraline  200 mg Oral HS Spencer Banai, DO           Patient is at moderate risk for morbidity and mortality due to above mentioned illness and comorbidities.

## 2024-10-15 ENCOUNTER — TELEPHONE (OUTPATIENT)
Age: 79
End: 2024-10-15

## 2024-10-15 ENCOUNTER — PATIENT OUTREACH (OUTPATIENT)
Dept: CASE MANAGEMENT | Facility: OTHER | Age: 79
End: 2024-10-15

## 2024-10-15 VITALS
TEMPERATURE: 98 F | BODY MASS INDEX: 24.36 KG/M2 | HEART RATE: 72 BPM | OXYGEN SATURATION: 95 % | WEIGHT: 155.2 LBS | HEIGHT: 67 IN | SYSTOLIC BLOOD PRESSURE: 174 MMHG | RESPIRATION RATE: 19 BRPM | DIASTOLIC BLOOD PRESSURE: 84 MMHG

## 2024-10-15 PROBLEM — R42 VERTIGO: Status: RESOLVED | Noted: 2019-05-10 | Resolved: 2024-10-15

## 2024-10-15 PROCEDURE — 99238 HOSP IP/OBS DSCHRG MGMT 30/<: CPT | Performed by: STUDENT IN AN ORGANIZED HEALTH CARE EDUCATION/TRAINING PROGRAM

## 2024-10-15 RX ADMIN — ENOXAPARIN SODIUM 40 MG: 40 INJECTION SUBCUTANEOUS at 09:10

## 2024-10-15 RX ADMIN — PANTOPRAZOLE SODIUM 20 MG: 20 TABLET, DELAYED RELEASE ORAL at 05:09

## 2024-10-15 NOTE — CASE MANAGEMENT
Called [employer] ([phone]) to check status of authorization. Spoke to Codie who stated auth was approved.    [facility] has received APPROVED authorization.  Insurance:   [employer]   Authorization received for: SNF  Facility: [facility]   Authorization #:[id number]  Start of Care:10/14  Next Review Date:10/16  Continued Stay Care Coordinator: Rosa BANG#:   Submit next review to: [employer] Portal or F: [phone]    Care Manager notified: Sarita Hernandez    Please reach out to  for updates on any clinical information.

## 2024-10-15 NOTE — CASE MANAGEMENT
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) 2022     Benign paroxysmal vertigo, bilateral 2019     Major depressive disorder, single episode, unspecified 2019     Nontoxic single thyroid nodule 2019     Thyroid nodule 2019     Vertigo 05/10/2019     Osteopenia 2018     Constipation 2018     Hyperlipidemia 2018     Celiac artery stenosis (HCC) 2018     Aneurysm of anterior cerebral artery 10/03/2017     Stress incontinence, female 2017     Mild cognitive disorder 2016     Abnormal gait 2016     Depression 2016     Depression with anxiety 2016     Hx of breast cancer 2016     Osteoarthritis of hip 2012     Allergic rhinitis 2012     Arthritis 2012       LOS (days): 4  Geometric Mean LOS (GMLOS) (days): 1.9  Days to GMLOS:-1.9     OBJECTIVE:  Risk of Unplanned Readmission Score: 11.88         Current admission status: Inpatient   Preferred Pharmacy:   [id number]  [facility] - [address]  [address] [address]  Phone: [phone] Fax: [phone]    Primary Care Provider: Art Crowell MD    Primary Insurance: [employer]  Secondary Insurance:     DISCHARGE DETAILS:                                                                                                               Facility Insurance Auth Number: [id number]

## 2024-10-15 NOTE — PLAN OF CARE
Problem: Prexisting or High Potential for Compromised Skin Integrity  Goal: Skin integrity is maintained or improved  Description: INTERVENTIONS:  - Identify patients at risk for skin breakdown  - Assess and monitor skin integrity  - Assess and monitor nutrition and hydration status  - Monitor labs   - Assess for incontinence   - Turn and reposition patient  - Assist with mobility/ambulation  - Relieve pressure over bony prominences  - Avoid friction and shearing  - Provide appropriate hygiene as needed including keeping skin clean and dry  - Evaluate need for skin moisturizer/barrier cream  - Collaborate with interdisciplinary team   - Patient/family teaching  - Consider wound care consult   Outcome: Adequate for Discharge     Problem: NEUROSENSORY - ADULT  Goal: Achieves stable or improved neurological status  Description: INTERVENTIONS  - Monitor and report changes in neurological status  - Monitor vital signs such as temperature, blood pressure, glucose, and any other labs ordered   - Initiate measures to prevent increased intracranial pressure  - Monitor for seizure activity and implement precautions if appropriate      Outcome: Adequate for Discharge  Goal: Remains free of injury related to seizures activity  Description: INTERVENTIONS  - Maintain airway, patient safety  and administer oxygen as ordered  - Monitor patient for seizure activity, document and report duration and description of seizure to physician/advanced practitioner  - If seizure occurs,  ensure patient safety during seizure  - Reorient patient post seizure  - Seizure pads on all 4 side rails  - Instruct patient/family to notify RN of any seizure activity including if an aura is experienced  - Instruct patient/family to call for assistance with activity based on nursing assessment  - Administer anti-seizure medications if ordered    Outcome: Adequate for Discharge  Goal: Achieves maximal functionality and self care  Description:  INTERVENTIONS  - Monitor swallowing and airway patency with patient fatigue and changes in neurological status  - Encourage and assist patient to increase activity and self care.   - Encourage visually impaired, hearing impaired and aphasic patients to use assistive/communication devices  Outcome: Adequate for Discharge     Problem: SAFETY ADULT  Goal: Patient will remain free of falls  Description: INTERVENTIONS:  - Educate patient/family on patient safety including physical limitations  - Instruct patient to call for assistance with activity   - Consult OT/PT to assist with strengthening/mobility   - Keep Call bell within reach  - Keep bed low and locked with side rails adjusted as appropriate  - Keep care items and personal belongings within reach  - Initiate and maintain comfort rounds  - Make Fall Risk Sign visible to staff  - Offer Toileting every 2 Hours, in advance of need  - Initiate/Maintain bed/chair alarm  - Obtain necessary fall risk management equipment  - Apply yellow socks and bracelet for high fall risk patients  - Consider moving patient to room near nurses station  Outcome: Adequate for Discharge

## 2024-10-15 NOTE — PROGRESS NOTES
ADT Notification received.  Patient was admitted to the hospital and discharged to [facility] today for short-term rehab.    Spoke with patient's dtr who confirmed plan would then be to transfer to facility for short-term rehab and plan is to then transfer directly to facility for LTC placement.  Encouraged patient's dtr to work with SNF  on this transition and inform this staff of her preferred LTC facilities.  Patient was receiving in-home waiver services.  Encouraged patient's dtr to inform waiver  of this plan in order to assist if needed.  Also encouraged patient's dtr to provide [facility] plan information to SNF .    Will close case at this time as plan is for patient to transition to LTC but will remain available to assist with future needs.

## 2024-10-15 NOTE — TELEPHONE ENCOUNTER
Please call back to reschedule 6 month follow up appointment which was cancelled via [facility].    Patient needs to be discharged from rehab before the appointment can be rescheduled.

## 2024-10-15 NOTE — CASE MANAGEMENT
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     Benign paroxysmal vertigo, bilateral 2019     Major depressive disorder, single episode, unspecified 2019     Nontoxic single thyroid nodule 2019     Thyroid nodule 2019     Vertigo 05/10/2019     Osteopenia 2018     Constipation 2018     Hyperlipidemia 2018     Celiac artery stenosis (HCC) 2018     Aneurysm of anterior cerebral artery 10/03/2017     Stress incontinence, female 2017     Mild cognitive disorder 2016     Abnormal gait 2016     Depression 2016     Depression with anxiety 2016     Hx of breast cancer 2016     Osteoarthritis of hip 2012     Allergic rhinitis 2012     Arthritis 2012       LOS (days): 4  Geometric Mean LOS (GMLOS) (days): 1.9  Days to GMLOS:-1.9     OBJECTIVE:  Risk of Unplanned Readmission Score: 11.88         Current admission status: Inpatient   Preferred Pharmacy:   [id number]  [facility] - [address]  [address] [address]  Phone: [phone] Fax: [phone]    Primary Care Provider: Art Crowell MD    Primary Insurance: [employer]  Secondary Insurance:     DISCHARGE DETAILS:         IMM Given (Date):: 10/15/24  IMM Given to:: Family  Family notified:: Pts daughter, Letty via phone       Accepting Facility Name, City & State : [facility]  Receiving Facility/Agency  Phone Number: [phone]  Facility/Agency Fax Number: [phone]       Pt and her daughter, Letty are aware and in agreement with her discharge to [facility] for STR.     [facility]     RN and SLIM provider aware.

## 2024-10-16 ENCOUNTER — TRANSITIONAL CARE MANAGEMENT (OUTPATIENT)
Dept: FAMILY MEDICINE CLINIC | Facility: CLINIC | Age: 79
End: 2024-10-16

## 2024-10-16 ENCOUNTER — NURSING HOME VISIT (OUTPATIENT)
Dept: GERIATRICS | Facility: OTHER | Age: 79
End: 2024-10-16
Payer: COMMERCIAL

## 2024-10-16 VITALS
WEIGHT: 155.6 LBS | RESPIRATION RATE: 18 BRPM | DIASTOLIC BLOOD PRESSURE: 84 MMHG | TEMPERATURE: 98 F | SYSTOLIC BLOOD PRESSURE: 157 MMHG | BODY MASS INDEX: 24.42 KG/M2 | OXYGEN SATURATION: 95 % | HEART RATE: 70 BPM | HEIGHT: 67 IN

## 2024-10-16 DIAGNOSIS — R26.2 AMBULATORY DYSFUNCTION: ICD-10-CM

## 2024-10-16 DIAGNOSIS — I60.11 SUBARACHNOID HEMORRHAGE FROM ANEURYSM OF RIGHT MIDDLE CEREBRAL ARTERY (HCC): Primary | ICD-10-CM

## 2024-10-16 DIAGNOSIS — Z85.3 HX OF BREAST CANCER: Chronic | ICD-10-CM

## 2024-10-16 DIAGNOSIS — F41.8 DEPRESSION WITH ANXIETY: ICD-10-CM

## 2024-10-16 DIAGNOSIS — H40.1231 LOW-TENSION GLAUCOMA OF BOTH EYES, MILD STAGE: ICD-10-CM

## 2024-10-16 DIAGNOSIS — F01.B2 MODERATE VASCULAR DEMENTIA WITH PSYCHOTIC DISTURBANCE (HCC): ICD-10-CM

## 2024-10-16 DIAGNOSIS — E78.00 PURE HYPERCHOLESTEROLEMIA: ICD-10-CM

## 2024-10-16 DIAGNOSIS — H81.13 BENIGN PAROXYSMAL VERTIGO, BILATERAL: ICD-10-CM

## 2024-10-16 DIAGNOSIS — N39.3 STRESS INCONTINENCE, FEMALE: ICD-10-CM

## 2024-10-16 DIAGNOSIS — R29.6 FALLS: ICD-10-CM

## 2024-10-16 DIAGNOSIS — F09 MILD COGNITIVE DISORDER: ICD-10-CM

## 2024-10-16 PROBLEM — H40.10X1 OPEN-ANGLE GLAUCOMA OF BOTH EYES, MILD STAGE: Status: ACTIVE | Noted: 2024-10-16

## 2024-10-16 PROCEDURE — 99305 1ST NF CARE MODERATE MDM 35: CPT | Performed by: INTERNAL MEDICINE

## 2024-10-16 NOTE — ASSESSMENT & PLAN NOTE
Family has noted a decline in patient's ambulation she had needed a 4 wheeled walker most of the time.  She also needs assistance of 1 at times.

## 2024-10-16 NOTE — ASSESSMENT & PLAN NOTE
Patient with history of falls at home apparently has fallen out of bed.  Patient will need continued physical and Occupational Therapy.  On reviewing her multiple comorbidities patient does have history of glaucoma, currently she is also taking sertraline which can promote falls.  one can consider titrating antidepressant down.  Patient was also taking Xanax on appearing basis this medication has been discontinued.

## 2024-10-16 NOTE — ASSESSMENT & PLAN NOTE
Patient with history of hyperlipidemia apparently she was intolerant to statins could only tolerate Zetia.

## 2024-10-16 NOTE — ASSESSMENT & PLAN NOTE
Patient with history of stress incontinence post subarachnoid hemorrhage.  Initially she dealt with it by going to the bathroom every 2 hours subsequently the patient currently uses diapers.  No evidence of fecal incontinence.

## 2024-10-16 NOTE — ASSESSMENT & PLAN NOTE
Patient with history benign positional vertigo was given meclizine patient's symptoms subsided.  Ideally 1 would perform Epley maneuver to resolve this issue.

## 2024-10-16 NOTE — ASSESSMENT & PLAN NOTE
Patient apparently did have episode of hallucinations in the past, apparently the patient was started on Risperdal but after they found out that the patient's hallucinations emanated from her urinary tract infection this medication was stopped she no longer has been taking it.  Patient's MoCA score is in the 23-24 range.

## 2024-10-16 NOTE — ASSESSMENT & PLAN NOTE
Patient with history of a subarachnoid hemorrhage in January 2016 with a suspected rupture of middle cerebral artery.  Patient needed drainage and subsequent physical and Occupational Therapy.

## 2024-10-16 NOTE — DISCHARGE SUMMARY
Discharge Summary - Hospitalist   Name: Samson Watts 79 y.o. female I MRN: [id number]  Unit/Bed#: S -01 I Date of Admission: 10/10/2024   Date of Service: 10/15/2024 I Hospital Day: 4     Assessment & Plan  Vertigo  Ms. Watts presented with dizziness starting on 10/10/2024.  Dizziness occurred when she change position and moved her head, but she denied ear pain, change in hearing, or ringing in her ears.  MRI on admission was negative for acute infarct.  It did show enlarged ventricles, but the patient had been previously worked up for normal pressure hydrocephalus in 2023 by neurosurgery.  It was determined at that time that she does not have NPH.  Consequently, she was admitted for treatment of BPPV with as needed meclizine.  Symptoms resolved over the next 72 hours.  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  Prior to admission, patient was living by herself but did have aides and family that would help her with ADLs.  While admitted, she was evaluated by OT and PT who recommended rehab.  Patient will be discharged to nursing facility.       Medical Problems       Resolved Problems  Date Reviewed: 10/14/2024   None       Discharging Physician / Practitioner: Christine Allen MD  PCP: Art Crowell MD  Admission Date:   Admission Orders (From admission, onward)       Ordered        10/11/24 1253  INPATIENT ADMISSION  Once            10/10/24 1822  Place in Observation  Once                          Discharge Date: 10/15/24    Consultations During Hospital Stay:  None    Procedures Performed:   None    Significant Findings / Test Results:   Mild ventricular prominence seen on MRI with already known and worked up, thought not to be consistent with NPH    Incidental Findings:  "  None    Test Results Pending at Discharge (will require follow up):   None     Outpatient Tests Requested:  Chest CT recommended in January 2024 to follow-up incidental finding of nodule.  This could still be performed if in line with goals of care for this patient with advancing dementia.    Complications: None    Reason for Admission: Dizziness    Hospital Course:     Please see above list of diagnoses and related plan for additional information.     Condition at Discharge: fair    Discharge Day Visit / Exam:   Subjective: There was no complaint of dizziness.  Vitals: Blood Pressure: (!) 174/84 (10/15/24 0704)  Pulse: 72 (10/15/24 0704)  Temperature: 98 °F (36.7 °C) (10/15/24 0704)  Temp Source: Oral (10/15/24 0704)  Respirations: 19 (10/15/24 0704)  Height: 5' 7\" (170.2 cm) (10/10/24 1215)  Weight - Scale: 70.4 kg (155 lb 3.3 oz) (10/15/24 0516)  SpO2: 95 % (10/15/24 0704)  Physical Exam  Constitutional:       General: She is not in acute distress.     Appearance: Normal appearance.   HENT:      Head: Normocephalic and atraumatic.      Mouth/Throat:      Mouth: Mucous membranes are moist.      Pharynx: No oropharyngeal exudate or posterior oropharyngeal erythema.   Eyes:      General: No scleral icterus.     Extraocular Movements: Extraocular movements intact.      Pupils: Pupils are equal, round, and reactive to light.   Cardiovascular:      Rate and Rhythm: Normal rate and regular rhythm.      Heart sounds: No murmur heard.  Pulmonary:      Effort: Pulmonary effort is normal. No respiratory distress.      Breath sounds: No wheezing, rhonchi or rales.   Abdominal:      General: Abdomen is flat. Bowel sounds are normal. There is no distension.      Palpations: Abdomen is soft.      Tenderness: There is no abdominal tenderness.   Musculoskeletal:         General: No signs of injury.      Cervical back: Normal range of motion and neck supple.      Right lower leg: No edema.      Left lower leg: No edema. "   Skin:     Findings: No bruising.   Neurological:      General: No focal deficit present.      Mental Status: She is alert.      Strength in large motor groups is intact.  Patient declined gait part of exam as she wanted to wait for her nurse to take her to the bathroom to urinate.  Speech was clear, not slurred.    Discussion with Family: Patient declined call to .     Discharge instructions/Information to patient and family:   See after visit summary for information provided to patient and family.      Provisions for Follow-Up Care:  See after visit summary for information related to follow-up care and any pertinent home health orders.      Mobility at time of Discharge:   Basic Mobility Inpatient Raw Score: 20  JH-HLM Goal: 6: Walk 10 steps or more  JH-HLM Achieved: 7: Walk 25 feet or more  HLM Goal achieved. Continue to encourage appropriate mobility.     Disposition:   [facility] Skilled Nursing [facility]    Planned Readmission: No    Discharge Medications:  See after visit summary for reconciled discharge medications provided to patient and/or family.         Medication List        START taking these medications      meclizine 25 mg tablet  Commonly known as: ANTIVERT  Take 1 tablet (25 mg total) by mouth every 8 (eight) hours as needed for dizziness            CONTINUE taking these medications      acetaminophen 500 mg tablet  Commonly known as: TYLENOL     CALTRATE 600 PLUS-VIT D PO     esomeprazole 10 MG packet  Commonly known as: NexIUM     ezetimibe 10 mg tablet  Commonly known as: ZETIA  TAKE ONE TABLET BY MOUTH EVERY DAY     fluticasone 50 mcg/act nasal spray  Commonly known as: FLONASE     Lumigan 0.01 % ophthalmic drops  Generic drug: bimatoprost     Multivitamin Adults 50+ Tabs     risperiDONE 0.5 mg tablet  Commonly known as: RisperDAL  Take 1 tablet (0.5 mg total) by mouth daily after dinner     sertraline 100 mg tablet  Commonly known as: ZOLOFT  TAKE TWO  TABLETS BY MOUTH EVERY DAY            STOP taking these medications      ALPRAZolam 0.25 mg tablet  Commonly known as: XANAX     NON FORMULARY     psyllium 0.52 g capsule  Commonly known as: METAMUCIL                Administrative Statements   Discharge Statement:  I have spent a total time of 26 minutes in caring for this patient on the day of the visit/encounter. .    **Please Note: This note may have been constructed using a voice recognition system**

## 2024-10-16 NOTE — ASSESSMENT & PLAN NOTE
On admission, family reporting that patient dementia had like to worsening cognitive function and memory.  Prior to admission, patient was living by herself but did have aides and family that would help her with ADLs.  While admitted, she was evaluated by OT and PT who recommended rehab.  Patient will be discharged to nursing facility.

## 2024-10-16 NOTE — ASSESSMENT & PLAN NOTE
Patient with history of depression and anxiety disorder.  Patient was taking sertraline 200 mg orally daily.  Apparently the patient was also taking Xanax on appearing basis but according to the daughter she no longer takes it.

## 2024-10-16 NOTE — ASSESSMENT & PLAN NOTE
Ms. Watts presented with dizziness starting on 10/10/2024.  Dizziness occurred when she change position and moved her head, but she denied ear pain, change in hearing, or ringing in her ears.  MRI on admission was negative for acute infarct.  It did show enlarged ventricles, but the patient had been previously worked up for normal pressure hydrocephalus in 2023 by neurosurgery.  It was determined at that time that she does not have NPH.  Consequently, she was admitted for treatment of BPPV with as needed meclizine.  Symptoms resolved over the next 72 hours.

## 2024-10-16 NOTE — PROGRESS NOTES
[facility]  Omid Barrios MD FACP-AGSF  History and physical dictated October 16, 2024 at [place] [facility].  Time spent 20 minutes reviewing chart, 15 minutes speaking with healthcare proxy daughter Letty, 40 minutes evaluating patient, 15 minutes dictating note.  Total time spent 90 minutes.    NAME: Samson Watts  AGE: 79 y.o. SEX: female [id number]    DATE OF ENCOUNTER: 10/16/2024    Assessment and Plan     Problem List Items Addressed This Visit          Nervous and Auditory    Benign paroxysmal vertigo, bilateral     Patient with history benign positional vertigo was given meclizine patient's symptoms subsided.  Ideally 1 would perform Epley maneuver to resolve this issue.         Mild cognitive disorder     Patient noted to have mild cognitive disorder based on her MoCA scoring.         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  Patient needed drainage and subsequent physical and Occupational Therapy.         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  Patient's MoCA score is in the 23-24 range.            Urinary    Stress incontinence, female     Patient with history of stress incontinence post subarachnoid hemorrhage.  Initially she dealt with it by going to the bathroom every 2 hours subsequently the patient currently uses diapers.  No evidence of fecal incontinence.            Behavioral Health    Depression with anxiety     Patient with history of depression and anxiety disorder.  Patient was taking sertraline 200 mg orally daily.   Apparently the patient was also taking Xanax on appearing basis but according to the daughter she no longer takes it.            Care Coordination    Ambulatory dysfunction     Family has noted a decline in patient's ambulation she had needed a 4 wheeled walker most of the time.  She also needs assistance of 1 at times.         Falls     Patient with history of falls at home apparently has fallen out of bed.  Patient will need continued physical and Occupational Therapy.  On reviewing her multiple comorbidities patient does have history of glaucoma, currently she is also taking sertraline which can promote falls.  one can consider titrating antidepressant down.  Patient was also taking Xanax on appearing basis this medication has been discontinued.            Eye    Open-angle glaucoma of both eyes, mild stage     Patient with history glaucoma currently on Lumigan 0.01% ophthalmic drops on a regular basis.            Oncology    Hx of breast cancer (Chronic)     Patient with previous history of breast cancer following with oncology.            Other    Hyperlipidemia     Patient with history of hyperlipidemia apparently she was intolerant to statins could only tolerate Zetia.        Recommendations    1.-Patient's risperidone should be discontinued patient is not taking the medication.    2.-Patient with history of falls consider titrating her sertraline down since SSRIs can predispose to falls.    All medications and routine orders were reviewed and updated as needed.    Plan discussed with: Family member    Chief Complaint     No chief complaint on file.  Dizziness-ambulatory dysfunction    History of Present Illness     Patient is a 79-year-old  female who had presented to the hospital with a history of dizziness.  Dizziness was noted when the patient stood up and ambulated as well as when she turns her head.  Patient denies any history of headaches, no visual problems, no chest pain, no palpitations,  shortness of breath.  Patient's daughter apparently was at bedside at the time of evaluation and had commented on patient's worsening cognitive deficits over the past several months.  Patient was admitted with a tentative diagnosis of benign positional vertigo and has been discharged to the rehab facility for physical and Occupational Therapy intervention.  Patient's past medical history is relevant for a history of left breast cancer apparently BRCA negative patient had received radiation treatment.  Patient has a significant history for a subarachnoid hemorrhage back in January 2016.  Daughter did notice marked deterioration and patient's cognition since that date.  And she has noted a more marked decline since January of this year.  Patient has had MoCA testing and she scores in the 23-24 range.  Patient lives by herself in a 1 floor apartment she lives on the fourth floor.  She does need nursing assistance and family has noted a decline in her capabilities of doing her basic activities of daily living such as bathing and getting dressed.  Daughter usually places patient's medications in a pillbox and keeps track of her medications.  Patient mainly eats TV dinners they had tried Meals on Wheels but apparently her mom is a picky eater.  Patient also has history of glaucoma apparently does take drops and she does wear glasses.  Patient also has had increased number of falls over the past year requiring the squad car to come in at least once to help her get up.  Patient had become incontinent of urine since her stroke initially she managed by going to the bathroom every 2 hours but at present she just wears pads to alleviate the problem.  I do see in her history that the patient did have an episode of delirium apparently they did add risperidone to her treatment plan but the daughter states that once they treated her urinary tract infection her hallucinations resolved and she no longer takes this medication.  She  does take medication for depression i.e. sertraline.  Patient does have evidence of ambulatory dysfunction and does need a 4 wheeled walker to get around.  Long-term plans for this patient is that she will no longer return to her apartment and they will find placement for her at this time due to her deteriorating condition.  As far as end-of-life her daughter Letty is her power of  and healthcare proxy and she would like her to remain a full code at present.  Also noted on review of her films is that the patient did have evidence of enlarged ventricles she had a workup for normal pressure hydrocephalus but it was negative.  Patient also noted to have a right lower lobe nodule of 5 mm that will require active follow-up.            HISTORY:  Past Surgical History:   Procedure Laterality Date    BREAST BIOPSY Left 2014    BREAST LUMPECTOMY Left 2014    COLONOSCOPY      Fiberoptic - 2009, 2015 5 year f/u    ESOPHAGOGASTRODUODENOSCOPY  2009    Diag, Resolved - 2006 / 2009    EXOSTECTOMY  2005    Simple Bunion (Silver Procedure)    FL LUMBAR PUNCTURE THERAPEUTIC  9/1/2023    HYSTERECTOMY  1981    JOINT REPLACEMENT Right 2012    Right Hip    OOPHORECTOMY  1981    not sure which one    REPLACEMENT TOTAL KNEE Right 2018    VENTRICULOSTOMY        Past Medical History:   Diagnosis Date    Acute mastoiditis with intracranial complication 11/16/2022    Anxiety disorder     Brain hemangioma (HCC)     2016    BRCA1 negative     BRCA2 negative     Breast cancer (HCC) 2014    left breast    Cancer (HCC)     Constipation     Esophageal reflux     History of bone density study 2011    [employer] X-Ray Absorptiometry    History of radiation therapy 2014    left breast ca    History of recurrent UTIs     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    Hyperlipidemia     Hypertension     Malignant neoplasm of female breast (HCC) 04/22/2014    Osteoarthritis of hip     Osteopenia     PONV (postoperative nausea and  vomiting)     Stroke, hemorrhagic (HCC) 12/2015    Subarachnoid hemorrhage (HCC)      Family History   Problem Relation Age of Onset    Cancer Mother 69        bone    Hypertension Mother     Arthritis Mother     Stroke Father         TIA    Stroke Maternal Grandmother         CVA    Deep vein thrombosis Daughter     Heart attack Neg Hx     Anuerysm Neg Hx     Clotting disorder Neg Hx     Arrhythmia Neg Hx     Heart failure Neg Hx     Coronary artery disease Neg Hx     Hyperlipidemia Neg Hx     Breast cancer Neg Hx      Social History     Socioeconomic History    Marital status:      Spouse name: None    Number of children: None    Years of education: None    Highest education level: None   Occupational History    None   Tobacco Use    Smoking status: Never     Passive exposure: Past    Smokeless tobacco: Never   Vaping Use    Vaping status: Never Used   Substance and Sexual Activity    Alcohol use: Yes     Comment: occasionally    Drug use: No    Sexual activity: Not Currently   Other Topics Concern    None   Social History Narrative    Caffeine use    Marital Status -  per [employer]      Social Determinants of Health     Financial Resource Strain: Low Risk  (1/12/2024)    Overall Financial Resource Strain (CARDIA)     Difficulty of Paying Living Expenses: Not hard at all   Food Insecurity: No Food Insecurity (10/11/2024)    Hunger Vital Sign     Worried About Running Out of Food in the Last Year: Never true     Ran Out of Food in the Last Year: Never true   Transportation Needs: No Transportation Needs (10/11/2024)    PRAPARE - Transportation     Lack of Transportation (Medical): No     Lack of Transportation (Non-Medical): No   Physical Activity: Not on file   Stress: Not on file   Social Connections: Not on file   Intimate Partner Violence: Not on file   Housing Stability: Low Risk  (10/11/2024)    Housing Stability Vital Sign     Unable to Pay for Housing in the Last Year: No     Number of Times  Moved in the Last Year: 0     Homeless in the Last Year: No       Allergies:  Allergies   Allergen Reactions    Oxycodone Vomiting    Atorvastatin Other (See Comments)     Leg/knee pain (joint pain)    Bactrim [Sulfamethoxazole-Trimethoprim] Itching and Rash    Keppra [Levetiracetam] Rash    Livalo [Pitavastatin] Itching    Penicillins Rash     Agitation        Review of Systems     Review of Systems   Constitutional: Negative.    HENT:          Patient with full set of dentures up since age 24.  Oechsle dentures down   Eyes:         History of glaucoma   Respiratory: Negative.     Cardiovascular: Negative.    Gastrointestinal: Negative.    Endocrine: Negative.    Genitourinary:         History of urinary incontinence   Musculoskeletal:  Positive for gait problem.   Skin: Negative.    Allergic/Immunologic: Negative.    Psychiatric/Behavioral:  Positive for decreased concentration.        PHQ-2/9 Depression Screening             Medications and orders       Current Outpatient Medications:     acetaminophen (TYLENOL) 500 mg tablet, Take 1,000 mg by mouth if needed for mild pain, Disp: , Rfl:     Calcium-Vitamin D (CALTRATE 600 PLUS-VIT D PO), Take by mouth daily., Disp: , Rfl:     esomeprazole (NexIUM) 10 MG packet, Take 10 mg by mouth every morning before breakfast, Disp: , Rfl:     ezetimibe (ZETIA) 10 mg tablet, TAKE ONE TABLET BY MOUTH EVERY DAY, Disp: 30 tablet, Rfl: 5    fluticasone (FLONASE) 50 mcg/act nasal spray, 1 spray into each nostril as needed, Disp: , Rfl:     LUMIGAN 0.01 % ophthalmic drops, PLACE 1 DROP INTO IN EACH EYE AT BEDTIME, Disp: , Rfl: 11    meclizine (ANTIVERT) 25 mg tablet, Take 1 tablet (25 mg total) by mouth every 8 (eight) hours as needed for dizziness, Disp: 30 tablet, Rfl: 0    Multiple Vitamins-Minerals (MULTIVITAMIN ADULTS 50+) TABS, Take by mouth daily, Disp: , Rfl:     risperiDONE (RisperDAL) 0.5 mg tablet, Take 1 tablet (0.5 mg total) by mouth daily after dinner, Disp: 30 tablet,  "Rfl: 1    sertraline (ZOLOFT) 100 mg tablet, TAKE TWO TABLETS BY MOUTH EVERY DAY, Disp: 180 tablet, Rfl: 1  No current facility-administered medications for this visit.       Objective     Vitals:   Vitals:    10/16/24 1016   BP: 157/84   Pulse: 70   Resp: 18   Temp: 98 °F (36.7 °C)   SpO2: 95%   Weight: 70.6 kg (155 lb 9.6 oz)   Height: 5' 7\" (1.702 m)       Physical Exam  Constitutional:       Appearance: Normal appearance.   HENT:      Head: Normocephalic and atraumatic.      Right Ear: External ear normal.      Left Ear: External ear normal.      Nose: Nose normal.      Mouth/Throat:      Mouth: Mucous membranes are moist.      Comments: Patient with full set of dentures up partial down.  Eyes:      Pupils: Pupils are equal, round, and reactive to light.   Cardiovascular:      Rate and Rhythm: Normal rate and regular rhythm.      Pulses: Normal pulses.      Heart sounds: Normal heart sounds.   Pulmonary:      Breath sounds: Normal breath sounds.   Abdominal:      General: Bowel sounds are normal.      Palpations: Abdomen is soft.   Musculoskeletal:         General: Normal range of motion.      Cervical back: Neck supple.   Skin:     General: Skin is dry.   Neurological:      Mental Status: She is alert and oriented to person, place, and time. Mental status is at baseline.   Psychiatric:         Mood and Affect: Mood normal.         Behavior: Behavior normal.         Thought Content: Thought content normal.         Judgment: Judgment normal.         Pertinent Laboratory/Diagnostic Studies:   The following labs/studies were reviewed please see facility chart for details.              "

## 2024-10-16 NOTE — UTILIZATION REVIEW
NOTIFICATION OF ADMISSION DISCHARGE   This is a Notification of Discharge from [facility]. Please be advised that this patient has been discharge from our facility. Below you will find the admission and discharge date and time including the patient’s disposition.   UTILIZATION REVIEW CONTACT:  Ilda Cordova  Utilization   Network Utilization Review Department  Phone: [phone] x carefully listen to the prompts. All voicemails are confidential.  Email: [email or web address]     ADMISSION INFORMATION  PRESENTATION DATE: 10/10/2024 12:10 PM  OBERVATION ADMISSION DATE: 10/10/2024 1822  INPATIENT ADMISSION DATE: 10/11/24 12:53 PM   DISCHARGE DATE: 10/15/2024 12:42 PM   DISPOSITION:Released to SNF/TCU/SNU Facility    Network Utilization Review Department  ATTENTION: Please call with any questions or concerns to [phone] and carefully listen to the prompts so that you are directed to the right person. All voicemails are confidential.   For Discharge needs, contact Care Management DC Support Team at [phone] opt. 2  Send all requests for admission clinical reviews, approved or denied determinations and any other requests to dedicated fax number below belonging to the campus where the patient is receiving treatment. List of dedicated fax numbers for the Facilities:  FACILITY NAME UR FAX NUMBER   ADMISSION DENIALS (Administrative/Medical Necessity) [phone]   DISCHARGE SUPPORT TEAM ([facility]) [phone]   PARENT CHILD HEALTH (Maternity/NICU/Pediatrics) [phone]   [facility] [phone]   [facility] [phone]   [facility] [phone]   [facility] [phone]   [facility] [phone]   [facility] [phone]   [facility] [phone]   [facility]  [facility] [phone]   [facility] [phone]   [facility] [phone]   [facility] [phone]   [facility] [phone]

## 2024-10-17 ENCOUNTER — TELEPHONE (OUTPATIENT)
Dept: OTHER | Facility: OTHER | Age: 79
End: 2024-10-17

## 2024-10-18 ENCOUNTER — NURSING HOME VISIT (OUTPATIENT)
Dept: GERIATRICS | Facility: OTHER | Age: 79
End: 2024-10-18
Payer: COMMERCIAL

## 2024-10-18 VITALS
RESPIRATION RATE: 20 BRPM | SYSTOLIC BLOOD PRESSURE: 142 MMHG | WEIGHT: 157.5 LBS | HEART RATE: 71 BPM | TEMPERATURE: 97.5 F | OXYGEN SATURATION: 99 % | BODY MASS INDEX: 24.67 KG/M2 | DIASTOLIC BLOOD PRESSURE: 67 MMHG

## 2024-10-18 DIAGNOSIS — G91.2 NPH (NORMAL PRESSURE HYDROCEPHALUS) (HCC): ICD-10-CM

## 2024-10-18 DIAGNOSIS — F01.B2 MODERATE VASCULAR DEMENTIA WITH PSYCHOTIC DISTURBANCE (HCC): Primary | ICD-10-CM

## 2024-10-18 DIAGNOSIS — H81.13 BENIGN PAROXYSMAL VERTIGO, BILATERAL: ICD-10-CM

## 2024-10-18 DIAGNOSIS — F41.8 DEPRESSION WITH ANXIETY: ICD-10-CM

## 2024-10-18 PROCEDURE — 99309 SBSQ NF CARE MODERATE MDM 30: CPT | Performed by: NURSE PRACTITIONER

## 2024-10-18 NOTE — TELEPHONE ENCOUNTER
Caitlyn HERNANDES [facility]. Reporting that patient is throwing up a lot and she just wanted to go over changing time of medication .Please follow up and advise . Thank you in advance.

## 2024-10-18 NOTE — PROGRESS NOTES
[facility]  [address] [address]  [phone]  FACILITY: [facility]  Code 31 (STR)      NAME: Samson Watts  AGE: 79 y.o. SEX: female CODE STATUS: CPR    DATE OF ENCOUNTER: 10/18/2024    Assessment and Plan     1. 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 Benign paroxysmal vertigo, bilateral  Assessment & Plan:  Hx of BPPV  Meclizine helps  Continue Meclizine 25 mg Q 8 hrs as needed for dizziness/vertigo  3. NPH (normal pressure hydrocephalus) (HCC)  Assessment & Plan:  Seen by NeuroSx in 2023 for possible NPH  Daughter noted new incontinence and leg weakness  Repeat CT head 10/10/24 with ventricles noted to be slightly enlarged out of proportion to sulcation- similar to prior, Mild chronic microangiopathic changes   MRI brain 10/11/24-No acute intracranial pathology. Chronic microangiopathy. Stable mild ventricular prominence out of proportion to degree of cortical volume loss that may represent NPH in the appropriate clinical setting.  Recommend OP f/u with [facility]eruoSx  Continue PT/OT   4. Depression with anxiety  Assessment & Plan:  Continue home med Sertraline 200 mg daily   Had been taking xanax prn at home prior but stopped   Currently she denies any mood changes, denies SI/HI  Continue home med Sertraline, can consider GDR       All medications and routine orders were reviewed and updated as needed.    Chief Complaint     STR follow up visit    Past Medical and Surgica History      Past Medical History:   Diagnosis Date    Acute mastoiditis with intracranial complication 11/16/2022    Anxiety disorder     Brain hemangioma (HCC)     2016    BRCA1 negative     BRCA2 negative     Breast cancer (HCC) 2014    left breast    Cancer (HCC)     Constipation      Esophageal reflux     History of bone density study 2011    [employer] X-Ray Absorptiometry    History of radiation therapy 2014    left breast ca    History of recurrent UTIs     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    Hyperlipidemia     Hypertension     Malignant neoplasm of female breast (HCC) 04/22/2014    Osteoarthritis of hip     Osteopenia     PONV (postoperative nausea and vomiting)     Stroke, hemorrhagic (HCC) 12/2015    Subarachnoid hemorrhage (HCC)      Past Surgical History:   Procedure Laterality Date    BREAST BIOPSY Left 2014    BREAST LUMPECTOMY Left 2014    COLONOSCOPY      Fiberoptic - 2009, 2015 5 year f/u    ESOPHAGOGASTRODUODENOSCOPY  2009    Diag, Resolved - 2006 / 2009    EXOSTECTOMY  2005    Simple Bunion (Silver Procedure)    FL LUMBAR PUNCTURE THERAPEUTIC  9/1/2023    HYSTERECTOMY  1981    JOINT REPLACEMENT Right 2012    Right Hip    OOPHORECTOMY  1981    not sure which one    REPLACEMENT TOTAL KNEE Right 2018    VENTRICULOSTOMY       Allergies   Allergen Reactions    Oxycodone Vomiting    Atorvastatin Other (See Comments)     Leg/knee pain (joint pain)    Bactrim [Sulfamethoxazole-Trimethoprim] Itching and Rash    Keppra [Levetiracetam] Rash    Livalo [Pitavastatin] Itching    Penicillins Rash     Agitation           History of Present Illness     Samson Watts is a 79 y.o. female admitted to [facility] following hospital stay for dizziness. Patient has a past medical Hx including but not limited to Breast Ca in remission, HTN,HLD . Patient is seen in collaboration with nursing for medical mgmt and STR follow up.      Patient initially presented to hospital with c/o dizziness when moving her head, standing up and ambulating. No other symptoms present. MRI and CT head without acute intracranial pathology. Treatment with meclizine improved symptoms. She was recommended d/c to post acute rehab. At baseline patient lives alone, does have aides that  help and family support. Family noted cognitive decline over the last year.       Seen and examined at bedside today. Patient is able to provide a limited reliable history, does have forgerfulness. She is AAOx 2 on exam. She is pleasant and cooperative, she stats she is here due to weakness. She states she has help at home with aides and family, lives alone. She denies CP/SOB/N/V/D. Denies lightheadedness, dizziness, headaches, vision changes. Patient states she is eating well and staying hydrated. Denies any bowel or bladder issues except some incontinence which has been ongoing. Patient is actively participating in therapy. No concerns from nursing at this time.            The patient's allergies, past medical, surgical, social and family history were reviewed and unchanged.    Review of Systems     Review of Systems   All other systems reviewed and are negative.        Objective     Vitals:   Vitals:    10/18/24 1529   BP: 142/67   Pulse: 71   Resp: 20   Temp: 97.5 °F (36.4 °C)   SpO2: 99%         Physical Exam  Vitals and nursing note reviewed.   Constitutional:       General: She is not in acute distress.     Appearance: Normal appearance.   HENT:      Head: Normocephalic and atraumatic.      Nose: No congestion or rhinorrhea.      Mouth/Throat:      Mouth: Mucous membranes are moist.   Eyes:      General: No scleral icterus.     Conjunctiva/sclera: Conjunctivae normal.      Pupils: Pupils are equal, round, and reactive to light.   Cardiovascular:      Rate and Rhythm: Normal rate and regular rhythm.      Pulses: Normal pulses.      Heart sounds: Normal heart sounds. No murmur heard.  Pulmonary:      Effort: Pulmonary effort is normal. No respiratory distress.      Breath sounds: Normal breath sounds. No wheezing, rhonchi or rales.   Abdominal:      General: Bowel sounds are normal. There is no distension.      Palpations: Abdomen is soft. There is no mass.      Tenderness: There is no abdominal tenderness.       Hernia: No hernia is present.   Musculoskeletal:         General: No swelling or tenderness.   Lymphadenopathy:      Cervical: No cervical adenopathy.   Skin:     General: Skin is warm and dry.      Coloration: Skin is not pale.      Findings: No rash.   Neurological:      General: No focal deficit present.      Mental Status: She is alert and oriented to person, place, and time. Mental status is at baseline.      Motor: No weakness.      Gait: Gait normal.   Psychiatric:         Mood and Affect: Mood normal.         Behavior: Behavior normal.         Pertinent Laboratory/Diagnostic Studies:     Reviewed in facility chart      Current Medications   Medications reviewed and updated see facility MAR for details.      Current Outpatient Medications:     acetaminophen (TYLENOL) 500 mg tablet, Take 1,000 mg by mouth if needed for mild pain, Disp: , Rfl:     Calcium-Vitamin D (CALTRATE 600 PLUS-VIT D PO), Take by mouth daily., Disp: , Rfl:     esomeprazole (NexIUM) 10 MG packet, Take 10 mg by mouth every morning before breakfast, Disp: , Rfl:     ezetimibe (ZETIA) 10 mg tablet, TAKE ONE TABLET BY MOUTH EVERY DAY, Disp: 30 tablet, Rfl: 5    fluticasone (FLONASE) 50 mcg/act nasal spray, 1 spray into each nostril as needed, Disp: , Rfl:     LUMIGAN 0.01 % ophthalmic drops, PLACE 1 DROP INTO IN EACH EYE AT BEDTIME, Disp: , Rfl: 11    meclizine (ANTIVERT) 25 mg tablet, Take 1 tablet (25 mg total) by mouth every 8 (eight) hours as needed for dizziness, Disp: 30 tablet, Rfl: 0    Multiple Vitamins-Minerals (MULTIVITAMIN ADULTS 50+) TABS, Take by mouth daily, Disp: , Rfl:     risperiDONE (RisperDAL) 0.5 mg tablet, Take 1 tablet (0.5 mg total) by mouth daily after dinner, Disp: 30 tablet, Rfl: 1    sertraline (ZOLOFT) 100 mg tablet, TAKE TWO TABLETS BY MOUTH EVERY DAY, Disp: 180 tablet, Rfl: 1     Please note:  Voice-recognition software may have been used in the preparation of this document.  Occasional wrong word or  "\"sound-alike\" substitutions may have occurred due to the inherent limitations of voice recognition software.  Interpretation should be guided by context.         EYAL Arevalo  10/18/2024  3:30 PM    "

## 2024-10-21 ENCOUNTER — NURSING HOME VISIT (OUTPATIENT)
Dept: GERIATRICS | Facility: OTHER | Age: 79
End: 2024-10-21
Payer: COMMERCIAL

## 2024-10-21 VITALS
HEART RATE: 77 BPM | BODY MASS INDEX: 24.67 KG/M2 | WEIGHT: 157.5 LBS | TEMPERATURE: 97.8 F | DIASTOLIC BLOOD PRESSURE: 66 MMHG | RESPIRATION RATE: 18 BRPM | OXYGEN SATURATION: 97 % | SYSTOLIC BLOOD PRESSURE: 138 MMHG

## 2024-10-21 DIAGNOSIS — F41.8 DEPRESSION WITH ANXIETY: ICD-10-CM

## 2024-10-21 DIAGNOSIS — G91.2 NPH (NORMAL PRESSURE HYDROCEPHALUS) (HCC): ICD-10-CM

## 2024-10-21 DIAGNOSIS — N39.3 STRESS INCONTINENCE, FEMALE: ICD-10-CM

## 2024-10-21 DIAGNOSIS — F01.B2 MODERATE VASCULAR DEMENTIA WITH PSYCHOTIC DISTURBANCE (HCC): ICD-10-CM

## 2024-10-21 DIAGNOSIS — R26.2 AMBULATORY DYSFUNCTION: ICD-10-CM

## 2024-10-21 DIAGNOSIS — H81.13 BENIGN PAROXYSMAL VERTIGO, BILATERAL: Primary | ICD-10-CM

## 2024-10-21 PROCEDURE — 99309 SBSQ NF CARE MODERATE MDM 30: CPT | Performed by: NURSE PRACTITIONER

## 2024-10-21 NOTE — ASSESSMENT & PLAN NOTE
Noted with hx of incontinue beginning s/p SAH in 2016  Has been using briefs   Encourage frequent incontinence care to prevent UTI and skin breakdown

## 2024-10-21 NOTE — ASSESSMENT & PLAN NOTE
Daughter, Rhonda noted cognitive decline over the past year  Last [place] 23/30   Also with concern for NPH per CT imaging   Continue PT/OT  OP f/u with PCP and Neuro if needed

## 2024-10-21 NOTE — ASSESSMENT & PLAN NOTE
Daughter, Rhonda noted cognitive decline over the past year  Last [place] 23/30   Also with concern for NPH per CT imaging   Continue PT/OT  OP f/u with PCP and Neuro if needed

## 2024-10-21 NOTE — ASSESSMENT & PLAN NOTE
Seen by NeuroSx in 2023 for possible NPH  Daughter noted new incontinence and leg weakness  Repeat CT head 10/10/24 with ventricles noted to be slightly enlarged out of proportion to sulcation- similar to prior, Mild chronic microangiopathic changes   MRI brain 10/11/24-No acute intracranial pathology. Chronic microangiopathy. Stable mild ventricular prominence out of proportion to degree of cortical volume loss that may represent NPH in the appropriate clinical setting.  Recommend OP f/u with NeruoSx  Continue PT/OT

## 2024-10-21 NOTE — PROGRESS NOTES
[facility]  [address] [address]  [phone]  FACILITY: [facility]  Code 31 (STR)      NAME: Samson Watts  AGE: 79 y.o. SEX: female CODE STATUS: CPR    DATE OF ENCOUNTER: 10/21/2024    Assessment and Plan     1. Benign paroxysmal vertigo, bilateral  Assessment & Plan:  Hx of BPPV  Meclizine helps  Continue Meclizine 25 mg Q 8 hrs as needed for dizziness/vertigo  2. 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 NPH (normal pressure hydrocephalus) (HCC)  Assessment & Plan:  Seen by NeuroSx in 2023 for possible NPH  Daughter noted new incontinence and leg weakness  Repeat CT head 10/10/24 with ventricles noted to be slightly enlarged out of proportion to sulcation- similar to prior, Mild chronic microangiopathic changes   MRI brain 10/11/24-No acute intracranial pathology. Chronic microangiopathy. Stable mild ventricular prominence out of proportion to degree of cortical volume loss that may represent NPH in the appropriate clinical setting.  Recommend OP f/u with NeruoSx  Continue PT/OT   4. Stress incontinence, female  Assessment & Plan:  Noted with hx of incontinue beginning s/p SAH in 2016  Has been using briefs   Encourage frequent incontinence care to prevent UTI and skin breakdown   5. Depression with anxiety  Assessment & Plan:  Continue home med Sertraline 200 mg daily   Had been taking xanax prn at home prior but stopped   Currently she denies any mood changes, denies SI/HI  Continue home med Sertraline, can consider GDR  6. Ambulatory dysfunction  Assessment & Plan:  Multifactorial  Continue PT/OT  Fall Precautions  Ensure adequate nutrition/hydration   Monitor CBC/BMP    following for d/c planning          All medications and routine orders  were reviewed and updated as needed.    Chief Complaint     STR follow up visit    Past Medical and Surgica History      Past Medical History:   Diagnosis Date    Acute mastoiditis with intracranial complication 11/16/2022    Anxiety disorder     Brain hemangioma (HCC)     2016    BRCA1 negative     BRCA2 negative     Breast cancer (HCC) 2014    left breast    Cancer (HCC)     Constipation     Esophageal reflux     History of bone density study 2011    [employer] X-Ray Absorptiometry    History of radiation therapy 2014    left breast ca    History of recurrent UTIs     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    Hyperlipidemia     Hypertension     Malignant neoplasm of female breast (HCC) 04/22/2014    Osteoarthritis of hip     Osteopenia     PONV (postoperative nausea and vomiting)     Stroke, hemorrhagic (HCC) 12/2015    Subarachnoid hemorrhage (HCC)      Past Surgical History:   Procedure Laterality Date    BREAST BIOPSY Left 2014    BREAST LUMPECTOMY Left 2014    COLONOSCOPY      Fiberoptic - 2009, 2015 5 year f/u    ESOPHAGOGASTRODUODENOSCOPY  2009    Diag, Resolved - 2006 / 2009    EXOSTECTOMY  2005    Simple Bunion (Silver Procedure)    FL LUMBAR PUNCTURE THERAPEUTIC  9/1/2023    HYSTERECTOMY  1981    JOINT REPLACEMENT Right 2012    Right Hip    OOPHORECTOMY  1981    not sure which one    REPLACEMENT TOTAL KNEE Right 2018    VENTRICULOSTOMY       Allergies   Allergen Reactions    Oxycodone Vomiting    Atorvastatin Other (See Comments)     Leg/knee pain (joint pain)    Bactrim [Sulfamethoxazole-Trimethoprim] Itching and Rash    Keppra [Levetiracetam] Rash    Livalo [Pitavastatin] Itching    Penicillins Rash     Agitation           History of Present Illness     Samson Watts is a 79 y.o. female admitted to [facility] following hospital stay for dizziness. Patient has a past medical Hx including but not limited to Breast Ca in remission, HTN,HLD . Patient is seen in  collaboration with nursing for medical mgmt and STR follow up.     Patient initially presented to hospital with c/o dizziness when moving her head, standing up and ambulating. No other symptoms present. MRI and CT head without acute intracranial pathology. Treatment with meclizine improved symptoms. She was recommended d/c to post acute rehab. At baseline patient lives alone, does have aides that help and family support. Family noted cognitive decline over the last year.      Seen and examined at bedside today. Patient is able to provide a limited reliable history, does have forgerfulness. She is AAOx 2 on exam. She denies CP/SOB/N/V/D. Denies lightheadedness, dizziness, headaches, vision changes. Patient states she is eating well and staying hydrated. Denies any bowel or bladder issues except some incontinence which has been ongoing. Patient is actively participating in therapy. No concerns from nursing at this time.          The patient's allergies, past medical, surgical, social and family history were reviewed and unchanged.    Review of Systems     Review of Systems   All other systems reviewed and are negative.        Objective     Vitals:   Vitals:    10/21/24 1020   BP: 138/66   Pulse: 77   Resp: 18   Temp: 97.8 °F (36.6 °C)   SpO2: 97%         Physical Exam  Vitals and nursing note reviewed.   Constitutional:       General: She is not in acute distress.     Appearance: Normal appearance.   HENT:      Head: Normocephalic and atraumatic.      Nose: No congestion or rhinorrhea.      Mouth/Throat:      Mouth: Mucous membranes are moist.   Eyes:      General: No scleral icterus.     Conjunctiva/sclera: Conjunctivae normal.      Pupils: Pupils are equal, round, and reactive to light.   Cardiovascular:      Rate and Rhythm: Normal rate and regular rhythm.      Pulses: Normal pulses.      Heart sounds: Normal heart sounds. No murmur heard.  Pulmonary:      Effort: Pulmonary effort is normal. No respiratory  distress.      Breath sounds: Normal breath sounds. No wheezing, rhonchi or rales.   Abdominal:      General: Bowel sounds are normal. There is no distension.      Palpations: Abdomen is soft. There is no mass.      Tenderness: There is no abdominal tenderness.      Hernia: No hernia is present.   Musculoskeletal:         General: No swelling or tenderness.   Lymphadenopathy:      Cervical: No cervical adenopathy.   Skin:     General: Skin is warm and dry.      Coloration: Skin is not pale.      Findings: No rash.   Neurological:      General: No focal deficit present.      Mental Status: She is alert and oriented to person, place, and time. Mental status is at baseline.      Motor: No weakness.      Gait: Gait normal.   Psychiatric:         Mood and Affect: Mood normal.         Behavior: Behavior normal.         Pertinent Laboratory/Diagnostic Studies:     Reviewed in facility chart      Current Medications   Medications reviewed and updated see facility MAR for details.      Current Outpatient Medications:     acetaminophen (TYLENOL) 500 mg tablet, Take 1,000 mg by mouth if needed for mild pain, Disp: , Rfl:     Calcium-Vitamin D (CALTRATE 600 PLUS-VIT D PO), Take by mouth daily., Disp: , Rfl:     esomeprazole (NexIUM) 10 MG packet, Take 10 mg by mouth every morning before breakfast, Disp: , Rfl:     ezetimibe (ZETIA) 10 mg tablet, TAKE ONE TABLET BY MOUTH EVERY DAY, Disp: 30 tablet, Rfl: 5    fluticasone (FLONASE) 50 mcg/act nasal spray, 1 spray into each nostril as needed, Disp: , Rfl:     LUMIGAN 0.01 % ophthalmic drops, PLACE 1 DROP INTO IN EACH EYE AT BEDTIME, Disp: , Rfl: 11    meclizine (ANTIVERT) 25 mg tablet, Take 1 tablet (25 mg total) by mouth every 8 (eight) hours as needed for dizziness, Disp: 30 tablet, Rfl: 0    Multiple Vitamins-Minerals (MULTIVITAMIN ADULTS 50+) TABS, Take by mouth daily, Disp: , Rfl:     sertraline (ZOLOFT) 100 mg tablet, TAKE TWO TABLETS BY MOUTH EVERY DAY, Disp: 180 tablet,  "Rfl: 1     Please note:  Voice-recognition software may have been used in the preparation of this document.  Occasional wrong word or \"sound-alike\" substitutions may have occurred due to the inherent limitations of voice recognition software.  Interpretation should be guided by context.         EYAL Arevalo  10/21/2024  3:41 PM    "

## 2024-10-21 NOTE — ASSESSMENT & PLAN NOTE
Multifactorial  Continue PT/OT  Fall Precautions  Ensure adequate nutrition/hydration   Monitor CBC/BMP    following for d/c planning

## 2024-10-21 NOTE — ASSESSMENT & PLAN NOTE
Continue home med Sertraline 200 mg daily   Had been taking xanax prn at home prior but stopped   Currently she denies any mood changes, denies SI/HI  Continue home med Sertraline, can consider GDR

## 2024-10-24 ENCOUNTER — TELEPHONE (OUTPATIENT)
Dept: FAMILY MEDICINE CLINIC | Facility: CLINIC | Age: 79
End: 2024-10-24

## 2024-10-24 NOTE — TELEPHONE ENCOUNTER
Patient's daughter called. Patient currently in rehab and the rehab keeps asking for her to have Prevnar 20. Daughter wants to make sure you agree to this. Please advise.   If daughter does not answer, please leave on VM

## 2024-10-25 ENCOUNTER — HOME HEALTH ADMISSION (OUTPATIENT)
Dept: HOME HEALTH SERVICES | Facility: HOME HEALTHCARE | Age: 79
End: 2024-10-25
Payer: COMMERCIAL

## 2024-10-29 ENCOUNTER — NURSING HOME VISIT (OUTPATIENT)
Dept: GERIATRICS | Facility: OTHER | Age: 79
End: 2024-10-29
Payer: COMMERCIAL

## 2024-10-29 VITALS
SYSTOLIC BLOOD PRESSURE: 132 MMHG | DIASTOLIC BLOOD PRESSURE: 65 MMHG | WEIGHT: 157 LBS | OXYGEN SATURATION: 96 % | TEMPERATURE: 98 F | BODY MASS INDEX: 24.59 KG/M2 | HEART RATE: 88 BPM | RESPIRATION RATE: 18 BRPM

## 2024-10-29 DIAGNOSIS — N39.3 STRESS INCONTINENCE, FEMALE: ICD-10-CM

## 2024-10-29 DIAGNOSIS — F41.8 DEPRESSION WITH ANXIETY: ICD-10-CM

## 2024-10-29 DIAGNOSIS — G91.2 NPH (NORMAL PRESSURE HYDROCEPHALUS) (HCC): ICD-10-CM

## 2024-10-29 DIAGNOSIS — R26.2 AMBULATORY DYSFUNCTION: ICD-10-CM

## 2024-10-29 DIAGNOSIS — F01.B2 MODERATE VASCULAR DEMENTIA WITH PSYCHOTIC DISTURBANCE (HCC): ICD-10-CM

## 2024-10-29 DIAGNOSIS — H81.13 BENIGN PAROXYSMAL VERTIGO, BILATERAL: Primary | ICD-10-CM

## 2024-10-29 PROCEDURE — 99316 NF DSCHRG MGMT 30 MIN+: CPT | Performed by: NURSE PRACTITIONER

## 2024-10-29 NOTE — ASSESSMENT & PLAN NOTE
Multifactorial in setting of vertigo, cognitive impairment, weakness, possible NPH  Continue PT/OT  Fall Precautions  Ensure adequate nutrition/hydration   Monitor CBC/BMP

## 2024-10-29 NOTE — PROGRESS NOTES
[place] [id number] [facility] [id number]  [phone]  DISCHARGE SUMMARY  FACILITY: [facility]  Code 31    NAME: Samson Watts  AGE: 79 y.o. SEX: female   CODE STATUS: CPR    DATE OF ADMISSION: 10/15/2024    DATE OF DISCHARGE: 10/31/2024  DISCHARGE DISPOSITION: Stable for discharge to home with family support and home health PT/OT/SN services.       Reason for Admission: Patient was admitted to [facility] for rehabilitation after hospitalization for Vertigo and gait dysfunction.     Past Medical and Surgical History:   Past Medical History:   Diagnosis Date    Acute mastoiditis with intracranial complication 11/16/2022    Anxiety disorder     Brain hemangioma (HCC)     2016    BRCA1 negative     BRCA2 negative     Breast cancer (HCC) 2014    left breast    Cancer (HCC)     Constipation     Esophageal reflux     History of bone density study 2011    [employer] X-Ray Absorptiometry    History of radiation therapy 2014    left breast ca    History of recurrent UTIs     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    Hyperlipidemia     Hypertension     Malignant neoplasm of female breast (HCC) 04/22/2014    Osteoarthritis of hip     Osteopenia     PONV (postoperative nausea and vomiting)     Stroke, hemorrhagic (HCC) 12/2015    Subarachnoid hemorrhage (HCC)       Past Surgical History:   Procedure Laterality Date    BREAST BIOPSY Left 2014    BREAST LUMPECTOMY Left 2014    COLONOSCOPY      Fiberoptic - 2009, 2015 5 year f/u    ESOPHAGOGASTRODUODENOSCOPY  2009    Diag, Resolved - 2006 / 2009    EXOSTECTOMY  2005    Simple Bunion (Silver Procedure)    FL LUMBAR PUNCTURE THERAPEUTIC  9/1/2023    HYSTERECTOMY  1981    JOINT REPLACEMENT Right 2012    Right Hip    OOPHORECTOMY  1981    not sure which one    REPLACEMENT TOTAL KNEE Right 2018    VENTRICULOSTOMY         Course of stay:   Samson Watts is a 79 y.o. female admitted to [facility]  following hospital stay for dizziness. Patient has a past medical Hx including but not limited to Breast Ca in remission, HTN,HLD . Patient is seen in collaboration with nursing for medical mgmt and Discharge visit.     Patient initially presented to hospital with c/o dizziness when moving her head, standing up and ambulating. No other symptoms present. MRI and CT head without acute intracranial pathology. Treatment with meclizine improved symptoms. She was recommended d/c to post acute rehab. At baseline patient lives alone, does have aides that help and family support. Family noted cognitive decline over the last year.      Seen and examined at bedside today. Patient is able to provide a limited reliable history, does have forgerfulness. She is AAOx 2 on exam. She states she feels better and ready to go home, she denies pain on exam. She states she has not had any dizziness. She denies CP/SOB/N/V/D. Denies lightheadedness, dizziness, headaches, vision changes. Patient states she is eating well and staying hydrated. Denies any bowel or bladder issues except some incontinence which has been ongoing. Patient is actively participating in therapy. No concerns from nursing at this time.        ROS:  Review of Systems   All other systems reviewed and are negative.      PHYSICAL EXAM:  VITALS:   Vitals:    10/29/24 1023   BP: 132/65   Pulse: 88   Resp: 18   Temp: 98 °F (36.7 °C)   SpO2: 96%        Physical Exam  Vitals and nursing note reviewed.   Constitutional:       General: She is not in acute distress.     Appearance: Normal appearance.   HENT:      Head: Normocephalic and atraumatic.      Nose: No congestion or rhinorrhea.      Mouth/Throat:      Mouth: Mucous membranes are moist.   Eyes:      General: No scleral icterus.     Conjunctiva/sclera: Conjunctivae normal.      Pupils: Pupils are equal, round, and reactive to light.   Cardiovascular:      Rate and Rhythm: Normal rate and regular rhythm.      Pulses: Normal  pulses.      Heart sounds: Normal heart sounds. No murmur heard.  Pulmonary:      Effort: Pulmonary effort is normal. No respiratory distress.      Breath sounds: Normal breath sounds. No wheezing, rhonchi or rales.   Abdominal:      General: Bowel sounds are normal. There is no distension.      Palpations: Abdomen is soft. There is no mass.      Tenderness: There is no abdominal tenderness.      Hernia: No hernia is present.   Musculoskeletal:         General: No swelling or tenderness.   Lymphadenopathy:      Cervical: No cervical adenopathy.   Skin:     General: Skin is warm and dry.      Coloration: Skin is not pale.      Findings: No rash.   Neurological:      General: No focal deficit present.      Mental Status: She is alert and oriented to person, place, and time. Mental status is at baseline.      Motor: No weakness.      Gait: Gait normal.   Psychiatric:         Mood and Affect: Mood normal.         Behavior: Behavior normal.         Admission Diagnoses:   1. Benign paroxysmal vertigo, bilateral  Assessment & Plan:  Hx of BPPV  Meclizine helps  Continue Meclizine 25 mg Q 8 hrs as needed for dizziness/vertigo  She would benefit from continued balance and gait training with home health PT/OT  2. 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 NPH (normal pressure hydrocephalus) (HCC)  Assessment & Plan:  Seen by NeuroSx in 2023 for possible NPH  Daughter noted new incontinence and leg weakness  Repeat CT head 10/10/24 with ventricles noted to be slightly enlarged out of proportion to sulcation- similar to prior, Mild chronic microangiopathic changes   MRI brain 10/11/24-No acute intracranial pathology. Chronic microangiopathy. Stable mild ventricular  prominence out of proportion to degree of cortical volume loss that may represent NPH in the appropriate clinical setting.  Recommend OP f/u with NeruoSx  Continue PT/OT   4. Stress incontinence, female  Assessment & Plan:  Noted with hx of incontinue beginning s/p SAH in 2016  Has been using briefs   Encourage frequent incontinence care to prevent UTI and skin breakdown   5. Depression with anxiety  Assessment & Plan:  Continue home med Sertraline 200 mg daily   Had been taking xanax prn at home prior but stopped   Currently she denies any mood changes, denies SI/HI  Continue home med Sertraline, can consider GDR  6. Ambulatory dysfunction  Assessment & Plan:  Multifactorial in setting of vertigo, cognitive impairment, weakness, possible NPH  Continue PT/OT  Fall Precautions  Ensure adequate nutrition/hydration   Monitor CBC/BMP          Follow-up Recommendations:    Outpatient Follow up with PCP in the next 2 weeks  Home Health PT/OT services     Labs and testing performed during stay:    Reviewed in chart    Discharge Medications: See discharge medication list which was reviewed and signed.      Current Outpatient Medications:     acetaminophen (TYLENOL) 500 mg tablet, Take 1,000 mg by mouth if needed for mild pain, Disp: , Rfl:     Calcium-Vitamin D (CALTRATE 600 PLUS-VIT D PO), Take by mouth daily., Disp: , Rfl:     esomeprazole (NexIUM) 10 MG packet, Take 10 mg by mouth every morning before breakfast, Disp: , Rfl:     ezetimibe (ZETIA) 10 mg tablet, TAKE ONE TABLET BY MOUTH EVERY DAY, Disp: 30 tablet, Rfl: 5    fluticasone (FLONASE) 50 mcg/act nasal spray, 1 spray into each nostril as needed, Disp: , Rfl:     LUMIGAN 0.01 % ophthalmic drops, PLACE 1 DROP INTO IN EACH EYE AT BEDTIME, Disp: , Rfl: 11    meclizine (ANTIVERT) 25 mg tablet, Take 1 tablet (25 mg total) by mouth every 8 (eight) hours as needed for dizziness, Disp: 30 tablet, Rfl: 0    Multiple Vitamins-Minerals (MULTIVITAMIN ADULTS 50+) TABS, Take by  "mouth daily, Disp: , Rfl:     sertraline (ZOLOFT) 100 mg tablet, TAKE TWO TABLETS BY MOUTH EVERY DAY, Disp: 180 tablet, Rfl: 1     Discussion with patient/family and further instructions:  -Fall precautions  -Aspiration precautions  -Bleeding precautions  -Monitor for signs/symptoms of infection  -Medication list was reviewed and signed  -DME form was completed    Status at time of discharge: Stable      Billing based on time. Time spent on unit, 40 minutes. Time spent counseling pt on debility/condition, 30 minutes.      Please note:  Voice-recognition software may have been used in the preparation of this document.  Occasional wrong word or \"sound-alike\" substitutions may have occurred due to the inherent limitations of voice recognition software.  Interpretation should be guided by context.        EYAL Arevalo  10/29/2024   "

## 2024-10-29 NOTE — ASSESSMENT & PLAN NOTE
Daughter, Rhonda noted cognitive decline over the past year  Last [place] 23/30   Also with concern for NPH per CT imaging   Continue PT/OT upon d/c from post acute rehab with home health   OP f/u with PCP and Neuro if needed

## 2024-10-29 NOTE — ASSESSMENT & PLAN NOTE
Hx of BPPV  Meclizine helps  Continue Meclizine 25 mg Q 8 hrs as needed for dizziness/vertigo  She would benefit from continued balance and gait training with home health PT/OT

## 2024-10-31 ENCOUNTER — TELEPHONE (OUTPATIENT)
Dept: FAMILY MEDICINE CLINIC | Facility: CLINIC | Age: 79
End: 2024-10-31

## 2024-10-31 NOTE — TELEPHONE ENCOUNTER
Patient being discharged today from [facility] post acute.  Rachael from the [facility] unit needs to schedule a TCM  Please call her back at [phone]

## 2024-11-01 ENCOUNTER — HOME CARE VISIT (OUTPATIENT)
Dept: HOME HEALTH SERVICES | Facility: HOME HEALTHCARE | Age: 79
End: 2024-11-01

## 2024-11-01 ENCOUNTER — TRANSITIONAL CARE MANAGEMENT (OUTPATIENT)
Dept: FAMILY MEDICINE CLINIC | Facility: CLINIC | Age: 79
End: 2024-11-01

## 2024-11-01 NOTE — TELEPHONE ENCOUNTER
I called daughter Letty and made TCM. Letty said that patient is having mild confusion. It's not new but she is keeping an eye on it. She feels like her mom should not be home alone and will speak to you about this at the visit. She said she doesn't feel ER is needed, it is not severe. However if it worsens she will take her over the weekend.

## 2024-11-02 ENCOUNTER — HOME CARE VISIT (OUTPATIENT)
Dept: HOME HEALTH SERVICES | Facility: HOME HEALTHCARE | Age: 79
End: 2024-11-02
Payer: COMMERCIAL

## 2024-11-02 VITALS
HEART RATE: 71 BPM | SYSTOLIC BLOOD PRESSURE: 118 MMHG | RESPIRATION RATE: 18 BRPM | TEMPERATURE: 97 F | OXYGEN SATURATION: 98 % | DIASTOLIC BLOOD PRESSURE: 80 MMHG

## 2024-11-02 PROCEDURE — 400013 VN SOC

## 2024-11-02 PROCEDURE — G0299 HHS/HOSPICE OF RN EA 15 MIN: HCPCS

## 2024-11-04 ENCOUNTER — HOME CARE VISIT (OUTPATIENT)
Dept: HOME HEALTH SERVICES | Facility: HOME HEALTHCARE | Age: 79
End: 2024-11-04
Payer: COMMERCIAL

## 2024-11-04 PROCEDURE — G0151 HHCP-SERV OF PT,EA 15 MIN: HCPCS

## 2024-11-04 RX ORDER — DOCUSATE SODIUM 100 MG/1
100 CAPSULE, LIQUID FILLED ORAL 2 TIMES DAILY
COMMUNITY

## 2024-11-04 NOTE — TELEPHONE ENCOUNTER
Noted. She can call the SW she had been working with to continue looking into care facilities, as we previously discussed.

## 2024-11-05 ENCOUNTER — HOME CARE VISIT (OUTPATIENT)
Dept: HOME HEALTH SERVICES | Facility: HOME HEALTHCARE | Age: 79
End: 2024-11-05
Payer: COMMERCIAL

## 2024-11-05 VITALS
HEART RATE: 66 BPM | SYSTOLIC BLOOD PRESSURE: 110 MMHG | DIASTOLIC BLOOD PRESSURE: 70 MMHG | OXYGEN SATURATION: 98 % | RESPIRATION RATE: 16 BRPM | TEMPERATURE: 98.6 F

## 2024-11-05 PROCEDURE — G0300 HHS/HOSPICE OF LPN EA 15 MIN: HCPCS

## 2024-11-06 ENCOUNTER — HOME CARE VISIT (OUTPATIENT)
Dept: HOME HEALTH SERVICES | Facility: HOME HEALTHCARE | Age: 79
End: 2024-11-06
Payer: COMMERCIAL

## 2024-11-06 VITALS
HEART RATE: 70 BPM | OXYGEN SATURATION: 98 % | RESPIRATION RATE: 16 BRPM | DIASTOLIC BLOOD PRESSURE: 64 MMHG | SYSTOLIC BLOOD PRESSURE: 112 MMHG

## 2024-11-06 VITALS — DIASTOLIC BLOOD PRESSURE: 64 MMHG | SYSTOLIC BLOOD PRESSURE: 116 MMHG

## 2024-11-06 PROCEDURE — G0151 HHCP-SERV OF PT,EA 15 MIN: HCPCS

## 2024-11-06 PROCEDURE — G0152 HHCP-SERV OF OT,EA 15 MIN: HCPCS

## 2024-11-07 ENCOUNTER — RA CDI HCC (OUTPATIENT)
Dept: OTHER | Facility: HOSPITAL | Age: 79
End: 2024-11-07

## 2024-11-07 VITALS — DIASTOLIC BLOOD PRESSURE: 70 MMHG | OXYGEN SATURATION: 97 % | HEART RATE: 79 BPM | SYSTOLIC BLOOD PRESSURE: 130 MMHG

## 2024-11-07 NOTE — PROGRESS NOTES
HCC coding opportunities       Chart reviewed, no opportunity found: CHART REVIEWED, NO OPPORTUNITY FOUND        Patients Insurance     Medicare Insurance: [employer]           no

## 2024-11-08 ENCOUNTER — HOME CARE VISIT (OUTPATIENT)
Dept: HOME HEALTH SERVICES | Facility: HOME HEALTHCARE | Age: 79
End: 2024-11-08
Payer: COMMERCIAL

## 2024-11-08 VITALS
DIASTOLIC BLOOD PRESSURE: 68 MMHG | SYSTOLIC BLOOD PRESSURE: 118 MMHG | HEART RATE: 76 BPM | RESPIRATION RATE: 20 BRPM | TEMPERATURE: 97.5 F | OXYGEN SATURATION: 97 %

## 2024-11-08 PROCEDURE — G0299 HHS/HOSPICE OF RN EA 15 MIN: HCPCS

## 2024-11-11 ENCOUNTER — HOME CARE VISIT (OUTPATIENT)
Dept: HOME HEALTH SERVICES | Facility: HOME HEALTHCARE | Age: 79
End: 2024-11-11
Payer: COMMERCIAL

## 2024-11-11 VITALS
OXYGEN SATURATION: 98 % | DIASTOLIC BLOOD PRESSURE: 76 MMHG | HEART RATE: 76 BPM | RESPIRATION RATE: 18 BRPM | SYSTOLIC BLOOD PRESSURE: 118 MMHG

## 2024-11-11 VITALS — OXYGEN SATURATION: 96 % | DIASTOLIC BLOOD PRESSURE: 70 MMHG | SYSTOLIC BLOOD PRESSURE: 118 MMHG | HEART RATE: 89 BPM

## 2024-11-11 PROCEDURE — G0158 HHC OT ASSISTANT EA 15: HCPCS

## 2024-11-11 PROCEDURE — G0151 HHCP-SERV OF PT,EA 15 MIN: HCPCS

## 2024-11-12 ENCOUNTER — HOME CARE VISIT (OUTPATIENT)
Dept: HOME HEALTH SERVICES | Facility: HOME HEALTHCARE | Age: 79
End: 2024-11-12
Payer: COMMERCIAL

## 2024-11-12 VITALS
TEMPERATURE: 97.8 F | OXYGEN SATURATION: 96 % | SYSTOLIC BLOOD PRESSURE: 122 MMHG | DIASTOLIC BLOOD PRESSURE: 70 MMHG | HEART RATE: 71 BPM

## 2024-11-12 PROCEDURE — G0299 HHS/HOSPICE OF RN EA 15 MIN: HCPCS

## 2024-11-13 ENCOUNTER — HOME CARE VISIT (OUTPATIENT)
Dept: HOME HEALTH SERVICES | Facility: HOME HEALTHCARE | Age: 79
End: 2024-11-13
Payer: COMMERCIAL

## 2024-11-13 ENCOUNTER — OFFICE VISIT (OUTPATIENT)
Dept: FAMILY MEDICINE CLINIC | Facility: CLINIC | Age: 79
End: 2024-11-13
Payer: COMMERCIAL

## 2024-11-13 VITALS
TEMPERATURE: 97.6 F | SYSTOLIC BLOOD PRESSURE: 112 MMHG | BODY MASS INDEX: 24.33 KG/M2 | WEIGHT: 155 LBS | DIASTOLIC BLOOD PRESSURE: 68 MMHG | HEART RATE: 71 BPM | OXYGEN SATURATION: 95 % | HEIGHT: 67 IN

## 2024-11-13 VITALS
OXYGEN SATURATION: 96 % | SYSTOLIC BLOOD PRESSURE: 120 MMHG | RESPIRATION RATE: 18 BRPM | DIASTOLIC BLOOD PRESSURE: 70 MMHG | HEART RATE: 70 BPM

## 2024-11-13 DIAGNOSIS — R26.2 AMBULATORY DYSFUNCTION: Primary | ICD-10-CM

## 2024-11-13 DIAGNOSIS — Z09 HOSPITAL DISCHARGE FOLLOW-UP: ICD-10-CM

## 2024-11-13 PROCEDURE — 99495 TRANSJ CARE MGMT MOD F2F 14D: CPT | Performed by: FAMILY MEDICINE

## 2024-11-13 PROCEDURE — G0151 HHCP-SERV OF PT,EA 15 MIN: HCPCS

## 2024-11-17 NOTE — ASSESSMENT & PLAN NOTE
I will reach back out to neurosurgery to see if any further workup/reassessment needs to be done

## 2024-11-17 NOTE — PROGRESS NOTES
Transition of Care Visit  Name: Samson Watts      : 1945      MRN: [id number]  Encounter Provider: Art Crowell MD  Encounter Date: 2024   Encounter department: [facility]    Assessment & Plan  Ambulatory dysfunction  I will reach back out to neurosurgery to see if any further workup/reassessment needs to be done       Hospital discharge follow-up              History of Present Illness     Transitional Care Management Review:   Samson Watts is a 79 y.o. female here for TCM follow up.     During the TCM phone call patient stated:  TCM Call       Date and time call was made  2024  5:37 PM    Hospital care reviewed  Records reviewed    Patient was hospitialized at  Other (comment)    Comment  [facility]    Date of Admission  10/15/24    Date of discharge  10/31/24    Diagnosis  Vertigo    Disposition  Home    Were the patients medications reviewed and updated  Yes    Current Symptoms  --  some confusion, not new though    Cough Severity  Moderate          TCM Call       Post hospital issues  None    Should patient be enrolled in anticoag monitoring?  No    Scheduled for follow up?  Yes    Did you obtain your prescribed medications  Yes    Do you need help managing your prescriptions or medications  No    Is transportation to your appointment needed  No    I have advised the patient to call PCP with any new or worsening symptoms  FILOMENA Paulino          HPI  Patient presents for follow up after hospital stay. She has been improving with her strength and has returned home. She has a daily nurse now, so it getting more regular meals and more physical activity on a regular basis.     Review of Systems   Constitutional:  Negative for chills and fever.   HENT:  Negative for congestion and sore throat.    Eyes:  Negative for pain and visual disturbance.   Respiratory:  Negative for cough and shortness of breath.    Cardiovascular:  Negative for chest pain and palpitations.  "  Gastrointestinal:  Negative for abdominal pain and nausea.   Genitourinary:  Negative for dysuria.        UI   Musculoskeletal:  Negative for arthralgias and myalgias.   Skin:  Negative for rash and wound.   Neurological:  Positive for light-headedness. Negative for dizziness and headaches.   All other systems reviewed and are negative.    Objective   /68   Pulse 71   Temp 97.6 °F (36.4 °C)   Ht 5' 7\" (1.702 m)   Wt 70.3 kg (155 lb)   SpO2 95%   BMI 24.28 kg/m²     Physical Exam  Vitals and nursing note reviewed.   Constitutional:       General: She is not in acute distress.     Appearance: She is well-developed.   HENT:      Head: Normocephalic and atraumatic.      Right Ear: External ear normal.      Left Ear: External ear normal.      Nose: Nose normal.   Eyes:      Conjunctiva/sclera: Conjunctivae normal.   Neck:      Trachea: No tracheal deviation.   Pulmonary:      Effort: Pulmonary effort is normal.   Abdominal:      Tenderness: There is no abdominal tenderness.   Skin:     General: Skin is warm and dry.      Capillary Refill: Capillary refill takes less than 2 seconds.      Findings: No rash.   Neurological:      Mental Status: She is alert.      Cranial Nerves: No cranial nerve deficit.       Medications have been reviewed by provider in current encounter    "

## 2024-11-18 ENCOUNTER — HOME CARE VISIT (OUTPATIENT)
Dept: HOME HEALTH SERVICES | Facility: HOME HEALTHCARE | Age: 79
End: 2024-11-18
Payer: COMMERCIAL

## 2024-11-18 ENCOUNTER — TELEPHONE (OUTPATIENT)
Dept: FAMILY MEDICINE CLINIC | Facility: CLINIC | Age: 79
End: 2024-11-18

## 2024-11-18 VITALS — SYSTOLIC BLOOD PRESSURE: 135 MMHG | DIASTOLIC BLOOD PRESSURE: 70 MMHG | HEART RATE: 80 BPM | OXYGEN SATURATION: 97 %

## 2024-11-18 DIAGNOSIS — G91.2 NPH (NORMAL PRESSURE HYDROCEPHALUS) (HCC): Primary | ICD-10-CM

## 2024-11-18 PROCEDURE — G0152 HHCP-SERV OF OT,EA 15 MIN: HCPCS

## 2024-11-18 NOTE — TELEPHONE ENCOUNTER
Please call patient's daughter. I did check with neurosurgery and they stated they could see her in the office to re-evaluate her for NPH.

## 2024-11-18 NOTE — TELEPHONE ENCOUNTER
"                                             Telephone Encounter - Velma Diaz is a 50 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 who is being evaluated via a billable telephone visit. Hi anxiety and stress due to pandemic and home issues      The patient has been notified of following:     \"This telephone visit will be conducted via a call between you and your physician/provider. We have found that certain health care needs can be provided without the need for a physical exam.  This service lets us provide the care you need with a short phone conversation.  If a prescription is necessary we can send it directly to your pharmacy.      If during the course of the call the physician/provider feels a telephone visit is not appropriate, you will not be charged for this service.\"     Velma Diaz complains of  High anxiety and worries about the future.  Many stressors and wanting to drink and relapse on alcohol.       Assessment/Plan:  There are no diagnoses linked to this encounter.Generalized Anxiety Disorder    Phone call duration:  15 minutes    SERVANDO Ames      " Pt's daughter is aware

## 2024-11-20 ENCOUNTER — TELEPHONE (OUTPATIENT)
Age: 79
End: 2024-11-20

## 2024-11-20 NOTE — TELEPHONE ENCOUNTER
Pts daughter called in to have pt scheduled for a f/u appt due to worsening symptoms and for NPH testing.   LOV 9/1/23 [facility]   F/u instructions:  Will plan to follow-up with the patient on an as-needed basis or should she experience any worsening symptoms  Pt and daughter were agreeable to the above noted plan   All questions answered at this time   Pt encouraged to call the office with any further questions or concerns or should they experience any worsening sx    New imaging has been done:   CTA NECK AND BRAIN WITH AND WITHOUT CONTRAST  10/10/24   MRI BRAIN WITHOUT CONTRAST 10/11     Dr. Christian,   Can you please review imaging and let us know how to proceed with f/u appt?     Thank you.

## 2024-11-25 ENCOUNTER — HOME CARE VISIT (OUTPATIENT)
Dept: HOME HEALTH SERVICES | Facility: HOME HEALTHCARE | Age: 79
End: 2024-11-25
Payer: COMMERCIAL

## 2024-11-25 PROCEDURE — G0152 HHCP-SERV OF OT,EA 15 MIN: HCPCS

## 2024-11-26 VITALS — OXYGEN SATURATION: 96 % | DIASTOLIC BLOOD PRESSURE: 75 MMHG | SYSTOLIC BLOOD PRESSURE: 120 MMHG | HEART RATE: 74 BPM

## 2024-12-03 ENCOUNTER — PATIENT OUTREACH (OUTPATIENT)
Dept: CASE MANAGEMENT | Facility: OTHER | Age: 79
End: 2024-12-03

## 2024-12-03 DIAGNOSIS — Z74.2 ASSISTANCE NEEDED AT HOME: Primary | ICD-10-CM

## 2024-12-03 NOTE — PROGRESS NOTES
Message received from patient's dtr Letty requesting return call from OP SWCM to discuss long-term placement options for patient due to declining memory.    Spoke with Letty who confirmed concerns with patient living independently.  States patient did not agree to transition to custodial/LTC following SNF stay for short-term rehab; instead returned home.  Patient had a fall on Friday; Letty states she did not hit her head but forgot she had her fall alert button & pull chain in the bathroom.  Patient declined wanting to go to hospital for evaluation at the time.    Patient continues with Waiver Services; 40 hrs per week (9AM-5PM Monday thru Friday) with [facility].  Letty asked about coverage for custodial costs.  Discussed this is private pay unless waiver benefits contract with any ALFs to cover costs.  Patient was previously referred to [facility];  determined patient did not have finances needed for custodial placement.  Encouraged Letty to outreach patient's waiver  to discuss waiver benefits for LTC placement vs additional in-home aide hours.    Letty previously looked at [facility] and utilizing patient's VA benefits.  Patient was not interested in this facility at the time.    Discussed patient's involvement in decision unless it is determined by provider that patient lacks capacity to make this decision.  Patient has Neurosurgery appt scheduled for 12/13.  Letty plans to discuss cognitive concerns with provider at this appt.  Will continue to assist Letty with LTC options.  Also encouraged Letty to have patient evaluated at hospital if cognition continues to decline or if safety concerns present.  Will follow-up after 12/13 appt.

## 2024-12-13 ENCOUNTER — OFFICE VISIT (OUTPATIENT)
Dept: NEUROSURGERY | Facility: CLINIC | Age: 79
End: 2024-12-13
Payer: COMMERCIAL

## 2024-12-13 VITALS
RESPIRATION RATE: 16 BRPM | SYSTOLIC BLOOD PRESSURE: 130 MMHG | BODY MASS INDEX: 24.33 KG/M2 | OXYGEN SATURATION: 95 % | HEIGHT: 67 IN | WEIGHT: 155 LBS | HEART RATE: 68 BPM | DIASTOLIC BLOOD PRESSURE: 88 MMHG

## 2024-12-13 DIAGNOSIS — R26.81 GAIT INSTABILITY: ICD-10-CM

## 2024-12-13 DIAGNOSIS — R42 DIZZINESS: ICD-10-CM

## 2024-12-13 DIAGNOSIS — G91.2 NPH (NORMAL PRESSURE HYDROCEPHALUS) (HCC): Primary | ICD-10-CM

## 2024-12-13 PROCEDURE — 99215 OFFICE O/P EST HI 40 MIN: CPT | Performed by: PHYSICIAN ASSISTANT

## 2024-12-13 NOTE — PROGRESS NOTES
Name: Samson Watts      : 1945      MRN: [id number]  Encounter Provider: Isis De La Garza PA-C  Encounter Date: 2024   Encounter department: [facility] NEUROSURGICAL [facility]  :  Assessment & Plan  NPH (normal pressure hydrocephalus) (HCC)  Patient presents to the  nsgy office today for follow-up for concern for NPH.   Patient has had prior workup for NPH, last seen by Nsx in 2023, there was no significant difference in physical therapy assessments after the high volume LP, thus  shunt was not recommended.   Known to the NSX office w/ hx of SAH 2016 thought to be due to possible aneurysm rupture which thrombosed  Continued to follow through  in regard to a 1mm R LIANET A1/A2 junction aneurysm   Reports she had repeat MRI brain this year that also mentioned mild stable ventriculomegaly for which she was referred back to neurosurgery for further eval.   Pt denies significant neurological change since her last visit in 2023. She reports she continues to have gait instability, mild cognitive issues and chronic urinary incontinence. She reports she remains independent at home and is independent with her ADLs.     Imaging:  Mri brain wo 10/11/24: Stable mild ventriculomegaly.    CTH: No acute intracranial hemorrhage.  Chronic microangiopathic changes  MRI brain neuroquant wo contrast 2023: Normal hippocampal volume and enlarged ventricular system: Findings do not support hippocampal degeneration. Possible expansion of ventricular system without medial temporal lobe focused ex-vacuo process.    Plan:    Provided referral to neurology given gait, balance and difficulties with her walking as she has not been evaluated by neurology before.   Reviewed with patient new imaging findings above that show stable mild ventriculomegaly.  Given mild stable ventriculomegaly and no significant change in pt's exam since last year, recommend patient continue conservative  measures.   Further  follow up with neurosurgery PRN.  Discussed plan of care with patient who showed understanding and maintained that she would not like surgical intervention at this time.   Pt encouraged to call the office with any further questions or concerns or should they experience any new or worsening sx    Orders:    Ambulatory Referral to Neurosurgery    Gait instability    Orders:    Ambulatory referral to Neurology; Future    Dizziness    Orders:    Ambulatory referral to Neurology; Future        History of Present Illness   Pt is a 79 y.o. 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 with past medical history significant for vertigo, mild cognitive disorder, osteopenia, arthritis, history of breast cancer, hyperlipidemia, depression, anxiety, and stress incontinence who presents for follow up for concern for NPH. Patient known to our service who unfortunately had a spontaneous subarachnoid hemorrhage on 1/3/2016 and hydrocephalus. There is possibility that her subarachnoid hemorrhage was the result of a ruptured aneurysm which may have thrombosed post rupture.      Patient had prior workup for NPH, last seen by Nsx in Sept 2023, there was no significant difference in physical therapy assessments after the high volume LP, thus  shunt was not recommended at the time. Patient was accompanied by her daughter to this visit. They report that patient had another MRI brain that mentioned ventriculomegaly again, thus patient was referred back to neurosurgery for further evaluation and discussion. Patient denies any significant new neurological changes since her last visit in Sept 2023. Pt/her daughter report that she continues to have issues with her gait and balance. Patient reports that at home and for short distance she can walk without an assistive device but now she uses a walker for safety. She reports she walks slow and carefully. She reports she had a fall earlier this year that she thinks was related to her intermittent dizziness. She reports she  continues to have chronic urinary incontinence and continues to wear depends. She reports she can tell when she needs to void, however has trouble getting to the bathroom fast enough. She reports she continues to live at home independently and is independent iwht her own ADLs. Patient is able to administer her own medications appropriately, clean her house, cook, dress herself, etc. without issue.  Her daughter assists her with her finances.             Review of Systems   Constitutional: Negative.    HENT: Negative.     Eyes: Negative.    Respiratory: Negative.     Cardiovascular: Negative.    Gastrointestinal: Negative.    Genitourinary:         Depends for incontinence   Musculoskeletal: Negative.    Skin: Negative.    Allergic/Immunologic: Negative.    Psychiatric/Behavioral:  Negative for agitation, decreased concentration and dysphoric mood.    All other systems reviewed and are negative.   I have personally reviewed the MA's review of systems and made changes as necessary.    Pertinent Medical History     Medical History Reviewed by provider this encounter:     .  Past Medical History   Past Medical History:   Diagnosis Date    Acute mastoiditis with intracranial complication 11/16/2022    Anxiety disorder     Brain hemangioma (HCC)     2016    BRCA1 negative     BRCA2 negative     Breast cancer (HCC) 2014    left breast    Cancer (HCC)     Constipation     Esophageal reflux     History of bone density study 2011    [employer] X-Ray Absorptiometry    History of radiation therapy 2014    left breast ca    History of recurrent UTIs     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    Hyperlipidemia     Hypertension     Malignant neoplasm of female breast (HCC) 04/22/2014    Osteoarthritis of hip     Osteopenia     PONV (postoperative nausea and vomiting)     Stroke, hemorrhagic (HCC) 12/2015    Subarachnoid hemorrhage (HCC)      Past Surgical History:   Procedure Laterality Date    BREAST BIOPSY  Left 2014    BREAST LUMPECTOMY Left 2014    COLONOSCOPY      Fiberoptic - 2009, 2015 5 year f/u    ESOPHAGOGASTRODUODENOSCOPY  2009    Diag, Resolved - 2006 / 2009    EXOSTECTOMY  2005    Simple Bunion (Silver Procedure)    FL LUMBAR PUNCTURE THERAPEUTIC  9/1/2023    HYSTERECTOMY  1981    JOINT REPLACEMENT Right 2012    Right Hip    OOPHORECTOMY  1981    not sure which one    REPLACEMENT TOTAL KNEE Right 2018    VENTRICULOSTOMY       Family History   Problem Relation Age of Onset    Cancer Mother 69        bone    Hypertension Mother     Arthritis Mother     Stroke Father         TIA    Stroke Maternal Grandmother         CVA    Deep vein thrombosis Daughter     Heart attack Neg Hx     Anuerysm Neg Hx     Clotting disorder Neg Hx     Arrhythmia Neg Hx     Heart failure Neg Hx     Coronary artery disease Neg Hx     Hyperlipidemia Neg Hx     Breast cancer Neg Hx       reports that she has never smoked. She has been exposed to tobacco smoke. She has never used smokeless tobacco. She reports current alcohol use. She reports that she does not use drugs.  Current Outpatient Medications on File Prior to Visit   Medication Sig Dispense Refill    Calcium-Vitamin D (CALTRATE 600 PLUS-VIT D PO) Take 1 tablet by mouth daily      ezetimibe (ZETIA) 10 mg tablet TAKE ONE TABLET BY MOUTH EVERY DAY 30 tablet 5    LUMIGAN 0.01 % ophthalmic drops PLACE 1 DROP INTO IN EACH EYE AT BEDTIME  11    Multiple Vitamins-Minerals (MULTIVITAMIN ADULTS 50+) TABS Take 1 tablet by mouth daily      NON FORMULARY Take 1 Dose by mouth daily. CBD gummy, 10mg, as needed for anxiety      sertraline (ZOLOFT) 100 mg tablet TAKE TWO TABLETS BY MOUTH EVERY  tablet 1    acetaminophen (TYLENOL) 500 mg tablet Take 1,000 mg by mouth every 6 (six) hours as needed for mild pain (Patient not taking: Reported on 11/13/2024)      docusate sodium (COLACE) 100 mg capsule Take 100 mg by mouth 2 (two) times a day (Patient not taking: Reported on 11/13/2024)       fluticasone (FLONASE) 50 mcg/act nasal spray 1 spray into each nostril as needed for rhinitis (Patient not taking: Reported on 12/13/2024)      lansoprazole (PREVACID SOLUTAB) 15 mg disintegrating tablet Take 15 mg by mouth daily (Patient not taking: Reported on 11/13/2024)      meclizine (ANTIVERT) 25 mg tablet Take 1 tablet (25 mg total) by mouth every 8 (eight) hours as needed for dizziness (Patient not taking: Reported on 11/2/2024) 30 tablet 0     No current facility-administered medications on file prior to visit.     Allergies   Allergen Reactions    Oxycodone Vomiting    Atorvastatin Other (See Comments)     Leg/knee pain (joint pain)    Bactrim [Sulfamethoxazole-Trimethoprim] Itching and Rash    Keppra [Levetiracetam] Rash    Livalo [Pitavastatin] Itching    Penicillins Rash     Agitation       Current Outpatient Medications on File Prior to Visit   Medication Sig Dispense Refill    Calcium-Vitamin D (CALTRATE 600 PLUS-VIT D PO) Take 1 tablet by mouth daily      ezetimibe (ZETIA) 10 mg tablet TAKE ONE TABLET BY MOUTH EVERY DAY 30 tablet 5    LUMIGAN 0.01 % ophthalmic drops PLACE 1 DROP INTO IN EACH EYE AT BEDTIME  11    Multiple Vitamins-Minerals (MULTIVITAMIN ADULTS 50+) TABS Take 1 tablet by mouth daily      NON FORMULARY Take 1 Dose by mouth daily. CBD gummy, 10mg, as needed for anxiety      sertraline (ZOLOFT) 100 mg tablet TAKE TWO TABLETS BY MOUTH EVERY  tablet 1    acetaminophen (TYLENOL) 500 mg tablet Take 1,000 mg by mouth every 6 (six) hours as needed for mild pain (Patient not taking: Reported on 11/13/2024)      docusate sodium (COLACE) 100 mg capsule Take 100 mg by mouth 2 (two) times a day (Patient not taking: Reported on 11/13/2024)      fluticasone (FLONASE) 50 mcg/act nasal spray 1 spray into each nostril as needed for rhinitis (Patient not taking: Reported on 12/13/2024)      lansoprazole (PREVACID SOLUTAB) 15 mg disintegrating tablet Take 15 mg by mouth daily (Patient not taking:  "Reported on 11/13/2024)      meclizine (ANTIVERT) 25 mg tablet Take 1 tablet (25 mg total) by mouth every 8 (eight) hours as needed for dizziness (Patient not taking: Reported on 11/2/2024) 30 tablet 0     No current facility-administered medications on file prior to visit.      Social History     Tobacco Use    Smoking status: Never     Passive exposure: Past    Smokeless tobacco: Never   Vaping Use    Vaping status: Never Used   Substance and Sexual Activity    Alcohol use: Yes     Comment: occasionally    Drug use: No    Sexual activity: Not Currently        Objective   /88 (BP Location: Right arm, Patient Position: Sitting)   Pulse 68   Resp 16   Ht 5' 7\" (1.702 m)   Wt 70.3 kg (155 lb)   SpO2 95%   BMI 24.28 kg/m²     Physical Exam  Constitutional:       General: She is not in acute distress.     Appearance: She is well-developed.      Comments: Slow measured gait with shorts steps.    HENT:      Head: Normocephalic and atraumatic.   Eyes:      Extraocular Movements: Extraocular movements intact.      Pupils: Pupils are equal, round, and reactive to light.   Neck:      Trachea: No tracheal deviation.   Cardiovascular:      Rate and Rhythm: Normal rate.   Pulmonary:      Effort: Pulmonary effort is normal.   Abdominal:      Palpations: Abdomen is soft.      Tenderness: There is no abdominal tenderness. There is no guarding.   Musculoskeletal:      Cervical back: Normal range of motion and neck supple.   Skin:     General: Skin is warm and dry.      Coloration: Skin is not pale.   Neurological:      Mental Status: She is alert and oriented to person, place, and time.      Comments: GCS 15, Awake, Alert, Oriented x 3    Motor: LISA, strength 4+/5 throughout    Sensation:  intact to LT X 4     Reflexes: 2+ and symmetric BUE, 3+ bilat patellar, no alberto's or clonus     Coordination: no drift bilateral upper extremities, finger to nose normal bilaterally.    Psychiatric:         Behavior: Behavior " normal.     Neurological Exam  Mental Status  Alert. Oriented to person, place, and time.    Cranial Nerves  CN III, IV, VI: Extraocular movements intact bilaterally. Pupils equal round and reactive to light bilaterally.  GCS 15, Awake, Alert, Oriented x 3    Motor: LISA, strength 4+/5 throughout    Sensation:  intact to LT X 4     Reflexes: 2+ and symmetric BUE, 3+ bilat patellar, no alberto's or clonus     Coordination: no drift bilateral upper extremities, finger to nose normal bilaterally. .        Administrative Statements   I have spent a total time of 45 minutes in caring for this patient on the day of the visit/encounter including Diagnostic results, Risks and benefits of tx options, Instructions for management, Patient and family education,  Risk factor reductions, Impressions, Counseling / Coordination of care, Documenting in the medical record, Reviewing / ordering tests, medicine, procedures  , Obtaining or reviewing history  , and Communicating with other healthcare professionals .

## 2024-12-13 NOTE — ASSESSMENT & PLAN NOTE
Patient presents to the  nsgy office today for follow-up for concern for NPH.   Patient has had prior workup for NPH, last seen by Nsx in Sept 2023, there was no significant difference in physical therapy assessments after the high volume LP, thus  shunt was not recommended.   Known to the NSX office w/ hx of SAH 1/2016 thought to be due to possible aneurysm rupture which thrombosed  Continued to follow through 2022 in regard to a 1mm R LIANET A1/A2 junction aneurysm   Reports she had repeat MRI brain this year that also mentioned mild stable ventriculomegaly for which she was referred back to neurosurgery for further eval.   Pt denies significant neurological change since her last visit in Sept 2023. She reports she continues to have gait instability, mild cognitive issues and chronic urinary incontinence. She reports she remains independent at home and is independent with her ADLs.     Imaging:  Mri brain wo 10/11/24: Stable mild ventriculomegaly.   8/4 CTH: No acute intracranial hemorrhage.  Chronic microangiopathic changes  MRI brain neuroquant wo contrast 5/28/2023: Normal hippocampal volume and enlarged ventricular system: Findings do not support hippocampal degeneration. Possible expansion of ventricular system without medial temporal lobe focused ex-vacuo process.    Plan:    Provided referral to neurology given gait, balance and difficulties with her walking as she has not been evaluated by neurology before.   Reviewed with patient new imaging findings above that show stable mild ventriculomegaly.  Given mild stable ventriculomegaly and no significant change in pt's exam since last year, recommend patient continue conservative  measures.   Further follow up with neurosurgery PRN.  Discussed plan of care with patient who showed understanding and maintained that she would not like surgical intervention at this time.   Pt encouraged to call the office with any further questions or concerns or should they  experience any new or worsening sx    Orders:    Ambulatory Referral to Neurosurgery

## 2024-12-16 ENCOUNTER — PATIENT OUTREACH (OUTPATIENT)
Dept: CASE MANAGEMENT | Facility: OTHER | Age: 79
End: 2024-12-16

## 2024-12-16 NOTE — PROGRESS NOTES
Chart reviewed.  Patient attended Neurosurgery appt on 12/13 and was referred to Neurology due to gait, balance and difficulties with her walking.    Call placed to patient's dtr Letty to follow-up on plan to increase in-home aide hours vs LTC placement.  No answer, voicemail left, and awaiting return call.

## 2024-12-23 ENCOUNTER — PATIENT OUTREACH (OUTPATIENT)
Dept: CASE MANAGEMENT | Facility: OTHER | Age: 79
End: 2024-12-23

## 2024-12-23 NOTE — PROGRESS NOTES
Additional call placed to patient's dtr Letty to follow-up on plan to increase in-home aide hours vs LTC placement.    Spoke with Letty who confirmed recent neurosurgeon appt.  Neurosurgery referred patient to Neurology.  Letty to still schedule this appt for patient.    Letty states patient is doing fine at home and she had a new HHA through waiver services start today.  Letty met HHA yesterday and feels she will be a better fit for patient than previous HHA.  She plans to talk with patient later to ask her thoughts.  Letty was able to change HHA hours to be later in the day, which will allow the HHA to assist patient with dinners & evening medication reminders.  Letty spoke with  who confirmed next assessment is not until March; would need to wait until then to inquire about increasing HHA hours unless patient is hospitalized before then.    [facility] rep also dicussed possibility of ordering a motorized scooter for patient.  Letty unsure about ordering this as she does not want patient relying on it as only way to ambulate/get around.  Encouraged Letty to further discuss DME recommendations with patient's providers.  Patient has annual PCP appt on 1/14/2025.    Letty states patient refuses placement at this time.  Family is working on installing a ring camera inside patient's apartment as an additional way to check on her or reach her if needed.  Patient is aware and agreeable to this.    Will follow-up in 1-2 weeks to check status of new HHA and assess any additional needs at that time.

## 2025-01-02 DIAGNOSIS — E78.00 HYPERCHOLESTEROLEMIA: ICD-10-CM

## 2025-01-02 RX ORDER — EZETIMIBE 10 MG/1
10 TABLET ORAL DAILY
Qty: 90 TABLET | Refills: 0 | Status: SHIPPED | OUTPATIENT
Start: 2025-01-02

## 2025-01-02 NOTE — TELEPHONE ENCOUNTER
Patient was transferred from the cardio pod - Patient is out of her medication zetia and need it sent to the pharmacy today.    Patient and her care giver was on the phone together, patient state she is out and she apologized stating this never happened to her before.    Patient advised I will document her chart and send for approval at .    Patient asked if she will get this today, patient advised I cannot guarantee it will be sent today but I am asking for approval to be sent to the pharmacy today.    Patient verbalized understanding

## 2025-01-03 ENCOUNTER — PATIENT OUTREACH (OUTPATIENT)
Dept: CASE MANAGEMENT | Facility: OTHER | Age: 80
End: 2025-01-03

## 2025-01-03 NOTE — PROGRESS NOTES
Spoke with patient's dtr Letty to check status of patient, status of new HHA, and assess other needs.  Letty states patient is doing well and new HHA is a good fit for patient.  HHA has been very helpful and attentive.  Reviewed that next assessment for waiver is in March at this time.  Patient is doing well with her walker; Letty declined need for additional DME at this time.  Patient's family did not install the ring camera in her apartment yet but plan to do this over the weekend.    Patient's neurology appt is not yet scheduled.  Provided phone number to patient's dtr and she will call today to schedule this.  Reviewed other upcoming appts.  Will close case at this time but will remain available to assist with any future needs.

## 2025-01-10 ENCOUNTER — HOSPITAL ENCOUNTER (OUTPATIENT)
Dept: CT IMAGING | Facility: HOSPITAL | Age: 80
Discharge: HOME | End: 2025-01-10
Attending: FAMILY MEDICINE
Payer: COMMERCIAL

## 2025-01-10 DIAGNOSIS — R91.1 LUNG NODULE: ICD-10-CM

## 2025-01-10 PROCEDURE — 71250 CT THORAX DX C-: CPT

## 2025-01-14 ENCOUNTER — OFFICE VISIT (OUTPATIENT)
Dept: FAMILY MEDICINE CLINIC | Facility: CLINIC | Age: 80
End: 2025-01-14
Payer: COMMERCIAL

## 2025-01-14 VITALS
HEIGHT: 67 IN | DIASTOLIC BLOOD PRESSURE: 72 MMHG | WEIGHT: 158.2 LBS | TEMPERATURE: 97.4 F | BODY MASS INDEX: 24.83 KG/M2 | OXYGEN SATURATION: 96 % | SYSTOLIC BLOOD PRESSURE: 108 MMHG | HEART RATE: 58 BPM

## 2025-01-14 DIAGNOSIS — R91.1 LUNG NODULE: ICD-10-CM

## 2025-01-14 DIAGNOSIS — G30.1 MODERATE LATE ONSET ALZHEIMER'S DEMENTIA WITHOUT BEHAVIORAL DISTURBANCE, PSYCHOTIC DISTURBANCE, MOOD DISTURBANCE, OR ANXIETY (HCC): ICD-10-CM

## 2025-01-14 DIAGNOSIS — F02.B0 MODERATE LATE ONSET ALZHEIMER'S DEMENTIA WITHOUT BEHAVIORAL DISTURBANCE, PSYCHOTIC DISTURBANCE, MOOD DISTURBANCE, OR ANXIETY (HCC): ICD-10-CM

## 2025-01-14 DIAGNOSIS — R73.01 IFG (IMPAIRED FASTING GLUCOSE): ICD-10-CM

## 2025-01-14 DIAGNOSIS — Z12.31 ENCOUNTER FOR SCREENING MAMMOGRAM FOR BREAST CANCER: ICD-10-CM

## 2025-01-14 DIAGNOSIS — F01.B2: ICD-10-CM

## 2025-01-14 DIAGNOSIS — Z00.00 MEDICARE ANNUAL WELLNESS VISIT, SUBSEQUENT: Primary | ICD-10-CM

## 2025-01-14 DIAGNOSIS — F32.0 CURRENT MILD EPISODE OF MAJOR DEPRESSIVE DISORDER WITHOUT PRIOR EPISODE (HCC): ICD-10-CM

## 2025-01-14 DIAGNOSIS — I77.4 CELIAC ARTERY STENOSIS (HCC): ICD-10-CM

## 2025-01-14 DIAGNOSIS — E78.00 PURE HYPERCHOLESTEROLEMIA: ICD-10-CM

## 2025-01-14 PROBLEM — G91.2 NPH (NORMAL PRESSURE HYDROCEPHALUS) (HCC): Status: RESOLVED | Noted: 2023-08-05 | Resolved: 2025-01-14

## 2025-01-14 PROCEDURE — G0439 PPPS, SUBSEQ VISIT: HCPCS | Performed by: FAMILY MEDICINE

## 2025-01-14 PROCEDURE — 99214 OFFICE O/P EST MOD 30 MIN: CPT | Performed by: FAMILY MEDICINE

## 2025-01-14 RX ORDER — ESOMEPRAZOLE MAGNESIUM 10 MG/1
10 GRANULE, FOR SUSPENSION, EXTENDED RELEASE ORAL
COMMUNITY

## 2025-01-14 RX ORDER — MEMANTINE HYDROCHLORIDE 7 MG/1
CAPSULE, EXTENDED RELEASE ORAL
Qty: 84 CAPSULE | Refills: 0 | Status: SHIPPED | OUTPATIENT
Start: 2025-01-14 | End: 2025-02-25

## 2025-01-14 RX ORDER — MEMANTINE HYDROCHLORIDE 28 MG/1
28 CAPSULE, EXTENDED RELEASE ORAL DAILY
Qty: 90 CAPSULE | Refills: 1 | Status: SHIPPED | OUTPATIENT
Start: 2025-01-14

## 2025-01-14 RX ORDER — MEMANTINE HYDROCHLORIDE 5 MG/1
5 TABLET ORAL DAILY
Qty: 30 TABLET | Status: CANCELLED | OUTPATIENT
Start: 2025-01-14

## 2025-01-14 NOTE — PATIENT INSTRUCTIONS
Patient Education     Memantine (me MAN teen)   Brand Names: US Namenda; Namenda Titration [place]; Namenda XR; Namenda XR Titration Pack [DSC]   Brand Names: Dione ACT Memantine; APO-Memantine; Ebixa; MED-Memantine; PMS-Memantine; RAN-Memantine [DSC]; DONNA-Memantine; SANDOZ Memantine FCT   What is this drug used for?   It is used to treat dementia in people with Alzheimer's disease.  What do I need to tell my doctor BEFORE I take this drug?   If you are allergic to this drug; any part of this drug; or any other drugs, foods, or substances. Tell your doctor about the allergy and what signs you had.  This drug may interact with other drugs or health problems.  Tell your doctor and pharmacist about all of your drugs (prescription or OTC, natural products, vitamins) and health problems. You must check to make sure that it is safe for you to take this drug with all of your drugs and health problems. Do not start, stop, or change the dose of any drug without checking with your doctor.  What are some things I need to know or do while I take this drug?   Tell all of your health care providers that you take this drug. This includes your doctors, nurses, pharmacists, and dentists.  Tell your doctor if you are pregnant, plan on getting pregnant, or are breast-feeding. You will need to talk about the benefits and risks to you and the baby.  What are some side effects that I need to call my doctor about right away?   WARNING/CAUTION: Even though it may be rare, some people may have very bad and sometimes deadly side effects when taking a drug. Tell your doctor or get medical help right away if you have any of the following signs or symptoms that may be related to a very bad side effect:  Signs of an allergic reaction, like rash; hives; itching; red, swollen, blistered, or peeling skin with or without fever; wheezing; tightness in the chest or throat; trouble breathing, swallowing, or talking; unusual hoarseness; or swelling of the  mouth, face, lips, tongue, or throat.  Feeling confused.  What are some other side effects of this drug?   All drugs may cause side effects. However, many people have no side effects or only have minor side effects. Call your doctor or get medical help if any of these side effects or any other side effects bother you or do not go away:  Dizziness.  Headache.  Diarrhea or constipation.  These are not all of the side effects that may occur. If you have questions about side effects, call your doctor. Call your doctor for medical advice about side effects.  You may report side effects to your national health agency.  You may report side effects to the FDA at 1-138.123.2967. You may also report side effects at https://www.fda.gov/medwatch.  How is this drug best taken?   Use this drug as ordered by your doctor. Read all information given to you. Follow all instructions closely.  All products:   Take with or without food.  Keep taking this drug as you have been told by your doctor or other health care provider, even if you feel well.  Liquid (solution):   Measure liquid doses carefully. Use the measuring device that comes with this drug.  Do not mix with any liquid.  Extended-release capsules:   Swallow whole. Do not chew or crush.  Do not take any capsules that do not look normal or are damaged.  If you cannot swallow this drug whole, you may sprinkle the contents on applesauce. If you do this, swallow the mixture right away without chewing.  What do I do if I miss a dose?   Skip the missed dose and go back to your normal time.  Do not take 2 doses at the same time or extra doses.  If you miss taking this drug for a few days in a row, call your doctor before you start taking it again.  How do I store and/or throw out this drug?   Store at room temperature in a dry place. Do not store in a bathroom.  Keep all drugs in a safe place. Keep all drugs out of the reach of children and pets.  Throw away unused or  drugs.  Do not flush down a toilet or pour down a drain unless you are told to do so. Check with your pharmacist if you have questions about the best way to throw out drugs. There may be drug take-back programs in your area.  General drug facts   If your symptoms or health problems do not get better or if they become worse, call your doctor.  Do not share your drugs with others and do not take anyone else's drugs.  Some drugs may have another patient information leaflet. If you have any questions about this drug, please talk with your doctor, nurse, pharmacist, or other health care provider.  Some drugs may have another patient information leaflet. Check with your pharmacist. If you have any questions about this drug, please talk with your doctor, nurse, pharmacist, or other health care provider.  If you think there has been an overdose, call your poison control center or get medical care right away. Be ready to tell or show what was taken, how much, and when it happened.  Consumer Information Use and Disclaimer   This generalized information is a limited summary of diagnosis, treatment, and/or medication information. It is not meant to be comprehensive and should be used as a tool to help the user understand and/or assess potential diagnostic and treatment options. It does NOT include all information about conditions, treatments, medications, side effects, or risks that may apply to a specific patient. It is not intended to be medical advice or a substitute for the medical advice, diagnosis, or treatment of a health care provider based on the health care provider's examination and assessment of a patient's specific and unique circumstances. Patients must speak with a health care provider for complete information about their health, medical questions, and treatment options, including any risks or benefits regarding use of medications. This information does not endorse any treatments or medications as safe, effective, or  approved for treating a specific patient. [employer] and its affiliates disclaim any warranty or liability relating to this information or the use thereof. The use of this information is governed by the Terms of Use, available at https://www.Driftyuwer.com/en/know/clinical-effectiveness-terms.  Last Reviewed Date   2020-01-10  Copyright   © 2024 [employer] and its affiliates and/or licensors. All rights reserved.  Medicare Preventive Visit Patient Instructions  Thank you for completing your Welcome to Medicare Visit or Medicare Annual Wellness Visit today. Your next wellness visit will be due in one year (1/15/2026).  The screening/preventive services that you may require over the next 5-10 years are detailed below. Some tests may not apply to you based off risk factors and/or age. Screening tests ordered at today's visit but not completed yet may show as past due. Also, please note that scanned in results may not display below.  Preventive Screenings:  Service Recommendations Previous Testing/Comments   Colorectal Cancer Screening  * Colonoscopy    * Fecal Occult Blood Test (FOBT)/Fecal Immunochemical Test (FIT)  * Fecal DNA/Cologuard Test  * Flexible Sigmoidoscopy Age: 45-75 years old   Colonoscopy: every 10 years (may be performed more frequently if at higher risk)  OR  FOBT/FIT: every 1 year  OR  Cologuard: every 3 years  OR  Sigmoidoscopy: every 5 years  Screening may be recommended earlier than age 45 if at higher risk for colorectal cancer. Also, an individualized decision between you and your healthcare provider will decide whether screening between the ages of 76-85 would be appropriate. Colonoscopy: 08/05/2015  FOBT/FIT: Not on file  Cologuard: Not on file  Sigmoidoscopy: Not on file          Breast Cancer Screening Age: 40+ years old  Frequency: every 1-2 years  Not required if history of left and right mastectomy Mammogram: 12/17/2021    History Breast Cancer   Cervical Cancer Screening  Between the ages of 21-29, pap smear recommended once every 3 years.   Between the ages of 30-65, can perform pap smear with HPV co-testing every 5 years.   Recommendations may differ for women with a history of total hysterectomy, cervical cancer, or abnormal pap smears in past. Pap Smear: Not on file    Screening Not Indicated   Hepatitis C Screening Once for adults born between 1945 and 1965  More frequently in patients at high risk for Hepatitis C Hep C Antibody: 11/23/2022    Screening Current   Diabetes Screening 1-2 times per year if you're at risk for diabetes or have pre-diabetes Fasting glucose: 88 mg/dL (10/11/2024)  A1C: 5.4 % (9/10/2024)  Screening Current   Cholesterol Screening Once every 5 years if you don't have a lipid disorder. May order more often based on risk factors. Lipid panel: 09/10/2024    Screening Not Indicated  History Lipid Disorder     Other Preventive Screenings Covered by Medicare:  Abdominal Aortic Aneurysm (AAA) Screening: covered once if your at risk. You're considered to be at risk if you have a family history of AAA.  Lung Cancer Screening: covers low dose CT scan once per year if you meet all of the following conditions: (1) Age 55-77; (2) No signs or symptoms of lung cancer; (3) Current smoker or have quit smoking within the last 15 years; (4) You have a tobacco smoking history of at least 20 pack years (packs per day multiplied by number of years you smoked); (5) You get a written order from a healthcare provider.  Glaucoma Screening: covered annually if you're considered high risk: (1) You have diabetes OR (2) Family history of glaucoma OR (3)  aged 50 and older OR (4)  American aged 65 and older  Osteoporosis Screening: covered every 2 years if you meet one of the following conditions: (1) You're estrogen deficient and at risk for osteoporosis based off medical history and other findings; (2) Have a vertebral abnormality; (3) On glucocorticoid  therapy for more than 3 months; (4) Have primary hyperparathyroidism; (5) On osteoporosis medications and need to assess response to drug therapy.   Last bone density test (DXA Scan): 01/19/2022.  HIV Screening: covered annually if you're between the age of 15-65. Also covered annually if you are younger than 15 and older than 65 with risk factors for HIV infection. For pregnant patients, it is covered up to 3 times per pregnancy.    Immunizations:  Immunization Recommendations   Influenza Vaccine Annual influenza vaccination during flu season is recommended for all persons aged >= 6 months who do not have contraindications   Pneumococcal Vaccine   * Pneumococcal conjugate vaccine = PCV13 (Prevnar 13), PCV15 (Vaxneuvance), PCV20 (Prevnar 20)  * Pneumococcal polysaccharide vaccine = PPSV23 (Pneumovax) Adults 19-63 yo with certain risk factors or if 65+ yo  If never received any pneumonia vaccine: recommend Prevnar 20 (PCV20)  Give PCV20 if previously received 1 dose of PCV13 or PPSV23   Hepatitis B Vaccine 3 dose series if at intermediate or high risk (ex: diabetes, end stage renal disease, liver disease)   Respiratory syncytial virus (RSV) Vaccine - COVERED BY MEDICARE PART D  * RSVPreF3 (Arexvy) CDC recommends that adults 60 years of age and older may receive a single dose of RSV vaccine using shared clinical decision-making (SCDM)   Tetanus (Td) Vaccine - COST NOT COVERED BY MEDICARE PART B Following completion of primary series, a booster dose should be given every 10 years to maintain immunity against tetanus. Td may also be given as tetanus wound prophylaxis.   Tdap Vaccine - COST NOT COVERED BY MEDICARE PART B Recommended at least once for all adults. For pregnant patients, recommended with each pregnancy.   Shingles Vaccine (Shingrix) - COST NOT COVERED BY MEDICARE PART B  2 shot series recommended in those 19 years and older who have or will have weakened immune systems or those 50 years and older     Health  Maintenance Due:      Topic Date Due    Breast Cancer Screening: Mammogram  12/17/2023    DXA SCAN  01/19/2027    Hepatitis C Screening  Completed    Colorectal Cancer Screening  Discontinued     Immunizations Due:      Topic Date Due    COVID-19 Vaccine (3 - 2024-25 season) 09/01/2024     Advance Directives   What are advance directives?  Advance directives are legal documents that state your wishes and plans for medical care. These plans are made ahead of time in case you lose your ability to make decisions for yourself. Advance directives can apply to any medical decision, such as the treatments you want, and if you want to donate organs.   What are the types of advance directives?  There are many types of advance directives, and each state has rules about how to use them. You may choose a combination of any of the following:  Living will:  This is a written record of the treatment you want. You can also choose which treatments you do not want, which to limit, and which to stop at a certain time. This includes surgery, medicine, IV fluid, and tube feedings.   Durable power of  for healthcare (DPAHC):  This is a written record that states who you want to make healthcare choices for you when you are unable to make them for yourself. This person, called a proxy, is usually a family member or a friend. You may choose more than 1 proxy.  Do not resuscitate (DNR) order:  A DNR order is used in case your heart stops beating or you stop breathing. It is a request not to have certain forms of treatment, such as CPR. A DNR order may be included in other types of advance directives.  Medical directive:  This covers the care that you want if you are in a coma, near death, or unable to make decisions for yourself. You can list the treatments you want for each condition. Treatment may include pain medicine, surgery, blood transfusions, dialysis, IV or tube feedings, and a ventilator (breathing machine).  Values  history:  This document has questions about your views, beliefs, and how you feel and think about life. This information can help others choose the care that you would choose.  Why are advance directives important?  An advance directive helps you control your care. Although spoken wishes may be used, it is better to have your wishes written down. Spoken wishes can be misunderstood, or not followed. Treatments may be given even if you do not want them. An advance directive may make it easier for your family to make difficult choices about your care.   Fall Prevention    Fall prevention  includes ways to make your home and other areas safer. It also includes ways you can move more carefully to prevent a fall. Health conditions that cause changes in your blood pressure, vision, or muscle strength and coordination may increase your risk for falls. Medicines may also increase your risk for falls if they make you dizzy, weak, or sleepy.   Fall prevention tips:   Stand or sit up slowly.    Use assistive devices as directed.    Wear shoes that fit well and have soles that .    Wear a personal alarm.    Stay active.    Manage your medical conditions.    Home Safety Tips:  Add items to prevent falls in the bathroom.    Keep paths clear.    Install bright lights in your home.    Keep items you use often on shelves within reach.    Paint or place reflective tape on the edges of your stairs.    Urinary Incontinence   Urinary incontinence (UI)  is when you lose control of your bladder. UI develops because your bladder cannot store or empty urine properly. The 3 most common types of UI are stress incontinence, urge incontinence, or both.  Medicines:   May be given to help strengthen your bladder control. Report any side effects of medication to your healthcare provider.  Do pelvic muscle exercises often:  Your pelvic muscles help you stop urinating. Squeeze these muscles tight for 5 seconds, then relax for 5 seconds. Gradually  work up to squeezing for 10 seconds. Do 3 sets of 15 repetitions a day, or as directed. This will help strengthen your pelvic muscles and improve bladder control.  Train your bladder:  Go to the bathroom at set times, such as every 2 hours, even if you do not feel the urge to go. You can also try to hold your urine when you feel the urge to go. For example, hold your urine for 5 minutes when you feel the urge to go. As that becomes easier, hold your urine for 10 minutes.   Self-care:   Keep a UI record.  Write down how often you leak urine and how much you leak. Make a note of what you were doing when you leaked urine.  Drink liquids as directed. You may need to limit the amount of liquid you drink to help control your urine leakage. Do not drink any liquid right before you go to bed. Limit or do not have drinks that contain caffeine or alcohol.   Prevent constipation.  Eat a variety of high-fiber foods. Good examples are high-fiber cereals, beans, vegetables, and whole-grain breads. Walking is the best way to trigger your intestines to have a bowel movement.  Exercise regularly and maintain a healthy weight.  Weight loss and exercise will decrease pressure on your bladder and help you control your leakage.   Use a catheter as directed  to help empty your bladder. A catheter is a tiny, plastic tube that is put into your bladder to drain your urine.   Go to behavior therapy as directed.  Behavior therapy may be used to help you learn to control your urge to urinate.     © Copyright [employer] 2018 Information is for End User's use only and may not be sold, redistributed or otherwise used for commercial purposes. All illustrations and images included in CareNotes® are the copyrighted property of [employer] or [employer]

## 2025-01-14 NOTE — PROGRESS NOTES
Name: Samson Watts      : 1945      MRN: [id number]  Encounter Provider: Art Crowell MD  Encounter Date: 2025   Encounter department: [facility]  Medicare annual wellness visit, subsequent  Obtain labs  Form filled out and faxed for accommodations        Encounter for screening mammogram for breast cancer  We discussed the recommendations regarding breast cancer screening in those over 75 yrs old. Patient-shared decision making to continue to defer screening unless she has breast pain or mass        IFG (impaired fasting glucose)    Orders:  •  CBC; Future  •  Comprehensive metabolic panel; Future  •  Hemoglobin A1C; Future    Pure hypercholesterolemia    Orders:  •  Lipid Panel with Direct LDL reflex; Future  •  CBC; Future    Lung nodule    Orders:  •  CBC; Future    Vascular dementia, moderate, with psychotic disturbance (HCC)         Celiac artery stenosis (HCC)         Current mild episode of major depressive disorder without prior episode (HCC)  Chronic, stable  Continue to monitor, continue Zoloft 200mg qd        Moderate late onset Alzheimer's dementia without behavioral disturbance, psychotic disturbance, mood disturbance, or anxiety (HCC)  Recommend starting Namenda XR (XR preferred to improve adherence)   Discussed possible adverse effects of new medication and to call with any concerns.   F/u with neuro is in 2 months then gerontology in 4 months, then I'll see her back in 6 months   Orders:  •  Memantine HCl ER (Namenda XR) 7 MG CP24; Take 1 capsule (7 mg total) by mouth in the morning for 14 days, THEN 2 capsules (14 mg total) in the morning for 14 days, THEN 3 capsules (21 mg total) in the morning for 14 days. Then increase to 28mg pill..  •  Memantine HCl ER (Namenda XR) 28 MG CP24; Take 1 capsule by mouth in the morning       Preventive health issues were discussed with patient, and age appropriate screening tests were ordered  as noted in patient's After Visit Summary. Personalized health advice and appropriate referrals for health education or preventive services given if needed, as noted in patient's After Visit Summary.    History of Present Illness     Patient's daughter reports that the patient had an episode of dizziness when she was in the shower yesterday. Took a meclizine and had a snack on daughter's direction.   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 Currently gets 40 hrs a week. Patient can be re-evaluated in March.     Patient often forgetful of wearing fall alert and has fallen in the past and not activitated it. Encouraged family to look into accelerometer activated fall alerts.    They are requesting a move to 2nd floor, which doesn't require stairs for emergency egress from the building (currently on 4th floor).        Patient Care Team:  Art Crowell MD as PCP - General (Family Medicine)  MD Michele Stokes MD Kathleen McFarland, PA-C Frank Jeremy Tamarkin, MD    Review of Systems   Constitutional:  Negative for chills and fever.   HENT:  Negative for congestion and sore throat.    Eyes:  Negative for pain and visual disturbance.   Respiratory:  Negative for cough and shortness of breath.    Cardiovascular:  Negative for chest pain and palpitations.   Gastrointestinal:  Negative for abdominal pain and nausea.   Genitourinary:  Negative for dysuria.   Musculoskeletal:  Positive for gait problem. Negative for arthralgias and myalgias.   Skin:  Negative for rash and wound.   Neurological:  Positive for dizziness. Negative for headaches.   Psychiatric/Behavioral:  Negative for dysphoric mood and sleep disturbance. The patient is not nervous/anxious.    All other systems reviewed and are negative.    Medical History Reviewed by provider this encounter:  Tobacco  Allergies  Meds  Problems  Med Hx  Surg Hx  Fam Hx       Annual Wellness Visit Questionnaire   Samson wren  here for her Subsequent Wellness visit.     Health Risk Assessment:   Patient rates overall health as fair. Patient feels that their physical health rating is slightly worse. Patient is satisfied with their life. Eyesight was rated as same. Hearing was rated as same. Patient feels that their emotional and mental health rating is slightly worse. Patients states they are never, rarely angry. Patient states they are often unusually tired/fatigued. Pain experienced in the last 7 days has been some. Patient's pain rating has been 6/10. Patient states that she has experienced no weight loss or gain in last 6 months.     Depression Screening:   PHQ-9 Score: 2      Fall Risk Screening:   In the past year, patient has experienced: history of falling in past year    Number of falls: 2 or more  Injured during fall?: No    Feels unsteady when standing or walking?: Yes    Worried about falling?: Yes      Urinary Incontinence Screening:   Patient has leaked urine accidently in the last six months.     Home Safety:  Patient has trouble with stairs inside or outside of their home. Patient has working smoke alarms and has no working carbon monoxide detector. Home safety hazards include: none.     Nutrition:   Current diet is Limited junk food.     Medications:   Patient is currently taking over-the-counter supplements. OTC medications include: see medication list. Patient is able to manage medications.     Activities of Daily Living (ADLs)/Instrumental Activities of Daily Living (IADLs):   Walk and transfer into and out of bed and chair?: Yes  Dress and groom yourself?: Yes    Bathe or shower yourself?: Yes    Feed yourself? Yes  Do your laundry/housekeeping?: Yes  Manage your money, pay your bills and track your expenses?: No  Make your own meals?: Yes    Do your own shopping?: No    Previous Hospitalizations:   Any hospitalizations or ED visits within the last 12 months?: Yes    How many hospitalizations have you had in the last  year?: 1-2    Advance Care Planning:   Living will: Yes    Durable POA for healthcare: Yes    Advanced directive: Yes      PREVENTIVE SCREENINGS      Cardiovascular Screening:    General: Screening Not Indicated and History Lipid Disorder      Diabetes Screening:     General: Screening Current      Colorectal Cancer Screening:     General: Screening Current      Breast Cancer Screening:     General: History Breast Cancer      Cervical Cancer Screening:    General: Screening Not Indicated      Osteoporosis Screening:    General: Screening Current      Abdominal Aortic Aneurysm (AAA) Screening:        General: Screening Not Indicated      Lung Cancer Screening:     General: Screening Not Indicated      Hepatitis C Screening:    General: Screening Current    Screening, Brief Intervention, and Referral to Treatment (SBIRT)    Screening  Typical number of drinks in a day: 0  Typical number of drinks in a week: 0  Interpretation: Low risk drinking behavior.    AUDIT-C Screenin) How often did you have a drink containing alcohol in the past year? monthly or less  2) How many drinks did you have on a typical day when you were drinking in the past year? 1 to 2  3) How often did you have 6 or more drinks on one occasion in the past year? never    AUDIT-C Score: 1  Interpretation: Score 0-2 (female): Negative screen for alcohol misuse    Single Item Drug Screening:  How often have you used an illegal drug (including marijuana) or a prescription medication for non-medical reasons in the past year? never    Single Item Drug Screen Score: 0  Interpretation: Negative screen for possible drug use disorder    Brief Intervention  Alcohol & drug use screenings were reviewed. No concerns regarding substance use disorder identified.     Other Counseling Topics:   Calcium and vitamin D intake and regular weightbearing exercise.     Social Drivers of Health     Financial Resource Strain: Low Risk  (2024)    Overall Financial  "Resource Strain (CARDIA)    • Difficulty of Paying Living Expenses: Not hard at all   Food Insecurity: No Food Insecurity (1/13/2025)    Hunger Vital Sign    • Worried About Running Out of Food in the Last Year: Never true    • Ran Out of Food in the Last Year: Never true   Transportation Needs: No Transportation Needs (1/13/2025)    PRAPARE - Transportation    • Lack of Transportation (Medical): No    • Lack of Transportation (Non-Medical): No   Housing Stability: Low Risk  (1/13/2025)    Housing Stability Vital Sign    • Unable to Pay for Housing in the Last Year: No    • Number of Times Moved in the Last Year: 0    • Homeless in the Last Year: No   Utilities: Not At Risk (1/13/2025)    [facility] Utilities    • Threatened with loss of utilities: No     No results found.    Objective   /72 (BP Location: Right arm, Patient Position: Sitting, Cuff Size: Standard)   Pulse 58   Temp (!) 97.4 °F (36.3 °C) (Temporal)   Ht 5' 7\" (1.702 m)   Wt 71.8 kg (158 lb 3.2 oz)   SpO2 96%   BMI 24.78 kg/m²     Physical Exam  Vitals and nursing note reviewed.   Constitutional:       General: She is not in acute distress.     Appearance: She is well-developed. She is not ill-appearing.   HENT:      Head: Normocephalic and atraumatic.      Right Ear: Tympanic membrane, ear canal and external ear normal. No middle ear effusion.      Left Ear: Tympanic membrane, ear canal and external ear normal.  No middle ear effusion.      Nose: Nose normal. No congestion or rhinorrhea.      Mouth/Throat:      Lips: Pink.      Mouth: Mucous membranes are moist.      Pharynx: Oropharynx is clear. Uvula midline. No oropharyngeal exudate.      Tonsils: No tonsillar exudate.   Eyes:      General: Lids are normal.      Extraocular Movements: Extraocular movements intact.      Conjunctiva/sclera: Conjunctivae normal.      Pupils: Pupils are equal, round, and reactive to light.   Neck:      Thyroid: No thyromegaly.      Trachea: No tracheal deviation. "   Cardiovascular:      Rate and Rhythm: Normal rate and regular rhythm.      Pulses: Normal pulses.      Heart sounds: Normal heart sounds, S1 normal and S2 normal. No murmur heard.  Pulmonary:      Effort: Pulmonary effort is normal. No respiratory distress.      Breath sounds: Normal breath sounds. No decreased breath sounds, wheezing, rhonchi or rales.   Abdominal:      General: Bowel sounds are normal. There is no distension.      Palpations: Abdomen is soft.      Tenderness: There is no abdominal tenderness.   Musculoskeletal:      Right lower leg: No edema.      Left lower leg: No edema.   Lymphadenopathy:      Cervical: No cervical adenopathy.   Skin:     General: Skin is warm and dry.      Capillary Refill: Capillary refill takes less than 2 seconds.   Neurological:      Mental Status: She is alert and oriented to person, place, and time.      Deep Tendon Reflexes: Reflexes normal.      Reflex Scores:       Patellar reflexes are 2+ on the right side and 2+ on the left side.  Psychiatric:         Attention and Perception: Attention normal.         Mood and Affect: Mood normal.         Thought Content: Thought content does not include suicidal ideation.

## 2025-01-14 NOTE — LETTER
January 14, 2025     Patient: Samson Watts   YOB: 1945   Date of Visit: 1/14/2025       To Whom It May Concern:    It is my medical opinion that Samson Watts should be given accomodation to live on a floor that does not require stairs to access emergency egress from the building. She requires a walker to ambulate safely.     If you have any questions or concerns, please don't hesitate to call.         Sincerely,        Art Crowell MD    CC: No Recipients

## 2025-01-15 ENCOUNTER — RESULTS FOLLOW-UP (OUTPATIENT)
Dept: FAMILY MEDICINE CLINIC | Facility: CLINIC | Age: 80
End: 2025-01-15

## 2025-02-11 ENCOUNTER — TELEPHONE (OUTPATIENT)
Age: 80
End: 2025-02-11

## 2025-02-11 NOTE — TELEPHONE ENCOUNTER
Letty states that her neighbors and aid are reporting that she walks the halls at night and she seems confused. States she is hiding money in odd places in the house. She talks about going out, but she really isn't it. These behaviors usually happen after 5:30 when the aid leaves. When Letty talks to her at night, she seems ok. She has moments of confusion, but they talk through it.

## 2025-02-11 NOTE — TELEPHONE ENCOUNTER
Patients daughter Ltety called in regards to Gisella dementia.  Letty is worried for Gisella stating that she is doing some odd things and not sure if it was the newest medication Memantine HCl causing this.  She would like to know what Dr Kemp recommends.    Please advise and contact Letty  Thank you

## 2025-02-12 NOTE — TELEPHONE ENCOUNTER
"I don't think these symptoms are caused by the memantine. These are classic \"sundowning\" symptoms. People with dementia often get more confused in the evening/overnight and have a loss of their normal sleep-wake cycle, so they're up pacing when they should be sleeping and then sleep half the day away. I know she sees the geriatric specialists in April. However, I again think Gisella should be transitioned soon to as facility to ensure her safety. I do not think it is safe for her to remain in her current environment.   "

## 2025-02-17 DIAGNOSIS — F41.8 DEPRESSION WITH ANXIETY: ICD-10-CM

## 2025-02-17 RX ORDER — SERTRALINE HYDROCHLORIDE 100 MG/1
200 TABLET, FILM COATED ORAL DAILY
Qty: 180 TABLET | Refills: 1 | Status: SHIPPED | OUTPATIENT
Start: 2025-02-17

## 2025-02-25 ENCOUNTER — OFFICE VISIT (OUTPATIENT)
Dept: NEUROLOGY | Facility: CLINIC | Age: 80
End: 2025-02-25
Payer: COMMERCIAL

## 2025-02-25 VITALS
SYSTOLIC BLOOD PRESSURE: 124 MMHG | DIASTOLIC BLOOD PRESSURE: 80 MMHG | WEIGHT: 155.4 LBS | HEART RATE: 66 BPM | BODY MASS INDEX: 24.39 KG/M2 | HEIGHT: 67 IN

## 2025-02-25 DIAGNOSIS — F03.90 DEMENTIA (HCC): Primary | ICD-10-CM

## 2025-02-25 PROCEDURE — 99214 OFFICE O/P EST MOD 30 MIN: CPT | Performed by: STUDENT IN AN ORGANIZED HEALTH CARE EDUCATION/TRAINING PROGRAM

## 2025-02-25 PROCEDURE — G2211 COMPLEX E/M VISIT ADD ON: HCPCS | Performed by: STUDENT IN AN ORGANIZED HEALTH CARE EDUCATION/TRAINING PROGRAM

## 2025-02-25 NOTE — PROGRESS NOTES
Name: Samson Watts      : 1945      MRN: [id number]  Encounter Provider: Faustino Marquez DO  Encounter Date: 2025   Encounter department: [facility]  :  Assessment & Plan  Dementia (HCC)  Gisella is a 80-year-old female with a history of spontaneous SAH () with hydrocephalus, depression, and anxiety presenting with progressive memory decline, notably worsening since 2024 after a hospitalization for vertigo. She exhibits short-term memory deficits, disorientation, word-finding difficulties, repetition, and frequent misplacement of objects. She also has visual-perceptual issues, hallucinations, gait instability, urinary incontinence, and a history of falls. Despite independence in basic ADLs, she requires assistance with IADLs, particularly shopping, finances, and medication organization. Safety concerns include occasional forgetting of the stove while in use. Mood remains stable on Zoloft and infrequent Xanax use. She resides in an independent senior living facility and is accompanied by her daughter. Patient does not drive.  MoCA (2025): 16/30  Gradual onset of short-term memory loss, word-finding difficulties, and repetitive questioning is consistent with alzheimer's dementia. History of spontaneous SAH and hydrocephalus increases the risk for vascular contributions to cognitive impairment.    Plan  Continue memantine ER 21 mg daily. Can consider increasing dose to a target maximum dose of 28 mg once daily  Will obtain MRI neuroquant to assess for structural and volumetric abnormalities which can help quantify hippocampal atrophy (suggestive of Alzheimer's) or parietal/occipital atrophy (suggestive of Lewy body dementia).  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 Patient and her daughter report that ever since her hospitalization in October 2024 for vertigo they have noticed a decline in her memory.    Patient presents today to the neurology outpatient clinic accompanied by daughter.    Currently she lives at independent senior living facility.     Description of Memory Trouble:  - Onset of memory difficulties since SAH in 2016 with notable decline since January 2024  - Symptoms began gradual, over time the problem has gradually worsened  - The memory problems affects changes in short term memory, day/night changes, getting disoriented outside of familiar environment, recalling words, and repetition of questions  - Noticed issues with memory issues - patient, daughter, and friends  - Family has also noticed hallucinations, gait trouble, and balance trouble (last fall 11/2024, no HS or LOC)  - Type of things forgotten include names, conversations, and directions  - Frequently misplaces objects: yes    Attention/Working Memory: Patient has difficult with    - Concentrating when reading a book: yes  - Trouble following a plot of movie: yes  - Maintaining train of thought: no  - Recalling why they entered a room: no    Language/speech problems:   - Difficulty with word finding: Yes  - Substitute wrong words: No  - Decline in vocabulary: No    Visual-perceptual-spatial  - Issues with familiar faces: yes  - Recognizing every day objects: yes  - Difficultly finding places within/outside immediate neighborhood: yes    Associated Mood Symptoms  - Changes in mood or personality observed by self or family: no  - Current or past treatment  "of depression or anxiety: yes - zoloft, xanax as needed (infrequent use), managed by PCP  - Changes in mood/behavior: no  - Depression Symptoms: no  - Change inpersonality: no  - Hallucinations/delusions: yes, within the past year saw a river in her room  - Paranoia/Suspiciousness: no  - Agitation/abusive behavior: no  - Aggressive or Combative Behavior: no  - Resistance of Care: yes  - Hoarding/Hiding Objects: yes, hiding money  - Withdrawn: no    Other  - Fluctuation in alertness: no  - Loss of personal awareness/social awareness - no  - Change in sleep pattern: yes, sleeping longer  - Urinary Incontinence - yes  - Bowel Incontinence - no  - Gait Difficulties - yes, since [facility] in 2016  - Falls - yes (last fall 11/2024, no head strike or loss of consciousness)  - Tremors - no    Functional Assessment  Activities of Daily Living (ADLs) Instrumental Activities of Daily Living (IADLs)   - Bodily functions/toileting: independent  - Hygiene: independent  - Grooming: independent  - Dressing: independent  - Feeding: independent  - Transfer: independent - Simple chores: requires assistance  - Cooking:  Never cooked  - Shopping: requires assistance  - Medication management: independent, however is organized weekly by aid and daughter  - Personal finances: requires assistance  - Driving:  has not driven since [facility] 2016     Safety Concerns  Driving: Not a concern, as patient does not currently drive   Stove: Yes. Per daughter, will sometimes forget stove is on while watching a movie. Fortunately has never caused a fire.   Smoking: no  Guns: no    Review of Systems   A 12 point ROS was completed. Other than the above mentioned  complaints, all remaining systems were negative.         Objective   /80 (BP Location: Right arm, Patient Position: Sitting, Cuff Size: Standard)   Pulse 66   Ht 5' 7\" (1.702 m)   Wt 70.5 kg (155 lb 6.4 oz)   BMI 24.34 kg/m²     Physical Exam  Constitutional:       General: She is awake.   HENT: "      Right Ear: Hearing normal.      Left Ear: Hearing normal.   Eyes:      General: Lids are normal.      Extraocular Movements: Extraocular movements intact.      Pupils: Pupils are equal, round, and reactive to light.   Neurological:      Mental Status: She is alert.      Deep Tendon Reflexes:      Reflex Scores:       Tricep reflexes are 2+ on the right side and 2+ on the left side.       Bicep reflexes are 2+ on the right side and 2+ on the left side.       Brachioradialis reflexes are 2+ on the right side and 2+ on the left side.       Patellar reflexes are 2+ on the right side and 2+ on the left side.       Achilles reflexes are 2+ on the right side and 2+ on the left side.  Psychiatric:         Speech: Speech normal.       Neurological Exam  Mental Status  Awake and alert. Speech is normal. Language is fluent with no aphasia.  Oriented to place  Disoriented to month and year  MoCA 16/30.    Cranial Nerves  CN II: Visual fields full to confrontation.  CN III, IV, VI: Extraocular movements intact bilaterally. Normal lids and orbits bilaterally. Pupils equal round and reactive to light bilaterally.  CN V:  Right: Facial sensation is normal.  Left: Facial sensation is normal on the left.  CN VII:  Right: There is no facial weakness.  Left: There is no facial weakness.  CN VIII:  Right: Hearing is normal.  Left: Hearing is normal.  CN IX, X: Palate elevates symmetrically  CN XI:  Right: Sternocleidomastoid strength is normal. Trapezius strength is normal.  Left: Sternocleidomastoid strength is normal. Trapezius strength is normal.  CN XII: Tongue midline without atrophy or fasciculations.    Motor  Normal muscle bulk throughout. No fasciculations present. Normal muscle tone.                                               Right                     Left   Shoulder abduction               5                          5  Elbow flexion                         5                          5  Elbow extension                     5                          5  Hip flexion                              5                          5  Knee flexion                           5                          5  Knee extension                      5                          5  Plantarflexion                         5                          5  Dorsiflexion                            5                          5    Sensory  Light touch is normal in upper and lower extremities.     Reflexes                                            Right                      Left  Brachioradialis                    2+                         2+  Biceps                                 2+                         2+  Triceps                                2+                         2+  Patellar                                2+                         2+  Achilles                                2+                         2+    Right pathological reflexes: Vanessa's absent.  Left pathological reflexes: Vanessa's absent.    Coordination  Right: Finger-to-nose normal.Left: Finger-to-nose normal.    Gait    Patient uses walker.              Faustino Marquez DO  [facility]  Neurology Residency PGY-II

## 2025-03-12 ENCOUNTER — APPOINTMENT (EMERGENCY)
Dept: CT IMAGING | Facility: HOSPITAL | Age: 80
End: 2025-03-12
Payer: COMMERCIAL

## 2025-03-12 ENCOUNTER — TELEPHONE (OUTPATIENT)
Age: 80
End: 2025-03-12

## 2025-03-12 ENCOUNTER — APPOINTMENT (EMERGENCY)
Dept: RADIOLOGY | Facility: HOSPITAL | Age: 80
End: 2025-03-12
Payer: COMMERCIAL

## 2025-03-12 ENCOUNTER — HOSPITAL ENCOUNTER (INPATIENT)
Facility: HOSPITAL | Age: 80
LOS: 2 days | Discharge: NURSING, REHAB OR LONG-TERM CARE | End: 2025-03-14
Attending: EMERGENCY MEDICINE | Admitting: INTERNAL MEDICINE
Payer: COMMERCIAL

## 2025-03-12 DIAGNOSIS — I95.1 ORTHOSTATIC HYPOTENSION: ICD-10-CM

## 2025-03-12 DIAGNOSIS — R26.2 AMBULATORY DYSFUNCTION: ICD-10-CM

## 2025-03-12 DIAGNOSIS — K59.00 CONSTIPATION, UNSPECIFIED CONSTIPATION TYPE: ICD-10-CM

## 2025-03-12 DIAGNOSIS — R29.6 MULTIPLE FALLS: ICD-10-CM

## 2025-03-12 DIAGNOSIS — N39.0 UTI (URINARY TRACT INFECTION): Primary | ICD-10-CM

## 2025-03-12 PROBLEM — F03.90 DEMENTIA (HCC): Status: ACTIVE | Noted: 2025-03-12

## 2025-03-12 PROBLEM — R82.90 ABNORMAL URINALYSIS: Status: ACTIVE | Noted: 2025-03-12

## 2025-03-12 LAB
ALBUMIN SERPL BCG-MCNC: 4.2 G/DL (ref 3.5–5)
ALP SERPL-CCNC: 84 U/L (ref 34–104)
ALT SERPL W P-5'-P-CCNC: 14 U/L (ref 7–52)
ANION GAP SERPL CALCULATED.3IONS-SCNC: 7 MMOL/L (ref 4–13)
AST SERPL W P-5'-P-CCNC: 18 U/L (ref 13–39)
ATRIAL RATE: 69 BPM
BACTERIA UR QL AUTO: ABNORMAL /HPF
BASOPHILS # BLD AUTO: 0.03 THOUSANDS/ÂΜL (ref 0–0.1)
BASOPHILS NFR BLD AUTO: 1 % (ref 0–1)
BILIRUB SERPL-MCNC: 0.55 MG/DL (ref 0.2–1)
BILIRUB UR QL STRIP: NEGATIVE
BUN SERPL-MCNC: 12 MG/DL (ref 5–25)
CALCIUM SERPL-MCNC: 9.5 MG/DL (ref 8.4–10.2)
CARDIAC TROPONIN I PNL SERPL HS: 3 NG/L (ref ?–50)
CHLORIDE SERPL-SCNC: 104 MMOL/L (ref 96–108)
CK SERPL-CCNC: 40 U/L (ref 26–192)
CLARITY UR: CLEAR
CO2 SERPL-SCNC: 30 MMOL/L (ref 21–32)
COLOR UR: COLORLESS
CREAT SERPL-MCNC: 0.78 MG/DL (ref 0.6–1.3)
EOSINOPHIL # BLD AUTO: 0.2 THOUSAND/ÂΜL (ref 0–0.61)
EOSINOPHIL NFR BLD AUTO: 3 % (ref 0–6)
ERYTHROCYTE [DISTWIDTH] IN BLOOD BY AUTOMATED COUNT: 13.1 % (ref 11.6–15.1)
GFR SERPL CREATININE-BSD FRML MDRD: 72 ML/MIN/1.73SQ M
GLUCOSE SERPL-MCNC: 86 MG/DL (ref 65–140)
GLUCOSE UR STRIP-MCNC: NEGATIVE MG/DL
HCT VFR BLD AUTO: 39.4 % (ref 34.8–46.1)
HGB BLD-MCNC: 12.9 G/DL (ref 11.5–15.4)
HGB UR QL STRIP.AUTO: NEGATIVE
IMM GRANULOCYTES # BLD AUTO: 0.02 THOUSAND/UL (ref 0–0.2)
IMM GRANULOCYTES NFR BLD AUTO: 0 % (ref 0–2)
KETONES UR STRIP-MCNC: NEGATIVE MG/DL
LEUKOCYTE ESTERASE UR QL STRIP: ABNORMAL
LYMPHOCYTES # BLD AUTO: 1.33 THOUSANDS/ÂΜL (ref 0.6–4.47)
LYMPHOCYTES NFR BLD AUTO: 20 % (ref 14–44)
MAGNESIUM SERPL-MCNC: 2.3 MG/DL (ref 1.9–2.7)
MCH RBC QN AUTO: 28.4 PG (ref 26.8–34.3)
MCHC RBC AUTO-ENTMCNC: 32.7 G/DL (ref 31.4–37.4)
MCV RBC AUTO: 87 FL (ref 82–98)
MONOCYTES # BLD AUTO: 0.54 THOUSAND/ÂΜL (ref 0.17–1.22)
MONOCYTES NFR BLD AUTO: 8 % (ref 4–12)
NEUTROPHILS # BLD AUTO: 4.43 THOUSANDS/ÂΜL (ref 1.85–7.62)
NEUTS SEG NFR BLD AUTO: 68 % (ref 43–75)
NITRITE UR QL STRIP: POSITIVE
NON-SQ EPI CELLS URNS QL MICRO: ABNORMAL /HPF
NRBC BLD AUTO-RTO: 0 /100 WBCS
P AXIS: 55 DEGREES
PH UR STRIP.AUTO: 8 [PH]
PLATELET # BLD AUTO: 294 THOUSANDS/UL (ref 149–390)
PMV BLD AUTO: 8.8 FL (ref 8.9–12.7)
POTASSIUM SERPL-SCNC: 3.7 MMOL/L (ref 3.5–5.3)
PR INTERVAL: 160 MS
PROT SERPL-MCNC: 7.2 G/DL (ref 6.4–8.4)
PROT UR STRIP-MCNC: NEGATIVE MG/DL
QRS AXIS: 39 DEGREES
QRSD INTERVAL: 82 MS
QT INTERVAL: 424 MS
QTC INTERVAL: 454 MS
RBC # BLD AUTO: 4.55 MILLION/UL (ref 3.81–5.12)
RBC #/AREA URNS AUTO: ABNORMAL /HPF
SODIUM SERPL-SCNC: 141 MMOL/L (ref 135–147)
SP GR UR STRIP.AUTO: 1.01 (ref 1–1.03)
T WAVE AXIS: 47 DEGREES
UROBILINOGEN UR STRIP-ACNC: <2 MG/DL
VENTRICULAR RATE: 69 BPM
WBC # BLD AUTO: 6.55 THOUSAND/UL (ref 4.31–10.16)
WBC #/AREA URNS AUTO: ABNORMAL /HPF

## 2025-03-12 PROCEDURE — 83735 ASSAY OF MAGNESIUM: CPT

## 2025-03-12 PROCEDURE — 70450 CT HEAD/BRAIN W/O DYE: CPT

## 2025-03-12 PROCEDURE — 36415 COLL VENOUS BLD VENIPUNCTURE: CPT

## 2025-03-12 PROCEDURE — 82607 VITAMIN B-12: CPT

## 2025-03-12 PROCEDURE — 84484 ASSAY OF TROPONIN QUANT: CPT

## 2025-03-12 PROCEDURE — 93005 ELECTROCARDIOGRAM TRACING: CPT

## 2025-03-12 PROCEDURE — 81001 URINALYSIS AUTO W/SCOPE: CPT

## 2025-03-12 PROCEDURE — 99285 EMERGENCY DEPT VISIT HI MDM: CPT | Performed by: EMERGENCY MEDICINE

## 2025-03-12 PROCEDURE — 71045 X-RAY EXAM CHEST 1 VIEW: CPT

## 2025-03-12 PROCEDURE — 87186 SC STD MICRODIL/AGAR DIL: CPT

## 2025-03-12 PROCEDURE — 85025 COMPLETE CBC W/AUTO DIFF WBC: CPT

## 2025-03-12 PROCEDURE — 87077 CULTURE AEROBIC IDENTIFY: CPT

## 2025-03-12 PROCEDURE — 87086 URINE CULTURE/COLONY COUNT: CPT

## 2025-03-12 PROCEDURE — 96365 THER/PROPH/DIAG IV INF INIT: CPT

## 2025-03-12 PROCEDURE — 99285 EMERGENCY DEPT VISIT HI MDM: CPT

## 2025-03-12 PROCEDURE — 99223 1ST HOSP IP/OBS HIGH 75: CPT | Performed by: INTERNAL MEDICINE

## 2025-03-12 PROCEDURE — 72125 CT NECK SPINE W/O DYE: CPT

## 2025-03-12 PROCEDURE — 93010 ELECTROCARDIOGRAM REPORT: CPT | Performed by: INTERNAL MEDICINE

## 2025-03-12 PROCEDURE — 82550 ASSAY OF CK (CPK): CPT

## 2025-03-12 PROCEDURE — 80053 COMPREHEN METABOLIC PANEL: CPT

## 2025-03-12 RX ORDER — ALPRAZOLAM 0.25 MG
0.25 TABLET ORAL
Status: DISCONTINUED | OUTPATIENT
Start: 2025-03-12 | End: 2025-03-14 | Stop reason: HOSPADM

## 2025-03-12 RX ORDER — PANTOPRAZOLE SODIUM 20 MG/1
20 TABLET, DELAYED RELEASE ORAL
Status: DISCONTINUED | OUTPATIENT
Start: 2025-03-13 | End: 2025-03-14 | Stop reason: HOSPADM

## 2025-03-12 RX ORDER — MECLIZINE HCL 12.5 MG 12.5 MG/1
25 TABLET ORAL EVERY 8 HOURS PRN
Status: CANCELLED | OUTPATIENT
Start: 2025-03-12

## 2025-03-12 RX ORDER — EZETIMIBE 10 MG/1
10 TABLET ORAL DAILY
Status: DISCONTINUED | OUTPATIENT
Start: 2025-03-13 | End: 2025-03-14 | Stop reason: HOSPADM

## 2025-03-12 RX ORDER — SERTRALINE HYDROCHLORIDE 100 MG/1
200 TABLET, FILM COATED ORAL DAILY
Status: DISCONTINUED | OUTPATIENT
Start: 2025-03-13 | End: 2025-03-14 | Stop reason: HOSPADM

## 2025-03-12 RX ORDER — SERTRALINE HYDROCHLORIDE 100 MG/1
200 TABLET, FILM COATED ORAL DAILY
Status: CANCELLED | OUTPATIENT
Start: 2025-03-12

## 2025-03-12 RX ORDER — MEMANTINE HYDROCHLORIDE 10 MG/1
10 TABLET ORAL 2 TIMES DAILY
Status: DISCONTINUED | OUTPATIENT
Start: 2025-03-12 | End: 2025-03-14 | Stop reason: HOSPADM

## 2025-03-12 RX ORDER — ENOXAPARIN SODIUM 100 MG/ML
40 INJECTION SUBCUTANEOUS DAILY
Status: DISCONTINUED | OUTPATIENT
Start: 2025-03-13 | End: 2025-03-14 | Stop reason: HOSPADM

## 2025-03-12 RX ORDER — SODIUM CHLORIDE 9 MG/ML
75 INJECTION, SOLUTION INTRAVENOUS CONTINUOUS
Status: DISCONTINUED | OUTPATIENT
Start: 2025-03-12 | End: 2025-03-13

## 2025-03-12 RX ORDER — PANTOPRAZOLE SODIUM 20 MG/1
20 TABLET, DELAYED RELEASE ORAL
Status: CANCELLED | OUTPATIENT
Start: 2025-03-13

## 2025-03-12 RX ORDER — BIMATOPROST 0.3 MG/ML
1 SOLUTION/ DROPS OPHTHALMIC
Status: DISCONTINUED | OUTPATIENT
Start: 2025-03-12 | End: 2025-03-14 | Stop reason: HOSPADM

## 2025-03-12 RX ORDER — MECLIZINE HYDROCHLORIDE 25 MG/1
25 TABLET ORAL EVERY 8 HOURS PRN
Status: DISCONTINUED | OUTPATIENT
Start: 2025-03-12 | End: 2025-03-14 | Stop reason: HOSPADM

## 2025-03-12 RX ORDER — LANOLIN ALCOHOL/MO/W.PET/CERES
1 CREAM (GRAM) TOPICAL
Status: CANCELLED | OUTPATIENT
Start: 2025-03-13

## 2025-03-12 RX ORDER — EZETIMIBE 10 MG/1
10 TABLET ORAL DAILY
Status: CANCELLED | OUTPATIENT
Start: 2025-03-12

## 2025-03-12 RX ORDER — LANOLIN ALCOHOL/MO/W.PET/CERES
1 CREAM (GRAM) TOPICAL
Status: DISCONTINUED | OUTPATIENT
Start: 2025-03-13 | End: 2025-03-14 | Stop reason: HOSPADM

## 2025-03-12 RX ORDER — BIMATOPROST 0.3 MG/ML
1 SOLUTION/ DROPS OPHTHALMIC DAILY
Status: CANCELLED | OUTPATIENT
Start: 2025-03-12

## 2025-03-12 RX ORDER — MEMANTINE HYDROCHLORIDE 10 MG/1
10 TABLET ORAL 2 TIMES DAILY
Status: CANCELLED | OUTPATIENT
Start: 2025-03-12

## 2025-03-12 RX ADMIN — SODIUM CHLORIDE 75 ML/HR: 0.9 INJECTION, SOLUTION INTRAVENOUS at 17:52

## 2025-03-12 RX ADMIN — BIMATOPROST 1 DROP: 0.3 SOLUTION/ DROPS OPHTHALMIC at 21:35

## 2025-03-12 RX ADMIN — MEMANTINE 10 MG: 10 TABLET ORAL at 17:52

## 2025-03-12 RX ADMIN — CEFTRIAXONE SODIUM 1000 MG: 10 INJECTION, POWDER, FOR SOLUTION INTRAVENOUS at 14:30

## 2025-03-12 NOTE — TELEPHONE ENCOUNTER
MSW received message that patient requesting transportation be arranged for appointment on 4/29/25.  MSW outreached patient's daughter, Letty, to review same.  Call placed with success and Letty pleased that transportation can be arranged.  During discussion Letty infomred that she would not be able to attend the visit with patient on 4/29/25 and would like to reschedule so she can be there with patient.  MSW expressed understanding of same and discussed rescheduling, which Letty would prefer.  MSW will remain available as needed.

## 2025-03-12 NOTE — ASSESSMENT & PLAN NOTE
Patient with hx of BPPV, previously admitted on October of 2024 with dizziness that resolved. Patient on home meclizine q8h PRN for dizziness which she endorses as responsive to meclizine. On examination, she does not endorse current dizziness when laying or transition to sitting.     Plan:  -Continue meclizine 25 mg q8h PRN  -If new dizziness develops unresponsive to meclizine, consider valium

## 2025-03-12 NOTE — ED ATTENDING ATTESTATION
3/12/2025  I, Rosa Mae MD, saw and evaluated the patient. I have discussed the patient with the resident/non-physician practitioner and agree with the resident's/non-physician practitioner's findings, Plan of Care, and MDM as documented in the resident's/non-physician practitioner's note, except where noted. All available labs and Radiology studies were reviewed.  I was present for key portions of any procedure(s) performed by the resident/non-physician practitioner and I was immediately available to provide assistance.       At this point I agree with the current assessment done in the Emergency Department.  I have conducted an independent evaluation of this patient a history and physical is as follows:    80-year-old female with history of dementia, GCS of 14 at baseline, history of spontaneous SAH in 2016 with hydrocephalus, depression, and anxiety.  Patient presents for evaluation of multiple falls, increasing over the last week.  She is accompanied by her daughter who supplements the history.  States that the patient lives alone but has home health care during the day, does not have home health care at night.  She has had numerous ground-level falls over the last 4 days, most recently this morning.  None of them were witnessed but were heard by the caregiver from the next room.  When they got to the patient several seconds later she was awake and alert so there is no suspected loss of consciousness but head strike is unknown.  The patient herself remembers falling this morning but is unable to tell me why she is falling.  She denies any areas of pain.  Specifically denies headache or vision changes.  She denies any dizziness.  No recent illnesses.  Her only complaint is urinary frequency which per her daughter is chronic.    Patient's daughter states that she brings the patient in for evaluation today due to concern that she is no longer safe at home due to numerous falls within the last 4 days.  She  Physical Therapy Evaluation    Visit Type: Initial Evaluation  Visit: 1  Referring Provider: Anurag De La Cruz CNP  Medical Diagnosis (from order): M54.16 - Lumbar radiculopathy  M21.371 - Right foot drop   Treatment Diagnosis: lumbar - increased pain/symptoms, impaired posture, impaired range of motion, impaired joint play/mobility, impaired body mechanics, impaired activity tolerance, impaired tissue mobility, impaired strength, impaired gait and impaired mobility.  Onset  - Date of exacerbation:  2/1/2024 \"early february:  Chart reviewed at time of initial evaluation (relevant co-morbidities, allergies, tests and medications listed):   - Diagnostic tests reviewed: MRI studies  MRI:    1.   Instrumented L5-S1 spinal fusion without evidence of acute  complication.  2.   Lumbar degenerative disc and joint disease as described above.  Moderate spinal canal stenosis at L3-4. Mild to moderate neuroforaminal  stenosis at multiple levels. Questionable moderate left L5-S1  neuroforaminal stenosis, partially obscured. Findings have not  significantly progressed since 10/23/2023.  3.   Chronic compression fractures at T11, T12 and L1 with vertebroplasty  changes at T12.  4.   Cholelithiasis.    L hip replacement in 2023  Fusion history lumbar spine: 2019       SUBJECTIVE                                                                                                               States the main reason she is here, is neurologist feels the R foot drop foot can be improved through PT.  works at [employer] and is on concrete and has to be able to go up and down stairs. States she had a fall in February 2024; thought she was fine but started getting worse. Saw her surgeon and said everything with fusion history and such ok.  has history of fractures in pelvis and they weren't healing; states it is improving now. States legs feel weak.  takes lirica for electric pains down the leg and has been resolved due to it.  Had clot but was treated for it.     States her R knee has issues and feels she will need a knee replacement. States her legs are weak and difficulty walking and doing stairs. States gets some sciatica pain about once every 3 weeks or so. States unable to do stairs one after the other; difficulty getting out of the car.     Pain / Symptoms  - Pain rating (out of 10): Current: 2 ; Best: 0; Worst: 5  - Location: Lumbar spine; Weakness in LE.   - Quality / Description: shooting, sharp  - Alleviating Factors: rest, ice  - Progression since onset: improved    Function:   Limitations / Exacerbation Factors:   - Patient reports pain and difficulty with function reported below.  - , bending/squatting/lifting, lifting/carrying, squatting/lifting, pushing/pulling, standing and walking, car transfers  Prior Level of Function: no limitation in involved extremity. pain free ADLs and IADLs,    Patient Goals: increased motion, increased strength, return to sport/leisure activities and improved balance. Return to walking and moving;improve balance.     Prior treatment  - no therapies  - Discharged from hospital, home health, or skilled nursing facility in last 30 days: no  Home Environment   - Patient lives with: friend  - Type of home: multiple level home  - Assistance available: consistent  - Denies 2 or more falls or an unexplained fall with injury in the last year.      OBJECTIVE                                                                                                                    Observation   Demonstrates shift in standing: Hips shifted R ( L lateral shift)      Repeated testing: (Baseline discomfort in R glute: limited motion with extension and side-glides)    Standing Extension: No Effect  R sideglides: improved extension; improved DF: 3+ to 4-/5  R sideglides at wall: Less shift in standing; able to maintain neutral; DF 3+ to 4-/5    Range of Motion (ROM)   (degrees unless noted; active unless noted; norms in (  is requesting placement for the patient.    On review of the medical record, patient was evaluated by neurosurgery for possible NPH within the last couple of months.  NPH was thought to be less likely and evaluation was deferred to neurology.  She was seen by neurology on February 25 and an outpatient neuro quant MRI was ordered for further evaluation.    Physical Exam  Vitals and nursing note reviewed.   Constitutional:       General: She is not in acute distress.     Appearance: She is well-developed. She is not ill-appearing, toxic-appearing or diaphoretic.   HENT:      Head: Normocephalic and atraumatic.      Right Ear: External ear normal.      Left Ear: External ear normal.      Nose: Nose normal.   Eyes:      Conjunctiva/sclera: Conjunctivae normal.   Cardiovascular:      Rate and Rhythm: Normal rate and regular rhythm.      Pulses: Normal pulses.      Heart sounds: Normal heart sounds. No murmur heard.     No friction rub. No gallop.   Pulmonary:      Effort: Pulmonary effort is normal. No respiratory distress.      Breath sounds: Normal breath sounds. No wheezing or rales.   Abdominal:      General: Bowel sounds are normal. There is no distension.      Palpations: Abdomen is soft.      Tenderness: There is no abdominal tenderness. There is no guarding.   Musculoskeletal:         General: No deformity. Normal range of motion.      Cervical back: Normal range of motion and neck supple.   Skin:     General: Skin is warm and dry.   Neurological:      General: No focal deficit present.      Mental Status: She is alert.      Motor: No abnormal muscle tone.      Comments: Clear speech. No truncal ataxia when sitting up in bed. Normal strength in all 4 extremities. Oriented to person, place, and somewhat to situation but forgetful about recent events, as per her baseline.    Psychiatric:         Mood and Affect: Mood normal.             ED Course  ED Course as of 03/12/25 1440   Wed Mar 12, 2025   1227 I personally  ); negative=lacking to 0, positive=beyond 0)  Lumbar:    - Flexion (60-80):  WNL     - Extension (25):  5°  pain     - Rotation (30-45):         Left:  50%          Right:  50%     - Side Bend (25-35):         Left:  10%  pain          Right:  10%  pain     Strength  (out of 5 unless noted, standard test position unless noted)   Hip:    - Flexion:         Left: 3         Right: 3    - Extension:         Left: 3+         Right: 3+    - Abduction:         Left: 3         Right: 3    - Adduction:         Left: 4         Right: 4  Knee:    - Flexion:         Left: 4         Right: 4-    - Extension:         Left: 4         Right: 4-  Ankle:    - Dorsiflexion:         Left: 4+, 5         Right: 3, 3+    - Plantar Flexion:         Left: 5         Right: 5                    Outcome/Assessments  Outcome Measures:   OSWESTRY Total Scored: 16  OSWESTRY Total Possible Score: 50  OSWESTRY Score Calculated: 32 %  (0-20% = minimal disability; 20-40% = moderate disability; 40-60% = severe disability; 60-80% = crippled; % = bed bound) see flowsheet for additional documentation        Treatment     Neuromuscular Re-Education  Sit to stand: 20 reps (cues for weight through R LE)  Supine bridges: 20 reps   L sideglides: 10 rep x 2 sets (1 set at wall)  Quad set + dorsiflexion+ straight leg raise: 10 rep x 2 sec B    Skilled input: verbal instruction/cues, demonstration and tactile instruction/cues    Home Exercise Program  Access Code: [email or web address]  URL: [email or web address]  Date: 06/25/2024  Prepared by: Allan Medrano    Exercises  - Bridge  - 2 x daily - 5 x weekly - 3 sets - 10 reps  - Sit to Stand Without Arm Support  - 2 x daily - 5 x weekly - 3 sets - 10 reps  - Supine Straight Leg Raises  - 1 x daily - 5 x weekly - 2 sets - 10 reps  - Lateral Shift Correction at Wall  - 6 x daily - 7 x weekly - 10 reps - 3 sec hold      ASSESSMENT                                                                         interpreted the patient's EKG which reveals normal rate, sinus rhythm with PACs in a pattern of bigeminy, normal axis, normal intervals, no ST segment deviation, pathologic T wave inversions, or pathologic Q waves.  CT head shows stable ventricular megaly, no acute intracranial abnormalities to explain the patient's frequent falls.  CT of the cervical spine is unremarkable.  She has no signs of trauma on exam.  Neurologic exam is normal without evidence of ataxia to suggest stroke and patient denies ongoing dizziness.  Lab work is unremarkable.  Currently awaiting UA as patient reports more frequent urination.   1227 Patient's daughter is at bedside, states that she brought the patient in today due to concern that she is no longer safe at home due to her increased frequency of falls.  She is requesting placement for the patient.  She has started the process of securing long-[facility] her [facility].  Will reach out to case management here in the emergency department to see if we are able to facilitate placement from the ED.   1344 UA concerning for UTI with positive nitrites and large leukocytes.  Will treat with antibiotics. May be contributing to patient's increased falls.    1440 Dose of IV ceftriaxone ordered.  Patient will be admitted to internal medicine for ambulatory dysfunction and worsening weakness in the setting of UTI, likely will require PT OT evaluation for possible rehab placement.         Critical Care Time  Procedures                                         63 year old patient has reported functional limitations listed above impacted by signs and symptoms consistent with treatment diagnosis below.  Treatment Diagnosis:   - Involved: lumbar.  - Symptoms/impairments: increased pain/symptoms, impaired posture, impaired range of motion, impaired joint play/mobility, impaired body mechanics, impaired activity tolerance, impaired tissue mobility, impaired strength, impaired gait and impaired mobility.    Patient attending Initial Evaluation of Physical therapy for chief complaint of Lumbar back pain with radicular symptoms into R LE. Pt presents to therapy with deficits and impairments including pain, limited lumbar ROM, weakness in LE, hypomobility in joint play of lumbar vertebrae, antalgic gait, and slouched posture. The impairments limit functional activities including house chores, mobility with positional changes, and participation in hobbies. Mechanical evaluation demonstrates signs and symptoms consistent with Lumbar radiculopathy (derangement) as evident by directional preference with decreased pain and improved ROM with repeated R Sideglides.  Patient would benefit from skilled PT at this time to improve function and decrease symptoms      Prognosis: Patient will benefit from skilled therapy.  Rehabilitative potential is: good.  Predicted patient presentation: Low (stable) - Patient comorbidities and complexities, as defined above, will have little effect on progress for prescribed plan of care.  Education:   - Present and ready to learn: patient  - Results of above outlined education: Verbalizes understanding and Demonstrates understanding    PLAN                                                                                                                         The following skilled interventions to be implemented to achieve goals listed below:  Manual Therapy (42525)  Therapeutic Activity (35071)  Therapeutic Exercise  (35446)  Neuromuscular Re-Education (94386)  Electrical Stimulation Unattended (43262 or )  Heat/Cold (24454)  Mechanical Traction-54517  Ultrasound (27421)  Gait Training (28419)    Frequency / Duration  2 times per week tapering as patient progresses for 8 weeks for an estimated total of 16 visits    Patient involved in and agreed to plan of care and goals.    Suggestions for next session as indicated: Assess response to directional reference exercises; continue or modify as appropriate      Goals  Improve hip strength with abduction and ext to 4/5  Increased upright posture awareness with sitting and standing  Improved ROM of lumbar spine   The above improvements in impairments to assist in obtaining goals listed below  Long Term Goals: to be met by end of plan of care  1. Patient will report at least 70% improvement in function in order to return to hobbies and activities (such as going up and down stairs, going on walks) with less symptoms, decreased compensations, and less use of medication.       2. Patient will be able to perform sit to stand transfers with good control and without UE 10x consistently to show improved safety with tranfers and progressed function.     3. Patient will demonstrate increase strength with hip abd to at least 4/5 with MMT indicating improved healing and function ability to improve single leg stance and gait mechanics  4. Patient will demonstrate increase strength with hip ext to at least 4/5 with MMT indicating improved healing and function ability of lifting moderate weight object to help with activities such a housework and putting away groceries with improved mechanics.        Therapy procedure time and total treatment time can be found documented on the Time Entry flowsheet

## 2025-03-12 NOTE — CASE MANAGEMENT
Case Management Progress Note    Patient name Samson Watts  Location ED-20/ED-20 MRN [id number]  : 1945 Date 3/12/2025       LOS (days): 0  Geometric Mean LOS (GMLOS) (days):   Days to GMLOS:        OBJECTIVE:        Current admission status: Emergency  Preferred Pharmacy:   [facility] [address] - [address]  [address] [address]  Phone: [phone] Fax: [phone]    Primary Care Provider: Art Crowell MD    Primary Insurance: [facility]  Secondary Insurance:     PROGRESS NOTE:  CM attempted to meet with patient at bedside to complete CM open assessment & discuss dcp but patient noted to be getting a Cath placed. CM will continue to outreach so that CM open assessment and dcp can be completed.

## 2025-03-12 NOTE — CASE MANAGEMENT
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     Benign paroxysmal vertigo, bilateral 2019     Current mild episode of major depressive disorder without prior episode (HCC) 2019     Nontoxic single thyroid nodule 2019     Thyroid nodule 2019     Vertigo 05/10/2019     Osteopenia 2018     Constipation 2018     Hyperlipidemia 2018     Celiac artery stenosis (HCC) 2018     Aneurysm of anterior cerebral artery 10/03/2017     Stress incontinence, female 2017     Mild cognitive disorder 2016     Abnormal gait 2016     Depression 2016     Depression with anxiety 2016     Hx of breast cancer 2016     GERD (gastroesophageal reflux disease) 2016     Hydrocephalus (HCC) 2016     Osteoarthritis of hip 2012     Allergic rhinitis 2012     Arthritis 2012       LOS (days): 0  Geometric Mean LOS (GMLOS) (days):   Days to GMLOS:     OBJECTIVE:    Risk of Unplanned Readmission Score: 8.37         Current admission status: Inpatient       Preferred Pharmacy:   [id number] - [address]  [address] [address]  Phone: [phone] Fax: [phone]    Primary Care Provider: Art Crowell MD    Primary Insurance: [facility]  Secondary Insurance:     ASSESSMENT:  Active Health Care Proxies       [facility]  Representative - Daughter   Primary Phone: [phone] (Home)                           Readmission Root Cause  30 Day Readmission: No    Patient Information  Admitted from:: Home  Mental Status: Alert  During Assessment patient was accompanied by: Daughter (Letty)  Assessment information provided by:: Daughter, Patient  Primary Caregiver: Self  Support Systems: Self, Daughter  County of Residence: [place]  What city do you live in?: [place]  Home entry access options. Select all that apply.: Elevator  Type of Current Residence: Apartment  Floor Level: 4  Upon entering residence, is there a bedroom on the main floor (no further steps)?: Yes  Upon entering residence, is there a bathroom on the main floor (no further steps)?: Yes  Living Arrangements: Lives Alone  Is patient a ?: No    Activities of Daily Living Prior to Admission  Functional Status: Independent  Completes ADLs independently?: Yes  Ambulates independently?: Yes  Does patient use assisted devices?: Yes  Assisted Devices (DME) used: Walker  Does patient currently own DME?: Yes  What DME does the patient currently own?: Walker  Does patient have a history of Outpatient Therapy (PT/OT)?: No  Does the patient have a history of Short-Term Rehab?: Yes ([facility])  Does patient have a history of HHC?: Yes (does not remember name)  Does patient currently have HHC?: No         Patient Information Continued  Income Source: Pension/nursing home  Does patient have prescription coverage?: Yes  Can the patient afford their medications and any related supplies (such as glucometers or test strips)?: Yes  Does patient receive dialysis treatments?: No  Does patient have a history of substance abuse?: No  Does patient have a history of Mental Health Diagnosis?: No         Means of Transportation  [facility]          DISCHARGE DETAILS:    Discharge planning discussed with:: Patient and daughter Letty at bedside  [place]  of Choice: Yes  Comments - Freedom of Choice: CM spoke with patient and Letty at bedside re: assessment and dcp. Patient and Letty provided assessment information. Per Letty & patient, they are looking for LTC placement as the patient has had a few falls recently among other things thus leaving family to believe that the pt being home by herself is not the best / safest thing for the pt any more. Patient currently has home aides through the waiver program (40 hrs a week). CM explained that she cannot place patient into NEAL/LTC from the hospital. Family understands that and is hoping that patient might be able to go to rehab and transition into LTC. Letty explained to CM that she was told that some facilities will take the patient's waiver LTC Benefits but she has not been given any information on which facilities take waiver LTC benefits besides [facility]. CM explained that she will look into that and get back to patient and family.  CM contacted family/caregiver?: Yes  Were Treatment Team discharge recommendations reviewed with patient/caregiver?: Yes  Did patient/caregiver verbalize understanding of patient care needs?: Yes  Were patient/caregiver advised of the risks associated with not following Treatment Team discharge recommendations?: Yes    Contacts  Patient Contacts: Letty Acosta  Relationship to Patient:: Family  Contact Method: In Person  Reason/Outcome: Continuity of Care, Discharge Planning, Emergency Contact              Other Referral/Resources/Interventions Provided:  Interventions: SNF, Short Term Rehab, Other (Specify)  Referral Comments: SNF / LTC Referral sent via [employer]                                                      Additional Comments: CM contacted [facility] on Aging to see if patient has a waiver and who her /  is. Area on aging confirmed that patient does have a waiver but were unable to tell CM who the patient's  or case  worker is. [facility] on Aging told CM to contact the [place] [employer]rol[employer] , who handles the waiver application and program to ask for the /. [facility]  at [phone] but was unable to reach anyone as the every prompt need a case ID #. CM will reach back out to [facility] on [facility] and Independent Enrollement  for further follow up.

## 2025-03-12 NOTE — ASSESSMENT & PLAN NOTE
Pt with known hx of communicating hydrocephalus    CT head wo contrast 3/12/2025:   1.  No acute intracranial findings or interval change.  2.  Chronic, unchanged sequelae of communicating hydrocephalus

## 2025-03-12 NOTE — ASSESSMENT & PLAN NOTE
Home medication: Memantine XR 28 mg qd    Plan:  -Continue memantine with switch to non XR form = 10 mg BID  -Requested daughter bring XR formulation from home  -B12

## 2025-03-12 NOTE — ASSESSMENT & PLAN NOTE
Hx of ambulatory dysfunction and falls, most recently admitted on October of 2024 due to dizziness managed with meclizine. The patient was discharged to [facility] with improvement in ambulation and no falls since October discharge until last Friday, 3/7/25 and again this morning, 3/12/25.    The patient states this morning, she got up to answer her door for her home nursing aide. She got up and started walking without her walker and felt her legs gave out. She denies prodromal symptoms at that time, denies head strike or LOC. The patient states she was awake and alert prior to, during and after the fall.     Plan:  -PT/OT  -Orthostatic vitals  -Magnesium  -B12  -CK  -IV NS 75 ml/hr x 15 hours

## 2025-03-12 NOTE — H&P
H&P - Hospitalist   Name: Samson Watts 80 y.o. female I MRN: [id number]  Unit/Bed#: ED-20 I Date of Admission: 3/12/2025   Date of Service: 3/12/2025 I Hospital Day: 0     Assessment & Plan  Ambulatory dysfunction  Hx of ambulatory dysfunction and falls, most recently admitted on October of 2024 due to dizziness managed with meclizine. The patient was discharged to [facility] with improvement in ambulation and no falls since October discharge until last Friday, 3/7/25 and again this morning, 3/12/25.    The patient states this morning, she got up to answer her door for her home nursing aide. She got up and started walking without her walker and felt her legs gave out. She denies prodromal symptoms at that time, denies head strike or LOC. The patient states she was awake and alert prior to, during and after the fall.     Plan:  -PT/OT  -Orthostatic vitals  -Magnesium  -B12  -CK  -IV NS 75 ml/hr x 15 hours  Abnormal urinalysis  3/12/25 UA positive for nitrites, large leukocytes  3/12/25 Urine Microscopy with 10-20 WBCs, occasional bacteria but also epithelial cells.     The patient has a history of incontinence and does not endorse more frequent diaper changes due to wetting, dysuria or hematuria. Previous Urine cultures with pansensitive E.coli, most recently in September of 2024.    WBC on admission 6.55, patient does not meet any SIRS criteria  Patient received 1 dose of Ceftriaxone in the ED    Low suspicion of active UTI    Plan:  -Hold ceftriaxone  -F/u urine culture  Benign paroxysmal vertigo, bilateral  Patient with hx of BPPV, previously admitted on October of 2024 with dizziness that resolved. Patient on home meclizine q8h PRN for dizziness which she endorses as responsive to meclizine. On examination, she does not endorse current dizziness when laying or transition to sitting.     Plan:  -Continue meclizine 25 mg q8h PRN  -If new dizziness develops unresponsive to meclizine, consider valium  Dementia (HCC)  Home  "medication: Memantine XR 28 mg qd    Plan:  -Continue memantine with switch to non XR form = 10 mg BID  -Requested daughter bring XR formulation from home  -B12  Hydrocephalus (HCC)  Pt with known hx of communicating hydrocephalus    CT head wo contrast 3/12/2025:   1.  No acute intracranial findings or interval change.  2.  Chronic, unchanged sequelae of communicating hydrocephalus  Depression  Home medication: Zoloft 200 mg po qd    Plan:  -Continue home medication   Hyperlipidemia  Home medication: Zetia 10 mg qd    -Continue home medication  GERD (gastroesophageal reflux disease)  Home medication: Nexium 10 mg qAM    Plan:  -Continue PPI with protonix equivalent      VTE Pharmacologic Prophylaxis: VTE Score: 4 Moderate Risk (Score 3-4) - Pharmacological DVT Prophylaxis Ordered: enoxaparin (Lovenox).  Code Status: Level 3 - DNAR and DNI   Discussion with family: Updated  (daughter) at bedside.    Anticipated Length of Stay: Patient will be admitted on an inpatient basis with an anticipated length of stay of greater than 2 midnights secondary to Ambulatory Dysfunction.    History of Present Illness   Chief Complaint: \"Increasing falls\"    Samson Watts is a 80 y.o. female with a PMH of SAH in 2016, depression on zoloft, Alzheimer's Dementia on memantine 28 mg qd, BPPV on meclizine PRN who presents for multiple falls at home, increasing over the last week.     Per the patient and the daughter at the bedside, the patient had two occurrences of fall in the last few days. Including one fall on late Friday evening while the patient was walking with her walker, and was able to recover from with assistance. Additionally, the patient had a fall this morning when her home nursing aide came this morning. The patient got up to walk without her walker, and fell before reaching the door. The patient denies striking her head and denies losing consciousness. She states she was awake prior, during and after the " "fall. She denies any sensation of warmth, nausea, sweating, palpitations, lightheadedness or headache prior to the fall. She states \"my legs just gave out\". The patient's aide heard the patient fall from outside her door at her senior living building, and went downstairs to obtain a key to unlock the door. The patient recalls the aide entering her apartment and helping her up. The patient notes that she does not drink much water, because she \"doesn't like water\". The patient and daughter note that she drinks 2 12oz navarro daily, but will only drink water to wet her mouth. The patient's daughter states Gisella does not exercise often, and largely lives a sedentary lifestyle as well. The patient denies changes in appetite. The patient denies chest pain, shortness of breath, abdominal pain, N/V/D in the last week. She denies explicit sick contacts or travel in the last few weeks. She notes that she cannot recall if she has had dysuria or increased frequency because she is incontinent and wears diapers. She denies changing her diapers more frequently or hematuria.    ED work-up with CT head wo contrast without acute intracranial findings, and stable unchanged communicating hydrocephalus. CT C-spine without C-spine fracture or traumatic malalignment. CXR without acute cardiopulmonary disease. Lab work-up in the ED notable for UA with positive nitrites, microscopy with WBC of 10-20, occasional bacteria. The patient received Ceftriaxone x1 in the ED and is admitted for recurrent falls and UTI. Patient does not meet any SIRS criteria on presentation.      Review of Systems   Constitutional:  Negative for activity change, appetite change, chills, diaphoresis and fever.   HENT:  Negative for hearing loss, sneezing and sore throat.    Eyes:  Negative for visual disturbance.   Respiratory:  Negative for cough, chest tightness and shortness of breath.    Cardiovascular:  Negative for chest pain and palpitations.   Gastrointestinal: "  Negative for abdominal pain, blood in stool, diarrhea, nausea and vomiting.   Genitourinary:  Negative for dysuria, frequency and hematuria.   Neurological:  Positive for weakness. Negative for dizziness.   Psychiatric/Behavioral:  Negative for hallucinations.        Historical Information   Past Medical History:   Diagnosis Date    Acute mastoiditis with intracranial complication 11/16/2022    Anxiety disorder     Brain hemangioma (HCC)     2016    Breast cancer (HCC) 2014    left breast  BRCA1&2 Negative    Constipation     Esophageal reflux     History of radiation therapy 2014    left breast ca    History of recurrent UTIs     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    Hyperlipidemia     Hypertension     Osteoarthritis of hip     Osteopenia     PONV (postoperative nausea and vomiting)     Subarachnoid hemorrhage (HCC)      Past Surgical History:   Procedure Laterality Date    BREAST BIOPSY Left 2014    BREAST LUMPECTOMY Left 2014    COLONOSCOPY      Fiberoptic - 2009, 2015 5 year f/u    ESOPHAGOGASTRODUODENOSCOPY  2009    Diag, Resolved - 2006 / 2009    EXOSTECTOMY  2005    Simple Bunion (Silver Procedure)    FL LUMBAR PUNCTURE THERAPEUTIC  9/1/2023    HYSTERECTOMY  1981    JOINT REPLACEMENT Right 2012    Right Hip    OOPHORECTOMY  1981    not sure which one    REPLACEMENT TOTAL KNEE Right 2018    VENTRICULOSTOMY       Social History     Tobacco Use    Smoking status: Never     Passive exposure: Past    Smokeless tobacco: Never   Vaping Use    Vaping status: Never Used   Substance and Sexual Activity    Alcohol use: Yes     Comment: occasionally    Drug use: No    Sexual activity: Not Currently     E-Cigarette/Vaping    E-Cigarette Use Never User      E-Cigarette/Vaping Substances    Nicotine No     THC No     CBD No     Flavoring No     Other No     Unknown No      Family History   Problem Relation Age of Onset    Cancer Mother 69        bone    Hypertension Mother     Arthritis Mother      Stroke Father         TIA    Stroke Maternal Grandmother         CVA    Deep vein thrombosis Daughter     Heart attack Neg Hx     Anuerysm Neg Hx     Clotting disorder Neg Hx     Arrhythmia Neg Hx     Heart failure Neg Hx     Coronary artery disease Neg Hx     Hyperlipidemia Neg Hx     Breast cancer Neg Hx      Social History:  Marital Status:    Occupation: Retired  Patient Pre-hospital Living Situation: Apartment  Patient Pre-hospital Level of Mobility: walks with walker  Patient Pre-hospital Diet Restrictions: None    Meds/Allergies   I have reviewed home medications with patient family member.  Prior to Admission medications    Medication Sig Start Date End Date Taking? Authorizing Provider   acetaminophen (TYLENOL) 500 mg tablet Take 1,000 mg by mouth every 6 (six) hours as needed for mild pain  Patient not taking: Reported on 2/25/2025    Historical Provider, MD   Calcium-Vitamin D (CALTRATE 600 PLUS-VIT D PO) Take 1 tablet by mouth daily    Historical Provider, MD   docusate sodium (COLACE) 100 mg capsule Take 100 mg by mouth 2 (two) times a day  Patient taking differently: Take 100 mg by mouth if needed for constipation    Historical Provider, MD   esomeprazole (NexIUM) 10 MG packet Take 10 mg by mouth every morning before breakfast    Historical Provider, MD   ezetimibe (ZETIA) 10 mg tablet TAKE ONE TABLET BY MOUTH EVERY DAY 1/2/25   Michele Robles MD   fluticasone (FLONASE) 50 mcg/act nasal spray 1 spray into each nostril as needed for rhinitis    Historical Provider, MD   lansoprazole (PREVACID SOLUTAB) 15 mg disintegrating tablet Take 15 mg by mouth daily  Patient not taking: Reported on 1/14/2025 10/31/24   Historical Provider, MD BEST 0.01 % ophthalmic drops PLACE 1 DROP INTO IN EACH EYE AT BEDTIME 10/3/19   Historical Provider, MD   meclizine (ANTIVERT) 25 mg tablet Take 1 tablet (25 mg total) by mouth every 8 (eight) hours as needed for dizziness 7/30/19   Radha Davis MD   Memantine HCl  ER (Namenda XR) 28 MG CP24 Take 1 capsule by mouth in the morning  Patient not taking: Reported on 2/25/2025 1/14/25   Art Crowell MD   Memantine HCl ER (Namenda XR) 7 MG CP24 Take 1 capsule (7 mg total) by mouth in the morning for 14 days, THEN 2 capsules (14 mg total) in the morning for 14 days, THEN 3 capsules (21 mg total) in the morning for 14 days. Then increase to 28mg pill.. 1/14/25 2/25/25  Art Crowell MD   Multiple Vitamins-Minerals (MULTIVITAMIN ADULTS 50+) TABS Take 1 tablet by mouth daily    Historical Provider, MD   NON FORMULARY Take 1 Dose by mouth daily. CBD gummy, 10mg, as needed for anxiety    Historical Provider, MD   sertraline (ZOLOFT) 100 mg tablet TAKE TWO TABLETS BY MOUTH EVERY DAY 2/17/25   Art Crowell MD     Allergies   Allergen Reactions    Oxycodone Vomiting    Atorvastatin Other (See Comments)     Leg/knee pain (joint pain)    Bactrim [Sulfamethoxazole-Trimethoprim] Itching and Rash    Keppra [Levetiracetam] Rash    Livalo [Pitavastatin] Itching    Penicillins Rash     Agitation        Objective :  Temp:  [97.4 °F (36.3 °C)] 97.4 °F (36.3 °C)  HR:  [63-76] 75  BP: (107-155)/() 107/61  Resp:  [18] 18  SpO2:  [95 %-98 %] 96 %    Physical Exam  Constitutional:       General: She is not in acute distress.     Appearance: Normal appearance. She is not ill-appearing or diaphoretic.   HENT:      Head: Normocephalic and atraumatic.      Mouth/Throat:      Mouth: Mucous membranes are moist.      Pharynx: Oropharynx is clear. No oropharyngeal exudate or posterior oropharyngeal erythema.   Eyes:      Extraocular Movements: Extraocular movements intact.      Conjunctiva/sclera: Conjunctivae normal.      Pupils: Pupils are equal, round, and reactive to light.   Cardiovascular:      Rate and Rhythm: Normal rate and regular rhythm.      Pulses: Normal pulses.      Heart sounds: Normal heart sounds.   Pulmonary:      Effort: Pulmonary effort is normal.       Breath sounds: Normal breath sounds. No wheezing, rhonchi or rales.   Abdominal:      General: Abdomen is flat. Bowel sounds are normal.      Palpations: Abdomen is soft.      Tenderness: There is no abdominal tenderness. There is no guarding or rebound.   Musculoskeletal:      Right lower leg: No edema.      Left lower leg: No edema.   Skin:     General: Skin is warm and dry.      Capillary Refill: Capillary refill takes less than 2 seconds.   Neurological:      Mental Status: She is alert and oriented to person, place, and time. Mental status is at baseline.      Sensory: No sensory deficit.      Motor: Weakness (4/5 weakness of bilateral lower extremities) present.          Lines/Drains:            Lab Results: I have reviewed the following results:  Results from last 7 days   Lab Units 03/12/25  1041   WBC Thousand/uL 6.55   HEMOGLOBIN g/dL 12.9   HEMATOCRIT % 39.4   PLATELETS Thousands/uL 294   SEGS PCT % 68   LYMPHO PCT % 20   MONO PCT % 8   EOS PCT % 3     Results from last 7 days   Lab Units 03/12/25  1041   SODIUM mmol/L 141   POTASSIUM mmol/L 3.7   CHLORIDE mmol/L 104   CO2 mmol/L 30   BUN mg/dL 12   CREATININE mg/dL 0.78   ANION GAP mmol/L 7   CALCIUM mg/dL 9.5   ALBUMIN g/dL 4.2   TOTAL BILIRUBIN mg/dL 0.55   ALK PHOS U/L 84   ALT U/L 14   AST U/L 18   GLUCOSE RANDOM mg/dL 86             Lab Results   Component Value Date    HGBA1C 5.4 09/10/2024    HGBA1C 5.6 05/11/2019    HGBA1C 5.3 01/05/2016           Imaging Results Review: I reviewed radiology reports from this admission including: chest xray, CT head, and CT C-spine.  Other Study Results Review: EKG was reviewed.     Administrative Statements   I have spent a total time of 55 minutes in caring for this patient on the day of the visit/encounter including Patient and family education, Documenting in the medical record, Reviewing/placing orders in the medical record (including tests, medications, and/or procedures), Obtaining or reviewing history  ,  and Communicating with other healthcare professionals .    ** Please Note: This note has been constructed using a voice recognition system. **

## 2025-03-12 NOTE — ED PROVIDER NOTES
Time reflects when diagnosis was documented in both MDM as applicable and the Disposition within this note       Time User Action Codes Description Comment    3/12/2025  2:46 PM Awosika, Afopefoluwa Add [N39.0] UTI (urinary tract infection)     3/12/2025  2:47 PM Awosika, Afopefoluwa Add [R26.2] Ambulatory dysfunction     3/12/2025  2:47 PM Awosika, Afopefoluwa Add [R29.6] Multiple falls           ED Disposition       ED Disposition   Admit    Condition   Stable    Date/Time   Wed Mar 12, 2025  2:46 PM    Comment   Case was discussed with MARIE and the patient's admission status was agreed to be Admission Status: inpatient status to the service of Dr. Villanueva .               Assessment & Plan       Medical Decision Making  80-year-old female presents for evaluation of multiple falls and dizziness.  Differentials include but not limited to vertigo, BPPV, intracranial abnormality such as subarachnoid hemorrhage, subdural versus epidural hematoma, hydrocephalus, CVA, hypothyroidism, electrolyte abnormality.  Will obtain labs, troponin, CT head and cervical spine, CXR.    See ED Course.      Amount and/or Complexity of Data Reviewed  Labs: ordered.  Radiology: ordered and independent interpretation performed. Decision-making details documented in ED Course.    Risk  Decision regarding hospitalization.        ED Course as of 03/12/25 1618   Wed Mar 12, 2025   1204  per patient's daughter, she would like to have patient placed in nursing facility, she is unable to care for her.  She was already in talks with [facility], but given her recent fall, brought her in for further evaluation and more emergent placement.  Will discuss with ED case management on next steps, and patient's options at this time.   1209 CT spine cervical without contrast     IMPRESSION:     No cervical spine fracture or traumatic malalignment.     1209 CT head wo contrast  IMPRESSION:     1.  No acute intracranial findings or interval change.    "  2.  Chronic, unchanged sequelae of communicating hydrocephalus.         Medications   ceftriaxone (ROCEPHIN) 1 g/50 mL in dextrose IVPB (1,000 mg Intravenous New Bag 3/12/25 1430)       ED Risk Strat Scores                            SBIRT 22yo+      Flowsheet Row Most Recent Value   Initial Alcohol Screen: US AUDIT-C     1. How often do you have a drink containing alcohol? 0 Filed at: 03/12/2025 1014   2. How many drinks containing alcohol do you have on a typical day you are drinking?  0 Filed at: 03/12/2025 1014   3a. Male UNDER 65: How often do you have five or more drinks on one occasion? 0 Filed at: 03/12/2025 1014   3b. FEMALE Any Age, or MALE 65+: How often do you have 4 or more drinks on one occassion? 0 Filed at: 03/12/2025 1014   Audit-C Score 0 Filed at: 03/12/2025 1014   CLIFFORD: How many times in the past year have you...    Used an illegal drug or used a prescription medication for non-medical reasons? Never Filed at: 03/12/2025 1014                            History of Present Illness       Chief Complaint   Patient presents with    Dizziness     Pt to ED via EMS from home with c/o dizziness. Patient reports \"vertigo episodes\" x1 week. Patient reports lowering self to floor. Denies fall       Past Medical History:   Diagnosis Date    Acute mastoiditis with intracranial complication 11/16/2022    Anxiety disorder     Brain hemangioma (HCC)     2016    Breast cancer (HCC) 2014    left breast  BRCA1&2 Negative    Constipation     Esophageal reflux     History of radiation therapy 2014    left breast ca    History of recurrent UTIs     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    Hyperlipidemia     Hypertension     Osteoarthritis of hip     Osteopenia     PONV (postoperative nausea and vomiting)     Subarachnoid hemorrhage (HCC)       Past Surgical History:   Procedure Laterality Date    BREAST BIOPSY Left 2014    BREAST LUMPECTOMY Left 2014    COLONOSCOPY      Fiberoptic - 2009, 2015 " 5 year f/u    ESOPHAGOGASTRODUODENOSCOPY  2009    Diag, Resolved - 2006 / 2009    EXOSTECTOMY  2005    Simple Bunion ([facility])    FL LUMBAR PUNCTURE THERAPEUTIC  9/1/2023    HYSTERECTOMY  1981    JOINT REPLACEMENT Right 2012    Right Hip    OOPHORECTOMY  1981    not sure which one    REPLACEMENT TOTAL KNEE Right 2018    VENTRICULOSTOMY        Family History   Problem Relation Age of Onset    Cancer Mother 69        bone    Hypertension Mother     Arthritis Mother     Stroke Father         TIA    Stroke Maternal Grandmother         CVA    Deep vein thrombosis Daughter     Heart attack Neg Hx     Anuerysm Neg Hx     Clotting disorder Neg Hx     Arrhythmia Neg Hx     Heart failure Neg Hx     Coronary artery disease Neg Hx     Hyperlipidemia Neg Hx     Breast cancer Neg Hx       Social History     Tobacco Use    Smoking status: Never     Passive exposure: Past    Smokeless tobacco: Never   Vaping Use    Vaping status: Never Used   Substance Use Topics    Alcohol use: Yes     Comment: occasionally    Drug use: No      E-Cigarette/Vaping    E-Cigarette Use Never User       E-Cigarette/Vaping Substances    Nicotine No     THC No     CBD No     Flavoring No     Other No     Unknown No       I have reviewed and agree with the history as documented.     81 yo female presents for evaluation of fall. She has a hx of dementia, depression  - Pt reports she got out of bed this morning, was at her dresser, when she fell. No head strike. No LOC. She felt dizzy, and then lowered herself to the grounds.   - Has had previous episodes of dizziness in the past. Presently endorsing some dizziness, but much improved from earlier in the day  - Daughter states that dizziness episodes have been occurring multiple times in the past week. Meclizine improves her symptoms. Her aid states she almost fell 3 times yesterday, and this morning, she heard the thud when she fell.   - Not sure if she is compliant with her medications.   -  Daughter is also concerned she might have taken more doses of her memantine than prescribed.  - Denies blurry vision, headaches, nausea, vomiting.   - Normal fluid and solid food intake.   - No recent illnesses.           Review of Systems   Constitutional:  Negative for appetite change, fatigue and fever.   Respiratory:  Negative for cough and shortness of breath.    Cardiovascular:  Negative for chest pain.   Gastrointestinal:  Negative for abdominal distention.   Genitourinary:  Negative for difficulty urinating, dysuria and pelvic pain.   Neurological:  Positive for dizziness and weakness. Negative for light-headedness.           Objective       ED Triage Vitals   Temperature Pulse Blood Pressure Respirations SpO2 Patient Position - Orthostatic VS   03/12/25 1211 03/12/25 1010 03/12/25 1010 03/12/25 1010 03/12/25 1010 --   (!) 97.4 °F (36.3 °C) 63 139/87 18 98 %       Temp src Heart Rate Source BP Location FiO2 (%) Pain Score    -- 03/12/25 1213 -- -- --     Monitor         Vitals      Date and Time Temp Pulse SpO2 Resp BP Pain Score FACES Pain Rating User   03/12/25 1430 -- 76 96 % 18 134/103 -- -- CB   03/12/25 1213 -- 70 95 % 18 155/87 -- -- CB   03/12/25 1211 97.4 °F (36.3 °C) -- -- -- -- -- -- CB   03/12/25 1010 -- 63 98 % 18 139/87 -- -- MD            Physical Exam  Vitals and nursing note reviewed.   Constitutional:       General: She is not in acute distress.     Appearance: She is well-developed.   HENT:      Head: Normocephalic and atraumatic.   Eyes:      Conjunctiva/sclera: Conjunctivae normal.      Pupils: Pupils are equal, round, and reactive to light.   Cardiovascular:      Rate and Rhythm: Normal rate and regular rhythm.      Heart sounds: No murmur heard.  Pulmonary:      Effort: Pulmonary effort is normal. No respiratory distress.      Breath sounds: Normal breath sounds.   Abdominal:      Palpations: Abdomen is soft.      Tenderness: There is no abdominal tenderness.   Musculoskeletal:          General: No swelling.      Cervical back: Neck supple.   Skin:     General: Skin is warm and dry.      Capillary Refill: Capillary refill takes less than 2 seconds.   Neurological:      Mental Status: She is alert. Mental status is at baseline.         Results Reviewed       Procedure Component Value Units Date/Time    Urine Microscopic [[id number]]  (Abnormal) Collected: 03/12/25 1230    Lab Status: Final result Specimen: Urine, Straight Cath Updated: 03/12/25 1351     RBC, UA None Seen /hpf      WBC, UA 10-20 /hpf      Epithelial Cells Occasional /hpf      Bacteria, UA Occasional /hpf     Urine culture [[id number]] Collected: 03/12/25 1230    Lab Status: In process Specimen: Urine, Straight Cath Updated: 03/12/25 1351    UA w Reflex to Microscopic w Reflex to Culture [[id number]]  (Abnormal) Collected: 03/12/25 1230    Lab Status: Final result Specimen: Urine, Straight Cath Updated: 03/12/25 1320     Color, UA Colorless     Clarity, UA Clear     Specific Gravity, UA 1.006     pH, UA 8.0     Leukocytes, UA Large     Nitrite, UA Positive     Protein, UA Negative mg/dl      Glucose, UA Negative mg/dl      Ketones, UA Negative mg/dl      Urobilinogen, UA <2.0 mg/dl      Bilirubin, UA Negative     Occult Blood, UA Negative    HS Troponin 0hr (reflex protocol) [[id number]]  (Normal) Collected: 03/12/25 1041    Lab Status: Final result Specimen: Blood from Arm, Left Updated: 03/12/25 1109     hs TnI 0hr 3 ng/L     Comprehensive metabolic panel [[id number]] Collected: 03/12/25 1041    Lab Status: Final result Specimen: Blood from Arm, Left Updated: 03/12/25 1102     Sodium 141 mmol/L      Potassium 3.7 mmol/L      Chloride 104 mmol/L      CO2 30 mmol/L      ANION GAP 7 mmol/L      BUN 12 mg/dL      Creatinine 0.78 mg/dL      Glucose 86 mg/dL      Calcium 9.5 mg/dL      AST 18 U/L      ALT 14 U/L      Alkaline Phosphatase 84 U/L      Total Protein 7.2 g/dL      Albumin 4.2 g/dL      Total Bilirubin 0.55 mg/dL      eGFR 72  ml/min/1.73sq m     Narrative:      National Kidney Disease Foundation guidelines for Chronic Kidney Disease (CKD):     Stage 1 with normal or high GFR (GFR > 90 mL/min/1.73 square meters)    Stage 2 Mild CKD (GFR = 60-89 mL/min/1.73 square meters)    Stage 3A Moderate CKD (GFR = 45-59 mL/min/1.73 square meters)    Stage 3B Moderate CKD (GFR = 30-44 mL/min/1.73 square meters)    Stage 4 Severe CKD (GFR = 15-29 mL/min/1.73 square meters)    Stage 5 End Stage CKD (GFR <15 mL/min/1.73 square meters)  Note: GFR calculation is accurate only with a steady state creatinine    CBC and differential [[id number]]  (Abnormal) Collected: 03/12/25 1041    Lab Status: Final result Specimen: Blood from Arm, Left Updated: 03/12/25 1049     WBC 6.55 Thousand/uL      RBC 4.55 Million/uL      Hemoglobin 12.9 g/dL      Hematocrit 39.4 %      MCV 87 fL      MCH 28.4 pg      MCHC 32.7 g/dL      RDW 13.1 %      MPV 8.8 fL      Platelets 294 Thousands/uL      nRBC 0 /100 WBCs      Segmented % 68 %      Immature Grans % 0 %      Lymphocytes % 20 %      Monocytes % 8 %      Eosinophils Relative 3 %      Basophils Relative 1 %      Absolute Neutrophils 4.43 Thousands/µL      Absolute Immature Grans 0.02 Thousand/uL      Absolute Lymphocytes 1.33 Thousands/µL      Absolute Monocytes 0.54 Thousand/µL      Eosinophils Absolute 0.20 Thousand/µL      Basophils Absolute 0.03 Thousands/µL             XR chest 1 view portable   ED Interpretation by Rosa Mae MD (03/12 1227)   No acute cardiopulmonary abnormalities      Final Interpretation by Juan Colon MD (03/12 1322)      No acute cardiopulmonary disease.            Workstation performed: [id number]         CT head wo contrast   Final Interpretation by Juan Moulton MD (03/12 4850)      1.  No acute intracranial findings or interval change.      2.  Chronic, unchanged sequelae of communicating hydrocephalus.               Resident: Dave Lee I, the attending radiologist,  have reviewed the images and agree with the final report above.      Workstation performed: [id number]         CT spine cervical without contrast   Final Interpretation by Juan Moulton MD (03/12 1144)      No cervical spine fracture or traumatic malalignment.                  Resident: Dave Lee I, the attending radiologist, have reviewed the images and agree with the final report above.      Workstation performed: [id number]             Procedures    ED Medication and Procedure Management   Prior to Admission Medications   Prescriptions Last Dose Informant Patient Reported? Taking?   Calcium-Vitamin D (CALTRATE 600 PLUS-VIT D PO) 3/11/2025 Morning Self, Child Yes Yes   Sig: Take 1 tablet by mouth daily   LUMIGAN 0.01 % ophthalmic drops 3/11/2025 Evening Self, Child Yes Yes   Sig: PLACE 1 DROP INTO IN EACH EYE AT BEDTIME   Memantine HCl ER (Namenda XR) 28 MG CP24 3/11/2025 Morning  No Yes   Sig: Take 1 capsule by mouth in the morning   Memantine HCl ER (Namenda XR) 7 MG CP24   No No   Sig: Take 1 capsule (7 mg total) by mouth in the morning for 14 days, THEN 2 capsules (14 mg total) in the morning for 14 days, THEN 3 capsules (21 mg total) in the morning for 14 days. Then increase to 28mg pill..   Multiple Vitamins-Minerals (MULTIVITAMIN ADULTS 50+) TABS Unknown Self, Child Yes No   Sig: Take 1 tablet by mouth daily   NON FORMULARY Not Taking Child, Self Yes No   Sig: Take 1 Dose by mouth daily. CBD gummy, 10mg, as needed for anxiety   Patient not taking: Reported on 3/12/2025   acetaminophen (TYLENOL) 500 mg tablet More than a month Self, Child Yes No   Sig: Take 1,000 mg by mouth every 6 (six) hours as needed for mild pain   Patient not taking: Reported on 2/25/2025   docusate sodium (COLACE) 100 mg capsule More than a month Child, Self Yes No   Sig: Take 100 mg by mouth 2 (two) times a day   Patient taking differently: Take 100 mg by mouth if needed for constipation   esomeprazole (NexIUM) 10 MG  packet 3/11/2025 Morning  Yes Yes   Sig: Take 10 mg by mouth every morning before breakfast   ezetimibe (ZETIA) 10 mg tablet 3/11/2025 Morning  No Yes   Sig: TAKE ONE TABLET BY MOUTH EVERY DAY   fluticasone (FLONASE) 50 mcg/act nasal spray More than a month Self, Child Yes No   Si spray into each nostril as needed for rhinitis   lansoprazole (PREVACID SOLUTAB) 15 mg disintegrating tablet Not Taking Child, Self Yes No   Sig: Take 15 mg by mouth daily   Patient not taking: Reported on 2025   meclizine (ANTIVERT) 25 mg tablet Past Week Self, Child No Yes   Sig: Take 1 tablet (25 mg total) by mouth every 8 (eight) hours as needed for dizziness   sertraline (ZOLOFT) 100 mg tablet 3/11/2025 Morning  No Yes   Sig: TAKE TWO TABLETS BY MOUTH EVERY DAY      Facility-Administered Medications: None     Patient's Medications   Discharge Prescriptions    No medications on file     No discharge procedures on file.  ED SEPSIS DOCUMENTATION   Time reflects when diagnosis was documented in both MDM as applicable and the Disposition within this note       Time User Action Codes Description Comment    3/12/2025  2:46 PM Constance Bhagat Add [N39.0] UTI (urinary tract infection)     3/12/2025  2:47 PM Constance Bhagat Add [R26.2] Ambulatory dysfunction     3/12/2025  2:47 PM Constance Bhagat Add [R29.6] Multiple falls                  Constance Bhagat MD  25 [phone]

## 2025-03-12 NOTE — ASSESSMENT & PLAN NOTE
3/12/25 UA positive for nitrites, large leukocytes  3/12/25 Urine Microscopy with 10-20 WBCs, occasional bacteria but also epithelial cells.     The patient has a history of incontinence and does not endorse more frequent diaper changes due to wetting, dysuria or hematuria. Previous Urine cultures with pansensitive E.coli, most recently in September of 2024.    WBC on admission 6.55, patient does not meet any SIRS criteria  Patient received 1 dose of Ceftriaxone in the ED    Low suspicion of active UTI    Plan:  -Hold ceftriaxone  -F/u urine culture

## 2025-03-13 ENCOUNTER — PATIENT OUTREACH (OUTPATIENT)
Dept: CASE MANAGEMENT | Facility: OTHER | Age: 80
End: 2025-03-13

## 2025-03-13 LAB
ANION GAP SERPL CALCULATED.3IONS-SCNC: 4 MMOL/L (ref 4–13)
BUN SERPL-MCNC: 13 MG/DL (ref 5–25)
CALCIUM SERPL-MCNC: 9.3 MG/DL (ref 8.4–10.2)
CHLORIDE SERPL-SCNC: 107 MMOL/L (ref 96–108)
CO2 SERPL-SCNC: 30 MMOL/L (ref 21–32)
CREAT SERPL-MCNC: 0.84 MG/DL (ref 0.6–1.3)
ERYTHROCYTE [DISTWIDTH] IN BLOOD BY AUTOMATED COUNT: 13.2 % (ref 11.6–15.1)
GFR SERPL CREATININE-BSD FRML MDRD: 66 ML/MIN/1.73SQ M
GLUCOSE SERPL-MCNC: 89 MG/DL (ref 65–140)
HCT VFR BLD AUTO: 38.9 % (ref 34.8–46.1)
HGB BLD-MCNC: 12.5 G/DL (ref 11.5–15.4)
MCH RBC QN AUTO: 28.2 PG (ref 26.8–34.3)
MCHC RBC AUTO-ENTMCNC: 32.1 G/DL (ref 31.4–37.4)
MCV RBC AUTO: 88 FL (ref 82–98)
PLATELET # BLD AUTO: 290 THOUSANDS/UL (ref 149–390)
PMV BLD AUTO: 9.1 FL (ref 8.9–12.7)
POTASSIUM SERPL-SCNC: 4.3 MMOL/L (ref 3.5–5.3)
RBC # BLD AUTO: 4.43 MILLION/UL (ref 3.81–5.12)
SODIUM SERPL-SCNC: 141 MMOL/L (ref 135–147)
VIT B12 SERPL-MCNC: 581 PG/ML (ref 180–914)
WBC # BLD AUTO: 7.44 THOUSAND/UL (ref 4.31–10.16)

## 2025-03-13 PROCEDURE — 97116 GAIT TRAINING THERAPY: CPT

## 2025-03-13 PROCEDURE — 85027 COMPLETE CBC AUTOMATED: CPT

## 2025-03-13 PROCEDURE — 99232 SBSQ HOSP IP/OBS MODERATE 35: CPT | Performed by: INTERNAL MEDICINE

## 2025-03-13 PROCEDURE — 97167 OT EVAL HIGH COMPLEX 60 MIN: CPT

## 2025-03-13 PROCEDURE — 80048 BASIC METABOLIC PNL TOTAL CA: CPT

## 2025-03-13 PROCEDURE — 97163 PT EVAL HIGH COMPLEX 45 MIN: CPT

## 2025-03-13 RX ORDER — SODIUM CHLORIDE 9 MG/ML
75 INJECTION, SOLUTION INTRAVENOUS CONTINUOUS
Status: DISPENSED | OUTPATIENT
Start: 2025-03-13 | End: 2025-03-14

## 2025-03-13 RX ORDER — MIDODRINE HYDROCHLORIDE 2.5 MG/1
2.5 TABLET ORAL
Status: DISCONTINUED | OUTPATIENT
Start: 2025-03-13 | End: 2025-03-14 | Stop reason: HOSPADM

## 2025-03-13 RX ADMIN — MEMANTINE 10 MG: 10 TABLET ORAL at 08:04

## 2025-03-13 RX ADMIN — MEMANTINE 10 MG: 10 TABLET ORAL at 17:03

## 2025-03-13 RX ADMIN — SODIUM CHLORIDE 75 ML/HR: 0.9 INJECTION, SOLUTION INTRAVENOUS at 11:42

## 2025-03-13 RX ADMIN — Medication 1 TABLET: at 08:04

## 2025-03-13 RX ADMIN — ENOXAPARIN SODIUM 40 MG: 40 INJECTION SUBCUTANEOUS at 08:04

## 2025-03-13 RX ADMIN — MIDODRINE HYDROCHLORIDE 2.5 MG: 2.5 TABLET ORAL at 17:03

## 2025-03-13 RX ADMIN — SERTRALINE HYDROCHLORIDE 200 MG: 100 TABLET ORAL at 08:04

## 2025-03-13 RX ADMIN — EZETIMIBE 10 MG: 10 TABLET ORAL at 08:04

## 2025-03-13 RX ADMIN — PANTOPRAZOLE SODIUM 20 MG: 20 TABLET, DELAYED RELEASE ORAL at 05:56

## 2025-03-13 RX ADMIN — SODIUM CHLORIDE 75 ML/HR: 0.9 INJECTION, SOLUTION INTRAVENOUS at 08:08

## 2025-03-13 RX ADMIN — SODIUM CHLORIDE 75 ML/HR: 0.9 INJECTION, SOLUTION INTRAVENOUS at 23:38

## 2025-03-13 RX ADMIN — BIMATOPROST 1 DROP: 0.3 SOLUTION/ DROPS OPHTHALMIC at 22:02

## 2025-03-13 NOTE — DISCHARGE SUPPORT
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     Benign paroxysmal vertigo, bilateral 2019     Current mild episode of major depressive disorder without prior episode (HCC) 2019     Nontoxic single thyroid nodule 2019     Thyroid nodule 2019     Vertigo 05/10/2019     Osteopenia 2018     Constipation 2018     Hyperlipidemia 2018     Celiac artery stenosis (HCC) 2018     Aneurysm of anterior cerebral artery 10/03/2017     Stress incontinence, female 2017     Mild cognitive disorder 2016     Abnormal gait 2016     Depression 2016     Depression with anxiety 2016     Hx of breast cancer 2016     GERD (gastroesophageal reflux disease) 2016     Hydrocephalus (HCC) 2016     Osteoarthritis of hip 2012     Allergic rhinitis 2012     Arthritis 2012       LOS (days): 1  Geometric Mean LOS (GMLOS) (days): 2.5  Days to GMLOS:1.6   Facility Authorization Initiated  [facility] received request for auth from : Karen MULLEN  Authorization Request Submitted for: SNF  Requested Start of Care Date: 25  Facility Name: [facility] NPI: [id number]  Facility MD: Dr. Bailey Brasher  [facility] MD NPI: [id number]  Authorization initiated by contacting insurance: [employer]  Via: H&CC Portal  Clinicals submitted via: Portal Attachment  Pending reference #: [id number]    notified: Karen MULLEN   and   Facility Authorization Approved  [facility] received approved auth for: SNF  Insurance: [employer]  Authorization Obtained Via: Portal  Facility Name: [facility]  Approved Authorization Number: [id number]  Start of Care Date: 03/13/25  Next Review Date: 03/17/25  Submit Next Review to: H & CC Portal or F: [phone]   notified: Karen MULLEN     Updates to authorization status will be noted in chart.    Please reach out to CM for updates on any clinical information.

## 2025-03-13 NOTE — ASSESSMENT & PLAN NOTE
3/12/25 UA positive for nitrites, large leukocytes  3/12/25 Urine Microscopy with 10-20 WBCs, occasional bacteria but also epithelial cells.     The patient has a history of incontinence and does not endorse more frequent diaper changes due to wetting, dysuria or hematuria. Previous Urine cultures with pansensitive E.coli, most recently in September of 2024.    WBC on admission 6.55, patient does not meet any SIRS criteria  Patient received 1 dose of Ceftriaxone in the ED    Low suspicion of active UTI    Plan:  -Monitor off antibiotics  -F/u urine culture

## 2025-03-13 NOTE — ASSESSMENT & PLAN NOTE
Home medication: Memantine XR 28 mg qd    Plan:  -Continue memantine with switch to non XR form = 10 mg BID  -Requested daughter bring XR formulation from home

## 2025-03-13 NOTE — CASE MANAGEMENT
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) 2022     Benign paroxysmal vertigo, bilateral 2019     Current mild episode of major depressive disorder without prior episode (HCC) 2019     Nontoxic single thyroid nodule 2019     Thyroid nodule 2019     Vertigo 05/10/2019     Osteopenia 2018     Constipation 2018     Hyperlipidemia 2018     Celiac artery stenosis (HCC) 2018     Aneurysm of anterior cerebral artery 10/03/2017     Stress incontinence, female 2017     Mild cognitive disorder 2016     Abnormal gait 2016     Depression 2016     Orthostatic hypotension 2016     Depression with anxiety 2016     Hx of breast cancer 2016     GERD (gastroesophageal reflux disease) 2016     Hydrocephalus ([facility]) 2016     Osteoarthritis of hip 2012     Allergic rhinitis 2012     Arthritis 2012       LOS (days): 1  Geometric Mean LOS (GMLOS) (days): 2.5  Days to GMLOS:1.5     OBJECTIVE:  Risk of Unplanned Readmission Score: 10.69         Current admission status: Inpatient   Preferred Pharmacy:   [facility] [address] - [address]  [address] [address]  Phone: [phone] Fax: [phone]    Primary Care Provider: Art Crowell MD    Primary Insurance: [employer] [facility]  Secondary Insurance:     DISCHARGE DETAILS:    Discharge planning  discussed with:: daughter Letty on phone     Comments - Freedom of Choice: CM discussed therapy recommendations for STR with Letty. Shared patient choice list. Letty would like to reserve bed at [facility]. Bed reserved. Auth requested, auto approved and sent to facility  CM contacted family/caregiver?: Yes  Were Treatment Team discharge recommendations reviewed with patient/caregiver?: Yes  Did patient/caregiver verbalize understanding of patient care needs?: N/A- going to facility  Were patient/caregiver advised of the risks associated with not following Treatment Team discharge recommendations?: Yes    Contacts  Patient Contacts: Letty Acosta  Relationship to Patient:: Family  Contact Method: Phone  Phone Number: [phone]  Reason/Outcome: Discharge Planning              Other Referral/Resources/Interventions Provided:  Interventions: Short Term Rehab  Referral Comments: Bed reserved at [facility]

## 2025-03-13 NOTE — PLAN OF CARE
Problem: PHYSICAL THERAPY ADULT  Goal: Performs mobility at highest level of function for planned discharge setting.  See evaluation for individualized goals.  Description: Treatment/Interventions: Functional transfer training, LE strengthening/ROM, Therapeutic exercise, Endurance training, Cognitive reorientation, Patient/family training, Equipment eval/education, Bed mobility, Gait training          See flowsheet documentation for full assessment, interventions and recommendations.  Note:    Problem List: Decreased strength, Decreased endurance, Impaired balance, Decreased mobility, Decreased safety awareness, Decreased cognition, Decreased coordination, Pain, Decreased range of motion  Assessment: Pt presents for multiple falls at home, increasing over the last week. Dx: ambulatory dysfunction, abnormal urinalysis, vertigo, dementia, and hydrocephalus. order placed for PT eval and tx, w/ activity order of activity as tolerated. pt presents w/ comorbidities of SAH, Alzheimer's, breast cancer, UTI, hydrocephalus, hyperlipidemia, HTN, OA, right DRAKE, right TKA, ventriculostomy, and BPPV and personal factors of mobilizing w/ assistive device, decreased cognition, positive fall history, anxiety, and depression. pt presents w/ weakness, decreased ROM, decreased endurance, impaired cognition, impaired balance, gait deviations, impaired coordination, decreased safety awareness, and fall risk. these impairments are evident in findings from physical examination (weakness, decreased ROM, and impaired coordination), mobility assessment (need for min assist w/ all phases of mobility when usually mobilizing independently, tolerance to only 15 feet of ambulation, and need for cueing for mobility technique), and Barthel Index: 50/100. pt needed input for task focus and mobility technique. pt is at risk for falls due to physical and safety awareness deficits. pt's clinical presentation is unstable/unpredictable (evident in poor  blood pressure control, need for assist w/ all phases of mobility when usually mobilizing independently, tolerance to only 15 feet of ambulation, lightheadedness impacting overall mobility status, and need for input for task focus and mobility technique/safety). pt needs inpatient PT tx to improve mobility deficits and progress mobility training as appropriate.        Rehab Resource Intensity Level, PT: II (Moderate Resource Intensity)    See flowsheet documentation for full assessment.

## 2025-03-13 NOTE — OCCUPATIONAL THERAPY NOTE
"    Occupational Therapy Evaluation     Patient Name: Samson Watts  Today's Date: 3/13/2025  Problem List  Active Problems:    GERD (gastroesophageal reflux disease)    Hydrocephalus (HCC)    Hyperlipidemia    Vertigo    Benign paroxysmal vertigo, bilateral    Depression    Ambulatory dysfunction    Abnormal urinalysis    Dementia (HCC)    Past Medical History  Past Medical History:   Diagnosis Date    Acute mastoiditis with intracranial complication 11/16/2022    Anxiety disorder     Brain hemangioma (HCC)     2016    Breast cancer (HCC) 2014    left breast  BRCA1&2 Negative    Constipation     Esophageal reflux     History of radiation therapy 2014    left breast ca    History of recurrent UTIs     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    Hyperlipidemia     Hypertension     Osteoarthritis of hip     Osteopenia     PONV (postoperative nausea and vomiting)     Subarachnoid hemorrhage (HCC)      Past Surgical History  Past Surgical History:   Procedure Laterality Date    BREAST BIOPSY Left 2014    BREAST LUMPECTOMY Left 2014    COLONOSCOPY      Fiberoptic - 2009, 2015 5 year f/u    ESOPHAGOGASTRODUODENOSCOPY  2009    Diag, Resolved - 2006 / 2009    EXOSTECTOMY  2005    Simple Bunion ([facility] Procedure)    FL LUMBAR PUNCTURE THERAPEUTIC  9/1/2023    HYSTERECTOMY  1981    JOINT REPLACEMENT Right 2012    Right Hip    OOPHORECTOMY  1981    not sure which one    REPLACEMENT TOTAL KNEE Right 2018    VENTRICULOSTOMY           03/13/25 0856   OT Last Visit   OT Visit Date 03/13/25   Note Type   Note type Evaluation   Additional Comments \"Gisella\"   Pain Assessment   Pain Assessment Tool 0-10   Pain Score No Pain   Restrictions/Precautions   Weight Bearing Precautions Per Order No   Other Precautions Cognitive;Chair Alarm;Bed Alarm;Fall Risk  ((+) dizziness)   Home Living   Type of Home Apartment  ([facility])   Home Layout One level;Able to live on main level with " "bedroom/bathroom;Access;Elevator  (4th floor apartment)   Bathroom Shower/Tub Tub/shower unit  (with cut out tub)   Bathroom Toilet Standard   Bathroom Equipment Grab bars in shower   Bathroom Accessibility Accessible   Home Equipment Walker;Cane;Grab bars   Prior Function   Level of [place] Independent with ADLs;Independent with functional mobility;Needs assistance with IADLS  (ambulates w/ RW at baseline)   Lives With (S)  Alone   Receives Help From Family;Home health  (currently has home aides through the waiver program (40 hrs a week))   IADLs Family/Friend/Other provides transportation;Family/Friend/Other provides meals;Independent with medication management  (Aides assist with laundry and meals. Daughter provides transportation. Pt reports being independent with med managment, use of pillbox)   Falls in the last 6 months (S)  5 to 10  (approx 5 falls in the past 6 months)   Vocational Retired   Lifestyle   Autonomy PTA pt living alone in apartment, pt (I) with ADLs and IADLs, (+)fall, (-)drives, use of RW at baseline   Reciprocal Relationships supportive daughter and HHAs   Service to Others retired   Intrinsic Gratification watching the hallmark channel   General   Additional Pertinent History Pt presents with multiple falls over the past week. CT head wo contrast without acute intracranial findings, and stable unchanged communicating hydrocephalus PMH: NPH, SAH, dementia, hx of breast cancer, HTN   Family/Caregiver Present No   Subjective   Subjective \"I have just been so dizzy\"   ADL   Eating Assistance 5  Supervision/Setup   Grooming Assistance 5  Supervision/Setup   Grooming Deficit Setup;Wash/dry hands  (standing at the sink w/ steadying (A))   UB Bathing Assistance 4  Minimal Assistance   LB Bathing Assistance 4  Minimal Assistance   UB Dressing Assistance 4  Minimal Assistance   LB Dressing Assistance 4  Minimal Assistance   Toileting Assistance  4  Minimal Assistance   Toileting Deficit " Setup;Verbal cueing;Supervison/safety;Increased time to complete;Grab bar use;Clothing management up;Clothing management down  ((A) to steady w/ support of RW to manage clothing up / down over hips. VC's for sequencing)   Additional Comments The above levels of assistance are anticipated based on functional performance deficits with use of clinical judgement.   Bed Mobility   Additional Comments Bed mobility NT: pt received ambulating in the room w/ PT RJ   Transfers   Sit to Stand 4  Minimal assistance   Additional items Assist x 1;Increased time required;Verbal cues   Stand to Sit 4  Minimal assistance   Additional items Assist x 1;Increased time required;Verbal cues;Armrests   Toilet transfer 4  Minimal assistance   Additional items Assist x 1;Increased time required;Verbal cues;Standard toilet  (use of R grab bar)   Additional Comments Use of RW. Verbal cues for optimal hand placement and body mechanics for safe transfers - pt w/ poor carryover. Pt tolerated static standing at the sink to perform hand washing w/ KADE. Posterior LOB w/ cervical extension, needing minimal for postural corrections   Functional Mobility   Functional Mobility 4  Minimal assistance   Additional Comments Ax1, functional household distance to/from the bathroom w/ use of RW. Intermittent dizziness. LOB w/ cervical flexion/extension needing increased assist to prevent a fall.   Additional items Rolling walker   Balance   Static Sitting Fair +   Dynamic Sitting Fair -   Static Standing Poor +   Dynamic Standing Poor   Activity Tolerance   Activity Tolerance Treatment limited secondary to medical complications (Comment)  ((+) dizziness)   Medical Staff Made Aware Spoke with PT   Nurse Made Aware Spoke with RN pre/post   RUE Assessment   RUE Assessment WFL   LUE Assessment   LUE Assessment WFL   Hand Function   Gross Motor Coordination Functional   Fine Motor Coordination Functional   Vision-Basic Assessment   Current Vision Wears glasses  all the time   Patient Visual Report Other (Comment)  (denies any acute visual changes)   Cognition   Overall Cognitive Status Impaired   Arousal/Participation Alert;Responsive;Cooperative   Attention Within functional limits   Orientation Level Oriented to person;Oriented to place;Oriented to situation;Disoriented to time   Memory Decreased recall of precautions;Decreased recall of recent events;Decreased short term memory   Following Commands Follows one step commands with increased time or repetition   Comments Pt ID via wristband, name and . Pt is pleasent and cooperative - limited insight into current limtiaitions and deficits. On 10/11/24 pt scored 4.2 on the ACLS recommending 24 hour supervision. Recommend assist with med managment and increased support/supervision in the home.   Assessment   Limitation Decreased ADL status;Decreased Safe judgement during ADL;Decreased cognition;Decreased endurance;Decreased self-care trans;Decreased high-level ADLs  (dizziness, stanading tolerance, balance)   Prognosis Good   Assessment Patient is a 80 y.o. female seen for OT evaluation following admission on 3/12/2025 with multiple falls. Please see above for comprehensive list of comorbidities and significant PMHx impacting functional performance. Upon initial evaluation, pt appears to be performing below baseline functional status. Pt requires KADE for UB ADLs, KADE for LB ADLs, KADE for transfers and KADE for functional mobility household distance with RW. The AM-PAC & Barthel Index outcome tools were used to assist in determining pt safety w/self care /mobility and appropriate d/c recommendations, see above for score. Occupational performance is affected by the following deficits:  decreased muscular strength , decreased balance , decreased standing tolerance for self care tasks , decreased dynamic balance impacting functional reach, decreased functional reach , decreased activity tolerance , impaired memory ,  impaired judgement and problem solving , impaired safety awareness , and impaired global mental status. Personal/Environmental factors impacting D/C include: (+) Hx of falls , decreased caregiver status , Assistance needed for ADL/IADLs, Assistance needed for ADLs and functional mobility, High fall risk , health management, and baseline cognitive deficits. Supporting factors include: able to maintain FFSU, support system available, and attitude towards recovery . Patient would benefit from OT services within the acute care setting to maximize level of functional independence in the following areas fall prevention , self-care transfers, bed mobility , functional mobility, formal cognitive evaluation, and ADLs.  From OT standpoint, recommendation at time of D/C would be Level II (Moderate Resource Intensity) .   Goals   Patient Goals to no longer be dizzy   LTG Time Frame 10-14   Long Term Goal See below   Plan   Treatment Interventions ADL retraining;Functional transfer training;Cognitive reorientation;Patient/family training;Equipment evaluation/education;Compensatory technique education;Energy conservation;Activityengagement  (cognitive assessments)   Goal Expiration Date 03/23/25   OT Treatment Day 0   OT Frequency 3-5x/wk   Discharge Recommendation   Rehab Resource Intensity Level, OT II (Moderate Resource Intensity)   AM-PAC Daily Activity Inpatient   Lower Body Dressing 3   Bathing 3   Toileting 3   Upper Body Dressing 3   Grooming 3   Eating 4   Daily Activity Raw Score 19   Daily Activity Standardized Score (Calc for Raw Score >=11) 40.22   AM-PAC Applied Cognition Inpatient   Following a Speech/Presentation 3   Understanding Ordinary Conversation 4   Taking Medications 2   Remembering Where Things Are Placed or Put Away 3   Remembering List of 4-5 Errands 2   Taking Care of Complicated Tasks 1   Applied Cognition Raw Score 15   Applied Cognition Standardized Score 33.54   Barthel Index   Feeding 10   Bathing  0   Grooming Score 0   Dressing Score 5   Bladder Score 5   Bowels Score 10   Toilet Use Score 5   Transfers (Bed/Chair) Score 10   Mobility (Level Surface) Score 0   Stairs Score 0   Barthel Index Score 45   End of Consult   Education Provided Yes   Patient Position at End of Consult Bedside chair;Bed/Chair alarm activated;All needs within reach   Nurse Communication Nurse aware of consult       GOALS:      -Patient will perform grooming tasks standing at sink with overall Mod I in order to increase overall independence     -Patient will be Mod I with UB dressing using AE and AD as needed in order to increase (I) with ADLs     -Patient will be Mod I with UB bathing using AE and AD as needed in order to increase (I) with ADLs     -Patient will be Mod I with LB dressing with use of AE and AD as needed in order to increase (I) with ADLs     -Patient will be Mod I with LB bathing with use of AE and AD as needed in order to increase (I) with ADLs    -Patient will complete toileting w/ Mod I w/ G hygiene/thoroughness in order to reduce caregiver burden     -Patient will demonstrate Mod I with bed mobility for ability to manage own comfort and initiate OOB tasks.      -Patient will perform functional transfers with Mod I to/from all surfaces using DME as needed in order to increase (I) with functional tasks     -Patient will be Mod I with functional mobility to/from bathroom for increased independence with toileting tasks     -Patient will tolerate therapeutic activities for greater than 30 min, in order to increase tolerance for functional activities.      -Patient will engage in ongoing cognitive assessment in order to assist with safe discharge planning/recommendations.     -Patient will demonstrate standing for 5 min in order to increase active participation in functional activities    -Patient will independently identify and utilize 2-3 positive coping strategies to enhance overall wellbeing and engagement in valued  occupations.    The patient's raw score on the AM-PAC Daily Activity Inpatient Short Form is 19. A raw score of greater than or equal to 19 suggests the patient may benefit from discharge to home. HOWEVER refer to the recommendation of the Occupational Therapist for safe discharge planning.    Shirley Hurtado MS OTR/L   [place] Licensure# [id number]

## 2025-03-13 NOTE — UTILIZATION REVIEW
"Initial Clinical Review    Admission: Date/Time/Statement:   Admission Orders (From admission, onward)       Ordered        03/12/25 1448  INPATIENT ADMISSION  Once                          Orders Placed This Encounter   Procedures    INPATIENT ADMISSION     Standing Status:   Standing     Number of Occurrences:   1     Level of Care:   Med Surg [16]     Estimated length of stay:   More than 2 Midnights     Certification:   I certify that inpatient services are medically necessary for this patient for a duration of greater than two midnights. See H&P and MD Progress Notes for additional information about the patient's course of treatment.     ED Arrival Information       Expected   -    Arrival   3/12/2025 10:00    Acuity   Urgent              Means of arrival   Ambulance    Escorted by   [facility]    Service   Hospitalist    Admission type   Emergency              Arrival complaint   *EMS/[facility]*             Chief Complaint   Patient presents with    Dizziness     Pt to ED via EMS from home with c/o dizziness. Patient reports \"vertigo episodes\" x1 week. Patient reports lowering self to floor. Denies fall       Initial Presentation: 80 y.o. female with hx of SAH in 2016, depression on zoloft, Alzheimer's Dementia on memantine , BPPV on prn meclizine , HLDwho presents to ED via EMS from home  for multiple falls at home, increasing over the last week. Pt had one fall on late Friday evening while the patient was walking with her walker and  fall this morning when patient got up to walk without her walker . Pt states \"my legs just gave out\".  Pt denies head strike or LOC . Per dgt, pt doesn't drink much water, lives a largely sedentary life style .Daughter states that dizziness episodes have been occurring multiple times in the past week. Meclizine improves her symptoms On exam, 4/5 weakness of bilateral lower extremities  Labs :UA with positive nitrites, microscopy with WBC of 10-20, occasional bacteria.  CT head wo " contrast without acute intracranial findings, and stable unchanged communicating hydrocephalus. CT C-spine without C-spine fracture or traumatic malalignment. CXR without acute findings. Pt given IV abx, IVF  in ED. Admitted as Inpatient with ambulatory dysfunction .Abnormal UA. Plan- Check orthostatic vitals . IVF . X 15 h . PT/OT . Hold on IV abx . F/U urine cx . Continue prn Meclizine .  Anticipated Length of Stay/Certification Statement: Patient will be admitted on an inpatient basis with an anticipated length of stay of greater than 2 midnights secondary to Ambulatory Dysfunction.     Date: 3/13    Day 2:    CK normal  . B12 581 . Mag normal . Patient endorses intermittent dizziness with standing up.  She feels as if she is spinning not the room.  .Orthostatics positive this am . Start midodrine 2.5 mg 3 times daily . Continue IVF. Repeat orthostatics tomorrow. Lab holiday tomorrow.  . Alert and oriented to self and place  . Denies any urinary symptoms including dysuria, frequency, urgency, hematuria, malodor Low suspicion of active UTI - monitor off abx . .   PT/OT level II rehab resource intensity- discussed with pt and dgt. Auth requested for STR     ED Treatment-Medication Administration from 03/12/2025 1000 to 03/12/2025 2133         Date/Time Order Dose Route Action     03/12/2025 1430 ceftriaxone (ROCEPHIN) 1 g/50 mL in dextrose IVPB 1,000 mg Intravenous New Bag     03/12/2025 1752 memantine (NAMENDA) tablet 10 mg 10 mg Oral Given     03/12/2025 1752 sodium chloride 0.9 % infusion 75 mL/hr Intravenous New Bag            Scheduled Medications:  bimatoprost, 1 drop, Both Eyes, HS  calcium carbonate-vitamin D, 1 tablet, Oral, Daily With Breakfast  enoxaparin, 40 mg, Subcutaneous, Daily  ezetimibe, 10 mg, Oral, Daily  memantine, 10 mg, Oral, BID  pantoprazole, 20 mg, Oral, Daily Before Breakfast  sertraline, 200 mg, Oral, Daily    midodrine (PROAMATINE) tablet 2.5 mg  Dose: 2.5 mg  Freq: 3 times daily before  meals Route: PO  Start: 03/13/25 1600  Continuous IV Infusions:  sodium chloride, 75 mL/hr, Intravenous, Continuous      PRN Meds:  ALPRAZolam, 0.25 mg, Oral, HS PRN  meclizine, 25 mg, Oral, Q8H PRN      ED Triage Vitals   Temperature Pulse Respirations Blood Pressure SpO2 Pain Score   03/12/25 1211 03/12/25 1010 03/12/25 1010 03/12/25 1010 03/12/25 1010 03/12/25 2100   (!) 97.4 °F (36.3 °C) 63 18 139/87 98 % No Pain     Weight (last 2 days)       Date/Time Weight    03/12/25 21:38:15 71.5 (157.6)          Date/Time Temp Pulse Resp BP MAP (mmHg) SpO2 O2 Device Patient Position - Orthostatic VS   03/13/25 15:07:37 98.1 °F (36.7 °C) 80 -- 121/81 94 98 % -- --   03/13/25 08:55:17 -- 75 -- 123/67 86 98 % -- --   03/13/25 08:34:47 -- 93 -- 103/64 77 96 % -- Standing - Orthostatic VS    Patient Position - Orthostatic VS: pt was unable to maintain standing x 3 minutes. notified Evelin WANG at 03/13/25 0834   03/13/25 08:32:28 -- 70 -- 115/66 82 94 % -- Sitting - Orthostatic VS   03/13/25 08:31:10 -- 70 -- 139/70 93 96 % -- Lying - Orthostatic VS       Vital Signs (last 3 days)       Date/Time Temp Pulse Resp BP MAP (mmHg) SpO2 O2 Device [place] Coma Scale Score Pain    03/12/25 22:59:54 97.9 °F (36.6 °C) 65 18 137/68 91 95 % -- -- --    03/12/25 2145 -- -- -- -- -- -- -- 15 No Pain    03/12/25 21:38:15 98 °F (36.7 °C) 64 18 123/70 88 96 % -- -- --    03/12/25 2100 -- -- -- -- -- -- -- -- No Pain    03/12/25 1900 -- 73 18 148/67 97 95 % None (Room air) -- --    03/12/25 1700 -- 75 18 107/61 77 96 % -- -- --    03/12/25 1600 -- 69 18 112/70 84 96 % -- -- --    03/12/25 1430 -- 76 18 134/103 116 96 % -- -- --    03/12/25 1213 -- 70 18 155/87 -- 95 % -- 15 --    03/12/25 1211 97.4 °F (36.3 °C) -- -- -- -- -- -- -- --    03/12/25 1011 -- -- -- -- -- -- -- 15 --    03/12/25 1010 -- 63 18 139/87 -- 98 % -- -- --              Pertinent Labs/Diagnostic Test Results:   Radiology:  XR chest 1 view portable   ED Interpretation by  Rosa Mae MD (03/12 1227)   No acute cardiopulmonary abnormalities      Final Interpretation by Juan Colon MD (03/12 1322)      No acute cardiopulmonary disease.            Workstation performed: [id number]         CT head wo contrast   Final Interpretation by Juan Moulton MD (03/12 1139)      1.  No acute intracranial findings or interval change.      2.  Chronic, unchanged sequelae of communicating hydrocephalus.               Resident: Dave Lee I, the attending radiologist, have reviewed the images and agree with the final report above.      Workstation performed: [id number]         CT spine cervical without contrast   Final Interpretation by Juan Moulton MD (03/12 1144)      No cervical spine fracture or traumatic malalignment.                  Resident: Dave Lee I, the attending radiologist, have reviewed the images and agree with the final report above.      Workstation performed: [id number]           Cardiology:  ECG 12 lead   Final Result by Michele Robles MD (03/12 0569)   Sinus rhythm with Premature atrial complexes in a pattern of bigeminy   Otherwise normal ECG   When compared with ECG of 10-Oct-2024 12:22,   Premature atrial complexes are now Present   Confirmed by Michele Robles ([id number]) on 3/12/2025 3:59:44 PM            Results from last 7 days   Lab Units 03/13/25  0551 03/12/25  1041   WBC Thousand/uL 7.44 6.55   HEMOGLOBIN g/dL 12.5 12.9   HEMATOCRIT % 38.9 39.4   PLATELETS Thousands/uL 290 294   TOTAL NEUT ABS Thousands/µL  --  4.43         Results from last 7 days   Lab Units 03/13/25  0551 03/12/25  1041   SODIUM mmol/L 141 141   POTASSIUM mmol/L 4.3 3.7   CHLORIDE mmol/L 107 104   CO2 mmol/L 30 30   ANION GAP mmol/L 4 7   BUN mg/dL 13 12   CREATININE mg/dL 0.84 0.78   EGFR ml/min/1.73sq m 66 72   CALCIUM mg/dL 9.3 9.5   MAGNESIUM mg/dL  --  2.3     Results from last 7 days   Lab Units 03/12/25  1041   AST U/L 18   ALT U/L 14   ALK PHOS U/L 84   TOTAL  PROTEIN g/dL 7.2   ALBUMIN g/dL 4.2   TOTAL BILIRUBIN mg/dL 0.55         Results from last 7 days   Lab Units 03/13/25  0551 03/12/25  1041   GLUCOSE RANDOM mg/dL 89 86               Results from last 7 days   Lab Units 03/12/25  1041   CK TOTAL U/L 40     Results from last 7 days   Lab Units 03/12/25  1041   HS TNI 0HR ng/L 3                   Results from last 7 days   Lab Units 03/12/25  1230   CLARITY UA  Clear   COLOR UA  Colorless   SPEC GRAV UA  1.006   PH UA  8.0   GLUCOSE UA mg/dl Negative   KETONES UA mg/dl Negative   BLOOD UA  Negative   PROTEIN UA mg/dl Negative   NITRITE UA  Positive*   BILIRUBIN UA  Negative   UROBILINOGEN UA (BE) mg/dl <2.0   LEUKOCYTES UA  Large*   WBC UA /hpf 10-20*   RBC UA /hpf None Seen   BACTERIA UA /hpf Occasional   EPITHELIAL CELLS WET PREP /hpf Occasional                 Past Medical History:   Diagnosis Date    Acute mastoiditis with intracranial complication 11/16/2022    Anxiety disorder     Brain hemangioma (HCC)     2016    Breast cancer (HCC) 2014    left breast  BRCA1&2 Negative    Constipation     Esophageal reflux     History of radiation therapy 2014    left breast ca    History of recurrent UTIs     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    Hyperlipidemia     Hypertension     Osteoarthritis of hip     Osteopenia     PONV (postoperative nausea and vomiting)     Subarachnoid hemorrhage (HCC)      Present on Admission:   Hyperlipidemia   Vertigo   Benign paroxysmal vertigo, bilateral   Depression   Ambulatory dysfunction   GERD (gastroesophageal reflux disease)   Hydrocephalus (HCC)      Admitting Diagnosis: Dizziness [R42]  UTI (urinary tract infection) [N39.0]  Multiple falls [R29.6]  Ambulatory dysfunction [R26.2]  Age/Sex: 80 y.o. female    Network Utilization Review Department  ATTENTION: Please call with any questions or concerns to [phone] and carefully listen to the prompts so that you are directed to the right person. All voicemails  are confidential.   For Discharge needs, contact Care Management DC Support Team at [phone] opt. 2  Send all requests for admission clinical reviews, approved or denied determinations and any other requests to dedicated fax number below belonging to the campus where the patient is receiving treatment. List of dedicated fax numbers for the Facilities:  FACILITY NAME UR FAX NUMBER   ADMISSION DENIALS (Administrative/Medical Necessity) [phone]   DISCHARGE SUPPORT TEAM (NETWORK) [phone]   PARENT CHILD HEALTH (Maternity/NICU/Pediatrics) [phone]   [facility] [phone]   [facility] [phone]   [facility] [phone]   [facility] [phone]   [facility] [phone]   [facility] [phone]   [facility] [phone]   [facility] [phone]   [facility] [phone]   [facility] [phone]   [facility] [phone]   [facility] [phone]

## 2025-03-13 NOTE — ASSESSMENT & PLAN NOTE
Hx of ambulatory dysfunction and falls, most recently admitted on October of 2024 due to dizziness managed with meclizine. The patient was discharged to STR with improvement in ambulation and no falls since October discharge until last Friday, 3/7/25 and again this morning, 3/12/25.    The patient states this morning, she got up to answer her door for her home nursing aide. She got up and started walking without her walker and felt her legs gave out. She denies prodromal symptoms at that time, denies head strike or LOC. The patient states she was awake and alert prior to, during and after the fall.   3/12 Patient orthostatic positive  PT/OT level 2  Discussed with STR with pt and CM  CK normal   B12 581  Mag normal      Plan:  Start midodrine 2.5 mg 3 times daily  Continue IV NS 75 ml/hr  Repeat orthostatics tomorrow  Lab holiday tomorrow

## 2025-03-13 NOTE — PLAN OF CARE
Problem: Potential for Falls  Goal: Patient will remain free of falls  Description: INTERVENTIONS:  - Educate patient/family on patient safety including physical limitations  - Instruct patient to call for assistance with activity   - Consult OT/PT to assist with strengthening/mobility   - Keep Call bell within reach  - Keep bed low and locked with side rails adjusted as appropriate  - Keep care items and personal belongings within reach  - Initiate and maintain comfort rounds  - Make Fall Risk Sign visible to staff  - Offer Toileting every 2 Hours, in advance of need  - Initiate/Maintain bed/chair alarm    Problem: INFECTION - ADULT  Goal: Absence or prevention of progression during hospitalization  Description: INTERVENTIONS:  - Assess and monitor for signs and symptoms of infection  - Monitor lab/diagnostic results  - Monitor all insertion sites, i.e. indwelling lines, tubes, and drains  - Monitor endotracheal if appropriate and nasal secretions for changes in amount and color  - [place] appropriate cooling/warming therapies per order  - Administer medications as ordered  - Instruct and encourage patient and family to use good hand hygiene technique  - Identify and instruct in appropriate isolation precautions for identified infection/condition  Outcome: Progressing     Problem: DISCHARGE PLANNING  Goal: Discharge to home or other facility with appropriate resources  Description: INTERVENTIONS:  - Identify barriers to discharge w/patient and caregiver  - Arrange for needed discharge resources and transportation as appropriate  - Identify discharge learning needs (meds, wound care, etc.)  - Arrange for interpretive services to assist at discharge as needed  - Refer to Case Management Department for coordinating discharge planning if the patient needs post-hospital services based on physician/advanced practitioner order or complex needs related to functional status, cognitive ability, or social support  system  Outcome: Progressing   - Apply yellow socks and bracelet for high fall risk patients  - Consider moving patient to room near nurses station  Outcome: Progressing

## 2025-03-13 NOTE — PLAN OF CARE
Problem: OCCUPATIONAL THERAPY ADULT  Goal: Performs self-care activities at highest level of function for planned discharge setting.  See evaluation for individualized goals.  Description: Treatment Interventions: ADL retraining, Functional transfer training, Cognitive reorientation, Patient/family training, Equipment evaluation/education, Compensatory technique education, Energy conservation, Activityengagement (cognitive assessments)          See flowsheet documentation for full assessment, interventions and recommendations.   Note: Limitation: Decreased ADL status, Decreased Safe judgement during ADL, Decreased cognition, Decreased endurance, Decreased self-care trans, Decreased high-level ADLs (dizziness, stanading tolerance, balance)  Prognosis: Good  Assessment: Patient is a 80 y.o. female seen for OT evaluation following admission on 3/12/2025 with multiple falls. Please see above for comprehensive list of comorbidities and significant PMHx impacting functional performance. Upon initial evaluation, pt appears to be performing below baseline functional status. Pt requires KADE for UB ADLs, KADE for LB ADLs, KADE for transfers and KAED for functional mobility household distance with RW. The AM-PAC & Barthel Index outcome tools were used to assist in determining pt safety w/self care /mobility and appropriate d/c recommendations, see above for score. Occupational performance is affected by the following deficits:  decreased muscular strength , decreased balance , decreased standing tolerance for self care tasks , decreased dynamic balance impacting functional reach, decreased functional reach , decreased activity tolerance , impaired memory , impaired judgement and problem solving , impaired safety awareness , and impaired global mental status. Personal/Environmental factors impacting D/C include: (+) Hx of falls , decreased caregiver status , Assistance needed for ADL/IADLs, Assistance needed for ADLs and  functional mobility, High fall risk , health management, and baseline cognitive deficits. Supporting factors include: able to maintain FFSU, support system available, and attitude towards recovery . Patient would benefit from OT services within the acute care setting to maximize level of functional independence in the following areas fall prevention , self-care transfers, bed mobility , functional mobility, formal cognitive evaluation, and ADLs.  From OT standpoint, recommendation at time of D/C would be Level II (Moderate Resource Intensity) .     Rehab Resource Intensity Level, OT: II (Moderate Resource Intensity)     Shirley Hurtado MS OTR/L   [place] Licensure# [id number]

## 2025-03-13 NOTE — PHYSICAL THERAPY NOTE
PHYSICAL THERAPY EVALUATION NOTE    Patient Name: Samson Watts  Today's Date: 3/13/2025  AGE:   80 y.o.  Mrn:   [id number]  ADMIT DX:  Dizziness [R42]  UTI (urinary tract infection) [N39.0]  Multiple falls [R29.6]  Ambulatory dysfunction [R26.2]    Past Medical History:   Diagnosis Date    Acute mastoiditis with intracranial complication 11/16/2022    Anxiety disorder     Brain hemangioma (HCC)     2016    Breast cancer (HCC) 2014    left breast  BRCA1&2 Negative    Constipation     Esophageal reflux     History of radiation therapy 2014    left breast ca    History of recurrent UTIs     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    Hyperlipidemia     Hypertension     Osteoarthritis of hip     Osteopenia     PONV (postoperative nausea and vomiting)     Subarachnoid hemorrhage (HCC)      Length Of Stay: 1  PHYSICAL THERAPY EVALUATION :    03/13/25 0832   PT Last Visit   PT Visit Date 03/13/25   Pain Assessment   Pain Assessment Tool 0-10   Pain Score No Pain   Restrictions/Precautions   Other Precautions Bed Alarm;Chair Alarm;Fall Risk;Limb alert;Cognitive  (CLEOPATRA Desai limb alert)   Home Living   Type of Home Apartment   Home Layout One level;Elevator   Additional Comments lives alone. has aide 40 hours/week through waiver program. receives assist w/ ADLs and IADLs. 5 falls in last 6 months. ambulates w/ rollator.   General   Additional Pertinent History (S)  room air resting pulse ox 98%, active 93%. blood pressure supine 139/70 and 70 BPM, seated 115/66 and 70 BPM, standing 103/64 and 93 BPM. pt was unable to maintain standing x 3 minutes.  (notified Maranda Hill and [place]in [place] [facility] [place]   Family/Caregiver Present No   Cognition   Arousal/Participation Cooperative   Orientation Level Oriented to person;Oriented to place;Other (Comment)  (pt was identified w/ full name)   Following Commands Follows one step commands  with increased time or repetition   Subjective   Subjective pt seen sitting out of bed. agreed to PT eval. denied pain. states feeling mildly lightheaded all the time, which worses intermittently. occasional cues were needed for task focus.   RUE Assessment   RUE Assessment WFL   LUE Assessment   LUE Assessment WFL   RLE Assessment   RLE Assessment WFL  (4- to 4/5)   LLE Assessment   LLE Assessment WFL  (4- to 4/5)   Vision-Basic Assessment   Current Vision Wears glasses all the time   Vestibular   Vestibular Comments forward and lateral gaze normal. cervical active ROM: limited all directions, + lightheaded w/ flexion and extension (worse w/ flexion). smooth pursuits: normal.   Coordination   Finger to Nose & Finger to Finger  Impaired  (L UE)   Heel to Shin Intact   Light Touch   RLE Light Touch Grossly intact   LLE Light Touch Grossly intact   Transfers   Sit to Stand 4  Minimal assistance   Additional items Assist x 1;Increased time required   Stand to Sit 4  Minimal assistance  (poorly controlled descent to sitting surface)   Additional items Assist x 1;Impulsive;Verbal cues  (for body positioning, safety)   Ambulation/Elevation   Gait pattern Foward flexed;Short stride;Narrow RENEA;Decreased foot clearance   Gait Assistance 4  Minimal assist  (w/ chair follow)   Additional items Assist x 1;Verbal cues;Tactile cues  (for walker positioning, full step length, use of fixed visual point)   Assistive Device Rolling walker   Distance 15 feet  (additional ambulation not possible due to fatigue)   Balance   Static Sitting Fair +   Dynamic Sitting Poor +   Static Standing Poor +  (w/ roller walker)   Ambulatory Poor +  (w/ roller walker)   Activity Tolerance   Activity Tolerance Patient limited by fatigue;Patient limited by pain   Nurse Made Aware spoke to Evelin MARCELINO, Shirley OT, Karen CM, Maranda Hill and Julieta   Assessment   Problem List Decreased strength;Decreased endurance;Impaired balance;Decreased  mobility;Decreased safety awareness;Decreased cognition;Decreased coordination;Pain;Decreased range of motion   Assessment Pt presents for multiple falls at home, increasing over the last week. Dx: ambulatory dysfunction, abnormal urinalysis, vertigo, dementia, and hydrocephalus. order placed for PT eval and tx, w/ activity order of activity as tolerated. pt presents w/ comorbidities of SAH, Alzheimer's, breast cancer, UTI, hydrocephalus, hyperlipidemia, HTN, OA, right DRAKE, right TKA, ventriculostomy, and BPPV and personal factors of mobilizing w/ assistive device, decreased cognition, positive fall history, anxiety, and depression. pt presents w/ weakness, decreased ROM, decreased endurance, impaired cognition, impaired balance, gait deviations, impaired coordination, decreased safety awareness, and fall risk. these impairments are evident in findings from physical examination (weakness, decreased ROM, and impaired coordination), mobility assessment (need for min assist w/ all phases of mobility when usually mobilizing independently, tolerance to only 15 feet of ambulation, and need for cueing for mobility technique), and Barthel Index: 50/100. pt needed input for task focus and mobility technique. pt is at risk for falls due to physical and safety awareness deficits. pt's clinical presentation is unstable/unpredictable (evident in poor blood pressure control, need for assist w/ all phases of mobility when usually mobilizing independently, tolerance to only 15 feet of ambulation, lightheadedness impacting overall mobility status, and need for input for task focus and mobility technique/safety). pt needs inpatient PT tx to improve mobility deficits and progress mobility training as appropriate.   Goals   Patient Goals I want my life to straighten out   STG Expiration Date 03/27/25   Short Term Goal #1 pt will: Increase bilateral LE strength 1/2 grade to facilitate independent mobility, Perform bed mobility  independently to increase level of independence, Perform all transfers modified independent to improve independence, Ambulate 200 ft. with roller walker modified independent w/o LOB to improve functional independence, Increase all balance 1 grade to decrease risk for falls, Complete exercise program independently to increase strength and endurance, Tolerate 3 hr OOB to faciliate upright tolerance, Tolerate standing 2 minutes modified independent to facilitate functional task performance, Complete 4 Item Dynamic Gait Index w/ score of 10/12 or greater to decrease fall risk, Improve Barthel Index score to 70 or greater to facilitate independence, and Complete Timed Up and Go or Comfortable Gait Speed to further assess mobility and monitor progress   PT Treatment Day 1   Plan   Treatment/Interventions Functional transfer training;LE strengthening/ROM;Therapeutic exercise;Endurance training;Cognitive reorientation;Patient/family training;Equipment eval/education;Bed mobility;Gait training   PT Frequency 3-5x/wk   Discharge Recommendation   Rehab Resource Intensity Level, PT II (Moderate Resource Intensity)   AM-PAC Basic Mobility Inpatient   Turning in Flat Bed Without Bedrails 3   Lying on Back to Sitting on Edge of Flat Bed Without Bedrails 2   Moving Bed to Chair 3   Standing Up From Chair Using Arms 3   Walk in Room 3   Climb 3-5 Stairs With Railing 1   Basic Mobility Inpatient Raw Score 15   Basic Mobility Standardized Score 36.97   [facility] Highest Level Of Mobility   -[facility] Goal 4: Move to chair/commode   -HL Achieved 7: Walk 25 feet or more   Barthel Index   Feeding 10   Bathing 0   Grooming Score 5   Dressing Score 5   Bladder Score 5   Bowels Score 10   Toilet Use Score 5   Transfers (Bed/Chair) Score 10   Mobility (Level Surface) Score 0   Stairs Score 0   Barthel Index Score 50   Additional Treatment Session   Start Time 0832   End Time 0847   Treatment Assessment Pt agreed to participate in PT  intervention. Pt completed additional mobilization to address physical and mobility deficits noted during eval. Therapist provided education to pt including walker management and transfer technique. Sit < - > stand transfer w/ supervision to min. Ambulated 40 feet, 60 feet and 30 feet w/ roller walker w/ minx1 and chair follow. Seated rest breaks were required.  Additional ambulation was not possible due to fatigue and lightheadedness. 4 Item DGI: 7/12. Pt shows improvement w/ increased activity tolerance. Further inpatient PT intervention is necessary to maximize functional independence.   Equipment Use roller walker   Additional Treatment Day 1   End of Consult   Patient Position at End of Consult Bedside chair;Bed/Chair alarm activated;All needs within reach     The patient's AM-PAC Basic Mobility Inpatient Short Form Raw Score is 15. A Raw score of less than or equal to 16 suggests the patient may benefit from discharge to post-acute rehabilitation services. Please also refer to the recommendation of the Physical Therapist for safe discharge planning.    4 Item Dynamic Gait Index  2/3 Gait level surface  2/3 Change in gait speed  2/3 Gait with horizontal head turns  1/3 Gait with vertical head turns  7/12 total score (<10/12 indicates increased risk of fall)    Skilled PT recommended while in hospital and upon DC to progress pt toward treatment goals.     Jose Antonio Aragon, PT

## 2025-03-13 NOTE — PROGRESS NOTES
Message received from patient's dtr Letty requesting return call from OP SW.  Letty states patient had a fall on Friday night but did not go to hospital for evaluation as patient declined this.  Letty feels it is time to proceed with placement.  Letty spoke with [facility] regarding previous paperwork she completed and was informed that a new medical evaluation form is needed.    Chart reviewed.  Patient was admitted to [facility] yesterday.  IPCM working with patient and Letty on STR/LTC placement options.    Call placed to Letty to check status.  Letty confirmed current admission, states patient fell again yesterday morning, aide had just arrived & was outside pt's apartment when she heard the fall, and called Letty & EMS.  Letty states patient was not doing well living on her own; Letty was unsure if patient was taking her medications correctly, pt was experiencing sundowners, was not waking up until noon, and was waiting to eat meals.    Letty previously asked patient's waiver  to obtain list of facilities that accept patient's [facility] Choice Plan but does not yet have a list; per chart review, IPCM called [facility] to request this information as well.  Letty was previously working with [facility] but patient is not interested in this facility at this time.  Discussed recommendation of patient transitioning to facility that can provide both STR and LTC placement rather than needing to move facilities after STR is complete; Letty agreed.    Will send Epic Secure Chat message to IPCM and continue to follow.

## 2025-03-13 NOTE — PROGRESS NOTES
Progress Note - Hospitalist   Name: Samson Watts 80 y.o. female I MRN: [id number]  Unit/Bed#: W -01 I Date of Admission: 3/12/2025   Date of Service: 3/13/2025 I Hospital Day: 1    Assessment & Plan  Ambulatory dysfunction  Hx of ambulatory dysfunction and falls, most recently admitted on October of 2024 due to dizziness managed with meclizine. The patient was discharged to [facility] with improvement in ambulation and no falls since October discharge until last Friday, 3/7/25 and again this morning, 3/12/25.    The patient states this morning, she got up to answer her door for her home nursing aide. She got up and started walking without her walker and felt her legs gave out. She denies prodromal symptoms at that time, denies head strike or LOC. The patient states she was awake and alert prior to, during and after the fall.   3/12 Patient orthostatic positive  PT/OT level 2  Discussed with STR with pt and CM  CK normal   B12 581  Mag normal      Plan:  Start midodrine 2.5 mg 3 times daily  Continue IV NS 75 ml/hr  Repeat orthostatics tomorrow  Lab holiday tomorrow  Orthostatic hypotension  See plan above  Abnormal urinalysis  3/12/25 UA positive for nitrites, large leukocytes  3/12/25 Urine Microscopy with 10-20 WBCs, occasional bacteria but also epithelial cells.     The patient has a history of incontinence and does not endorse more frequent diaper changes due to wetting, dysuria or hematuria. Previous Urine cultures with pansensitive E.coli, most recently in September of 2024.    WBC on admission 6.55, patient does not meet any SIRS criteria  Patient received 1 dose of Ceftriaxone in the ED    Low suspicion of active UTI    Plan:  -Monitor off antibiotics  -F/u urine culture  Benign paroxysmal vertigo, bilateral  Patient with hx of BPPV, previously admitted on October of 2024 with dizziness that resolved. Patient on home meclizine q8h PRN for dizziness which she endorses as responsive to meclizine. On  examination, she does not endorse current dizziness when laying or transition to sitting.     Plan:  -Continue meclizine 25 mg q8h PRN  -If new dizziness develops unresponsive to meclizine, consider valium  Dementia (HCC)  Home medication: Memantine XR 28 mg qd    Plan:  -Continue memantine with switch to non XR form = 10 mg BID  -Requested daughter bring XR formulation from home  Hydrocephalus (HCC)  Pt with known hx of communicating hydrocephalus    CT head wo contrast 3/12/2025:   1.  No acute intracranial findings or interval change.  2.  Chronic, unchanged sequelae of communicating hydrocephalus  Depression  Home medication: Zoloft 200 mg po qd    Plan:  -Continue home medication   Hyperlipidemia  Home medication: Zetia 10 mg qd    -Continue home medication  GERD (gastroesophageal reflux disease)  Home medication: Nexium 10 mg qAM    Plan:  -Continue PPI with protonix equivalent    VTE Pharmacologic Prophylaxis: VTE Score: 4 Moderate Risk (Score 3-4) - Pharmacological DVT Prophylaxis Ordered: enoxaparin (Lovenox).    Mobility:   Basic Mobility Inpatient Raw Score: 15  JH-HLM Goal: 4: Move to chair/commode  JH-HLM Achieved: 7: Walk 25 feet or more  JH-HLM Goal achieved. Continue to encourage appropriate mobility.    Patient Centered Rounds: I performed bedside rounds with nursing staff today.   Discussions with Specialists or Other Care Team Provider:     Education and Discussions with Family / Patient: Patient declined call to .     Current Length of Stay: 1 day(s)  Current Patient Status: Inpatient   Certification Statement: The patient will continue to require additional inpatient hospital stay due to orthostatic hypotension, recurrent falls, dizziness  Discharge Plan: Anticipate discharge in 24-48 hrs to TBD    Code Status: Level 3 - DNAR and DNI    Subjective   Patient seen and examined at bedside. No overnight events.  Patient endorses intermittent dizziness with standing up.  She feels as if  she is spinning not the room.  Reports sleeping well last night and is tolerating p.o. without difficulty.  Denies any urinary symptoms including dysuria, frequency, urgency, hematuria, malodor.      Objective :  Temp:  [97.6 °F (36.4 °C)-98.1 °F (36.7 °C)] 98.1 °F (36.7 °C)  HR:  [64-93] 80  BP: (103-149)/(61-83) 121/81  Resp:  [18] 18  SpO2:  [94 %-98 %] 98 %  O2 Device: None (Room air)    Body mass index is 23.27 kg/m².     Input and Output Summary (last 24 hours):     Intake/Output Summary (Last 24 hours) at 3/13/2025 1539  Last data filed at 3/13/2025 1400  Gross per 24 hour   Intake 2230 ml   Output --   Net 2230 ml       Physical Exam  Vitals and nursing note reviewed.   Constitutional:       Appearance: Normal appearance.   HENT:      Head: Normocephalic and atraumatic.      Mouth/Throat:      Mouth: Mucous membranes are moist.   Eyes:      Extraocular Movements: Extraocular movements intact.      Conjunctiva/sclera: Conjunctivae normal.      Pupils: Pupils are equal, round, and reactive to light.   Cardiovascular:      Rate and Rhythm: Normal rate and regular rhythm.   Pulmonary:      Effort: Pulmonary effort is normal. No respiratory distress.      Breath sounds: Normal breath sounds.   Abdominal:      General: Bowel sounds are normal. There is no distension.      Palpations: Abdomen is soft.      Tenderness: There is no abdominal tenderness. There is no guarding.   Skin:     General: Skin is warm and dry.   Neurological:      General: No focal deficit present.      Mental Status: She is alert. Mental status is at baseline.      Sensory: Sensation is intact.      Motor: No weakness.      Comments: Alert and oriented to self and place   Psychiatric:         Mood and Affect: Mood normal.           Lines/Drains:              Lab Results: I have reviewed the following results:   Results from last 7 days   Lab Units 03/13/25  0551 03/12/25  1041   WBC Thousand/uL 7.44 6.55   HEMOGLOBIN g/dL 12.5 12.9    HEMATOCRIT % 38.9 39.4   PLATELETS Thousands/uL 290 294   SEGS PCT %  --  68   LYMPHO PCT %  --  20   MONO PCT %  --  8   EOS PCT %  --  3     Results from last 7 days   Lab Units 03/13/25  0551 03/12/25  1041   SODIUM mmol/L 141 141   POTASSIUM mmol/L 4.3 3.7   CHLORIDE mmol/L 107 104   CO2 mmol/L 30 30   BUN mg/dL 13 12   CREATININE mg/dL 0.84 0.78   ANION GAP mmol/L 4 7   CALCIUM mg/dL 9.3 9.5   ALBUMIN g/dL  --  4.2   TOTAL BILIRUBIN mg/dL  --  0.55   ALK PHOS U/L  --  84   ALT U/L  --  14   AST U/L  --  18   GLUCOSE RANDOM mg/dL 89 86                       Recent Cultures (last 7 days):   Results from last 7 days   Lab Units 03/12/25  1230   URINE CULTURE  >100,000 cfu/ml Gram Negative Alexis Enteric Like*             Last 24 Hours Medication List:     Current Facility-Administered Medications:     ALPRAZolam (XANAX) tablet 0.25 mg, HS PRN    bimatoprost (LUMIGAN) 0.03 % ophthalmic drops 1 drop, HS    calcium carbonate-vitamin D 500 mg-5 mcg tablet 1 tablet, Daily With Breakfast    enoxaparin (LOVENOX) subcutaneous injection 40 mg, Daily    ezetimibe (ZETIA) tablet 10 mg, Daily    meclizine (ANTIVERT) tablet 25 mg, Q8H PRN    memantine (NAMENDA) tablet 10 mg, BID    midodrine (PROAMATINE) tablet 2.5 mg, TID AC    pantoprazole (PROTONIX) EC tablet 20 mg, Daily Before Breakfast    sertraline (ZOLOFT) tablet 200 mg, Daily    sodium chloride 0.9 % infusion, Continuous, Last Rate: 75 mL/hr (03/13/25 1142)    Administrative Statements   Today, Patient Was Seen By: Tete Lea MD      **Please Note: This note may have been constructed using a voice recognition system.**

## 2025-03-13 NOTE — PLAN OF CARE

## 2025-03-14 VITALS
HEIGHT: 69 IN | BODY MASS INDEX: 23.34 KG/M2 | WEIGHT: 157.6 LBS | RESPIRATION RATE: 16 BRPM | DIASTOLIC BLOOD PRESSURE: 71 MMHG | OXYGEN SATURATION: 97 % | HEART RATE: 60 BPM | TEMPERATURE: 97.8 F | SYSTOLIC BLOOD PRESSURE: 146 MMHG

## 2025-03-14 LAB — BACTERIA UR CULT: ABNORMAL

## 2025-03-14 PROCEDURE — 99239 HOSP IP/OBS DSCHRG MGMT >30: CPT | Performed by: INTERNAL MEDICINE

## 2025-03-14 RX ORDER — CEPHALEXIN 500 MG/1
500 CAPSULE ORAL EVERY 6 HOURS SCHEDULED
Start: 2025-03-14 | End: 2025-03-17

## 2025-03-14 RX ORDER — DOCUSATE SODIUM 100 MG/1
100 CAPSULE, LIQUID FILLED ORAL AS NEEDED
Start: 2025-03-14

## 2025-03-14 RX ORDER — MIDODRINE HYDROCHLORIDE 2.5 MG/1
2.5 TABLET ORAL
Start: 2025-03-14

## 2025-03-14 RX ADMIN — MIDODRINE HYDROCHLORIDE 2.5 MG: 2.5 TABLET ORAL at 05:34

## 2025-03-14 RX ADMIN — MIDODRINE HYDROCHLORIDE 2.5 MG: 2.5 TABLET ORAL at 12:01

## 2025-03-14 RX ADMIN — MEMANTINE 10 MG: 10 TABLET ORAL at 08:37

## 2025-03-14 RX ADMIN — ENOXAPARIN SODIUM 40 MG: 40 INJECTION SUBCUTANEOUS at 08:38

## 2025-03-14 RX ADMIN — EZETIMIBE 10 MG: 10 TABLET ORAL at 08:38

## 2025-03-14 RX ADMIN — SERTRALINE HYDROCHLORIDE 200 MG: 100 TABLET ORAL at 08:37

## 2025-03-14 RX ADMIN — Medication 1 TABLET: at 08:37

## 2025-03-14 RX ADMIN — PANTOPRAZOLE SODIUM 20 MG: 20 TABLET, DELAYED RELEASE ORAL at 05:34

## 2025-03-14 NOTE — DISCHARGE INSTR - AVS FIRST PAGE
Dear Samson Watts,     It was our pleasure to care for you here at [facility].  It is our hope that we were always able to exceed the expected standards for your care during your stay.  You were hospitalized due to fall.  You were cared for on the fourth floor by Tete Lea MD under the service of Jeni Hill MD with the [facility] Hospitalist Group who covers for your primary care physician (PCP), Art Crowell MD, while you were hospitalized.  If you have any questions or concerns related to this hospitalization, you may contact us at .  For follow up as well as any medication refills, we recommend that you follow up with your primary care physician.  A registered nurse will reach out to you by phone within a few days after your discharge to answer any additional questions that you may have after going home.  However, at this time we provide for you here, the most important instructions / recommendations at discharge:     Notable Medication Adjustments -   Start cephalexin/Keflex 500 mg, 1 tablet, every 6 hours for 3 days starting today, 3/14  Start midodrine/proamatine 2.5mg, 1 tablet, three times a day before meals.   Hold the midodrine if systolic blood pressure is greater than 160  Testing Required after Discharge -   None  Important follow up information -   Please follow up with your primary care physician within 1 week of discharge.   Other Instructions -   Please return to the emergency department or contact your primary care provider if you have continued falls, develop urinary symptoms, abdominal pain, inability to tolerate food or liquids, new or worsening symptoms.  Please ensure that you adequately hydrate with water  Please review this entire after visit summary as additional general instructions including medication list, appointments, activity, diet, any pertinent wound care, and other additional recommendations from  your care team that may be provided for you.    Sincerely,     Tete Lea MD

## 2025-03-14 NOTE — UTILIZATION REVIEW
Continued Stay Review    SEE INITIAL REVIEW AT BOTTOM     Date: 3/14/25                          Current Patient Class: Inpatient  Current Level of Care: med surg    HPI:80 y.o. female initially admitted on 3/12/25   Presented with frequent falls, found to be Orthostatic.   Given IVF, remained orthostatic and started on Low dose midodrine started.  UA positive WBC, given ceftriaxone.   Current Diagnosis:Current Diagnosis:ambulatory dysfunction, orthostatic hypotension, Abnormal UA.     Assessment/Plan: Patient has crossed 3 midnights and requires ongoing care    3/14/2025 .  Patient presents with  no complaints today.  Orthostatic with decrease of 10 systolic.     On exam alert and oriented.   Abnormal labs or imaging:  Urine culture positive for 100,000 cfu/ml Escherichia coli   Plan    PT recommends rehab.  Bed is available.   Continue midodrine.  Adequate hydration.  Start Keflex. IVF stopped     Medications:   bimatoprost, 1 drop, Both Eyes, HS  calcium carbonate-vitamin D, 1 tablet, Oral, Daily With Breakfast  enoxaparin, 40 mg, Subcutaneous, Daily  ezetimibe, 10 mg, Oral, Daily  memantine, 10 mg, Oral, BID  midodrine, 2.5 mg, Oral, TID AC  pantoprazole, 20 mg, Oral, Daily Before Breakfast  sertraline, 200 mg, Oral, Daily         Continuous IV Infusions: none   Infusions Meds - Displays dose, route, & frequency only    sodium chloride 0.9 % infusion  Rate: 75 mL/hr Dose: 75 mL/hr  Freq: Continuous Route: IV  Indications of Use: IV Hydration  Last Dose: Stopped (03/14/25 0749)  Start: 03/13/25 1145 End: 03/14/25 0741     PRN Meds: not used.   ALPRAZolam, 0.25 mg, Oral, HS PRN  meclizine, 25 mg, Oral, Q8H PRN  PRN Medications - displays dose, route, and frequency       Discharge Plan:  discharged to rehab 3/14 (cammy)    Vital Signs (last 3 days) before discharge       Date/Time Temp Pulse Resp BP MAP (mmHg) SpO2 O2 Device Patient Position - Orthostatic VS Collin Coma Scale Score Pain    03/14/25 09:03:55 --  60 -- 146/71 96 97 % -- Standing for 3 minutes - Orthostatic VS -- --    03/14/25 09:01:51 -- 82 -- 146/71 96 95 % -- Standing - Orthostatic VS -- --    03/14/25 0900 -- -- -- -- -- -- -- Sitting - Orthostatic VS 15 No Pain    03/14/25 08:57:19 -- 84 -- 156/85 109 97 % -- Lying - Orthostatic VS -- --    03/14/25 08:55:09 -- 71 -- 136/90 105 96 % -- -- -- --    03/14/25 08:52:56 -- 65 -- 127/76 93 97 % -- -- -- --    03/14/25 07:25:24 97.8 °F (36.6 °C) 65 -- 122/69 87 98 % -- -- -- --    03/13/25 23:38:51 97.8 °F (36.6 °C) 67 16 139/64 89 95 % -- -- -- --    03/13/25 2300 -- -- -- -- -- -- -- -- -- No Pain    03/13/25 15:07:37 98.1 °F (36.7 °C) 80 -- 121/81 94 98 % -- -- -- --    03/13/25 0856 -- -- -- -- -- -- -- -- -- No Pain    03/13/25 08:55:17 -- 75 -- 123/67 86 98 % -- -- -- --    03/13/25 0845 -- -- -- -- -- -- -- -- 15 No Pain    03/13/25 08:34:47 -- 93 -- 103/64 77 96 % -- Standing - Orthostatic VS -- --    03/13/25 08:32:28 -- 70 -- 115/66 82 94 % -- Sitting - Orthostatic VS -- --    03/13/25 0832 -- -- -- -- -- -- -- -- -- No Pain    03/13/25 08:31:10 -- 70 -- 139/70 93 96 % -- Lying - Orthostatic VS -- --    03/13/25 07:33:12 97.6 °F (36.4 °C) 65 -- 149/83 105 96 % -- -- -- --    03/12/25 22:59:54 97.9 °F (36.6 °C) 65 18 137/68 91 95 % -- -- -- --    03/12/25 2145 -- -- -- -- -- -- -- -- 15 No Pain    03/12/25 21:38:15 98 °F (36.7 °C) 64 18 123/70 88 96 % -- -- -- --    03/12/25 2100 -- -- -- -- -- -- -- -- -- No Pain    03/12/25 1900 -- 73 18 148/67 97 95 % None (Room air) -- -- --    03/12/25 1700 -- 75 18 107/61 77 96 % -- -- -- --    03/12/25 1600 -- 69 18 112/70 84 96 % -- -- -- --    03/12/25 1430 -- 76 18 134/103 116 96 % -- -- -- --    03/12/25 1213 -- 70 18 155/87 -- 95 % -- -- 15 --    03/12/25 1211 97.4 °F (36.3 °C) -- -- -- -- -- -- -- -- --    03/12/25 1011 -- -- -- -- -- -- -- -- 15 --    03/12/25 1010 -- 63 18 139/87 -- 98 % -- -- -- --          Weight (last 2 days) before discharge        Date/Time Weight    03/12/25 21:38:15 71.5 (157.6)            Pertinent Labs/Diagnostic Results:   Radiology:  XR chest 1 view portable   ED Interpretation by Rosa Mae MD (03/12 1227)   No acute cardiopulmonary abnormalities      Final Interpretation by Juan Colon MD (03/12 1322)      No acute cardiopulmonary disease.            Workstation performed: [id number]         CT head wo contrast   Final Interpretation by Juan Moulton MD (03/12 1139)      1.  No acute intracranial findings or interval change.      2.  Chronic, unchanged sequelae of communicating hydrocephalus.               Resident: Dave Lee I, the attending radiologist, have reviewed the images and agree with the final report above.      Workstation performed: [id number]         CT spine cervical without contrast   Final Interpretation by Juan Moulton MD (03/12 1144)      No cervical spine fracture or traumatic malalignment.                  Resident: Dave Lee I, the attending radiologist, have reviewed the images and agree with the final report above.      Workstation performed: [id number]           Cardiology:  ECG 12 lead   Final Result by Michele Robles MD (03/12 5919)   Sinus rhythm with Premature atrial complexes in a pattern of bigeminy   Otherwise normal ECG   When compared with ECG of 10-Oct-2024 12:22,   Premature atrial complexes are now Present   Confirmed by Michele Robles ([id number]) on 3/12/2025 3:59:44 PM        GI:  No orders to display     Results from last 7 days   Lab Units 03/13/25  0551 03/12/25  1041   WBC Thousand/uL 7.44 6.55   HEMOGLOBIN g/dL 12.5 12.9   HEMATOCRIT % 38.9 39.4   PLATELETS Thousands/uL 290 294   TOTAL NEUT ABS Thousands/µL  --  4.43     Results from last 7 days   Lab Units 03/13/25  0551 03/12/25  1041   SODIUM mmol/L 141 141   POTASSIUM mmol/L 4.3 3.7   CHLORIDE mmol/L 107 104   CO2 mmol/L 30 30   ANION GAP mmol/L 4 7   BUN mg/dL 13 12   CREATININE mg/dL 0.84 0.78   EGFR  ml/min/1.73sq m 66 72   CALCIUM mg/dL 9.3 9.5   MAGNESIUM mg/dL  --  2.3     Results from last 7 days   Lab Units 03/12/25  1041   AST U/L 18   ALT U/L 14   ALK PHOS U/L 84   TOTAL PROTEIN g/dL 7.2   ALBUMIN g/dL 4.2   TOTAL BILIRUBIN mg/dL 0.55     Results from last 7 days   Lab Units 03/13/25  0551 03/12/25  1041   GLUCOSE RANDOM mg/dL 89 86     Results from last 7 days   Lab Units 03/12/25  1041   CK TOTAL U/L 40     Results from last 7 days   Lab Units 03/12/25  1041   HS TNI 0HR ng/L 3     Results from last 7 days   Lab Units 03/12/25  1230   CLARITY UA  Clear   COLOR UA  Colorless   SPEC GRAV UA  1.006   PH UA  8.0   GLUCOSE UA mg/dl Negative   KETONES UA mg/dl Negative   BLOOD UA  Negative   PROTEIN UA mg/dl Negative   NITRITE UA  Positive*   BILIRUBIN UA  Negative   UROBILINOGEN UA (BE) mg/dl <2.0   LEUKOCYTES UA  Large*   WBC UA /hpf 10-20*   RBC UA /hpf None Seen   BACTERIA UA /hpf Occasional   EPITHELIAL CELLS WET PREP /hpf Occasional       Results from last 7 days   Lab Units 03/12/25  1230   URINE CULTURE  >100,000 cfu/ml Escherichia coli*         Network Utilization Review Department  ATTENTION: Please call with any questions or concerns to [phone] and carefully listen to the prompts so that you are directed to the right person. All voicemails are confidential.   For Discharge needs, contact Care Management DC Support Team at [phone] opt. [phone]  Send all requests for admission clinical reviews, approved or denied determinations and any other requests to dedicated fax number below belonging to the campus where the patient is receiving treatment. List of dedicated fax numbers for the Facilities:  FACILITY NAME UR FAX NUMBER   ADMISSION DENIALS (Administrative/Medical Necessity) [phone]   DISCHARGE SUPPORT TEAM (NETWORK) [phone]   PARENT CHILD HEALTH (Maternity/NICU/Pediatrics) [phone]   [facility] [phone]   [facility]  [phone]   [facility] [phone]   [facility] [phone]   [facility] [phone]   [facility] [phone]   [facility] [phone]   [facility] [phone]   [facility] [phone]   [facility] [phone]   [facility] [phone]   [facility] [phone]

## 2025-03-14 NOTE — ASSESSMENT & PLAN NOTE
Hx of ambulatory dysfunction and falls, most recently admitted on October of 2024 due to dizziness managed with meclizine. The patient was discharged to [facility] with improvement in ambulation and no falls since October discharge until last Friday, 3/7/25 and again this morning, 3/12/25.    The patient states this morning, she got up to answer her door for her home nursing aide. She got up and started walking without her walker and felt her legs gave out. She denies prodromal symptoms at that time, denies head strike or LOC. The patient states she was awake and alert prior to, during and after the fall.   3/12 Patient orthostatic positive    CK normal   B12 581  Mag normal  3/14 P orthostatics negative  PT/OT level 2  Patient will be discharged to [facility]      Plan:  Continue midodrine 2.5 mg 3 times daily  For systolic blood pressure greater than 160  Patient encouraged to ensure adequate hydration

## 2025-03-14 NOTE — ASSESSMENT & PLAN NOTE
Patient with hx of BPPV, previously admitted on October of 2024 with dizziness that resolved. Patient on home meclizine q8h PRN for dizziness which she endorses as responsive to meclizine. On examination, she does not endorse current dizziness when laying or transition to sitting.     Plan:  Continue home meclizine 25 mg q8h PRN

## 2025-03-14 NOTE — QUICK NOTE
Urine cultures positive for greater than 100,000 gram-negative rods, patient is asymptomatic, no dysuria, hematuria.  Per day team, patient has a history of incontinence, has not been endorsing more frequent diaper changes due to wetting.    Last urine culture was positive and 9/2024, pansensitive to antibiotics for E. Coli.    Status post 1 dose of ceftriaxone given in the ED during this admission.  As patient is asymptomatic, off on further antibiotics at this time.  If patient is to endorse any symptoms, can restart antibiotics at that time.

## 2025-03-14 NOTE — ASSESSMENT & PLAN NOTE
3/12/25 UA positive for nitrites, large leukocytes  3/12/25 Urine Microscopy with 10-20 WBCs, occasional bacteria but also epithelial cells.     The patient has a history of incontinence and does not endorse more frequent diaper changes due to wetting, dysuria or hematuria. Previous Urine cultures with pansensitive E.coli, most recently in September of 2024.    WBC on admission 6.55, patient does not meet any SIRS criteria  Patient received 1 dose of Ceftriaxone in the ED    Urine culture positive for 100,000 cfu/ml Escherichia coli     Plan:  Will treat with 3 days of Keflex 500 mg every 6 hours

## 2025-03-14 NOTE — DISCHARGE SUMMARY
Discharge Summary - Hospitalist   Name: Samson Watts 80 y.o. female I MRN: [id number]  Unit/Bed#: W -01 I Date of Admission: 3/12/2025   Date of Service: 3/14/2025 I Hospital Day: 2     Assessment & Plan  Ambulatory dysfunction  Hx of ambulatory dysfunction and falls, most recently admitted on October of 2024 due to dizziness managed with meclizine. The patient was discharged to [facility] with improvement in ambulation and no falls since October discharge until last Friday, 3/7/25 and again this morning, 3/12/25.    The patient states this morning, she got up to answer her door for her home nursing aide. She got up and started walking without her walker and felt her legs gave out. She denies prodromal symptoms at that time, denies head strike or LOC. The patient states she was awake and alert prior to, during and after the fall.   3/12 Patient orthostatic positive    CK normal   B12 581  Mag normal  3/14 P orthostatics negative  PT/OT level 2  Patient will be discharged to [facility]      Plan:  Continue midodrine 2.5 mg 3 times daily  For systolic blood pressure greater than 160  Patient encouraged to ensure adequate hydration    Orthostatic hypotension  See plan above  Abnormal urinalysis  3/12/25 UA positive for nitrites, large leukocytes  3/12/25 Urine Microscopy with 10-20 WBCs, occasional bacteria but also epithelial cells.     The patient has a history of incontinence and does not endorse more frequent diaper changes due to wetting, dysuria or hematuria. Previous Urine cultures with pansensitive E.coli, most recently in September of 2024.    WBC on admission 6.55, patient does not meet any SIRS criteria  Patient received 1 dose of Ceftriaxone in the ED    Urine culture positive for 100,000 cfu/ml Escherichia coli     Plan:  Will treat with 3 days of Keflex 500 mg every 6 hours  Benign paroxysmal vertigo, bilateral  Patient with hx of BPPV, previously admitted on October of 2024 with dizziness that resolved.  Patient on home meclizine q8h PRN for dizziness which she endorses as responsive to meclizine. On examination, she does not endorse current dizziness when laying or transition to sitting.     Plan:  Continue home meclizine 25 mg q8h PRN    Dementia (HCC)  Home medication: Memantine XR 28 mg qd    Plan:  Resume home medications  Hydrocephalus (HCC)  Pt with known hx of communicating hydrocephalus    CT head wo contrast 3/12/2025:   1.  No acute intracranial findings or interval change.  2.  Chronic, unchanged sequelae of communicating hydrocephalus  Depression  Home medication: Zoloft 200 mg po qd    Plan:  -Continue home medication   Hyperlipidemia  Home medication: Zetia 10 mg qd    -Continue home medication  GERD (gastroesophageal reflux disease)  Home medication: Nexium 10 mg qAM    Plan:  -Continue PPI with protonix equivalent     Medical Problems       Resolved Problems  Date Reviewed: 3/12/2025   None       Discharging Physician / Practitioner: Tete Lea MD  PCP: Art Crowell MD  Admission Date:   Admission Orders (From admission, onward)       Ordered        03/12/25 1448  INPATIENT ADMISSION  Once                          Discharge Date: 03/14/25    Consultations During Hospital Stay:  None    Procedures Performed:   None    Significant Findings / Test Results:   Positive orthostatics upon arrival    Incidental Findings:   None    Test Results Pending at Discharge (will require follow up):   None     Outpatient Tests Requested:  None    Complications: None    Reason for Admission: Ambulatory dysfunction and recurrent falls    Hospital Course:   Samson Watts is a 80 y.o. female w/ PMHx SAH in 2016, depression on zoloft, Alzheimer's Dementia on memantine 28 mg qd, BPPV on meclizine PRN patient who originally presented to the hospital on 3/12/2025 due to multiple falls at home, increasing over the last week.  Patient was found to have orthostatic hypotension despite IVF and was started on  "midodrine 2.5 mg TID.  Orthostatic vitals negative upon repeat.  Patient also noted to have UA with large leukocytes and nitrate.  Urine culture came back positive for pansusceptible E. Coli.  Patient denies any urinary symptoms however was given 3-day course of Keflex in the event UTI was contributing to increased weakness and falls.  Patient evaluated by PT OT and found to be level 2.  She was discharged to [facility] short-term rehab.    Please see above list of diagnoses and related plan for additional information.     Condition at Discharge: stable    Discharge Day Visit / Exam:   Subjective:      Vitals: Blood Pressure: 146/71 (03/14/25 0903)  Pulse: 60 (03/14/25 0903)  Temperature: 97.8 °F (36.6 °C) (03/14/25 0725)  Respirations: 16 (03/13/25 2338)  Height: 5' 9\" (175.3 cm) (03/12/25 2100)  Weight - Scale: 71.5 kg (157 lb 9.6 oz) (03/12/25 2138)  SpO2: 97 % (03/14/25 0903)    Physical Exam  Vitals and nursing note reviewed.   Constitutional:       Appearance: Normal appearance.   HENT:      Head: Normocephalic and atraumatic.      Mouth/Throat:      Mouth: Mucous membranes are moist.   Eyes:      Extraocular Movements: Extraocular movements intact.      Conjunctiva/sclera: Conjunctivae normal.      Pupils: Pupils are equal, round, and reactive to light.   Cardiovascular:      Rate and Rhythm: Normal rate and regular rhythm.   Pulmonary:      Effort: Pulmonary effort is normal. No respiratory distress.      Breath sounds: Normal breath sounds.   Abdominal:      General: Bowel sounds are normal. There is no distension.      Palpations: Abdomen is soft.      Tenderness: There is no abdominal tenderness. There is no guarding.   Skin:     General: Skin is warm and dry.   Neurological:      General: No focal deficit present.      Mental Status: She is alert. Mental status is at baseline.      Sensory: Sensation is intact.      Motor: No weakness.      Comments: Alert and oriented to self and place   Psychiatric:    "      Mood and Affect: Mood normal.          Discussion with Family: Patient declined call to .     Discharge instructions/Information to patient and family:   See after visit summary for information provided to patient and family.      Provisions for Follow-Up Care:  See after visit summary for information related to follow-up care and any pertinent home health orders.      Mobility at time of Discharge:   Basic Mobility Inpatient Raw Score: 15  JH-HLM Goal: 4: Move to chair/commode  JH-HLM Achieved: 7: Walk 25 feet or more  HLM Goal achieved. Continue to encourage appropriate mobility.     Disposition:   Other: [facility]    Planned Readmission: No    Discharge Medications:  See after visit summary for reconciled discharge medications provided to patient and/or family.      Administrative Statements   Discharge Statement:  I have spent a total time of 30 minutes in caring for this patient on the day of the visit/encounter. .    **Please Note: This note may have been constructed using a voice recognition system**

## 2025-03-14 NOTE — CASE MANAGEMENT
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     Benign paroxysmal vertigo, bilateral 2019     Current mild episode of major depressive disorder without prior episode (HCC) 2019     Nontoxic single thyroid nodule 2019     Thyroid nodule 2019     Vertigo 05/10/2019     Osteopenia 2018     Constipation 2018     Hyperlipidemia 2018     Celiac artery stenosis (HCC) 2018     Aneurysm of anterior cerebral artery 10/03/2017     Stress incontinence, female 2017     Mild cognitive disorder 2016     Abnormal gait 2016     Depression 2016     Orthostatic hypotension 2016     Depression with anxiety 2016     Hx of breast cancer 2016     GERD (gastroesophageal reflux disease) 2016     Hydrocephalus ([facility]) 2016     Osteoarthritis of hip 2012     Allergic rhinitis 2012     Arthritis 2012       LOS (days): 2  Geometric Mean LOS (GMLOS) (days): 2.5  Days to GMLOS:0.6     OBJECTIVE:  Risk of Unplanned Readmission Score: 10.83         Current admission status: Inpatient   Preferred Pharmacy:   [facility] [address] - [address]  [address] [address]  Phone: [phone] Fax: [phone]    Primary Care Provider: Art Crowell MD    Primary Insurance: [facility]  Secondary Insurance:     DISCHARGE DETAILS:    Discharge planning  discussed with:: Daughter Letty  Freedom of Choice: Yes  Comments - Freedom of Choice: Patient discharging to [facility]. WCV ordered. Daughter updated. Attending and RN aware. Facility confirmed bed availability  CM contacted family/caregiver?: Yes  Were Treatment Team discharge recommendations reviewed with patient/caregiver?: Yes  Did patient/caregiver verbalize understanding of patient care needs?: N/A- going to facility  Were patient/caregiver advised of the risks associated with not following Treatment Team discharge recommendations?: Yes    Contacts  Patient Contacts: Letty Acosta  Relationship to Patient:: Family  Contact Method: Phone  Phone Number: [phone]  Reason/Outcome: Discharge Planning              Other Referral/Resources/Interventions Provided:  Interventions: Short Term Rehab  Referral Comments: Patient discharging to [facility]         Treatment Team Recommendation: Short Term Rehab  Discharge Destination Plan:: Short Term Rehab  Transport at Discharge : Wheelchair van                                           Accepting Facility Name, City & State : [place] [address]  Receiving Facility/Agency Phone Number: [phone]  Facility/Agency Fax Number: [phone]

## 2025-03-14 NOTE — PLAN OF CARE

## 2025-03-14 NOTE — PLAN OF CARE

## 2025-03-14 NOTE — UTILIZATION REVIEW
Continued Stay Review    Date: 03/14                          Current Patient Class: Inpatient  Current Level of Care: MS    HPI:80 y.o. female initially admitted on 03/12   Current Diagnosis:ambulatory dysfunction, orthostatic hypotension, Abnormal UA.    Assessment/Plan:   Date: 03/14  Day 3: Has surpassed a 2nd midnight with active treatments and services.              Medications:   Scheduled Medications:  bimatoprost, 1 drop, Both Eyes, HS  calcium carbonate-vitamin D, 1 tablet, Oral, Daily With Breakfast  enoxaparin, 40 mg, Subcutaneous, Daily  ezetimibe, 10 mg, Oral, Daily  memantine, 10 mg, Oral, BID  midodrine, 2.5 mg, Oral, TID AC  pantoprazole, 20 mg, Oral, Daily Before Breakfast  sertraline, 200 mg, Oral, Daily      Continuous IV Infusions:     PRN Meds:  ALPRAZolam, 0.25 mg, Oral, HS PRN  meclizine, 25 mg, Oral, Q8H PRN      Discharge Plan:       Vital Signs (last 3 days)       Date/Time Temp Pulse Resp BP MAP (mmHg) SpO2 O2 Device Patient Position - Orthostatic VS Munden Coma Scale Score Pain    03/14/25 09:03:55 -- 60 -- 146/71 96 97 % -- Standing for 3 minutes - Orthostatic VS -- --    03/14/25 09:01:51 -- 82 -- 146/71 96 95 % -- Standing - Orthostatic VS -- --    03/14/25 0900 -- -- -- -- -- -- -- Sitting - Orthostatic VS 15 No Pain    03/14/25 08:57:19 -- 84 -- 156/85 109 97 % -- Lying - Orthostatic VS -- --    03/14/25 08:55:09 -- 71 -- 136/90 105 96 % -- -- -- --    03/14/25 08:52:56 -- 65 -- 127/76 93 97 % -- -- -- --    03/14/25 07:25:24 97.8 °F (36.6 °C) 65 -- 122/69 87 98 % -- -- -- --    03/13/25 23:38:51 97.8 °F (36.6 °C) 67 16 139/64 89 95 % -- -- -- --    03/13/25 2300 -- -- -- -- -- -- -- -- -- No Pain    03/13/25 15:07:37 98.1 °F (36.7 °C) 80 -- 121/81 94 98 % -- -- -- --    03/13/25 0856 -- -- -- -- -- -- -- -- -- No Pain    03/13/25 08:55:17 -- 75 -- 123/67 86 98 % -- -- -- --    03/13/25 0845 -- -- -- -- -- -- -- -- 15 No Pain    03/13/25 08:34:47 -- 93 -- 103/64 77 96 % -- Standing  - Orthostatic VS -- --    03/13/25 08:32:28 -- 70 -- 115/66 82 94 % -- Sitting - Orthostatic VS -- --    03/13/25 0832 -- -- -- -- -- -- -- -- -- No Pain    03/13/25 08:31:10 -- 70 -- 139/70 93 96 % -- Lying - Orthostatic VS -- --    03/13/25 07:33:12 97.6 °F (36.4 °C) 65 -- 149/83 105 96 % -- -- -- --    03/12/25 22:59:54 97.9 °F (36.6 °C) 65 18 137/68 91 95 % -- -- -- --    03/12/25 2145 -- -- -- -- -- -- -- -- 15 No Pain    03/12/25 21:38:15 98 °F (36.7 °C) 64 18 123/70 88 96 % -- -- -- --    03/12/25 2100 -- -- -- -- -- -- -- -- -- No Pain    03/12/25 1900 -- 73 18 148/67 97 95 % None (Room air) -- -- --    03/12/25 1700 -- 75 18 107/61 77 96 % -- -- -- --    03/12/25 1600 -- 69 18 112/70 84 96 % -- -- -- --    03/12/25 1430 -- 76 18 134/103 116 96 % -- -- -- --    03/12/25 1213 -- 70 18 155/87 -- 95 % -- -- 15 --    03/12/25 1211 97.4 °F (36.3 °C) -- -- -- -- -- -- -- -- --    03/12/25 1011 -- -- -- -- -- -- -- -- 15 --    03/12/25 1010 -- 63 18 139/87 -- 98 % -- -- -- --          Weight (last 2 days)       Date/Time Weight    03/12/25 21:38:15 71.5 (157.6)            Pertinent Labs/Diagnostic Results:   Radiology:  XR chest 1 view portable   ED Interpretation by Rosa Mae MD (03/12 1227)   No acute cardiopulmonary abnormalities      Final Interpretation by Juan Colon MD (03/12 1322)      No acute cardiopulmonary disease.            Workstation performed: [id number]         CT head wo contrast   Final Interpretation by Juan Moulton MD (03/12 8700)      1.  No acute intracranial findings or interval change.      2.  Chronic, unchanged sequelae of communicating hydrocephalus.               Resident: Dave Lee I, the attending radiologist, have reviewed the images and agree with the final report above.      Workstation performed: [id number]         CT spine cervical without contrast   Final Interpretation by Juan Moulton MD (03/12 1388)      No cervical spine fracture or traumatic  "malalignment.                  Resident: Dave Lee I, the attending radiologist, have reviewed the images and agree with the final report above.      Workstation performed: [id number]           Cardiology:  ECG 12 lead   Final Result by Michele Robles MD (03/12 8343)   Sinus rhythm with Premature atrial complexes in a pattern of bigeminy   Otherwise normal ECG   When compared with ECG of 10-Oct-2024 12:22,   Premature atrial complexes are now Present   Confirmed by Michele Robles ([id number]) on 3/12/2025 3:59:44 PM        GI:  No orders to display           Results from last 7 days   Lab Units 03/13/25  0551 03/12/25  1041   WBC Thousand/uL 7.44 6.55   HEMOGLOBIN g/dL 12.5 12.9   HEMATOCRIT % 38.9 39.4   PLATELETS Thousands/uL 290 294   TOTAL NEUT ABS Thousands/µL  --  4.43         Results from last 7 days   Lab Units 03/13/25  0551 03/12/25  1041   SODIUM mmol/L 141 141   POTASSIUM mmol/L 4.3 3.7   CHLORIDE mmol/L 107 104   CO2 mmol/L 30 30   ANION GAP mmol/L 4 7   BUN mg/dL 13 12   CREATININE mg/dL 0.84 0.78   EGFR ml/min/1.73sq m 66 72   CALCIUM mg/dL 9.3 9.5   MAGNESIUM mg/dL  --  2.3     Results from last 7 days   Lab Units 03/12/25  1041   AST U/L 18   ALT U/L 14   ALK PHOS U/L 84   TOTAL PROTEIN g/dL 7.2   ALBUMIN g/dL 4.2   TOTAL BILIRUBIN mg/dL 0.55         Results from last 7 days   Lab Units 03/13/25  0551 03/12/25  1041   GLUCOSE RANDOM mg/dL 89 86             No results found for: \"BETA-HYDROXYBUTYRATE\"               Results from last 7 days   Lab Units 03/12/25  1041   CK TOTAL U/L 40     Results from last 7 days   Lab Units 03/12/25  1041   HS TNI 0HR ng/L 3                                                                     Results from last 7 days   Lab Units 03/12/25  1230   CLARITY UA  Clear   COLOR UA  Colorless   SPEC GRAV UA  1.006   PH UA  8.0   GLUCOSE UA mg/dl Negative   KETONES UA mg/dl Negative   BLOOD UA  Negative   PROTEIN UA mg/dl Negative   NITRITE UA  Positive*   BILIRUBIN UA  " Negative   UROBILINOGEN UA (BE) mg/dl <2.0   LEUKOCYTES UA  Large*   WBC UA /hpf 10-20*   RBC UA /hpf None Seen   BACTERIA UA /hpf Occasional   EPITHELIAL CELLS WET PREP /hpf Occasional                                 Results from last 7 days   Lab Units 03/12/25  1230   URINE CULTURE  >100,000 cfu/ml Escherichia coli*                   Network Utilization Review Department  ATTENTION: Please call with any questions or concerns to [phone] and carefully listen to the prompts so that you are directed to the right person. All voicemails are confidential.   For Discharge needs, contact Care Management DC Support Team at [phone] opt. 2  Send all requests for admission clinical reviews, approved or denied determinations and any other requests to dedicated fax number below belonging to the campus where the patient is receiving treatment. List of dedicated fax numbers for the Facilities:  FACILITY NAME UR FAX NUMBER   ADMISSION DENIALS (Administrative/Medical Necessity) [phone]   DISCHARGE SUPPORT TEAM (NETWORK) [phone]   PARENT CHILD HEALTH (Maternity/NICU/Pediatrics) [phone]   [facility] [phone]   [facility] [phone]   [facility] [phone]   [facility] [phone]   [facility] [phone]   [facility] [phone]   [facility] [phone]   [facility] [phone]   [facility] [phone]   [facility] [phone]   [facility] [phone]   [facility] [phone]

## 2025-03-17 ENCOUNTER — TRANSITIONAL CARE MANAGEMENT (OUTPATIENT)
Dept: FAMILY MEDICINE CLINIC | Facility: CLINIC | Age: 80
End: 2025-03-17

## 2025-03-17 ENCOUNTER — PATIENT OUTREACH (OUTPATIENT)
Dept: CASE MANAGEMENT | Facility: OTHER | Age: 80
End: 2025-03-17

## 2025-03-17 NOTE — UTILIZATION REVIEW
NOTIFICATION OF ADMISSION DISCHARGE   This is a Notification of Discharge from [facility]. Please be advised that this patient has been discharge from our facility. Below you will find the admission and discharge date and time including the patient’s disposition.   UTILIZATION REVIEW CONTACT:  Ilda Cordova  Utilization   Network Utilization Review Department  Phone: [phone] x carefully listen to the prompts. All voicemails are confidential.  Email: [email or web address]     ADMISSION INFORMATION  PRESENTATION DATE: 3/12/2025 10:07 AM  OBERVATION ADMISSION DATE: N/A  INPATIENT ADMISSION DATE: 3/12/25  2:48 PM   DISCHARGE DATE: 3/14/2025  3:09 PM   [facility]    Network Utilization Review Department  ATTENTION: Please call with any questions or concerns to [phone] and carefully listen to the prompts so that you are directed to the right person. All voicemails are confidential.   For Discharge needs, contact Care Management DC Support Team at [phone] opt. 2  Send all requests for admission clinical reviews, approved or denied determinations and any other requests to dedicated fax number below belonging to the campus where the patient is receiving treatment. List of dedicated fax numbers for the Facilities:  FACILITY NAME UR FAX NUMBER   ADMISSION DENIALS (Administrative/Medical Necessity) [phone]   DISCHARGE SUPPORT TEAM ([facility]) [phone]   PARENT CHILD HEALTH (Maternity/NICU/Pediatrics) [phone]   [facility] [phone]   [facility] [phone]   [facility] [phone]   [facility] [phone]   [facility] [phone]   [facility] [phone]   [facility] [phone]   [facility]  [phone]   [facility] [phone]   [facility] [phone]   [facility] [phone]   [facility] [phone]

## 2025-03-17 NOTE — PROGRESS NOTES
ADT Notification received.  Patient discharged from [facility] to [facility] for STR.    Call placed to patient's dtr Letty to check status of patient and ask if this is the LTC placement plan as well.  Spoke with Letty who stated there was confusion about STR vs LTC when patient arrived at SNF.  This has been resolved.  Patient will start in STR and plan will be for patient to transition to LTC at [facility].  Will close case at this time due to plan for LTC placement but will remain available to assist with future needs.  Encouraged Letty to outreach as needed.

## 2025-04-28 ENCOUNTER — TELEPHONE (OUTPATIENT)
Age: 80
End: 2025-04-28

## 2025-04-28 NOTE — TELEPHONE ENCOUNTER
LSW left message for patient's daughter-Letty ([email or web address]) to see if they would like to reschedule appt again as it was cancelled again (appt for 4/29). LSW stated that we are able to provide transportation to the appointment if needed, and provided office number to reschedule if interested.

## 2025-04-28 NOTE — TELEPHONE ENCOUNTER
Patient daughter returned a call from [place]. Daughter says patient will not need appointments as she has now been placed in long term care. Please advise.

## 2025-04-28 NOTE — TELEPHONE ENCOUNTER
----- Message from Lucila MULLEN sent at 3/11/2025  9:41 AM EDT -----  Regarding: Transportation  Please contact patient to advise her 4/29/25 appointment has been re-established for the same date and time, and that transportation will be arranged prior to the visit.    Patient cancelled 6 month follow up appointment due to lack of transportation.